# Patient Record
Sex: FEMALE | Race: WHITE | Employment: UNEMPLOYED | URBAN - METROPOLITAN AREA
[De-identification: names, ages, dates, MRNs, and addresses within clinical notes are randomized per-mention and may not be internally consistent; named-entity substitution may affect disease eponyms.]

---

## 2018-09-19 ENCOUNTER — OFFICE VISIT (OUTPATIENT)
Dept: ORTHOPEDIC SURGERY | Age: 58
End: 2018-09-19

## 2018-09-19 VITALS — BODY MASS INDEX: 31.58 KG/M2 | HEIGHT: 64 IN | WEIGHT: 185 LBS

## 2018-09-19 DIAGNOSIS — M76.61 ACHILLES TENDINITIS OF RIGHT LOWER EXTREMITY: ICD-10-CM

## 2018-09-19 DIAGNOSIS — M79.671 PAIN OF RIGHT HEEL: Primary | ICD-10-CM

## 2018-09-19 PROCEDURE — 99203 OFFICE O/P NEW LOW 30 MIN: CPT | Performed by: PHYSICIAN ASSISTANT

## 2018-09-19 PROCEDURE — L4361 PNEUMA/VAC WALK BOOT PRE OTS: HCPCS | Performed by: PHYSICIAN ASSISTANT

## 2018-09-19 RX ORDER — AMLODIPINE BESYLATE 10 MG/1
10 TABLET ORAL DAILY
COMMUNITY
Start: 2018-05-09

## 2018-09-19 NOTE — PROGRESS NOTES
T & A     Current Medications:     Current Outpatient Prescriptions:     amLODIPine (NORVASC) 10 MG tablet, Take 10 mg by mouth, Disp: , Rfl:     Etanercept (ENBREL SC), Inject  into the skin twice a week.  , Disp: , Rfl:     celecoxib (CELEBREX) 200 MG capsule, Take 200 mg by mouth daily. , Disp: , Rfl:     thyroid (ARMOUR) 240 MG tablet, Take 240 mg by mouth daily. , Disp: , Rfl:     Nutritional Supplements (DHEA PO), Take 10 mg by mouth three times a week., Disp: , Rfl:   Allergies:  Adhesive tape; Gluten; Plaquenil [hydroxychloroquine sulfate]; Vicodin [hydrocodone-acetaminophen]; Benzamide derivatives; and Codeine  Social History:    reports that she has never smoked. She has never used smokeless tobacco. She reports that she drinks alcohol. She reports that she does not use drugs. Family History:   Family History   Problem Relation Age of Onset    Heart Disease Father         enlarged heart    Cancer Brother        REVIEW OF SYSTEMS:   For new problems, a full review of systems will be found scanned in the patient's chart. CONSTITUTIONAL: Denies unexplained weight loss, fevers, chills   NEUROLOGICAL: Denies unsteady gait or progressive weakness  SKIN: Denies skin changes, delayed healing, rash, itching       PHYSICAL EXAM:    Vitals: Height 5' 4\" (1.626 m), weight 185 lb (83.9 kg), last menstrual period 09/01/2011. GENERAL EXAM:  · General Apparence: Patient is adequately groomed with no evidence of malnutrition. · Orientation: The patient is oriented to time, place and person. · Mood & Affect:The patient's mood and affect are appropriate       Right Achilles tendon PHYSICAL EXAMINATION:  · Inspection:  No visible deformities noted    · Palpation:  Tenderness to palpation at the distal Achilles insertion and retro-Achilles space      · Range of Motion: Range of motion limited by swelling used to chief complaint    · Strength: No gross strength deficits.   Strength limited only by She is going to follow up with Dr. Khari Mercer into 3 weeks. Eduardo Proctor, [facility]    This dictation was performed with a verbal recognition program [facility]) and it was checked for errors. It is possible that there are still dictated errors within this office note. If so, please bring any errors to my attention for an addendum. All efforts were made to ensure that this office note is accurate.

## 2018-09-20 ENCOUNTER — TELEPHONE (OUTPATIENT)
Dept: ORTHOPEDIC SURGERY | Age: 58
End: 2018-09-20

## 2018-10-12 ENCOUNTER — OFFICE VISIT (OUTPATIENT)
Dept: ORTHOPEDIC SURGERY | Age: 58
End: 2018-10-12
Payer: COMMERCIAL

## 2018-10-12 DIAGNOSIS — M76.61 TENDONITIS, ACHILLES, RIGHT: Primary | ICD-10-CM

## 2018-10-12 PROCEDURE — 99243 OFF/OP CNSLTJ NEW/EST LOW 30: CPT | Performed by: ORTHOPAEDIC SURGERY

## 2018-10-12 RX ORDER — PREDNISONE 10 MG/1
TABLET ORAL
Qty: 14 TABLET | Refills: 0 | Status: ON HOLD | OUTPATIENT
Start: 2018-10-12 | End: 2019-12-11 | Stop reason: ALTCHOICE

## 2018-10-13 NOTE — PROGRESS NOTES
Additional Examinations:         Left Lower Extremity: Examination of the left lower extremity does not show any tenderness, deformity or injury. Range of motion is unremarkable. There is no gross instability. There are no rashes, ulcerations or lesions. Strength and tone are normal.    Radiology:     X-rays obtained and reviewed in office:  Views 2  Location right calcaneus  Impression obtained previously shows evidence of some insertional Achilles spurring with Christa's deformity          Assessment : This patient has insertional Achilles tendinosis. Impression:  Encounter Diagnosis   Name Primary?  Tendonitis, Achilles, right Yes       Office Procedures:  No orders of the defined types were placed in this encounter. Treatment Plan:  I spent 30 minutes with this patient greater than 50% of the time face-to-face discussing treatment options. I wrote out her plan of care and copy to the media section of her chart. She is going to continue with her boot but use crutches as needed I put her on prednisone and I will see her back in 3 weeks at which point if she is not significantly improved I would recommend that we scan her and discuss surgical options.   Her hypersensitivity and fibromyalgia would be a problem with anything surgical

## 2018-11-02 ENCOUNTER — TELEPHONE (OUTPATIENT)
Dept: ORTHOPEDIC SURGERY | Age: 58
End: 2018-11-02

## 2018-11-02 ENCOUNTER — OFFICE VISIT (OUTPATIENT)
Dept: ORTHOPEDIC SURGERY | Age: 58
End: 2018-11-02
Payer: COMMERCIAL

## 2018-11-02 VITALS
HEIGHT: 64 IN | HEART RATE: 76 BPM | SYSTOLIC BLOOD PRESSURE: 135 MMHG | DIASTOLIC BLOOD PRESSURE: 87 MMHG | BODY MASS INDEX: 31.58 KG/M2 | WEIGHT: 184.97 LBS

## 2018-11-02 DIAGNOSIS — S86.001S ACHILLES TENDON INJURY, RIGHT, SEQUELA: Primary | ICD-10-CM

## 2018-11-02 DIAGNOSIS — S86.001D INJURY OF RIGHT ACHILLES TENDON, SUBSEQUENT ENCOUNTER: Primary | ICD-10-CM

## 2018-11-02 PROCEDURE — 99212 OFFICE O/P EST SF 10 MIN: CPT | Performed by: ORTHOPAEDIC SURGERY

## 2018-11-09 ENCOUNTER — TELEPHONE (OUTPATIENT)
Dept: ORTHOPEDIC SURGERY | Age: 58
End: 2018-11-09

## 2019-01-30 ENCOUNTER — OFFICE VISIT (OUTPATIENT)
Dept: ORTHOPEDIC SURGERY | Age: 59
End: 2019-01-30
Payer: COMMERCIAL

## 2019-01-30 VITALS — WEIGHT: 184.97 LBS | HEIGHT: 64 IN | BODY MASS INDEX: 31.58 KG/M2

## 2019-01-30 DIAGNOSIS — S86.001D INJURY OF RIGHT ACHILLES TENDON, SUBSEQUENT ENCOUNTER: Primary | ICD-10-CM

## 2019-01-30 PROCEDURE — 99212 OFFICE O/P EST SF 10 MIN: CPT | Performed by: ORTHOPAEDIC SURGERY

## 2019-12-10 ENCOUNTER — ANESTHESIA EVENT (OUTPATIENT)
Dept: OPERATING ROOM | Age: 59
DRG: 472 | End: 2019-12-10
Payer: COMMERCIAL

## 2019-12-11 ENCOUNTER — HOSPITAL ENCOUNTER (INPATIENT)
Age: 59
LOS: 4 days | Discharge: HOME | DRG: 472 | End: 2019-12-15
Attending: NEUROLOGICAL SURGERY | Admitting: NEUROLOGICAL SURGERY
Payer: COMMERCIAL

## 2019-12-11 ENCOUNTER — ANESTHESIA (OUTPATIENT)
Dept: OPERATING ROOM | Age: 59
DRG: 472 | End: 2019-12-11
Payer: COMMERCIAL

## 2019-12-11 ENCOUNTER — APPOINTMENT (OUTPATIENT)
Dept: GENERAL RADIOLOGY | Age: 59
DRG: 472 | End: 2019-12-11
Attending: NEUROLOGICAL SURGERY
Payer: COMMERCIAL

## 2019-12-11 VITALS — SYSTOLIC BLOOD PRESSURE: 81 MMHG | TEMPERATURE: 69.4 F | DIASTOLIC BLOOD PRESSURE: 51 MMHG | OXYGEN SATURATION: 91 %

## 2019-12-11 DIAGNOSIS — M47.22 CERVICAL SPONDYLOSIS WITH RADICULOPATHY: Primary | ICD-10-CM

## 2019-12-11 LAB
ABO/RH: NORMAL
ANTIBODY SCREEN: NORMAL
BASOPHILS ABSOLUTE: 0 K/UL (ref 0–0.2)
BASOPHILS RELATIVE PERCENT: 0.7 %
EOSINOPHILS ABSOLUTE: 0.1 K/UL (ref 0–0.6)
EOSINOPHILS RELATIVE PERCENT: 2 %
HCT VFR BLD CALC: 43.3 % (ref 36–48)
HEMOGLOBIN: 14.9 G/DL (ref 12–16)
LYMPHOCYTES ABSOLUTE: 1.1 K/UL (ref 1–5.1)
LYMPHOCYTES RELATIVE PERCENT: 15.9 %
MCH RBC QN AUTO: 32.2 PG (ref 26–34)
MCHC RBC AUTO-ENTMCNC: 34.5 G/DL (ref 31–36)
MCV RBC AUTO: 93.2 FL (ref 80–100)
MONOCYTES ABSOLUTE: 0.8 K/UL (ref 0–1.3)
MONOCYTES RELATIVE PERCENT: 11.6 %
NEUTROPHILS ABSOLUTE: 4.6 K/UL (ref 1.7–7.7)
NEUTROPHILS RELATIVE PERCENT: 69.8 %
PDW BLD-RTO: 13.2 % (ref 12.4–15.4)
PLATELET # BLD: 244 K/UL (ref 135–450)
PMV BLD AUTO: 10.1 FL (ref 5–10.5)
RBC # BLD: 4.65 M/UL (ref 4–5.2)
WBC # BLD: 6.6 K/UL (ref 4–11)

## 2019-12-11 PROCEDURE — 86900 BLOOD TYPING SEROLOGIC ABO: CPT

## 2019-12-11 PROCEDURE — 3209999900 FLUORO FOR SURGICAL PROCEDURES

## 2019-12-11 PROCEDURE — 6360000002 HC RX W HCPCS: Performed by: ANESTHESIOLOGY

## 2019-12-11 PROCEDURE — 86901 BLOOD TYPING SEROLOGIC RH(D): CPT

## 2019-12-11 PROCEDURE — 2580000003 HC RX 258: Performed by: ANESTHESIOLOGY

## 2019-12-11 PROCEDURE — 6360000002 HC RX W HCPCS: Performed by: NEUROLOGICAL SURGERY

## 2019-12-11 PROCEDURE — 6370000000 HC RX 637 (ALT 250 FOR IP): Performed by: ANESTHESIOLOGY

## 2019-12-11 PROCEDURE — 7100000001 HC PACU RECOVERY - ADDTL 15 MIN: Performed by: NEUROLOGICAL SURGERY

## 2019-12-11 PROCEDURE — 72040 X-RAY EXAM NECK SPINE 2-3 VW: CPT

## 2019-12-11 PROCEDURE — 2780000010 HC IMPLANT OTHER: Performed by: NEUROLOGICAL SURGERY

## 2019-12-11 PROCEDURE — 2709999900 HC NON-CHARGEABLE SUPPLY: Performed by: NEUROLOGICAL SURGERY

## 2019-12-11 PROCEDURE — 7100000000 HC PACU RECOVERY - FIRST 15 MIN: Performed by: NEUROLOGICAL SURGERY

## 2019-12-11 PROCEDURE — 0RB30ZZ EXCISION OF CERVICAL VERTEBRAL DISC, OPEN APPROACH: ICD-10-PCS | Performed by: NEUROLOGICAL SURGERY

## 2019-12-11 PROCEDURE — 2500000003 HC RX 250 WO HCPCS: Performed by: NURSE ANESTHETIST, CERTIFIED REGISTERED

## 2019-12-11 PROCEDURE — 6360000002 HC RX W HCPCS: Performed by: NURSE ANESTHETIST, CERTIFIED REGISTERED

## 2019-12-11 PROCEDURE — 6370000000 HC RX 637 (ALT 250 FOR IP): Performed by: NEUROLOGICAL SURGERY

## 2019-12-11 PROCEDURE — 86850 RBC ANTIBODY SCREEN: CPT

## 2019-12-11 PROCEDURE — 2500000003 HC RX 250 WO HCPCS: Performed by: ANESTHESIOLOGY

## 2019-12-11 PROCEDURE — 2580000003 HC RX 258: Performed by: NURSE ANESTHETIST, CERTIFIED REGISTERED

## 2019-12-11 PROCEDURE — C1713 ANCHOR/SCREW BN/BN,TIS/BN: HCPCS | Performed by: NEUROLOGICAL SURGERY

## 2019-12-11 PROCEDURE — 85025 COMPLETE CBC W/AUTO DIFF WBC: CPT

## 2019-12-11 PROCEDURE — 3600000004 HC SURGERY LEVEL 4 BASE: Performed by: NEUROLOGICAL SURGERY

## 2019-12-11 PROCEDURE — 0RG20A0 FUSION OF 2 OR MORE CERVICAL VERTEBRAL JOINTS WITH INTERBODY FUSION DEVICE, ANTERIOR APPROACH, ANTERIOR COLUMN, OPEN APPROACH: ICD-10-PCS | Performed by: NEUROLOGICAL SURGERY

## 2019-12-11 PROCEDURE — 2500000003 HC RX 250 WO HCPCS: Performed by: NEUROLOGICAL SURGERY

## 2019-12-11 PROCEDURE — 3600000014 HC SURGERY LEVEL 4 ADDTL 15MIN: Performed by: NEUROLOGICAL SURGERY

## 2019-12-11 PROCEDURE — G0378 HOSPITAL OBSERVATION PER HR: HCPCS

## 2019-12-11 PROCEDURE — 2580000003 HC RX 258: Performed by: NEUROLOGICAL SURGERY

## 2019-12-11 PROCEDURE — 51701 INSERT BLADDER CATHETER: CPT

## 2019-12-11 PROCEDURE — 3700000000 HC ANESTHESIA ATTENDED CARE: Performed by: NEUROLOGICAL SURGERY

## 2019-12-11 PROCEDURE — 2720000010 HC SURG SUPPLY STERILE: Performed by: NEUROLOGICAL SURGERY

## 2019-12-11 PROCEDURE — 51798 US URINE CAPACITY MEASURE: CPT

## 2019-12-11 PROCEDURE — 3700000001 HC ADD 15 MINUTES (ANESTHESIA): Performed by: NEUROLOGICAL SURGERY

## 2019-12-11 PROCEDURE — 1200000000 HC SEMI PRIVATE

## 2019-12-11 DEVICE — IMPLANTABLE DEVICE
Type: IMPLANTABLE DEVICE | Site: SPINE CERVICAL | Status: FUNCTIONAL
Brand: VISTA®-S

## 2019-12-11 DEVICE — IMPLANTABLE DEVICE
Type: IMPLANTABLE DEVICE | Site: SPINE CERVICAL | Status: FUNCTIONAL
Brand: TRINICA® TRINICA®

## 2019-12-11 DEVICE — PRIMAGEN 1CC: Type: IMPLANTABLE DEVICE | Site: SPINE CERVICAL | Status: FUNCTIONAL

## 2019-12-11 RX ORDER — DIPHENHYDRAMINE HYDROCHLORIDE 50 MG/ML
12.5 INJECTION INTRAMUSCULAR; INTRAVENOUS
Status: DISCONTINUED | OUTPATIENT
Start: 2019-12-11 | End: 2019-12-11 | Stop reason: HOSPADM

## 2019-12-11 RX ORDER — SODIUM CHLORIDE, SODIUM LACTATE, POTASSIUM CHLORIDE, CALCIUM CHLORIDE 600; 310; 30; 20 MG/100ML; MG/100ML; MG/100ML; MG/100ML
INJECTION, SOLUTION INTRAVENOUS CONTINUOUS PRN
Status: DISCONTINUED | OUTPATIENT
Start: 2019-12-11 | End: 2019-12-11 | Stop reason: SDUPTHER

## 2019-12-11 RX ORDER — HYDROMORPHONE HCL 110MG/55ML
PATIENT CONTROLLED ANALGESIA SYRINGE INTRAVENOUS PRN
Status: DISCONTINUED | OUTPATIENT
Start: 2019-12-11 | End: 2019-12-11 | Stop reason: SDUPTHER

## 2019-12-11 RX ORDER — SODIUM CHLORIDE, SODIUM LACTATE, POTASSIUM CHLORIDE, CALCIUM CHLORIDE 600; 310; 30; 20 MG/100ML; MG/100ML; MG/100ML; MG/100ML
INJECTION, SOLUTION INTRAVENOUS CONTINUOUS
Status: DISCONTINUED | OUTPATIENT
Start: 2019-12-11 | End: 2019-12-11

## 2019-12-11 RX ORDER — OXYCODONE HYDROCHLORIDE 5 MG/1
10 TABLET ORAL EVERY 4 HOURS PRN
Status: DISCONTINUED | OUTPATIENT
Start: 2019-12-11 | End: 2019-12-13

## 2019-12-11 RX ORDER — AMLODIPINE BESYLATE 10 MG/1
10 TABLET ORAL DAILY
Status: DISCONTINUED | OUTPATIENT
Start: 2019-12-11 | End: 2019-12-15 | Stop reason: HOSPADM

## 2019-12-11 RX ORDER — FOLIC ACID 1 MG/1
1 TABLET ORAL DAILY
COMMUNITY

## 2019-12-11 RX ORDER — OXYCODONE HYDROCHLORIDE 5 MG/1
10 TABLET ORAL ONCE
Status: COMPLETED | OUTPATIENT
Start: 2019-12-11 | End: 2019-12-11

## 2019-12-11 RX ORDER — SUCCINYLCHOLINE/SOD CL,ISO/PF 200MG/10ML
SYRINGE (ML) INTRAVENOUS PRN
Status: DISCONTINUED | OUTPATIENT
Start: 2019-12-11 | End: 2019-12-11 | Stop reason: SDUPTHER

## 2019-12-11 RX ORDER — OXYCODONE HYDROCHLORIDE 5 MG/1
5 TABLET ORAL EVERY 4 HOURS PRN
Status: DISCONTINUED | OUTPATIENT
Start: 2019-12-11 | End: 2019-12-13

## 2019-12-11 RX ORDER — APREPITANT 40 MG/1
40 CAPSULE ORAL ONCE
Status: COMPLETED | OUTPATIENT
Start: 2019-12-11 | End: 2019-12-11

## 2019-12-11 RX ORDER — SODIUM CHLORIDE 9 MG/ML
INJECTION, SOLUTION INTRAVENOUS CONTINUOUS
Status: DISCONTINUED | OUTPATIENT
Start: 2019-12-11 | End: 2019-12-11

## 2019-12-11 RX ORDER — FENTANYL CITRATE 50 UG/ML
50 INJECTION, SOLUTION INTRAMUSCULAR; INTRAVENOUS EVERY 5 MIN PRN
Status: DISCONTINUED | OUTPATIENT
Start: 2019-12-11 | End: 2019-12-11 | Stop reason: HOSPADM

## 2019-12-11 RX ORDER — SODIUM CHLORIDE 9 MG/ML
INJECTION, SOLUTION INTRAVENOUS CONTINUOUS
Status: ACTIVE | OUTPATIENT
Start: 2019-12-11 | End: 2019-12-12

## 2019-12-11 RX ORDER — SODIUM CHLORIDE 0.9 % (FLUSH) 0.9 %
10 SYRINGE (ML) INJECTION EVERY 12 HOURS SCHEDULED
Status: DISCONTINUED | OUTPATIENT
Start: 2019-12-11 | End: 2019-12-15 | Stop reason: HOSPADM

## 2019-12-11 RX ORDER — MIDAZOLAM HYDROCHLORIDE 1 MG/ML
INJECTION INTRAMUSCULAR; INTRAVENOUS PRN
Status: DISCONTINUED | OUTPATIENT
Start: 2019-12-11 | End: 2019-12-11 | Stop reason: SDUPTHER

## 2019-12-11 RX ORDER — ROCURONIUM BROMIDE 10 MG/ML
INJECTION, SOLUTION INTRAVENOUS PRN
Status: DISCONTINUED | OUTPATIENT
Start: 2019-12-11 | End: 2019-12-11 | Stop reason: SDUPTHER

## 2019-12-11 RX ORDER — SODIUM CHLORIDE 0.9 % (FLUSH) 0.9 %
10 SYRINGE (ML) INJECTION PRN
Status: DISCONTINUED | OUTPATIENT
Start: 2019-12-11 | End: 2019-12-11 | Stop reason: HOSPADM

## 2019-12-11 RX ORDER — METHOCARBAMOL 750 MG/1
750 TABLET, FILM COATED ORAL EVERY 6 HOURS PRN
Status: DISCONTINUED | OUTPATIENT
Start: 2019-12-12 | End: 2019-12-13

## 2019-12-11 RX ORDER — METHOCARBAMOL 750 MG/1
750 TABLET, FILM COATED ORAL 4 TIMES DAILY
Status: ON HOLD | COMMUNITY
End: 2020-08-29 | Stop reason: HOSPADM

## 2019-12-11 RX ORDER — HEPARIN SODIUM 5000 [USP'U]/ML
5000 INJECTION, SOLUTION INTRAVENOUS; SUBCUTANEOUS EVERY 8 HOURS
Status: DISCONTINUED | OUTPATIENT
Start: 2019-12-12 | End: 2019-12-15 | Stop reason: HOSPADM

## 2019-12-11 RX ORDER — FENTANYL CITRATE 50 UG/ML
25 INJECTION, SOLUTION INTRAMUSCULAR; INTRAVENOUS EVERY 5 MIN PRN
Status: DISCONTINUED | OUTPATIENT
Start: 2019-12-11 | End: 2019-12-11 | Stop reason: HOSPADM

## 2019-12-11 RX ORDER — LABETALOL 20 MG/4 ML (5 MG/ML) INTRAVENOUS SYRINGE
5 EVERY 10 MIN PRN
Status: DISCONTINUED | OUTPATIENT
Start: 2019-12-11 | End: 2019-12-11 | Stop reason: HOSPADM

## 2019-12-11 RX ORDER — ONDANSETRON 2 MG/ML
4 INJECTION INTRAMUSCULAR; INTRAVENOUS EVERY 6 HOURS PRN
Status: DISCONTINUED | OUTPATIENT
Start: 2019-12-11 | End: 2019-12-15 | Stop reason: HOSPADM

## 2019-12-11 RX ORDER — LIDOCAINE HYDROCHLORIDE ANHYDROUS AND DEXTROSE MONOHYDRATE .4; 5 G/100ML; G/100ML
INJECTION, SOLUTION INTRAVENOUS CONTINUOUS PRN
Status: DISCONTINUED | OUTPATIENT
Start: 2019-12-11 | End: 2019-12-11 | Stop reason: SDUPTHER

## 2019-12-11 RX ORDER — POLYETHYLENE GLYCOL 3350 17 G/17G
17 POWDER, FOR SOLUTION ORAL DAILY PRN
Status: DISCONTINUED | OUTPATIENT
Start: 2019-12-11 | End: 2019-12-15 | Stop reason: HOSPADM

## 2019-12-11 RX ORDER — ESMOLOL HYDROCHLORIDE 10 MG/ML
20 INJECTION INTRAVENOUS ONCE
Status: DISCONTINUED | OUTPATIENT
Start: 2019-12-11 | End: 2019-12-11 | Stop reason: HOSPADM

## 2019-12-11 RX ORDER — PROPOFOL 10 MG/ML
INJECTION, EMULSION INTRAVENOUS CONTINUOUS PRN
Status: DISCONTINUED | OUTPATIENT
Start: 2019-12-11 | End: 2019-12-11 | Stop reason: SDUPTHER

## 2019-12-11 RX ORDER — TRIAMCINOLONE ACETONIDE 0.25 MG/G
CREAM TOPICAL EVERY 4 HOURS PRN
COMMUNITY

## 2019-12-11 RX ORDER — SCOLOPAMINE TRANSDERMAL SYSTEM 1 MG/1
1 PATCH, EXTENDED RELEASE TRANSDERMAL
Status: DISCONTINUED | OUTPATIENT
Start: 2019-12-11 | End: 2019-12-11

## 2019-12-11 RX ORDER — HYDRALAZINE HYDROCHLORIDE 20 MG/ML
5 INJECTION INTRAMUSCULAR; INTRAVENOUS EVERY 10 MIN PRN
Status: DISCONTINUED | OUTPATIENT
Start: 2019-12-11 | End: 2019-12-11 | Stop reason: HOSPADM

## 2019-12-11 RX ORDER — SENNA AND DOCUSATE SODIUM 50; 8.6 MG/1; MG/1
1 TABLET, FILM COATED ORAL 2 TIMES DAILY
Status: DISCONTINUED | OUTPATIENT
Start: 2019-12-11 | End: 2019-12-15 | Stop reason: HOSPADM

## 2019-12-11 RX ORDER — LEVOTHYROXINE AND LIOTHYRONINE 19; 4.5 UG/1; UG/1
120 TABLET ORAL DAILY
Status: DISCONTINUED | OUTPATIENT
Start: 2019-12-12 | End: 2019-12-15 | Stop reason: HOSPADM

## 2019-12-11 RX ORDER — ONDANSETRON 2 MG/ML
4 INJECTION INTRAMUSCULAR; INTRAVENOUS
Status: COMPLETED | OUTPATIENT
Start: 2019-12-11 | End: 2019-12-11

## 2019-12-11 RX ORDER — ESMOLOL HYDROCHLORIDE 10 MG/ML
20 INJECTION INTRAVENOUS ONCE
Status: COMPLETED | OUTPATIENT
Start: 2019-12-11 | End: 2019-12-11

## 2019-12-11 RX ORDER — PROCHLORPERAZINE EDISYLATE 5 MG/ML
10 INJECTION INTRAMUSCULAR; INTRAVENOUS EVERY 6 HOURS PRN
Status: DISCONTINUED | OUTPATIENT
Start: 2019-12-11 | End: 2019-12-15 | Stop reason: HOSPADM

## 2019-12-11 RX ORDER — FENTANYL CITRATE 50 UG/ML
INJECTION, SOLUTION INTRAMUSCULAR; INTRAVENOUS PRN
Status: DISCONTINUED | OUTPATIENT
Start: 2019-12-11 | End: 2019-12-11 | Stop reason: SDUPTHER

## 2019-12-11 RX ORDER — HYDROCHLOROTHIAZIDE 12.5 MG/1
12.5 CAPSULE, GELATIN COATED ORAL DAILY
COMMUNITY
End: 2020-08-26

## 2019-12-11 RX ORDER — DIAZEPAM 5 MG/1
5 TABLET ORAL EVERY 6 HOURS PRN
Status: DISCONTINUED | OUTPATIENT
Start: 2019-12-11 | End: 2019-12-15 | Stop reason: HOSPADM

## 2019-12-11 RX ORDER — DEXAMETHASONE SODIUM PHOSPHATE 4 MG/ML
INJECTION, SOLUTION INTRA-ARTICULAR; INTRALESIONAL; INTRAMUSCULAR; INTRAVENOUS; SOFT TISSUE PRN
Status: DISCONTINUED | OUTPATIENT
Start: 2019-12-11 | End: 2019-12-11 | Stop reason: SDUPTHER

## 2019-12-11 RX ORDER — SODIUM CHLORIDE 0.9 % (FLUSH) 0.9 %
10 SYRINGE (ML) INJECTION PRN
Status: DISCONTINUED | OUTPATIENT
Start: 2019-12-11 | End: 2019-12-15 | Stop reason: HOSPADM

## 2019-12-11 RX ORDER — PROCHLORPERAZINE EDISYLATE 5 MG/ML
5 INJECTION INTRAMUSCULAR; INTRAVENOUS
Status: DISCONTINUED | OUTPATIENT
Start: 2019-12-11 | End: 2019-12-11 | Stop reason: HOSPADM

## 2019-12-11 RX ORDER — PROPOFOL 10 MG/ML
INJECTION, EMULSION INTRAVENOUS PRN
Status: DISCONTINUED | OUTPATIENT
Start: 2019-12-11 | End: 2019-12-11 | Stop reason: SDUPTHER

## 2019-12-11 RX ORDER — SODIUM CHLORIDE 0.9 % (FLUSH) 0.9 %
10 SYRINGE (ML) INJECTION EVERY 12 HOURS SCHEDULED
Status: DISCONTINUED | OUTPATIENT
Start: 2019-12-11 | End: 2019-12-11 | Stop reason: HOSPADM

## 2019-12-11 RX ORDER — ACETAMINOPHEN 325 MG/1
650 TABLET ORAL EVERY 4 HOURS PRN
Status: DISCONTINUED | OUTPATIENT
Start: 2019-12-11 | End: 2019-12-12

## 2019-12-11 RX ORDER — MEPERIDINE HYDROCHLORIDE 25 MG/ML
12.5 INJECTION INTRAMUSCULAR; INTRAVENOUS; SUBCUTANEOUS EVERY 5 MIN PRN
Status: DISCONTINUED | OUTPATIENT
Start: 2019-12-11 | End: 2019-12-11 | Stop reason: HOSPADM

## 2019-12-11 RX ORDER — LIDOCAINE HYDROCHLORIDE 20 MG/ML
INJECTION, SOLUTION INTRAVENOUS PRN
Status: DISCONTINUED | OUTPATIENT
Start: 2019-12-11 | End: 2019-12-11 | Stop reason: SDUPTHER

## 2019-12-11 RX ORDER — BISACODYL 10 MG
10 SUPPOSITORY, RECTAL RECTAL DAILY PRN
Status: DISCONTINUED | OUTPATIENT
Start: 2019-12-11 | End: 2019-12-15 | Stop reason: HOSPADM

## 2019-12-11 RX ADMIN — SUGAMMADEX 200 MG: 100 INJECTION, SOLUTION INTRAVENOUS at 08:40

## 2019-12-11 RX ADMIN — OXYCODONE 10 MG: 5 TABLET ORAL at 13:09

## 2019-12-11 RX ADMIN — ONDANSETRON 4 MG: 2 INJECTION INTRAMUSCULAR; INTRAVENOUS at 07:46

## 2019-12-11 RX ADMIN — SODIUM CHLORIDE, POTASSIUM CHLORIDE, SODIUM LACTATE AND CALCIUM CHLORIDE: 600; 310; 30; 20 INJECTION, SOLUTION INTRAVENOUS at 06:15

## 2019-12-11 RX ADMIN — Medication 140 MG: at 07:39

## 2019-12-11 RX ADMIN — ONDANSETRON 4 MG: 2 INJECTION INTRAMUSCULAR; INTRAVENOUS at 10:40

## 2019-12-11 RX ADMIN — SENNOSIDES AND DOCUSATE SODIUM 1 TABLET: 8.6; 5 TABLET ORAL at 19:51

## 2019-12-11 RX ADMIN — OXYCODONE 10 MG: 5 TABLET ORAL at 17:26

## 2019-12-11 RX ADMIN — SODIUM CHLORIDE, SODIUM LACTATE, POTASSIUM CHLORIDE, AND CALCIUM CHLORIDE: 600; 310; 30; 20 INJECTION, SOLUTION INTRAVENOUS at 07:30

## 2019-12-11 RX ADMIN — SODIUM CHLORIDE: 9 INJECTION, SOLUTION INTRAVENOUS at 11:34

## 2019-12-11 RX ADMIN — LIDOCAINE HYDROCHLORIDE ANHYDROUS AND DEXTROSE MONOHYDRATE 2 MG/MIN: .4; 5 INJECTION, SOLUTION INTRAVENOUS at 07:48

## 2019-12-11 RX ADMIN — HYDROMORPHONE HYDROCHLORIDE 0.5 MG: 1 INJECTION, SOLUTION INTRAMUSCULAR; INTRAVENOUS; SUBCUTANEOUS at 12:00

## 2019-12-11 RX ADMIN — AMLODIPINE BESYLATE 10 MG: 10 TABLET ORAL at 19:51

## 2019-12-11 RX ADMIN — HYDROMORPHONE HYDROCHLORIDE 0.5 MG: 1 INJECTION, SOLUTION INTRAMUSCULAR; INTRAVENOUS; SUBCUTANEOUS at 13:00

## 2019-12-11 RX ADMIN — SODIUM CHLORIDE, POTASSIUM CHLORIDE, SODIUM LACTATE AND CALCIUM CHLORIDE: 600; 310; 30; 20 INJECTION, SOLUTION INTRAVENOUS at 06:30

## 2019-12-11 RX ADMIN — SODIUM CHLORIDE, SODIUM LACTATE, POTASSIUM CHLORIDE, AND CALCIUM CHLORIDE: 600; 310; 30; 20 INJECTION, SOLUTION INTRAVENOUS at 09:28

## 2019-12-11 RX ADMIN — HYDROMORPHONE HYDROCHLORIDE 0.5 MG: 1 INJECTION, SOLUTION INTRAMUSCULAR; INTRAVENOUS; SUBCUTANEOUS at 15:40

## 2019-12-11 RX ADMIN — DEXAMETHASONE SODIUM PHOSPHATE 12 MG: 4 INJECTION, SOLUTION INTRAMUSCULAR; INTRAVENOUS at 07:46

## 2019-12-11 RX ADMIN — FENTANYL CITRATE 100 MCG: 50 INJECTION INTRAMUSCULAR; INTRAVENOUS at 07:31

## 2019-12-11 RX ADMIN — PROPOFOL 100 MCG/KG/MIN: 10 INJECTION, EMULSION INTRAVENOUS at 07:48

## 2019-12-11 RX ADMIN — CEFAZOLIN SODIUM 2 G: 10 INJECTION, POWDER, FOR SOLUTION INTRAVENOUS at 23:59

## 2019-12-11 RX ADMIN — APREPITANT 40 MG: 40 CAPSULE ORAL at 07:02

## 2019-12-11 RX ADMIN — REMIFENTANIL HYDROCHLORIDE 0.1 MCG/KG/MIN: 1 INJECTION, POWDER, LYOPHILIZED, FOR SOLUTION INTRAVENOUS at 07:46

## 2019-12-11 RX ADMIN — ESMOLOL HYDROCHLORIDE 20 MG: 10 INJECTION, SOLUTION INTRAVENOUS at 11:58

## 2019-12-11 RX ADMIN — METHOCARBAMOL 1000 MG: 100 INJECTION, SOLUTION INTRAMUSCULAR; INTRAVENOUS at 11:34

## 2019-12-11 RX ADMIN — MIDAZOLAM HYDROCHLORIDE 2 MG: 2 INJECTION, SOLUTION INTRAMUSCULAR; INTRAVENOUS at 07:34

## 2019-12-11 RX ADMIN — MIDAZOLAM HYDROCHLORIDE 2 MG: 2 INJECTION, SOLUTION INTRAMUSCULAR; INTRAVENOUS at 07:30

## 2019-12-11 RX ADMIN — OXYCODONE 10 MG: 5 TABLET ORAL at 21:47

## 2019-12-11 RX ADMIN — SUGAMMADEX 200 MG: 100 INJECTION, SOLUTION INTRAVENOUS at 09:10

## 2019-12-11 RX ADMIN — FENTANYL CITRATE 50 MCG: 50 INJECTION INTRAMUSCULAR; INTRAVENOUS at 11:49

## 2019-12-11 RX ADMIN — Medication 2 G: at 07:45

## 2019-12-11 RX ADMIN — PROPOFOL 200 MG: 10 INJECTION, EMULSION INTRAVENOUS at 07:39

## 2019-12-11 RX ADMIN — PHENYLEPHRINE HYDROCHLORIDE 100 MCG: 10 INJECTION, SOLUTION INTRAMUSCULAR; INTRAVENOUS; SUBCUTANEOUS at 08:57

## 2019-12-11 RX ADMIN — HYDROMORPHONE HYDROCHLORIDE 0.5 MG: 1 INJECTION, SOLUTION INTRAMUSCULAR; INTRAVENOUS; SUBCUTANEOUS at 13:14

## 2019-12-11 RX ADMIN — HYDROMORPHONE HYDROCHLORIDE 0.5 MG: 1 INJECTION, SOLUTION INTRAMUSCULAR; INTRAVENOUS; SUBCUTANEOUS at 23:59

## 2019-12-11 RX ADMIN — METHOCARBAMOL 1000 MG: 100 INJECTION, SOLUTION INTRAMUSCULAR; INTRAVENOUS at 18:44

## 2019-12-11 RX ADMIN — HYDROMORPHONE HYDROCHLORIDE 0.5 MG: 1 INJECTION, SOLUTION INTRAMUSCULAR; INTRAVENOUS; SUBCUTANEOUS at 11:50

## 2019-12-11 RX ADMIN — FENTANYL CITRATE 100 MCG: 50 INJECTION INTRAMUSCULAR; INTRAVENOUS at 07:39

## 2019-12-11 RX ADMIN — HYDROMORPHONE HYDROCHLORIDE 2 MG: 2 INJECTION, SOLUTION INTRAMUSCULAR; INTRAVENOUS; SUBCUTANEOUS at 08:07

## 2019-12-11 RX ADMIN — Medication 2 G: at 15:40

## 2019-12-11 RX ADMIN — DIAZEPAM 5 MG: 5 TABLET ORAL at 19:52

## 2019-12-11 RX ADMIN — FENTANYL CITRATE 50 MCG: 50 INJECTION INTRAMUSCULAR; INTRAVENOUS at 11:35

## 2019-12-11 RX ADMIN — ROCURONIUM BROMIDE 50 MG: 10 INJECTION, SOLUTION INTRAVENOUS at 08:19

## 2019-12-11 RX ADMIN — LIDOCAINE HYDROCHLORIDE 100 MG: 20 INJECTION, SOLUTION INTRAVENOUS at 07:34

## 2019-12-11 RX ADMIN — HYDROMORPHONE HYDROCHLORIDE 0.5 MG: 1 INJECTION, SOLUTION INTRAMUSCULAR; INTRAVENOUS; SUBCUTANEOUS at 18:30

## 2019-12-11 RX ADMIN — DIAZEPAM 5 MG: 5 TABLET ORAL at 12:44

## 2019-12-11 RX ADMIN — REMIFENTANIL HYDROCHLORIDE 0.25 MCG/KG/MIN: 1 INJECTION, POWDER, LYOPHILIZED, FOR SOLUTION INTRAVENOUS at 10:23

## 2019-12-11 ASSESSMENT — PULMONARY FUNCTION TESTS
PIF_VALUE: 22
PIF_VALUE: 21
PIF_VALUE: 20
PIF_VALUE: 22
PIF_VALUE: 21
PIF_VALUE: 1
PIF_VALUE: 22
PIF_VALUE: 20
PIF_VALUE: 21
PIF_VALUE: 21
PIF_VALUE: 22
PIF_VALUE: 22
PIF_VALUE: 21
PIF_VALUE: 22
PIF_VALUE: 21
PIF_VALUE: 22
PIF_VALUE: 21
PIF_VALUE: 22
PIF_VALUE: 21
PIF_VALUE: 19
PIF_VALUE: 8
PIF_VALUE: 22
PIF_VALUE: 21
PIF_VALUE: 22
PIF_VALUE: 21
PIF_VALUE: 21
PIF_VALUE: 23
PIF_VALUE: 23
PIF_VALUE: 2
PIF_VALUE: 22
PIF_VALUE: 25
PIF_VALUE: 22
PIF_VALUE: 22
PIF_VALUE: 7
PIF_VALUE: 22
PIF_VALUE: 22
PIF_VALUE: 18
PIF_VALUE: 2
PIF_VALUE: 22
PIF_VALUE: 20
PIF_VALUE: 20
PIF_VALUE: 22
PIF_VALUE: 18
PIF_VALUE: 22
PIF_VALUE: 21
PIF_VALUE: 22
PIF_VALUE: 1
PIF_VALUE: 22
PIF_VALUE: 4
PIF_VALUE: 2
PIF_VALUE: 21
PIF_VALUE: 22
PIF_VALUE: 21
PIF_VALUE: 18
PIF_VALUE: 24
PIF_VALUE: 22
PIF_VALUE: 21
PIF_VALUE: 22
PIF_VALUE: 21
PIF_VALUE: 21
PIF_VALUE: 22
PIF_VALUE: 20
PIF_VALUE: 1
PIF_VALUE: 21
PIF_VALUE: 22
PIF_VALUE: 21
PIF_VALUE: 23
PIF_VALUE: 21
PIF_VALUE: 22
PIF_VALUE: 19
PIF_VALUE: 20
PIF_VALUE: 21
PIF_VALUE: 29
PIF_VALUE: 15
PIF_VALUE: 22
PIF_VALUE: 23
PIF_VALUE: 22
PIF_VALUE: 3
PIF_VALUE: 22
PIF_VALUE: 2
PIF_VALUE: 22
PIF_VALUE: 21
PIF_VALUE: 20
PIF_VALUE: 2
PIF_VALUE: 21
PIF_VALUE: 22
PIF_VALUE: 21
PIF_VALUE: 22
PIF_VALUE: 1
PIF_VALUE: 22
PIF_VALUE: 23
PIF_VALUE: 22
PIF_VALUE: 22
PIF_VALUE: 21
PIF_VALUE: 22
PIF_VALUE: 19
PIF_VALUE: 23
PIF_VALUE: 22
PIF_VALUE: 21
PIF_VALUE: 22
PIF_VALUE: 0
PIF_VALUE: 22
PIF_VALUE: 21
PIF_VALUE: 21
PIF_VALUE: 22
PIF_VALUE: 18
PIF_VALUE: 17
PIF_VALUE: 21
PIF_VALUE: 2
PIF_VALUE: 22
PIF_VALUE: 23
PIF_VALUE: 22
PIF_VALUE: 21
PIF_VALUE: 22
PIF_VALUE: 20
PIF_VALUE: 22
PIF_VALUE: 21
PIF_VALUE: 22
PIF_VALUE: 21
PIF_VALUE: 21
PIF_VALUE: 0
PIF_VALUE: 20
PIF_VALUE: 22
PIF_VALUE: 21
PIF_VALUE: 22
PIF_VALUE: 22
PIF_VALUE: 21
PIF_VALUE: 22
PIF_VALUE: 2
PIF_VALUE: 17
PIF_VALUE: 21
PIF_VALUE: 21
PIF_VALUE: 22
PIF_VALUE: 21
PIF_VALUE: 20
PIF_VALUE: 21
PIF_VALUE: 22
PIF_VALUE: 21
PIF_VALUE: 0
PIF_VALUE: 20
PIF_VALUE: 20
PIF_VALUE: 21
PIF_VALUE: 22
PIF_VALUE: 22
PIF_VALUE: 21
PIF_VALUE: 2
PIF_VALUE: 1
PIF_VALUE: 21
PIF_VALUE: 22
PIF_VALUE: 22
PIF_VALUE: 21
PIF_VALUE: 22
PIF_VALUE: 3
PIF_VALUE: 21
PIF_VALUE: 23
PIF_VALUE: 21
PIF_VALUE: 21
PIF_VALUE: 23
PIF_VALUE: 21
PIF_VALUE: 21
PIF_VALUE: 22
PIF_VALUE: 21
PIF_VALUE: 17
PIF_VALUE: 22
PIF_VALUE: 2
PIF_VALUE: 22
PIF_VALUE: 22
PIF_VALUE: 0
PIF_VALUE: 21
PIF_VALUE: 22
PIF_VALUE: 20
PIF_VALUE: 21
PIF_VALUE: 22
PIF_VALUE: 22
PIF_VALUE: 21
PIF_VALUE: 17
PIF_VALUE: 21
PIF_VALUE: 1
PIF_VALUE: 21
PIF_VALUE: 21

## 2019-12-11 ASSESSMENT — PAIN DESCRIPTION - ONSET
ONSET: ON-GOING

## 2019-12-11 ASSESSMENT — PAIN DESCRIPTION - PAIN TYPE
TYPE: SURGICAL PAIN
TYPE: ACUTE PAIN
TYPE: SURGICAL PAIN

## 2019-12-11 ASSESSMENT — PAIN SCALES - GENERAL
PAINLEVEL_OUTOF10: 7
PAINLEVEL_OUTOF10: 6
PAINLEVEL_OUTOF10: 8
PAINLEVEL_OUTOF10: 10
PAINLEVEL_OUTOF10: 8
PAINLEVEL_OUTOF10: 7
PAINLEVEL_OUTOF10: 7
PAINLEVEL_OUTOF10: 0
PAINLEVEL_OUTOF10: 8
PAINLEVEL_OUTOF10: 8
PAINLEVEL_OUTOF10: 7
PAINLEVEL_OUTOF10: 8

## 2019-12-11 ASSESSMENT — PAIN DESCRIPTION - FREQUENCY
FREQUENCY: CONTINUOUS

## 2019-12-11 ASSESSMENT — PAIN - FUNCTIONAL ASSESSMENT
PAIN_FUNCTIONAL_ASSESSMENT: PREVENTS OR INTERFERES SOME ACTIVE ACTIVITIES AND ADLS
PAIN_FUNCTIONAL_ASSESSMENT: 0-10
PAIN_FUNCTIONAL_ASSESSMENT: PREVENTS OR INTERFERES SOME ACTIVE ACTIVITIES AND ADLS

## 2019-12-11 ASSESSMENT — PAIN DESCRIPTION - DESCRIPTORS
DESCRIPTORS: ACHING
DESCRIPTORS: ACHING;BURNING;SHARP
DESCRIPTORS: ACHING
DESCRIPTORS: ACHING;BURNING;SHARP
DESCRIPTORS: ACHING
DESCRIPTORS: ACHING;BURNING;SHARP
DESCRIPTORS: ACHING;NAGGING;NUMBNESS

## 2019-12-11 ASSESSMENT — PAIN DESCRIPTION - PROGRESSION
CLINICAL_PROGRESSION: NOT CHANGED

## 2019-12-11 ASSESSMENT — PAIN DESCRIPTION - LOCATION
LOCATION: NECK
LOCATION: NECK;SHOULDER
LOCATION: NECK

## 2019-12-11 ASSESSMENT — PAIN DESCRIPTION - ORIENTATION
ORIENTATION: POSTERIOR
ORIENTATION: ANTERIOR
ORIENTATION: POSTERIOR;RIGHT;LEFT
ORIENTATION: ANTERIOR
ORIENTATION: POSTERIOR

## 2019-12-11 ASSESSMENT — ENCOUNTER SYMPTOMS: SHORTNESS OF BREATH: 1

## 2019-12-12 LAB
ANION GAP SERPL CALCULATED.3IONS-SCNC: 11 MMOL/L (ref 3–16)
BUN BLDV-MCNC: 7 MG/DL (ref 7–20)
CALCIUM SERPL-MCNC: 9.2 MG/DL (ref 8.3–10.6)
CHLORIDE BLD-SCNC: 100 MMOL/L (ref 99–110)
CO2: 27 MMOL/L (ref 21–32)
CREAT SERPL-MCNC: 0.7 MG/DL (ref 0.6–1.2)
GFR AFRICAN AMERICAN: >60
GFR NON-AFRICAN AMERICAN: >60
GLUCOSE BLD-MCNC: 153 MG/DL (ref 70–99)
HCT VFR BLD CALC: 39.9 % (ref 36–48)
HEMOGLOBIN: 13.6 G/DL (ref 12–16)
MCH RBC QN AUTO: 31.8 PG (ref 26–34)
MCHC RBC AUTO-ENTMCNC: 34.1 G/DL (ref 31–36)
MCV RBC AUTO: 93.4 FL (ref 80–100)
PDW BLD-RTO: 13.2 % (ref 12.4–15.4)
PLATELET # BLD: 254 K/UL (ref 135–450)
PMV BLD AUTO: 10.1 FL (ref 5–10.5)
POTASSIUM SERPL-SCNC: 3.5 MMOL/L (ref 3.5–5.1)
RBC # BLD: 4.27 M/UL (ref 4–5.2)
SODIUM BLD-SCNC: 138 MMOL/L (ref 136–145)
WBC # BLD: 15.2 K/UL (ref 4–11)

## 2019-12-12 PROCEDURE — 97116 GAIT TRAINING THERAPY: CPT

## 2019-12-12 PROCEDURE — 36415 COLL VENOUS BLD VENIPUNCTURE: CPT

## 2019-12-12 PROCEDURE — 6360000002 HC RX W HCPCS: Performed by: NEUROLOGICAL SURGERY

## 2019-12-12 PROCEDURE — 97162 PT EVAL MOD COMPLEX 30 MIN: CPT

## 2019-12-12 PROCEDURE — 80048 BASIC METABOLIC PNL TOTAL CA: CPT

## 2019-12-12 PROCEDURE — 97166 OT EVAL MOD COMPLEX 45 MIN: CPT

## 2019-12-12 PROCEDURE — 2580000003 HC RX 258: Performed by: NEUROLOGICAL SURGERY

## 2019-12-12 PROCEDURE — 6370000000 HC RX 637 (ALT 250 FOR IP): Performed by: NEUROLOGICAL SURGERY

## 2019-12-12 PROCEDURE — 6370000000 HC RX 637 (ALT 250 FOR IP): Performed by: NURSE PRACTITIONER

## 2019-12-12 PROCEDURE — 1200000000 HC SEMI PRIVATE

## 2019-12-12 PROCEDURE — 51701 INSERT BLADDER CATHETER: CPT

## 2019-12-12 PROCEDURE — 51798 US URINE CAPACITY MEASURE: CPT

## 2019-12-12 PROCEDURE — 85027 COMPLETE CBC AUTOMATED: CPT

## 2019-12-12 PROCEDURE — 97535 SELF CARE MNGMENT TRAINING: CPT

## 2019-12-12 PROCEDURE — 51702 INSERT TEMP BLADDER CATH: CPT

## 2019-12-12 PROCEDURE — 97530 THERAPEUTIC ACTIVITIES: CPT

## 2019-12-12 RX ORDER — ACETAMINOPHEN 500 MG
1000 TABLET ORAL EVERY 6 HOURS
Status: DISCONTINUED | OUTPATIENT
Start: 2019-12-12 | End: 2019-12-15 | Stop reason: HOSPADM

## 2019-12-12 RX ADMIN — HYDROMORPHONE HYDROCHLORIDE 0.5 MG: 1 INJECTION, SOLUTION INTRAMUSCULAR; INTRAVENOUS; SUBCUTANEOUS at 11:46

## 2019-12-12 RX ADMIN — ACETAMINOPHEN 1000 MG: 500 TABLET ORAL at 18:52

## 2019-12-12 RX ADMIN — ACETAMINOPHEN 1000 MG: 500 TABLET ORAL at 14:12

## 2019-12-12 RX ADMIN — METHOCARBAMOL TABLETS 750 MG: 750 TABLET, COATED ORAL at 17:44

## 2019-12-12 RX ADMIN — OXYCODONE 10 MG: 5 TABLET ORAL at 14:12

## 2019-12-12 RX ADMIN — DIAZEPAM 5 MG: 5 TABLET ORAL at 10:03

## 2019-12-12 RX ADMIN — OXYCODONE 10 MG: 5 TABLET ORAL at 02:35

## 2019-12-12 RX ADMIN — LEVOTHYROXINE, LIOTHYRONINE 120 MG: 19; 4.5 TABLET ORAL at 11:37

## 2019-12-12 RX ADMIN — HYDROMORPHONE HYDROCHLORIDE 0.5 MG: 1 INJECTION, SOLUTION INTRAMUSCULAR; INTRAVENOUS; SUBCUTANEOUS at 07:47

## 2019-12-12 RX ADMIN — HYDROMORPHONE HYDROCHLORIDE 0.5 MG: 1 INJECTION, SOLUTION INTRAMUSCULAR; INTRAVENOUS; SUBCUTANEOUS at 21:51

## 2019-12-12 RX ADMIN — ACETAMINOPHEN 1000 MG: 500 TABLET ORAL at 07:48

## 2019-12-12 RX ADMIN — HEPARIN SODIUM 5000 UNITS: 5000 INJECTION INTRAVENOUS; SUBCUTANEOUS at 20:20

## 2019-12-12 RX ADMIN — HYDROMORPHONE HYDROCHLORIDE 0.5 MG: 1 INJECTION, SOLUTION INTRAMUSCULAR; INTRAVENOUS; SUBCUTANEOUS at 15:17

## 2019-12-12 RX ADMIN — SENNOSIDES AND DOCUSATE SODIUM 1 TABLET: 8.6; 5 TABLET ORAL at 20:21

## 2019-12-12 RX ADMIN — OXYCODONE 10 MG: 5 TABLET ORAL at 20:21

## 2019-12-12 RX ADMIN — HYDROMORPHONE HYDROCHLORIDE 0.5 MG: 1 INJECTION, SOLUTION INTRAMUSCULAR; INTRAVENOUS; SUBCUTANEOUS at 18:52

## 2019-12-12 RX ADMIN — SENNOSIDES AND DOCUSATE SODIUM 1 TABLET: 8.6; 5 TABLET ORAL at 10:04

## 2019-12-12 RX ADMIN — Medication 10 ML: at 11:38

## 2019-12-12 RX ADMIN — METHOCARBAMOL 1000 MG: 100 INJECTION, SOLUTION INTRAMUSCULAR; INTRAVENOUS at 02:35

## 2019-12-12 RX ADMIN — Medication 10 ML: at 20:30

## 2019-12-12 RX ADMIN — OXYCODONE 10 MG: 5 TABLET ORAL at 06:23

## 2019-12-12 RX ADMIN — DIAZEPAM 5 MG: 5 TABLET ORAL at 21:51

## 2019-12-12 ASSESSMENT — PAIN DESCRIPTION - PROGRESSION
CLINICAL_PROGRESSION: NOT CHANGED

## 2019-12-12 ASSESSMENT — PAIN DESCRIPTION - ONSET
ONSET: ON-GOING

## 2019-12-12 ASSESSMENT — PAIN SCALES - GENERAL
PAINLEVEL_OUTOF10: 8
PAINLEVEL_OUTOF10: 0
PAINLEVEL_OUTOF10: 7
PAINLEVEL_OUTOF10: 0
PAINLEVEL_OUTOF10: 7
PAINLEVEL_OUTOF10: 7
PAINLEVEL_OUTOF10: 6
PAINLEVEL_OUTOF10: 8
PAINLEVEL_OUTOF10: 8
PAINLEVEL_OUTOF10: 7
PAINLEVEL_OUTOF10: 0
PAINLEVEL_OUTOF10: 0
PAINLEVEL_OUTOF10: 7
PAINLEVEL_OUTOF10: 0
PAINLEVEL_OUTOF10: 7

## 2019-12-12 ASSESSMENT — PAIN DESCRIPTION - PAIN TYPE
TYPE: SURGICAL PAIN

## 2019-12-12 ASSESSMENT — PAIN DESCRIPTION - FREQUENCY
FREQUENCY: CONTINUOUS

## 2019-12-12 ASSESSMENT — PAIN DESCRIPTION - ORIENTATION
ORIENTATION: ANTERIOR
ORIENTATION: RIGHT;LEFT;ANTERIOR;POSTERIOR
ORIENTATION: ANTERIOR
ORIENTATION: ANTERIOR;POSTERIOR
ORIENTATION: ANTERIOR
ORIENTATION: ANTERIOR;POSTERIOR
ORIENTATION: ANTERIOR

## 2019-12-12 ASSESSMENT — PAIN DESCRIPTION - LOCATION
LOCATION: NECK
LOCATION: NECK
LOCATION: NECK;SHOULDER
LOCATION: NECK

## 2019-12-12 ASSESSMENT — PAIN DESCRIPTION - DESCRIPTORS
DESCRIPTORS: ACHING;BURNING;SHARP
DESCRIPTORS: ACHING;BURNING;SHARP
DESCRIPTORS: ACHING
DESCRIPTORS: ACHING;BURNING;SHARP
DESCRIPTORS: ACHING;BURNING;SHARP
DESCRIPTORS: ACHING
DESCRIPTORS: ACHING
DESCRIPTORS: ACHING;BURNING;SHARP
DESCRIPTORS: ACHING

## 2019-12-13 LAB
BILIRUBIN URINE: NEGATIVE
BLOOD, URINE: NEGATIVE
CLARITY: CLEAR
COLOR: YELLOW
GLUCOSE URINE: NEGATIVE MG/DL
KETONES, URINE: NEGATIVE MG/DL
LEUKOCYTE ESTERASE, URINE: NEGATIVE
MICROSCOPIC EXAMINATION: NORMAL
NITRITE, URINE: NEGATIVE
PH UA: 7 (ref 5–8)
PROTEIN UA: NEGATIVE MG/DL
SPECIFIC GRAVITY UA: 1.01 (ref 1–1.03)
URINE TYPE: NORMAL
UROBILINOGEN, URINE: 0.2 E.U./DL

## 2019-12-13 PROCEDURE — 1200000000 HC SEMI PRIVATE

## 2019-12-13 PROCEDURE — 81003 URINALYSIS AUTO W/O SCOPE: CPT

## 2019-12-13 PROCEDURE — 2580000003 HC RX 258: Performed by: NEUROLOGICAL SURGERY

## 2019-12-13 PROCEDURE — 6360000002 HC RX W HCPCS: Performed by: NEUROLOGICAL SURGERY

## 2019-12-13 PROCEDURE — 6370000000 HC RX 637 (ALT 250 FOR IP): Performed by: NURSE PRACTITIONER

## 2019-12-13 PROCEDURE — 51798 US URINE CAPACITY MEASURE: CPT

## 2019-12-13 PROCEDURE — 6370000000 HC RX 637 (ALT 250 FOR IP): Performed by: NEUROLOGICAL SURGERY

## 2019-12-13 RX ORDER — LIDOCAINE 4 G/G
1 PATCH TOPICAL DAILY
Status: DISCONTINUED | OUTPATIENT
Start: 2019-12-13 | End: 2019-12-15 | Stop reason: HOSPADM

## 2019-12-13 RX ORDER — HYDROMORPHONE HYDROCHLORIDE 2 MG/1
2 TABLET ORAL EVERY 4 HOURS PRN
Status: DISCONTINUED | OUTPATIENT
Start: 2019-12-13 | End: 2019-12-15 | Stop reason: HOSPADM

## 2019-12-13 RX ORDER — HYDROMORPHONE HYDROCHLORIDE 2 MG/1
4 TABLET ORAL EVERY 4 HOURS PRN
Status: DISCONTINUED | OUTPATIENT
Start: 2019-12-13 | End: 2019-12-15 | Stop reason: HOSPADM

## 2019-12-13 RX ORDER — METHOCARBAMOL 750 MG/1
750 TABLET, FILM COATED ORAL 4 TIMES DAILY
Status: DISCONTINUED | OUTPATIENT
Start: 2019-12-13 | End: 2019-12-15 | Stop reason: HOSPADM

## 2019-12-13 RX ADMIN — AMLODIPINE BESYLATE 10 MG: 10 TABLET ORAL at 09:15

## 2019-12-13 RX ADMIN — HYDROMORPHONE HYDROCHLORIDE 0.5 MG: 1 INJECTION, SOLUTION INTRAMUSCULAR; INTRAVENOUS; SUBCUTANEOUS at 08:40

## 2019-12-13 RX ADMIN — DIAZEPAM 5 MG: 5 TABLET ORAL at 17:03

## 2019-12-13 RX ADMIN — SENNOSIDES AND DOCUSATE SODIUM 1 TABLET: 8.6; 5 TABLET ORAL at 09:14

## 2019-12-13 RX ADMIN — HEPARIN SODIUM 5000 UNITS: 5000 INJECTION INTRAVENOUS; SUBCUTANEOUS at 21:17

## 2019-12-13 RX ADMIN — SENNOSIDES AND DOCUSATE SODIUM 1 TABLET: 8.6; 5 TABLET ORAL at 21:16

## 2019-12-13 RX ADMIN — Medication 10 ML: at 21:17

## 2019-12-13 RX ADMIN — OXYCODONE 10 MG: 5 TABLET ORAL at 14:42

## 2019-12-13 RX ADMIN — METHOCARBAMOL TABLETS 750 MG: 750 TABLET, COATED ORAL at 07:43

## 2019-12-13 RX ADMIN — OXYCODONE 10 MG: 5 TABLET ORAL at 01:51

## 2019-12-13 RX ADMIN — HYDROMORPHONE HYDROCHLORIDE 4 MG: 2 TABLET ORAL at 22:22

## 2019-12-13 RX ADMIN — METHOCARBAMOL TABLETS 750 MG: 750 TABLET, COATED ORAL at 21:16

## 2019-12-13 RX ADMIN — HEPARIN SODIUM 5000 UNITS: 5000 INJECTION INTRAVENOUS; SUBCUTANEOUS at 13:05

## 2019-12-13 RX ADMIN — METHOCARBAMOL TABLETS 750 MG: 750 TABLET, COATED ORAL at 13:05

## 2019-12-13 RX ADMIN — Medication 10 ML: at 11:08

## 2019-12-13 RX ADMIN — OXYCODONE 10 MG: 5 TABLET ORAL at 06:09

## 2019-12-13 RX ADMIN — OXYCODONE 10 MG: 5 TABLET ORAL at 10:30

## 2019-12-13 RX ADMIN — METHOCARBAMOL TABLETS 750 MG: 750 TABLET, COATED ORAL at 18:02

## 2019-12-13 RX ADMIN — ACETAMINOPHEN 1000 MG: 500 TABLET ORAL at 18:47

## 2019-12-13 RX ADMIN — ACETAMINOPHEN 1000 MG: 500 TABLET ORAL at 13:05

## 2019-12-13 RX ADMIN — DIAZEPAM 5 MG: 5 TABLET ORAL at 11:07

## 2019-12-13 RX ADMIN — HEPARIN SODIUM 5000 UNITS: 5000 INJECTION INTRAVENOUS; SUBCUTANEOUS at 06:09

## 2019-12-13 RX ADMIN — ACETAMINOPHEN 1000 MG: 500 TABLET ORAL at 01:51

## 2019-12-13 RX ADMIN — DIAZEPAM 5 MG: 5 TABLET ORAL at 22:22

## 2019-12-13 RX ADMIN — LEVOTHYROXINE, LIOTHYRONINE 120 MG: 19; 4.5 TABLET ORAL at 07:43

## 2019-12-13 RX ADMIN — HYDROMORPHONE HYDROCHLORIDE 4 MG: 2 TABLET ORAL at 18:02

## 2019-12-13 ASSESSMENT — PAIN DESCRIPTION - PROGRESSION
CLINICAL_PROGRESSION: NOT CHANGED

## 2019-12-13 ASSESSMENT — PAIN DESCRIPTION - LOCATION
LOCATION: NECK

## 2019-12-13 ASSESSMENT — PAIN DESCRIPTION - DESCRIPTORS
DESCRIPTORS: ACHING;SHARP
DESCRIPTORS: SORE
DESCRIPTORS: ACHING;BURNING;SHARP
DESCRIPTORS: ACHING
DESCRIPTORS: ACHING
DESCRIPTORS: ACHING;BURNING;SHARP
DESCRIPTORS: ACHING;SHARP
DESCRIPTORS: SORE
DESCRIPTORS: ACHING;SHARP
DESCRIPTORS: ACHING;SHARP

## 2019-12-13 ASSESSMENT — PAIN SCALES - GENERAL
PAINLEVEL_OUTOF10: 0
PAINLEVEL_OUTOF10: 4
PAINLEVEL_OUTOF10: 0
PAINLEVEL_OUTOF10: 6
PAINLEVEL_OUTOF10: 7
PAINLEVEL_OUTOF10: 6
PAINLEVEL_OUTOF10: 8
PAINLEVEL_OUTOF10: 7
PAINLEVEL_OUTOF10: 8
PAINLEVEL_OUTOF10: 6
PAINLEVEL_OUTOF10: 7

## 2019-12-13 ASSESSMENT — PAIN DESCRIPTION - PAIN TYPE
TYPE: SURGICAL PAIN
TYPE: ACUTE PAIN;SURGICAL PAIN
TYPE: ACUTE PAIN;SURGICAL PAIN
TYPE: SURGICAL PAIN

## 2019-12-13 ASSESSMENT — PAIN DESCRIPTION - ORIENTATION
ORIENTATION: ANTERIOR
ORIENTATION: ANTERIOR;POSTERIOR
ORIENTATION: ANTERIOR;POSTERIOR
ORIENTATION: ANTERIOR
ORIENTATION: ANTERIOR;POSTERIOR

## 2019-12-13 ASSESSMENT — PAIN DESCRIPTION - ONSET
ONSET: ON-GOING

## 2019-12-13 ASSESSMENT — PAIN - FUNCTIONAL ASSESSMENT

## 2019-12-13 ASSESSMENT — PAIN DESCRIPTION - FREQUENCY
FREQUENCY: CONTINUOUS

## 2019-12-14 PROCEDURE — 6370000000 HC RX 637 (ALT 250 FOR IP): Performed by: NURSE PRACTITIONER

## 2019-12-14 PROCEDURE — 6370000000 HC RX 637 (ALT 250 FOR IP): Performed by: PHYSICIAN ASSISTANT

## 2019-12-14 PROCEDURE — 6360000002 HC RX W HCPCS: Performed by: NEUROLOGICAL SURGERY

## 2019-12-14 PROCEDURE — 6370000000 HC RX 637 (ALT 250 FOR IP): Performed by: NEUROLOGICAL SURGERY

## 2019-12-14 PROCEDURE — 2580000003 HC RX 258: Performed by: NEUROLOGICAL SURGERY

## 2019-12-14 PROCEDURE — 51798 US URINE CAPACITY MEASURE: CPT

## 2019-12-14 PROCEDURE — 1200000000 HC SEMI PRIVATE

## 2019-12-14 RX ORDER — TAMSULOSIN HYDROCHLORIDE 0.4 MG/1
0.4 CAPSULE ORAL DAILY
Status: DISCONTINUED | OUTPATIENT
Start: 2019-12-14 | End: 2019-12-15 | Stop reason: HOSPADM

## 2019-12-14 RX ORDER — DIPHENHYDRAMINE HCL 25 MG
25 TABLET ORAL EVERY 6 HOURS PRN
Status: DISCONTINUED | OUTPATIENT
Start: 2019-12-14 | End: 2019-12-15 | Stop reason: HOSPADM

## 2019-12-14 RX ADMIN — HYDROMORPHONE HYDROCHLORIDE 4 MG: 2 TABLET ORAL at 17:20

## 2019-12-14 RX ADMIN — METHOCARBAMOL TABLETS 750 MG: 750 TABLET, COATED ORAL at 20:28

## 2019-12-14 RX ADMIN — Medication 10 ML: at 10:03

## 2019-12-14 RX ADMIN — ACETAMINOPHEN 1000 MG: 500 TABLET ORAL at 06:18

## 2019-12-14 RX ADMIN — PROCHLORPERAZINE EDISYLATE 10 MG: 5 INJECTION INTRAMUSCULAR; INTRAVENOUS at 10:02

## 2019-12-14 RX ADMIN — HEPARIN SODIUM 5000 UNITS: 5000 INJECTION INTRAVENOUS; SUBCUTANEOUS at 13:44

## 2019-12-14 RX ADMIN — ACETAMINOPHEN 1000 MG: 500 TABLET ORAL at 13:44

## 2019-12-14 RX ADMIN — TAMSULOSIN HYDROCHLORIDE 0.4 MG: 0.4 CAPSULE ORAL at 10:46

## 2019-12-14 RX ADMIN — ACETAMINOPHEN 1000 MG: 500 TABLET ORAL at 02:23

## 2019-12-14 RX ADMIN — SENNOSIDES AND DOCUSATE SODIUM 1 TABLET: 8.6; 5 TABLET ORAL at 20:28

## 2019-12-14 RX ADMIN — DIPHENHYDRAMINE HYDROCHLORIDE 25 MG: 25 TABLET ORAL at 10:08

## 2019-12-14 RX ADMIN — Medication 10 ML: at 20:29

## 2019-12-14 RX ADMIN — METHOCARBAMOL TABLETS 750 MG: 750 TABLET, COATED ORAL at 10:09

## 2019-12-14 RX ADMIN — HEPARIN SODIUM 5000 UNITS: 5000 INJECTION INTRAVENOUS; SUBCUTANEOUS at 20:29

## 2019-12-14 RX ADMIN — HYDROMORPHONE HYDROCHLORIDE 4 MG: 2 TABLET ORAL at 22:01

## 2019-12-14 RX ADMIN — LEVOTHYROXINE, LIOTHYRONINE 120 MG: 19; 4.5 TABLET ORAL at 06:36

## 2019-12-14 RX ADMIN — AMLODIPINE BESYLATE 10 MG: 10 TABLET ORAL at 10:08

## 2019-12-14 RX ADMIN — HEPARIN SODIUM 5000 UNITS: 5000 INJECTION INTRAVENOUS; SUBCUTANEOUS at 06:19

## 2019-12-14 RX ADMIN — DIAZEPAM 5 MG: 5 TABLET ORAL at 22:01

## 2019-12-14 RX ADMIN — ONDANSETRON 4 MG: 2 INJECTION INTRAMUSCULAR; INTRAVENOUS at 07:14

## 2019-12-14 RX ADMIN — METHOCARBAMOL TABLETS 750 MG: 750 TABLET, COATED ORAL at 13:44

## 2019-12-14 RX ADMIN — ACETAMINOPHEN 1000 MG: 500 TABLET ORAL at 20:28

## 2019-12-14 RX ADMIN — HYDROMORPHONE HYDROCHLORIDE 4 MG: 2 TABLET ORAL at 02:23

## 2019-12-14 RX ADMIN — METHOCARBAMOL TABLETS 750 MG: 750 TABLET, COATED ORAL at 17:17

## 2019-12-14 RX ADMIN — SENNOSIDES AND DOCUSATE SODIUM 1 TABLET: 8.6; 5 TABLET ORAL at 10:09

## 2019-12-14 RX ADMIN — HYDROMORPHONE HYDROCHLORIDE 4 MG: 2 TABLET ORAL at 06:19

## 2019-12-14 ASSESSMENT — PAIN DESCRIPTION - PAIN TYPE
TYPE: ACUTE PAIN;SURGICAL PAIN

## 2019-12-14 ASSESSMENT — PAIN SCALES - GENERAL
PAINLEVEL_OUTOF10: 7
PAINLEVEL_OUTOF10: 4

## 2019-12-14 ASSESSMENT — PAIN DESCRIPTION - LOCATION
LOCATION: NECK

## 2019-12-14 ASSESSMENT — PAIN DESCRIPTION - ONSET
ONSET: ON-GOING

## 2019-12-14 ASSESSMENT — PAIN DESCRIPTION - PROGRESSION
CLINICAL_PROGRESSION: NOT CHANGED

## 2019-12-14 ASSESSMENT — PAIN DESCRIPTION - DESCRIPTORS
DESCRIPTORS: SORE

## 2019-12-14 ASSESSMENT — PAIN DESCRIPTION - FREQUENCY
FREQUENCY: CONTINUOUS

## 2019-12-14 ASSESSMENT — PAIN - FUNCTIONAL ASSESSMENT
PAIN_FUNCTIONAL_ASSESSMENT: ACTIVITIES ARE NOT PREVENTED

## 2019-12-14 ASSESSMENT — PAIN DESCRIPTION - ORIENTATION
ORIENTATION: ANTERIOR;POSTERIOR

## 2019-12-15 VITALS
WEIGHT: 182 LBS | BODY MASS INDEX: 31.07 KG/M2 | HEIGHT: 64 IN | TEMPERATURE: 97.8 F | DIASTOLIC BLOOD PRESSURE: 77 MMHG | HEART RATE: 71 BPM | OXYGEN SATURATION: 94 % | RESPIRATION RATE: 16 BRPM | SYSTOLIC BLOOD PRESSURE: 120 MMHG

## 2019-12-15 PROCEDURE — 6370000000 HC RX 637 (ALT 250 FOR IP): Performed by: NURSE PRACTITIONER

## 2019-12-15 PROCEDURE — 6370000000 HC RX 637 (ALT 250 FOR IP): Performed by: NEUROLOGICAL SURGERY

## 2019-12-15 PROCEDURE — 6360000002 HC RX W HCPCS: Performed by: NEUROLOGICAL SURGERY

## 2019-12-15 PROCEDURE — 51798 US URINE CAPACITY MEASURE: CPT

## 2019-12-15 PROCEDURE — 2580000003 HC RX 258: Performed by: NEUROLOGICAL SURGERY

## 2019-12-15 PROCEDURE — 6370000000 HC RX 637 (ALT 250 FOR IP): Performed by: PHYSICIAN ASSISTANT

## 2019-12-15 RX ORDER — HYDROMORPHONE HYDROCHLORIDE 2 MG/1
2 TABLET ORAL
Qty: 40 TABLET | Refills: 0 | Status: SHIPPED | OUTPATIENT
Start: 2019-12-15 | End: 2019-12-29

## 2019-12-15 RX ORDER — TAMSULOSIN HYDROCHLORIDE 0.4 MG/1
0.4 CAPSULE ORAL DAILY
Qty: 30 CAPSULE | Refills: 3 | Status: SHIPPED | OUTPATIENT
Start: 2019-12-16 | End: 2020-08-26 | Stop reason: ALTCHOICE

## 2019-12-15 RX ORDER — SENNA AND DOCUSATE SODIUM 50; 8.6 MG/1; MG/1
1 TABLET, FILM COATED ORAL 2 TIMES DAILY
COMMUNITY
Start: 2019-12-15

## 2019-12-15 RX ORDER — DIAZEPAM 5 MG/1
5 TABLET ORAL EVERY 8 HOURS PRN
Qty: 40 TABLET | Refills: 0 | Status: SHIPPED | OUTPATIENT
Start: 2019-12-15 | End: 2019-12-25

## 2019-12-15 RX ADMIN — HYDROMORPHONE HYDROCHLORIDE 4 MG: 2 TABLET ORAL at 12:33

## 2019-12-15 RX ADMIN — HEPARIN SODIUM 5000 UNITS: 5000 INJECTION INTRAVENOUS; SUBCUTANEOUS at 06:15

## 2019-12-15 RX ADMIN — Medication 10 ML: at 08:34

## 2019-12-15 RX ADMIN — TAMSULOSIN HYDROCHLORIDE 0.4 MG: 0.4 CAPSULE ORAL at 08:33

## 2019-12-15 RX ADMIN — HYDROMORPHONE HYDROCHLORIDE 4 MG: 2 TABLET ORAL at 06:22

## 2019-12-15 RX ADMIN — METHOCARBAMOL TABLETS 750 MG: 750 TABLET, COATED ORAL at 08:33

## 2019-12-15 RX ADMIN — SENNOSIDES AND DOCUSATE SODIUM 1 TABLET: 8.6; 5 TABLET ORAL at 08:33

## 2019-12-15 RX ADMIN — HYDROMORPHONE HYDROCHLORIDE 4 MG: 2 TABLET ORAL at 02:32

## 2019-12-15 RX ADMIN — DIPHENHYDRAMINE HYDROCHLORIDE 25 MG: 25 TABLET ORAL at 02:32

## 2019-12-15 RX ADMIN — LEVOTHYROXINE, LIOTHYRONINE 120 MG: 19; 4.5 TABLET ORAL at 08:41

## 2019-12-15 RX ADMIN — ACETAMINOPHEN 1000 MG: 500 TABLET ORAL at 02:32

## 2019-12-15 RX ADMIN — HEPARIN SODIUM 5000 UNITS: 5000 INJECTION INTRAVENOUS; SUBCUTANEOUS at 12:33

## 2019-12-15 RX ADMIN — ACETAMINOPHEN 1000 MG: 500 TABLET ORAL at 07:27

## 2019-12-15 RX ADMIN — METHOCARBAMOL TABLETS 750 MG: 750 TABLET, COATED ORAL at 12:33

## 2019-12-15 RX ADMIN — AMLODIPINE BESYLATE 10 MG: 10 TABLET ORAL at 08:33

## 2019-12-15 ASSESSMENT — PAIN SCALES - GENERAL
PAINLEVEL_OUTOF10: 0
PAINLEVEL_OUTOF10: 7

## 2019-12-15 ASSESSMENT — PAIN - FUNCTIONAL ASSESSMENT
PAIN_FUNCTIONAL_ASSESSMENT: ACTIVITIES ARE NOT PREVENTED

## 2019-12-15 ASSESSMENT — PAIN DESCRIPTION - ONSET
ONSET: ON-GOING

## 2019-12-15 ASSESSMENT — PAIN DESCRIPTION - PROGRESSION
CLINICAL_PROGRESSION: NOT CHANGED

## 2019-12-15 ASSESSMENT — PAIN DESCRIPTION - FREQUENCY
FREQUENCY: CONTINUOUS

## 2019-12-15 ASSESSMENT — PAIN DESCRIPTION - DESCRIPTORS
DESCRIPTORS: ACHING;CONSTANT;SORE;THROBBING
DESCRIPTORS: SORE
DESCRIPTORS: ACHING;CONSTANT;SORE;THROBBING

## 2019-12-15 ASSESSMENT — PAIN DESCRIPTION - LOCATION
LOCATION: NECK

## 2019-12-15 ASSESSMENT — PAIN DESCRIPTION - ORIENTATION
ORIENTATION: ANTERIOR;POSTERIOR

## 2019-12-15 ASSESSMENT — PAIN DESCRIPTION - PAIN TYPE
TYPE: ACUTE PAIN;SURGICAL PAIN

## 2020-08-20 NOTE — PROGRESS NOTES
[facility] / [facility] ([facility]) [address] [facility], [address]    Acknowledgment of Informed Consent for Surgical or Medical Procedure and Sedation  I agree to allow doctor(s) [id number] and his/her associates or assistants, including residents and/or other qualified medical practitioner to perform the following medical treatment or procedure and to administer or direct the administration of sedation as necessary:  Procedure(s): LEFT TOTAL KNEE REVISION 2 COMPONENT  My doctor has explained the following regarding the proposed procedure:   the explanation of the procedure   the benefits of the procedure   the potential problems that might occur during recuperation   the risks and side effects of the procedure which could include but are not limited to severe blood loss, infection, stroke or death   the benefits, risks and side effect of alternative procedures including the consequences of declining this procedure or any alternative procedures   the likelihood of achieving satisfactory results. I acknowledge no guarantee or assurance has been made to me regarding the results. I understand that during the course of this treatment/procedure, unforeseen conditions can occur which require an additional or different procedure. I agree to allow my physician or assistants to perform such extension of the original procedure as they may find necessary. I understand that sedation will often result in temporary impairment of memory and fine motor skills and that sedation can occasionally progress to a state of deep sedation or general anesthesia. I understand the risks of anesthesia for surgery include, but are not limited to, sore throat, hoarseness, injury to face, mouth, or teeth; nausea; headache; injury to blood vessels or nerves; death, brain damage, or paralysis.     I understand that if I have a Limitation of Treatment order in effect during my hospitalization, the order may or

## 2020-08-24 ENCOUNTER — NURSE ONLY (OUTPATIENT)
Dept: PRIMARY CARE CLINIC | Age: 60
End: 2020-08-24
Payer: COMMERCIAL

## 2020-08-24 PROCEDURE — 99211 OFF/OP EST MAY X REQ PHY/QHP: CPT | Performed by: NURSE PRACTITIONER

## 2020-08-25 LAB — SARS-COV-2, NAA: NOT DETECTED

## 2020-08-26 RX ORDER — ETANERCEPT 25 MG/.5ML
25 SOLUTION SUBCUTANEOUS
Status: ON HOLD | COMMUNITY
Start: 2020-08-03 | End: 2022-01-10 | Stop reason: HOSPADM

## 2020-08-26 NOTE — PROGRESS NOTES
Snoring? Do you snore loudly (loud enough to be heard through closed doors, or your bed partner elbows you for snoring at night)? No    Tired? Do you often feel tired, fatigued, or sleepy during the daytime (such as falling asleep during driving)? No    Observed? Has anyone observed you stop breathing or choking/gasping during your sleep? No    Pressure? Do you have or are being treated for high blood pressure? Yes    Neck Size? (measured around Robers apple)  For male, is your shirt collar 17 inches or larger? For female, is your shirt collar 16 inches or larger? No    Age older than 48years old? No    Gender = Male  No    Body Mass Index more than 35 kg/m2?   No    Risk of YUDITH Scoring criteria:    [x] Low risk:  Yes to 0 - 2 questions    [] Intermediate risk:  Yes to 3 - 4 questions    [] High risk:  Yes to 5 - 8 questions

## 2020-08-26 NOTE — PROGRESS NOTES
registered at your bedside once brought back to your room. 5. DO NOT EAT ANYTHING eight hours prior to surgery. May have 8 ounces of water 4 hours prior to surgery. 6. MEDICATIONS    Take the following medications with a SMALL sip of water: [place] Thyroid   Use your usual dose of inhalers the morning of surgery. BRING your rescue inhaler with you to hospital.    Anesthesia does NOT want you to take insulin the morning of surgery. They will control your blood sugar while you are at the hospital. Please contact your ordering physician for instructions regarding your insulin the night before your procedure. If you have an insulin pump, please keep it set on basal rate. 7. Do not swallow water when brushing teeth. No gum, candy, mints or ice chips. Refrain from smoking or at least decrease the amount. 8. Dress in loose, comfortable clothing appropriate for redressing after your procedure. Do not wear jewelry (including body piercings), make-up (especially NO eye make-up), fingernail polish (NO toenail polish if foot/leg surgery), lotion, powders or metal hairclips. 9. Dentures, glasses, or contacts will need to be removed before your procedure. Bring cases for your glasses, contacts, dentures, or hearing aids to protect them while you are in surgery. 10. If you use a CPAP, please bring it with you on the day of your procedure. 11. We recommend that valuable personal  belongings such as cash, cell phones, e-tablets or jewelry, be left at home during your stay. The hospital will not be responsible for valuables that are not secured in the hospital safe. However, if your insurance requires a co-pay, you may want to bring a method of payment, i.e. Check or credit card, if you wish to pay your co-pay the day of surgery. 12. If you are to stay overnight, you may bring a bag with personal items.  Please have any large items you may need brought in by your family after your arrival to your hospital room. 15. If you have a Living Will or Durable Power of , please bring a copy on the day of your procedure. 15. With your permission, one family member may accompany you while you are being prepared for surgery. Once you are ready, additional family members may join you. HOW WE KEEP YOU SAFE and WORK TO PREVENT SURGICAL SITE INFECTIONS:  1. Health care workers should always check your ID bracelet to verify your name and birth date. You will be asked many times to state your name, date of birth, and allergies. 2. Health care workers should always clean their hands with soap or alcohol gel before providing care to you. It is okay to ask anyone if they cleaned their hands before they touch you. 3. You will be actively involved in verifying the type of procedure you are having and ensuring the correct surgical site. This will be confirmed multiple times prior to your procedure. Do NOT duke your surgery site UNLESS instructed to by your surgeon. 4. Do not shave or wax for 72 hours prior to procedure near your operative site. Shaving with a razor can irritate your skin and make it easier to develop an infection. On the day of your procedure, any hair that needs to be removed near the surgical site will be clipped by a healthcare worker using a special clippers designed to avoid skin irritation. 5. When you are in the operating room, your surgical site will be cleansed with a special soap, and in most cases, you will be given an antibiotic before the surgery begins. What to expect AFTER YOUR PROCEDURE:  1. Immediately following your procedure, your will be taken to the PACU for the first phase of your recovery. Your nurse will help you recover from any potential side effects of anesthesia, such as extreme drowsiness, changes in your vital signs or breathing patterns. Nausea, headache, muscle aches, or sore throat may also occur after anesthesia.   Your nurse will help you manage these potential side effects. 2. For comfort and safety, arrange to have someone at home with you for the first 24 hours after discharge. 3. You and your family will be given written instructions about your diet, activity, dressing care, medications, and return visits. 4. Once at home, should issues with nausea, pain, or bleeding occur, or should you notice any signs of infection, you should call your surgeon. 5. Always clean your hands before and after caring for your wound. Do not let your family touch your surgery site without cleaning their hands. 6. Narcotic pain medications can cause significant constipation. You may want to add a stool softener to your postoperative medication schedule or speak to your surgeon on how best to manage this SIDE EFFECT. SPECIAL INSTRUCTIONS     Thank you for allowing us to care for you. We strive to exceed your expectations in the delivery of care and service provided to you and your family. If you need to contact us for any reason, please call us at [phone]    Instructions reviewed with patient during preadmission testing phone interview. Tri Francois. 8/26/2020 .11:05 AM      ADDITIONAL EDUCATIONAL INFORMATION REVIEWED PER PHONE WITH YOU AND/OR YOUR FAMILY:  Yes Antibacterial Soap  Yes Incentive Spirometer Education previous instruction

## 2020-08-27 ENCOUNTER — ANESTHESIA EVENT (OUTPATIENT)
Dept: OPERATING ROOM | Age: 60
End: 2020-08-27
Payer: COMMERCIAL

## 2020-08-28 ENCOUNTER — APPOINTMENT (OUTPATIENT)
Dept: GENERAL RADIOLOGY | Age: 60
End: 2020-08-28
Attending: ORTHOPAEDIC SURGERY
Payer: COMMERCIAL

## 2020-08-28 ENCOUNTER — HOSPITAL ENCOUNTER (OUTPATIENT)
Age: 60
Setting detail: OBSERVATION
Discharge: HOME | End: 2020-08-30
Attending: ORTHOPAEDIC SURGERY | Admitting: ORTHOPAEDIC SURGERY
Payer: COMMERCIAL

## 2020-08-28 ENCOUNTER — ANESTHESIA (OUTPATIENT)
Dept: OPERATING ROOM | Age: 60
End: 2020-08-28
Payer: COMMERCIAL

## 2020-08-28 VITALS — DIASTOLIC BLOOD PRESSURE: 58 MMHG | SYSTOLIC BLOOD PRESSURE: 118 MMHG | OXYGEN SATURATION: 96 %

## 2020-08-28 PROBLEM — T84.023A INSTABILITY OF INTERNAL LEFT KNEE PROSTHESIS (HCC): Status: ACTIVE | Noted: 2020-08-28

## 2020-08-28 LAB
ABO/RH: NORMAL
ANTIBODY SCREEN: NORMAL
APPEARANCE FLUID: NORMAL
CELL COUNT FLUID TYPE: NORMAL
CLOT EVALUATION: NORMAL
COLOR FLUID: NORMAL
EOSINOPHIL FLUID: 2 %
LYMPHOCYTES, BODY FLUID: 34 %
MONOCYTE, FLUID: 8 %
NEUTROPHIL, FLUID: 56 %
NUCLEATED CELLS FLUID: 500 /CUMM
NUMBER OF CELLS COUNTED FLUID: 100
RBC FLUID: 6700 /CUMM

## 2020-08-28 PROCEDURE — 6360000002 HC RX W HCPCS: Performed by: ORTHOPAEDIC SURGERY

## 2020-08-28 PROCEDURE — 2709999900 HC NON-CHARGEABLE SUPPLY: Performed by: ORTHOPAEDIC SURGERY

## 2020-08-28 PROCEDURE — 6370000000 HC RX 637 (ALT 250 FOR IP): Performed by: ORTHOPAEDIC SURGERY

## 2020-08-28 PROCEDURE — 6360000002 HC RX W HCPCS: Performed by: NURSE ANESTHETIST, CERTIFIED REGISTERED

## 2020-08-28 PROCEDURE — 73560 X-RAY EXAM OF KNEE 1 OR 2: CPT

## 2020-08-28 PROCEDURE — 94150 VITAL CAPACITY TEST: CPT

## 2020-08-28 PROCEDURE — 89051 BODY FLUID CELL COUNT: CPT

## 2020-08-28 PROCEDURE — 3700000000 HC ANESTHESIA ATTENDED CARE: Performed by: ORTHOPAEDIC SURGERY

## 2020-08-28 PROCEDURE — 87205 SMEAR GRAM STAIN: CPT

## 2020-08-28 PROCEDURE — 6360000002 HC RX W HCPCS: Performed by: INTERNAL MEDICINE

## 2020-08-28 PROCEDURE — 2580000003 HC RX 258: Performed by: ORTHOPAEDIC SURGERY

## 2020-08-28 PROCEDURE — 2500000003 HC RX 250 WO HCPCS: Performed by: NURSE ANESTHETIST, CERTIFIED REGISTERED

## 2020-08-28 PROCEDURE — 7100000001 HC PACU RECOVERY - ADDTL 15 MIN: Performed by: ORTHOPAEDIC SURGERY

## 2020-08-28 PROCEDURE — G0378 HOSPITAL OBSERVATION PER HR: HCPCS

## 2020-08-28 PROCEDURE — 6360000002 HC RX W HCPCS: Performed by: ANESTHESIOLOGY

## 2020-08-28 PROCEDURE — 2580000003 HC RX 258: Performed by: NURSE ANESTHETIST, CERTIFIED REGISTERED

## 2020-08-28 PROCEDURE — 3600000015 HC SURGERY LEVEL 5 ADDTL 15MIN: Performed by: ORTHOPAEDIC SURGERY

## 2020-08-28 PROCEDURE — 87070 CULTURE OTHR SPECIMN AEROBIC: CPT

## 2020-08-28 PROCEDURE — 86900 BLOOD TYPING SEROLOGIC ABO: CPT

## 2020-08-28 PROCEDURE — 62327 NJX INTERLAMINAR LMBR/SAC: CPT | Performed by: ANESTHESIOLOGY

## 2020-08-28 PROCEDURE — 1200000000 HC SEMI PRIVATE

## 2020-08-28 PROCEDURE — 2580000003 HC RX 258: Performed by: ANESTHESIOLOGY

## 2020-08-28 PROCEDURE — 6370000000 HC RX 637 (ALT 250 FOR IP): Performed by: ANESTHESIOLOGY

## 2020-08-28 PROCEDURE — 3600000005 HC SURGERY LEVEL 5 BASE: Performed by: ORTHOPAEDIC SURGERY

## 2020-08-28 PROCEDURE — 3700000001 HC ADD 15 MINUTES (ANESTHESIA): Performed by: ORTHOPAEDIC SURGERY

## 2020-08-28 PROCEDURE — C1713 ANCHOR/SCREW BN/BN,TIS/BN: HCPCS | Performed by: ORTHOPAEDIC SURGERY

## 2020-08-28 PROCEDURE — 7100000000 HC PACU RECOVERY - FIRST 15 MIN: Performed by: ORTHOPAEDIC SURGERY

## 2020-08-28 PROCEDURE — C1776 JOINT DEVICE (IMPLANTABLE): HCPCS | Performed by: ORTHOPAEDIC SURGERY

## 2020-08-28 PROCEDURE — 86850 RBC ANTIBODY SCREEN: CPT

## 2020-08-28 PROCEDURE — 2720000010 HC SURG SUPPLY STERILE: Performed by: ORTHOPAEDIC SURGERY

## 2020-08-28 PROCEDURE — 86901 BLOOD TYPING SEROLOGIC RH(D): CPT

## 2020-08-28 PROCEDURE — 94761 N-INVAS EAR/PLS OXIMETRY MLT: CPT

## 2020-08-28 DEVICE — IMPLANTABLE DEVICE: Type: IMPLANTABLE DEVICE | Site: KNEE | Status: FUNCTIONAL

## 2020-08-28 DEVICE — INSERT TIB SZ 2.5 THK12.5MM GVF ROT PLATFRM TC3 SIG: Type: IMPLANTABLE DEVICE | Site: KNEE | Status: FUNCTIONAL

## 2020-08-28 DEVICE — ADAPTER FEM 5DEG KNEE PFC SIG: Type: IMPLANTABLE DEVICE | Site: KNEE | Status: FUNCTIONAL

## 2020-08-28 DEVICE — STEM FEM L115MM DIA12MM UNIV KNEE FLUT PRESSFIT FOR ROT HNG: Type: IMPLANTABLE DEVICE | Site: KNEE | Status: FUNCTIONAL

## 2020-08-28 DEVICE — TRAY TIB SZ 2 AP42.6MM ML64.6MM THK4.8MM KNEE CO CHROM ALLY: Type: IMPLANTABLE DEVICE | Site: KNEE | Status: FUNCTIONAL

## 2020-08-28 DEVICE — CEMENT BNE 20ML 41GM FULL DOSE PMMA W/ TOBRA M VISC RADPQ: Type: IMPLANTABLE DEVICE | Site: KNEE | Status: FUNCTIONAL

## 2020-08-28 DEVICE — SLEEVE TIB H40MM AP27MM ML37MM MTPHSEAL TI PORCOAT POR REV: Type: IMPLANTABLE DEVICE | Site: KNEE | Status: FUNCTIONAL

## 2020-08-28 DEVICE — ADAPTER FEM +2/-2MM OFFSET BOLT PFC SIG: Type: IMPLANTABLE DEVICE | Site: KNEE | Status: FUNCTIONAL

## 2020-08-28 DEVICE — STEM FEM L75MM DIA10MM UNIV KNEE PRESSFIT FLUT FOR ROT HNG: Type: IMPLANTABLE DEVICE | Site: KNEE | Status: FUNCTIONAL

## 2020-08-28 RX ORDER — PROPOFOL 10 MG/ML
INJECTION, EMULSION INTRAVENOUS PRN
Status: DISCONTINUED | OUTPATIENT
Start: 2020-08-28 | End: 2020-08-28 | Stop reason: SDUPTHER

## 2020-08-28 RX ORDER — ACETAMINOPHEN 500 MG
1000 TABLET ORAL EVERY 8 HOURS SCHEDULED
Status: DISCONTINUED | OUTPATIENT
Start: 2020-08-28 | End: 2020-08-30 | Stop reason: HOSPADM

## 2020-08-28 RX ORDER — FENTANYL CITRATE 50 UG/ML
INJECTION, SOLUTION INTRAMUSCULAR; INTRAVENOUS PRN
Status: DISCONTINUED | OUTPATIENT
Start: 2020-08-28 | End: 2020-08-28 | Stop reason: SDUPTHER

## 2020-08-28 RX ORDER — MORPHINE SULFATE 4 MG/ML
1 INJECTION, SOLUTION INTRAMUSCULAR; INTRAVENOUS EVERY 5 MIN PRN
Status: DISCONTINUED | OUTPATIENT
Start: 2020-08-28 | End: 2020-08-28 | Stop reason: HOSPADM

## 2020-08-28 RX ORDER — LIDOCAINE HYDROCHLORIDE 20 MG/ML
INJECTION, SOLUTION INFILTRATION; PERINEURAL PRN
Status: DISCONTINUED | OUTPATIENT
Start: 2020-08-28 | End: 2020-08-28 | Stop reason: SDUPTHER

## 2020-08-28 RX ORDER — ESMOLOL HYDROCHLORIDE 10 MG/ML
INJECTION INTRAVENOUS PRN
Status: DISCONTINUED | OUTPATIENT
Start: 2020-08-28 | End: 2020-08-28 | Stop reason: SDUPTHER

## 2020-08-28 RX ORDER — VANCOMYCIN HYDROCHLORIDE 500 MG/10ML
INJECTION, POWDER, LYOPHILIZED, FOR SOLUTION INTRAVENOUS PRN
Status: DISCONTINUED | OUTPATIENT
Start: 2020-08-28 | End: 2020-08-28 | Stop reason: ALTCHOICE

## 2020-08-28 RX ORDER — ONDANSETRON 2 MG/ML
4 INJECTION INTRAMUSCULAR; INTRAVENOUS EVERY 6 HOURS PRN
Status: DISCONTINUED | OUTPATIENT
Start: 2020-08-28 | End: 2020-08-30 | Stop reason: HOSPADM

## 2020-08-28 RX ORDER — DIPHENHYDRAMINE HYDROCHLORIDE 50 MG/ML
12.5 INJECTION INTRAMUSCULAR; INTRAVENOUS
Status: DISCONTINUED | OUTPATIENT
Start: 2020-08-28 | End: 2020-08-28 | Stop reason: HOSPADM

## 2020-08-28 RX ORDER — FOLIC ACID 1 MG/1
1 TABLET ORAL DAILY
Status: DISCONTINUED | OUTPATIENT
Start: 2020-08-28 | End: 2020-08-30 | Stop reason: HOSPADM

## 2020-08-28 RX ORDER — AMLODIPINE BESYLATE 10 MG/1
10 TABLET ORAL DAILY
Status: DISCONTINUED | OUTPATIENT
Start: 2020-08-28 | End: 2020-08-30 | Stop reason: HOSPADM

## 2020-08-28 RX ORDER — LABETALOL 20 MG/4 ML (5 MG/ML) INTRAVENOUS SYRINGE
5 EVERY 10 MIN PRN
Status: DISCONTINUED | OUTPATIENT
Start: 2020-08-28 | End: 2020-08-28 | Stop reason: HOSPADM

## 2020-08-28 RX ORDER — PROPOFOL 10 MG/ML
INJECTION, EMULSION INTRAVENOUS CONTINUOUS PRN
Status: DISCONTINUED | OUTPATIENT
Start: 2020-08-28 | End: 2020-08-28 | Stop reason: SDUPTHER

## 2020-08-28 RX ORDER — HYDRALAZINE HYDROCHLORIDE 20 MG/ML
5 INJECTION INTRAMUSCULAR; INTRAVENOUS EVERY 10 MIN PRN
Status: DISCONTINUED | OUTPATIENT
Start: 2020-08-28 | End: 2020-08-28 | Stop reason: HOSPADM

## 2020-08-28 RX ORDER — MAGNESIUM HYDROXIDE 1200 MG/15ML
LIQUID ORAL CONTINUOUS PRN
Status: COMPLETED | OUTPATIENT
Start: 2020-08-28 | End: 2020-08-28

## 2020-08-28 RX ORDER — HYDROMORPHONE HYDROCHLORIDE 2 MG/1
4 TABLET ORAL EVERY 4 HOURS PRN
Status: DISCONTINUED | OUTPATIENT
Start: 2020-08-28 | End: 2020-08-29

## 2020-08-28 RX ORDER — HYDRALAZINE HYDROCHLORIDE 20 MG/ML
INJECTION INTRAMUSCULAR; INTRAVENOUS PRN
Status: DISCONTINUED | OUTPATIENT
Start: 2020-08-28 | End: 2020-08-28 | Stop reason: SDUPTHER

## 2020-08-28 RX ORDER — SODIUM CHLORIDE 9 MG/ML
INJECTION, SOLUTION INTRAVENOUS CONTINUOUS
Status: DISCONTINUED | OUTPATIENT
Start: 2020-08-28 | End: 2020-08-30 | Stop reason: HOSPADM

## 2020-08-28 RX ORDER — MIDAZOLAM HYDROCHLORIDE 1 MG/ML
INJECTION INTRAMUSCULAR; INTRAVENOUS PRN
Status: DISCONTINUED | OUTPATIENT
Start: 2020-08-28 | End: 2020-08-28 | Stop reason: SDUPTHER

## 2020-08-28 RX ORDER — SODIUM CHLORIDE, SODIUM LACTATE, POTASSIUM CHLORIDE, CALCIUM CHLORIDE 600; 310; 30; 20 MG/100ML; MG/100ML; MG/100ML; MG/100ML
INJECTION, SOLUTION INTRAVENOUS CONTINUOUS PRN
Status: DISCONTINUED | OUTPATIENT
Start: 2020-08-28 | End: 2020-08-28 | Stop reason: SDUPTHER

## 2020-08-28 RX ORDER — PROMETHAZINE HYDROCHLORIDE 25 MG/ML
6.25 INJECTION, SOLUTION INTRAMUSCULAR; INTRAVENOUS
Status: COMPLETED | OUTPATIENT
Start: 2020-08-28 | End: 2020-08-28

## 2020-08-28 RX ORDER — HYDROMORPHONE HYDROCHLORIDE 2 MG/1
2 TABLET ORAL EVERY 6 HOURS PRN
Qty: 42 TABLET | Refills: 0 | Status: SHIPPED | OUTPATIENT
Start: 2020-08-28 | End: 2020-09-04

## 2020-08-28 RX ORDER — SENNA AND DOCUSATE SODIUM 50; 8.6 MG/1; MG/1
1 TABLET, FILM COATED ORAL 2 TIMES DAILY
Status: DISCONTINUED | OUTPATIENT
Start: 2020-08-28 | End: 2020-08-30 | Stop reason: HOSPADM

## 2020-08-28 RX ORDER — OXYCODONE HYDROCHLORIDE 5 MG/1
10 TABLET ORAL PRN
Status: COMPLETED | OUTPATIENT
Start: 2020-08-28 | End: 2020-08-28

## 2020-08-28 RX ORDER — METHOCARBAMOL 500 MG/1
500 TABLET, FILM COATED ORAL 4 TIMES DAILY
Status: DISCONTINUED | OUTPATIENT
Start: 2020-08-28 | End: 2020-08-30 | Stop reason: HOSPADM

## 2020-08-28 RX ORDER — PROMETHAZINE HYDROCHLORIDE 25 MG/ML
6.25 INJECTION, SOLUTION INTRAMUSCULAR; INTRAVENOUS EVERY 6 HOURS PRN
Status: DISCONTINUED | OUTPATIENT
Start: 2020-08-28 | End: 2020-08-30 | Stop reason: HOSPADM

## 2020-08-28 RX ORDER — FENTANYL CITRATE 50 UG/ML
INJECTION, SOLUTION INTRAMUSCULAR; INTRAVENOUS
Status: COMPLETED
Start: 2020-08-28 | End: 2020-08-28

## 2020-08-28 RX ORDER — SODIUM CHLORIDE 0.9 % (FLUSH) 0.9 %
10 SYRINGE (ML) INJECTION PRN
Status: DISCONTINUED | OUTPATIENT
Start: 2020-08-28 | End: 2020-08-30 | Stop reason: HOSPADM

## 2020-08-28 RX ORDER — METOCLOPRAMIDE HYDROCHLORIDE 5 MG/ML
10 INJECTION INTRAMUSCULAR; INTRAVENOUS
Status: DISCONTINUED | OUTPATIENT
Start: 2020-08-28 | End: 2020-08-28 | Stop reason: HOSPADM

## 2020-08-28 RX ORDER — OXYCODONE HYDROCHLORIDE 5 MG/1
5 TABLET ORAL PRN
Status: COMPLETED | OUTPATIENT
Start: 2020-08-28 | End: 2020-08-28

## 2020-08-28 RX ORDER — HYDROMORPHONE HYDROCHLORIDE 2 MG/1
2 TABLET ORAL EVERY 4 HOURS PRN
Status: DISCONTINUED | OUTPATIENT
Start: 2020-08-28 | End: 2020-08-29

## 2020-08-28 RX ORDER — MIDAZOLAM HYDROCHLORIDE 1 MG/ML
INJECTION INTRAMUSCULAR; INTRAVENOUS
Status: COMPLETED
Start: 2020-08-28 | End: 2020-08-28

## 2020-08-28 RX ORDER — MEPERIDINE HYDROCHLORIDE 25 MG/ML
12.5 INJECTION INTRAMUSCULAR; INTRAVENOUS; SUBCUTANEOUS EVERY 5 MIN PRN
Status: DISCONTINUED | OUTPATIENT
Start: 2020-08-28 | End: 2020-08-28 | Stop reason: HOSPADM

## 2020-08-28 RX ORDER — LEVOTHYROXINE AND LIOTHYRONINE 19; 4.5 UG/1; UG/1
120 TABLET ORAL DAILY
Status: DISCONTINUED | OUTPATIENT
Start: 2020-08-29 | End: 2020-08-30 | Stop reason: HOSPADM

## 2020-08-28 RX ORDER — SODIUM CHLORIDE, SODIUM LACTATE, POTASSIUM CHLORIDE, CALCIUM CHLORIDE 600; 310; 30; 20 MG/100ML; MG/100ML; MG/100ML; MG/100ML
INJECTION, SOLUTION INTRAVENOUS CONTINUOUS
Status: DISCONTINUED | OUTPATIENT
Start: 2020-08-28 | End: 2020-08-28

## 2020-08-28 RX ORDER — SODIUM CHLORIDE 0.9 % (FLUSH) 0.9 %
10 SYRINGE (ML) INJECTION EVERY 12 HOURS SCHEDULED
Status: DISCONTINUED | OUTPATIENT
Start: 2020-08-28 | End: 2020-08-30 | Stop reason: HOSPADM

## 2020-08-28 RX ADMIN — FENTANYL CITRATE 25 MCG: 50 INJECTION INTRAMUSCULAR; INTRAVENOUS at 12:48

## 2020-08-28 RX ADMIN — PROMETHAZINE HYDROCHLORIDE 6.25 MG: 25 INJECTION INTRAMUSCULAR; INTRAVENOUS at 20:43

## 2020-08-28 RX ADMIN — LIDOCAINE HYDROCHLORIDE 3 ML: 20 INJECTION, SOLUTION INFILTRATION; PERINEURAL at 12:56

## 2020-08-28 RX ADMIN — PROPOFOL 50 MG: 10 INJECTION, EMULSION INTRAVENOUS at 12:01

## 2020-08-28 RX ADMIN — ESMOLOL HYDROCHLORIDE 30 MG: 10 INJECTION, SOLUTION INTRAVENOUS at 12:54

## 2020-08-28 RX ADMIN — LIDOCAINE HYDROCHLORIDE 2 ML: 20 INJECTION, SOLUTION INFILTRATION; PERINEURAL at 12:30

## 2020-08-28 RX ADMIN — FENTANYL CITRATE 25 MCG: 50 INJECTION INTRAMUSCULAR; INTRAVENOUS at 13:03

## 2020-08-28 RX ADMIN — ESMOLOL HYDROCHLORIDE 30 MG: 10 INJECTION, SOLUTION INTRAVENOUS at 14:00

## 2020-08-28 RX ADMIN — SODIUM CHLORIDE, SODIUM LACTATE, POTASSIUM CHLORIDE, AND CALCIUM CHLORIDE: 600; 310; 30; 20 INJECTION, SOLUTION INTRAVENOUS at 12:35

## 2020-08-28 RX ADMIN — FENTANYL CITRATE 25 MCG: 50 INJECTION INTRAMUSCULAR; INTRAVENOUS at 13:01

## 2020-08-28 RX ADMIN — ESMOLOL HYDROCHLORIDE 20 MG: 10 INJECTION, SOLUTION INTRAVENOUS at 13:25

## 2020-08-28 RX ADMIN — FENTANYL CITRATE 50 MCG: 50 INJECTION INTRAMUSCULAR; INTRAVENOUS at 13:48

## 2020-08-28 RX ADMIN — HYDROMORPHONE HYDROCHLORIDE 0.5 MG: 1 INJECTION, SOLUTION INTRAMUSCULAR; INTRAVENOUS; SUBCUTANEOUS at 19:14

## 2020-08-28 RX ADMIN — HYDROMORPHONE HYDROCHLORIDE 0.5 MG: 1 INJECTION, SOLUTION INTRAMUSCULAR; INTRAVENOUS; SUBCUTANEOUS at 15:41

## 2020-08-28 RX ADMIN — LIDOCAINE HYDROCHLORIDE 5 ML: 20 INJECTION, SOLUTION INFILTRATION; PERINEURAL at 11:44

## 2020-08-28 RX ADMIN — HYDROMORPHONE HYDROCHLORIDE 0.5 MG: 1 INJECTION, SOLUTION INTRAMUSCULAR; INTRAVENOUS; SUBCUTANEOUS at 23:33

## 2020-08-28 RX ADMIN — METHOCARBAMOL 500 MG: 500 TABLET ORAL at 20:45

## 2020-08-28 RX ADMIN — HYDROMORPHONE HYDROCHLORIDE 0.5 MG: 1 INJECTION, SOLUTION INTRAMUSCULAR; INTRAVENOUS; SUBCUTANEOUS at 15:00

## 2020-08-28 RX ADMIN — MIDAZOLAM HYDROCHLORIDE 2 MG: 2 INJECTION, SOLUTION INTRAMUSCULAR; INTRAVENOUS at 09:40

## 2020-08-28 RX ADMIN — PROGESTERONE 600 MG: 100 CAPSULE ORAL at 20:45

## 2020-08-28 RX ADMIN — ESMOLOL HYDROCHLORIDE 30 MG: 10 INJECTION, SOLUTION INTRAVENOUS at 14:27

## 2020-08-28 RX ADMIN — VANCOMYCIN HYDROCHLORIDE 1250 MG: 10 INJECTION, POWDER, LYOPHILIZED, FOR SOLUTION INTRAVENOUS at 09:33

## 2020-08-28 RX ADMIN — ACETAMINOPHEN 1000 MG: 500 TABLET ORAL at 20:44

## 2020-08-28 RX ADMIN — PROPOFOL 40 MG: 10 INJECTION, EMULSION INTRAVENOUS at 13:25

## 2020-08-28 RX ADMIN — HYDROMORPHONE HYDROCHLORIDE 4 MG: 2 TABLET ORAL at 22:12

## 2020-08-28 RX ADMIN — LIDOCAINE HYDROCHLORIDE 5 ML: 20 INJECTION, SOLUTION INFILTRATION; PERINEURAL at 12:01

## 2020-08-28 RX ADMIN — FOLIC ACID 1 MG: 1 TABLET ORAL at 18:01

## 2020-08-28 RX ADMIN — LIDOCAINE HYDROCHLORIDE 5 ML: 20 INJECTION, SOLUTION INFILTRATION; PERINEURAL at 12:16

## 2020-08-28 RX ADMIN — HYDROMORPHONE HYDROCHLORIDE 2 MG: 2 TABLET ORAL at 18:01

## 2020-08-28 RX ADMIN — FENTANYL CITRATE 50 MCG: 50 INJECTION INTRAMUSCULAR; INTRAVENOUS at 12:20

## 2020-08-28 RX ADMIN — HYDROMORPHONE HYDROCHLORIDE 0.5 MG: 1 INJECTION, SOLUTION INTRAMUSCULAR; INTRAVENOUS; SUBCUTANEOUS at 16:04

## 2020-08-28 RX ADMIN — PROPOFOL 100 MCG/KG/MIN: 10 INJECTION, EMULSION INTRAVENOUS at 12:01

## 2020-08-28 RX ADMIN — HYDROMORPHONE HYDROCHLORIDE 0.5 MG: 1 INJECTION, SOLUTION INTRAMUSCULAR; INTRAVENOUS; SUBCUTANEOUS at 15:11

## 2020-08-28 RX ADMIN — HYDRALAZINE HYDROCHLORIDE 10 MG: 20 INJECTION INTRAMUSCULAR; INTRAVENOUS at 12:57

## 2020-08-28 RX ADMIN — SODIUM CHLORIDE: 9 INJECTION, SOLUTION INTRAVENOUS at 18:02

## 2020-08-28 RX ADMIN — FENTANYL CITRATE 25 MCG: 50 INJECTION INTRAMUSCULAR; INTRAVENOUS at 12:55

## 2020-08-28 RX ADMIN — LIDOCAINE HYDROCHLORIDE 2 ML: 20 INJECTION, SOLUTION INFILTRATION; PERINEURAL at 13:25

## 2020-08-28 RX ADMIN — LIDOCAINE HYDROCHLORIDE 2 ML: 20 INJECTION, SOLUTION INFILTRATION; PERINEURAL at 12:52

## 2020-08-28 RX ADMIN — OXYCODONE HYDROCHLORIDE 10 MG: 5 TABLET ORAL at 16:03

## 2020-08-28 RX ADMIN — PROMETHAZINE HYDROCHLORIDE 6.25 MG: 25 INJECTION INTRAMUSCULAR; INTRAVENOUS at 15:08

## 2020-08-28 RX ADMIN — CEFAZOLIN 2 G: 10 INJECTION, POWDER, FOR SOLUTION INTRAVENOUS at 18:40

## 2020-08-28 RX ADMIN — SODIUM CHLORIDE, SODIUM LACTATE, POTASSIUM CHLORIDE, AND CALCIUM CHLORIDE: 600; 310; 30; 20 INJECTION, SOLUTION INTRAVENOUS at 11:44

## 2020-08-28 RX ADMIN — MEPERIDINE HYDROCHLORIDE 12.5 MG: 25 INJECTION, SOLUTION INTRAMUSCULAR; INTRAVENOUS; SUBCUTANEOUS at 14:55

## 2020-08-28 RX ADMIN — ONDANSETRON 4 MG: 2 INJECTION INTRAMUSCULAR; INTRAVENOUS at 19:11

## 2020-08-28 RX ADMIN — DOCUSATE SODIUM 50 MG AND SENNOSIDES 8.6 MG 1 TABLET: 8.6; 5 TABLET, FILM COATED ORAL at 20:45

## 2020-08-28 RX ADMIN — ESMOLOL HYDROCHLORIDE 10 MG: 10 INJECTION, SOLUTION INTRAVENOUS at 13:03

## 2020-08-28 RX ADMIN — FENTANYL CITRATE 100 MCG: 50 INJECTION INTRAMUSCULAR; INTRAVENOUS at 09:40

## 2020-08-28 RX ADMIN — SODIUM CHLORIDE, POTASSIUM CHLORIDE, SODIUM LACTATE AND CALCIUM CHLORIDE: 600; 310; 30; 20 INJECTION, SOLUTION INTRAVENOUS at 08:21

## 2020-08-28 RX ADMIN — AMLODIPINE BESYLATE 10 MG: 10 TABLET ORAL at 20:51

## 2020-08-28 RX ADMIN — PROPOFOL 50 MG: 10 INJECTION, EMULSION INTRAVENOUS at 12:05

## 2020-08-28 ASSESSMENT — PULMONARY FUNCTION TESTS
PIF_VALUE: 6
PIF_VALUE: 2
PIF_VALUE: 5
PIF_VALUE: 6
PIF_VALUE: 2
PIF_VALUE: 9
PIF_VALUE: 10
PIF_VALUE: 2
PIF_VALUE: 5
PIF_VALUE: 1
PIF_VALUE: 4
PIF_VALUE: 2
PIF_VALUE: 1
PIF_VALUE: 2
PIF_VALUE: 0
PIF_VALUE: 3
PIF_VALUE: 0
PIF_VALUE: 4
PIF_VALUE: 6
PIF_VALUE: 4
PIF_VALUE: 5
PIF_VALUE: 2
PIF_VALUE: 3
PIF_VALUE: 0
PIF_VALUE: 3
PIF_VALUE: 2
PIF_VALUE: 5
PIF_VALUE: 3
PIF_VALUE: 0
PIF_VALUE: 0
PIF_VALUE: 35
PIF_VALUE: 6
PIF_VALUE: 6
PIF_VALUE: 1
PIF_VALUE: 7
PIF_VALUE: 6
PIF_VALUE: 11
PIF_VALUE: 1
PIF_VALUE: 7
PIF_VALUE: 3
PIF_VALUE: 4
PIF_VALUE: 21
PIF_VALUE: 7
PIF_VALUE: 2
PIF_VALUE: 0
PIF_VALUE: 7
PIF_VALUE: 1
PIF_VALUE: 8
PIF_VALUE: 9
PIF_VALUE: 6
PIF_VALUE: 2
PIF_VALUE: 0
PIF_VALUE: 0
PIF_VALUE: 1
PIF_VALUE: 2
PIF_VALUE: 5
PIF_VALUE: 1
PIF_VALUE: 3
PIF_VALUE: 8
PIF_VALUE: 10
PIF_VALUE: 0
PIF_VALUE: 6
PIF_VALUE: 8
PIF_VALUE: 2
PIF_VALUE: 2
PIF_VALUE: 6
PIF_VALUE: 7
PIF_VALUE: 4
PIF_VALUE: 3
PIF_VALUE: 4
PIF_VALUE: 2
PIF_VALUE: 5
PIF_VALUE: 0
PIF_VALUE: 2
PIF_VALUE: 6
PIF_VALUE: 6
PIF_VALUE: 0
PIF_VALUE: 2
PIF_VALUE: 6
PIF_VALUE: 32
PIF_VALUE: 1
PIF_VALUE: 2
PIF_VALUE: 2
PIF_VALUE: 11
PIF_VALUE: 2
PIF_VALUE: 2
PIF_VALUE: 1
PIF_VALUE: 5
PIF_VALUE: 2
PIF_VALUE: 9
PIF_VALUE: 21
PIF_VALUE: 2
PIF_VALUE: 2
PIF_VALUE: 3
PIF_VALUE: 0
PIF_VALUE: 9
PIF_VALUE: 6
PIF_VALUE: 5
PIF_VALUE: 1
PIF_VALUE: 1
PIF_VALUE: 6
PIF_VALUE: 2
PIF_VALUE: 8
PIF_VALUE: 10
PIF_VALUE: 2
PIF_VALUE: 1
PIF_VALUE: 6
PIF_VALUE: 0
PIF_VALUE: 7
PIF_VALUE: 0
PIF_VALUE: 2
PIF_VALUE: 2
PIF_VALUE: 1
PIF_VALUE: 2
PIF_VALUE: 6
PIF_VALUE: 3
PIF_VALUE: 4
PIF_VALUE: 2
PIF_VALUE: 0
PIF_VALUE: 6
PIF_VALUE: 6
PIF_VALUE: 3
PIF_VALUE: 1
PIF_VALUE: 1
PIF_VALUE: 9
PIF_VALUE: 6
PIF_VALUE: 0
PIF_VALUE: 2
PIF_VALUE: 2
PIF_VALUE: 0
PIF_VALUE: 10
PIF_VALUE: 2
PIF_VALUE: 6
PIF_VALUE: 26
PIF_VALUE: 2
PIF_VALUE: 8
PIF_VALUE: 0
PIF_VALUE: 1
PIF_VALUE: 2
PIF_VALUE: 2
PIF_VALUE: 0
PIF_VALUE: 1
PIF_VALUE: 31
PIF_VALUE: 2
PIF_VALUE: 6
PIF_VALUE: 30
PIF_VALUE: 9
PIF_VALUE: 2
PIF_VALUE: 7
PIF_VALUE: 10
PIF_VALUE: 4
PIF_VALUE: 9
PIF_VALUE: 1
PIF_VALUE: 2
PIF_VALUE: 6
PIF_VALUE: 2
PIF_VALUE: 2
PIF_VALUE: 1
PIF_VALUE: 8
PIF_VALUE: 6
PIF_VALUE: 0
PIF_VALUE: 13
PIF_VALUE: 4
PIF_VALUE: 3
PIF_VALUE: 9
PIF_VALUE: 32
PIF_VALUE: 3
PIF_VALUE: 6
PIF_VALUE: 0
PIF_VALUE: 11
PIF_VALUE: 2
PIF_VALUE: 8
PIF_VALUE: 1
PIF_VALUE: 0
PIF_VALUE: 6
PIF_VALUE: 5
PIF_VALUE: 6
PIF_VALUE: 1
PIF_VALUE: 2
PIF_VALUE: 21
PIF_VALUE: 5

## 2020-08-28 ASSESSMENT — PAIN DESCRIPTION - DESCRIPTORS
DESCRIPTORS: ACHING

## 2020-08-28 ASSESSMENT — PAIN DESCRIPTION - PROGRESSION
CLINICAL_PROGRESSION: NOT CHANGED

## 2020-08-28 ASSESSMENT — PAIN DESCRIPTION - LOCATION
LOCATION: LEG
LOCATION: KNEE

## 2020-08-28 ASSESSMENT — PAIN SCALES - GENERAL
PAINLEVEL_OUTOF10: 7
PAINLEVEL_OUTOF10: 7
PAINLEVEL_OUTOF10: 0
PAINLEVEL_OUTOF10: 8
PAINLEVEL_OUTOF10: 8
PAINLEVEL_OUTOF10: 4
PAINLEVEL_OUTOF10: 0
PAINLEVEL_OUTOF10: 8
PAINLEVEL_OUTOF10: 9
PAINLEVEL_OUTOF10: 6
PAINLEVEL_OUTOF10: 8

## 2020-08-28 ASSESSMENT — PAIN DESCRIPTION - PAIN TYPE
TYPE: SURGICAL PAIN
TYPE: ACUTE PAIN;CHRONIC PAIN
TYPE: SURGICAL PAIN

## 2020-08-28 ASSESSMENT — PAIN DESCRIPTION - ORIENTATION
ORIENTATION: LEFT

## 2020-08-28 ASSESSMENT — PAIN DESCRIPTION - ONSET
ONSET: ON-GOING

## 2020-08-28 ASSESSMENT — PAIN DESCRIPTION - FREQUENCY
FREQUENCY: CONTINUOUS

## 2020-08-28 ASSESSMENT — PAIN - FUNCTIONAL ASSESSMENT: PAIN_FUNCTIONAL_ASSESSMENT: 0-10

## 2020-08-28 NOTE — ANESTHESIA POSTPROCEDURE EVALUATION
Department of Anesthesiology  Postprocedure Note    Patient: Jose Evans  MRN: [id number]  YOB: 1960  Date of evaluation: 8/28/2020  Time:  3:34 PM     Procedure Summary     Date:  08/28/20 Room / Location:  [address]    Anesthesia Start:  3787 Anesthesia Stop:  5241    Procedure:  LEFT TOTAL KNEE REVISION 2 COMPONENT (Left ) Diagnosis:       Instability of internal left knee prosthesis, initial encounter ([facility] [address]      (Instability of internal left knee prosthesis, initial encounter ([address] [N96.360S])    Surgeon:  Della Guillen MD Responsible Provider:  Carin Birmingham MD    Anesthesia Type:  general, epidural ASA Status:  3          Anesthesia Type: general, epidural    Ant Phase I: Ant Score: 10    Ant Phase II:      Last vitals: Reviewed and per EMR flowsheets.        Anesthesia Post Evaluation    Patient location during evaluation: PACU  Patient participation: complete - patient participated  Level of consciousness: awake and alert  Pain score: 0  Airway patency: patent  Nausea & Vomiting: no nausea and no vomiting  Complications: no  Cardiovascular status: hemodynamically stable  Respiratory status: acceptable  Hydration status: euvolemic

## 2020-08-28 NOTE — PLAN OF CARE
Problem: Pain:  Goal: Pain level will decrease  Description: Pain level will decrease  Outcome: Ongoing  Note: Mrs. Cesar Lim is currently rating her pain between a 4 and 6. I will continue to assess frequently. Ice is applied; I'm also encouraging her to breathe.

## 2020-08-28 NOTE — PROGRESS NOTES
Allergies reviewed with pt. Pt with adhesive tape allergy. Asked if tegaderm on skin is ok?   Pt stated that it was ok to use, as well as paper tape/

## 2020-08-28 NOTE — CONSULTS
Hospital Medicine  Consult History & Physical        Chief Complaint: Left knee pain    Date of Service: Pt seen/examined in consultation on 8/28/2020    History Of Present Illness:      42-year-old female with past medical history of hypothyroidism, hypertension presented to the hospital for left knee surgery. Patient states that she had left knee osteoarthritis for sure which she initially got arthroplasty last year but since then she has been having significant pain and difficulty walking. She was recommended to undergo at the surgery for which she presented today. She was seen postoperatively. She said that the pain was well controlled and denied any other symptoms at this point. Past Medical History:        Diagnosis Date    Arthritis     rheumatoid arthritis    Fibromyalgia     Migraine     MVP (mitral valve prolapse)     Nausea & vomiting     PONV (postoperative nausea and vomiting)     Reflux Doesn't have anymore r/t weight loss    Thyroid disease     goiter treated with radioactive med    Wears glasses        Past Surgical History:        Procedure Laterality Date    ANKLE ARTHROSCOPY Right 3/12    lateral ligament repair, removal spurs    ANKLE SURGERY      right    BACK SURGERY      cyst removed off lower spine    CERVICAL FUSION N/A 12/11/2019    C4-5, C5-6, C6-7 ANTERIOR CERVICAL DISCECTOMY AND FUSION performed by Stew Herrera MD at [address] [place] [address]      left    JOINT REPLACEMENT Left 2019    KNEE ARTHROSCOPY Left     SHOULDER SURGERY      bilateral    TONSILLECTOMY      T & A    WRIST SURGERY Right        Medications Prior to Admission:    Prior to Admission medications    Medication Sig Start Date End Date Taking? Authorizing Provider   HYDROmorphone (DILAUDID) 2 MG tablet Take 1 tablet by mouth every 6 hours as needed for Pain for up to 7 days.  8/28/20 9/4/20 Yes Dudley Pastrana MD   apixaban (ELIQUIS) 2.5 MG TABS tablet Take 1 tablet by mouth 2 times daily 8/28/20  Yes Linnie Boxer, MD   sennosides-docusate sodium (SENOKOT-S) 8.6-50 MG tablet Take 1 tablet by mouth 2 times daily 12/15/19  Yes Kirit Vazquez MD   progesterone (PROMETRIUM) 200 MG capsule Take 600 mg by mouth nightly   Yes Historical Provider, MD   amLODIPine (NORVASC) 10 MG tablet Take 10 mg by mouth daily  5/9/18  Yes Historical Provider, MD   thyroid (ARMOUR) 240 MG tablet Take 120 mg by mouth daily    Yes Historical Provider, MD   ENBREL 25 MG/0.5ML SOSY  8/3/20   Historical Provider, MD   folic acid (FOLVITE) 1 MG tablet Take 1 mg by mouth daily    Historical Provider, MD   methocarbamol (ROBAXIN) 750 MG tablet Take 750 mg by mouth 4 times daily    Historical Provider, MD   metoprolol tartrate (LOPRESSOR) 25 MG tablet Take 25 mg by mouth as needed (heart racing)    Historical Provider, MD   triamcinolone (KENALOG) 0.025 % cream Apply topically every 4 hours as needed Apply Topically itchy skin    Historical Provider, MD       Allergies:  Latex; Adhesive tape; Gluten; Plaquenil [hydroxychloroquine sulfate]; Vicodin [hydrocodone-acetaminophen]; Benzamide derivatives; and Codeine    Social History:      TOBACCO:   reports that she has never smoked. She has never used smokeless tobacco.  ETOH:   reports previous alcohol use. Family History:     Reviewed in detail and positive as follows:        Problem Relation Age of Onset    Diabetes Mother     Heart Disease Father         enlarged heart    Cancer Father     Cancer Brother     Other Sister        REVIEW OF SYSTEMS:   Pertinent positives as noted in the HPI. All other systems reviewed and negative. PHYSICAL EXAM PERFORMED:  /68   Pulse 95   Temp 98 °F (36.7 °C) (Oral)   Resp 16   Ht 5' 4\" (1.626 m)   Wt 174 lb (78.9 kg)   LMP 09/01/2011   SpO2 95%   BMI 29.87 kg/m²   General appearance: No apparent distress, appears stated age and cooperative.   HEENT: Normal cephalic, atraumatic without obvious deformity. Pupils equal, round, and reactive to light. Extra ocular muscles intact. Conjunctivae/corneas clear. Neck: Supple, with full range of motion. No jugular venous distention. Trachea midline. Respiratory:  Normal respiratory effort. Clear to auscultation, bilaterally without Rales/Wheezes/Rhonchi. Cardiovascular: Regular rate and rhythm with normal S1/S2 without murmurs, rubs or gallops. Abdomen: Soft, non-tender, non-distended with normal bowel sounds. Musculoskeletal: Left knee/leg in Ace wrap  Skin: Skin color, texture, turgor normal.  No rashes or lesions. Neurologic:  Neurovascularly intact without any focal sensory/motor deficits. Cranial nerves: II-XII intact, grossly non-focal.  Psychiatric: Alert and oriented, thought content appropriate, normal insight  Capillary Refill: Brisk,< 3 seconds   Peripheral Pulses: +2 palpable, equal bilaterally     Labs:     No results for input(s): WBC, HGB, HCT, PLT in the last 72 hours. No results for input(s): NA, K, CL, CO2, BUN, CREATININE, CALCIUM, PHOS in the last 72 hours. Invalid input(s): MAGNES  No results for input(s): AST, ALT, BILIDIR, BILITOT, ALKPHOS in the last 72 hours. No results for input(s): INR in the last 72 hours. No results for input(s): Norvel Edi in the last 72 hours. Urinalysis:    Lab Results   Component Value Date    NITRU Negative 12/13/2019    BLOODU Negative 12/13/2019    SPECGRAV 1.010 12/13/2019    GLUCOSEU Negative 12/13/2019       Radiology: I have reviewed the radiology reports with the following interpretation:     XR KNEE LEFT (1-2 VIEWS)   Final Result      No immediate postoperative complications. XR KNEE LEFT (1-2 VIEWS)   Final Result   Impression:      Status post revision of the left knee arthroplasty with no immediate complication evident.               EKG:  I have reviewed the EKG with the following interpretation:    @ekgread@      ASSESSMENT:    Active Hospital Problems    Diagnosis Date Noted    Instability of internal left knee prosthesis ([address] Pati Muhammad 08/28/2020     Hypertension  -Continue amlodipine    Hypothyroidism  -Continue [place] Thyroid    Status post revision left total knee arthroplasty  -Continue postoperative management as per orthopedic surgery  -PT/OT  -Pain control      DVT Prophylaxis: Eliquis  Diet: DIET GENERAL;  Code Status: Full Code    PT/OT Eval Status: Active and ongoing    Dispo -as per primary    Thank you for the consultation, will follow up as needed    Electronically signed by Kay Lopez MD on 8/28/20 at 5:42 PM EDT

## 2020-08-28 NOTE — PROGRESS NOTES
PACU Transfer to [facility]    Vitals:    08/28/20 1636   BP: (!) 131/59   Pulse: 109   Resp: 12   Temp: 98.5 °F (36.9 °C)   SpO2: 93%   bp within 20% of admin       Intake/Output Summary (Last 24 hours) at 8/28/2020 1638  Last data filed at 8/28/2020 1638  Gross per 24 hour   Intake 2125 ml   Output 700 ml   Net 1425 ml       Pain assessment:  present - adequately treated  Pain Level: 6    Patient transferred to care of LARRY RN.    8/28/2020 4:38 PM

## 2020-08-28 NOTE — PROGRESS NOTES
4 Eyes Admission Assessment     I agree as the admission nurse that 2 RN's have performed a thorough Head to Toe Skin Assessment on the patient. ALL assessment sites listed below have been assessed on admission. Areas assessed by both nurses:   [x]   Head, Face, and Ears   [x]   Shoulders, Back, and Chest  [x]   Arms, Elbows, and Hands   [x]   Coccyx, Sacrum, and Ischium  [x]   Legs, Feet, and Heels        Does the Patient have Skin Breakdown?   No         Antonio Prevention initiated:  No   Wound Care Orders initiated:  No      [facility] nurse consulted for Pressure Injury (Stage 3,4, Unstageable, DTI, NWPT, and Complex wounds) or Antonio score 18 or lower:  No      Nurse 1 eSignature: Electronically signed by Nora Grande RN on 8/28/20 at 6:50 PM EDT    **SHARE this note so that the co-signing nurse is able to place an eSignature**    Kristen White RN 8/28/20 at 6:51 PM EDT

## 2020-08-28 NOTE — PROGRESS NOTES
Mrs. Kerry Woodson admitted to [phone] with all personal belongings. VSS. Neuro WDL. Patient oriented to room. She was given her call light. Fall precautions in place (nonskid socks, bed alarm, bedside table within [facility], fall sign posted).

## 2020-08-28 NOTE — OP NOTE
their implants to address instability. We discussed the  standard risk of surgery including but not limited to pain, scar,  bleeding, infection, need for recurrent surgery, fracture, stiffness,  or even loss of life or limb. Despite these risks and other, they did  elect to proceed.     OPERATIVE DETAILS:  The patient was greeted in the preoperative  holding area. The operative knee was marked with a marker. They were  brought to the operating room where general anesthesia was obtained. They were placed in the supine position with a tourniquet on the thigh  and a bump under the operative hip and all bony prominences well padded. Pre-operative antibiotics were administered prior to surgical incision and tourniquet  inflation. The operative extremity was prepped and draped in a normal  standard sterile surgical fashion followed by final time-out verifying  correct patient, operative site, and operative plan. The operative lower  extremity was exsanguinated with an Ace bandage and tourniquet was  inflated.     I utilized the previous anterior-based incision to expose the extensor mechanism. Full-thickness layers were elevated medially and laterally. I  aspirated synovial fluid, which was sent for stat cell  count and differential which later returned negative for signs of  infection. I then performed a medial parapatellar arthrotomy with a  medial release and performed an extensive synovectomy while sending samples for culture. I then obtained exposure of the  femur and removed the femur with an offset osteotome and a reciprocating saw. I then used an offset osteotome and reciprocating saw to elevate the tibial  Component and it was removed with as little bone loss as possible. After I had removed all the femoral and  tibial cement, I then reamed to the above size stems for the femur and  the tibia. I then broached for a sleeve in the tibia followed by tibia  trial in appropriate external rotation.   I then prepared the femur with  augments. I assembled the trial and placed it onto the femur. I placed the knee into extension and took through wide range of  motion. I distalized the femur well and restored the joint  line and took an intraoperative x-ray, which verified a correct  position of my implant. I was pleased with this. I opened these  implants on the back table. I removed the trials and copiously  irrigated the wound with multiple Betadine solution soaks. I then  dried all the bony and mixed cement on the back table and cemented the  tibia and femur into place removing all extra cement. This knee was  placed into full extension for cement curing. I trialed multiple  polyethylene liners and selected the 12.5 mm, placed it in the knee and once  again took the knee through a wide range of motion. The patella  tracked centrally. I did perform a lateral release with a small  resection of the lateral patellar facet. I was pleased with my  construct. I once again irrigated the wound and then closed the knee  in mid flexion with interrupted 0 Vicryl and arthrotomy oversewn with  #2 Stratafix and tourniquet was deflated. There was no profuse  bleeding. I injected the ortho cocktail mixture into the deep and  subcutaneous tissues followed by 0 Vicryl in the subcutaneous tissue,  2-0 Vicryl in a buried interrupted fashion, the subdermal tissue and  A running 4-0 monocryl, dermabond and prineo. A sterile silver-impregnated  dressing was applied followed by an Ace bandage. The patient was then  extubated and transported to the postoperative anesthesia care unit in  stable condition. All sponge and needle counts were correct x2. The  patient tolerated the procedure well without complication.

## 2020-08-28 NOTE — ANESTHESIA PROCEDURE NOTES
Epidural Block    Patient location during procedure: pre-op  Start time: 8/28/2020 9:40 AM  End time: 8/28/2020 9:53 AM  Staffing  Anesthesiologist: Dylan Costello MD  Performed: anesthesiologist   Preanesthetic Checklist  Completed: patient identified, site marked, surgical consent, pre-op evaluation, timeout performed, IV checked, risks and benefits discussed, monitors and equipment checked, anesthesia consent given, oxygen available and patient being monitored  Epidural  Patient position: sitting  Prep: ChloraPrep  Patient monitoring: cardiac monitor, continuous pulse ox and frequent blood pressure checks  Approach: midline  Location: lumbar (1-5)  Injection technique: PARRISH air  Provider prep: mask and sterile gloves  Needle  Needle type: Tuohy   Needle gauge: 17 G  Needle length: 3.5 in  Needle insertion depth: 5 cm  Catheter type: side hole  Catheter size: 19 G  Catheter at skin depth: 15 cm  Test dose: negative  Kit: Perifix FX Continuous Epidural Anesthesia Tray  Lot number: [id number]  Expiration date: 6/1/2021  Assessment  Hemodynamics: stable  Attempts: 1  Additional Notes  Epidural Note    Seated position. Landmarks identified. Sterile prep and drape. Lidocaine 1% skin wheal at L3-4.  17G Tuohy passed with loss of resistance at 5cm. Catheter passed easily to 15cm. Tuohy removed. Catheter secured. Lidocaine 1.5% with Epinephrine 1:200,000 administered to 5cc total at conclusion of procedure. No apparent complications at the time of the procedure. Jasiel Banks.  Magi Sommer MD  August 28, 2020 9:56 AM

## 2020-08-28 NOTE — CONSULTS
Clinical Pharmacy Progress Note    Admit date: 8/28/2020     Subjective/Objective:  Pt is 64 yo female with PMHx significant for RA (on Enbrel on hold til 1 week postop) HTN, hypothyroidism, osteoarthritis. Admitted today for left total knee revision by Dr Vale Dumont. POD #0    Pharmacy has been consulted to make pain medication recommendations by Dr. Vale Dumont    Pain scores have been 4-8  Patient reports-  * patient will be interviewed tomorrow  Patient's stated pain goal: TBD    Current pain regimen:   Medication  Home med? Amount used last 24 hrs    Acetaminophen 100 mg TID no    Methocarbamol 500 mg PO 4x daily yes    Hydromorphone 0.25- 0.5 mg IV q3h PRN no    Hydromorphone 2- 4 mg PO q4h PRN pain no    Oxycodone 5-10 mg PO PRN (PACU only 8/28)  no                     Morphine Equivalent Daily Dose: Will assess tomorrow    Bowel Regimen:  Senokot/ DSS 1 tab BID    Assessment/Plan:  1)  Pain regimen recommendations:  · Will make recommendations tomorrow after assessing patient's response to current regimen    Will notify Dr Vale Dumont when recommendations made tomorrow. Will continue to monitor and assist with adjustments to regimen as needed. Please call with questions:  [phone] ([address])    Thank you for the consult  Vito BRYANT  8/28/2020    7:31 PM

## 2020-08-28 NOTE — ANESTHESIA PRE PROCEDURE
Department of Anesthesiology  Preprocedure Note       Name:  Romaine Carrel   Age:  63 y.o.  :  1960                                          MRN:  [id number]         Date:  2020      Surgeon: Neftali Mckeon):  Du Johansen MD    Procedure: Procedure(s):  LEFT TOTAL KNEE REVISION 2 COMPONENT    Medications prior to admission:   Prior to Admission medications    Medication Sig Start Date End Date Taking? Authorizing Provider   sennosides-docusate sodium (SENOKOT-S) 8.6-50 MG tablet Take 1 tablet by mouth 2 times daily 12/15/19  Yes Kirit Vazquez MD   progesterone (PROMETRIUM) 200 MG capsule Take 600 mg by mouth nightly   Yes Historical Provider, MD   amLODIPine (NORVASC) 10 MG tablet Take 10 mg by mouth daily  18  Yes Historical Provider, MD   thyroid (ARMOUR) 240 MG tablet Take 120 mg by mouth daily    Yes Historical Provider, MD   ENBREL 25 MG/0.5ML SOSY  8/3/20   Historical Provider, MD   folic acid (FOLVITE) 1 MG tablet Take 1 mg by mouth daily    Historical Provider, MD   methocarbamol (ROBAXIN) 750 MG tablet Take 750 mg by mouth 4 times daily    Historical Provider, MD   metoprolol tartrate (LOPRESSOR) 25 MG tablet Take 25 mg by mouth as needed (heart racing)    Historical Provider, MD   triamcinolone (KENALOG) 0.025 % cream Apply topically every 4 hours as needed Apply Topically itchy skin    Historical Provider, MD       Current medications:    No current facility-administered medications for this encounter.       Current Outpatient Medications   Medication Sig Dispense Refill    sennosides-docusate sodium (SENOKOT-S) 8.6-50 MG tablet Take 1 tablet by mouth 2 times daily      progesterone (PROMETRIUM) 200 MG capsule Take 600 mg by mouth nightly      amLODIPine (NORVASC) 10 MG tablet Take 10 mg by mouth daily       thyroid (ARMOUR) 240 MG tablet Take 120 mg by mouth daily       ENBREL 25 WZ/2.1FB SOSY       folic acid (FOLVITE) 1 MG tablet Take 1 mg by mouth daily      Never Used   Substance Use Topics    Alcohol use: Yes     Comment: monthly                                Counseling given: Not Answered      Vital Signs (Current):   Vitals:    08/26/20 1104   Weight: 171 lb (77.6 kg)   Height: 5' 4\" (1.626 m)                                              BP Readings from Last 3 Encounters:   12/15/19 120/77   12/11/19 (!) 81/51   11/02/18 135/87       NPO Status:                                                                                 BMI:   Wt Readings from Last 3 Encounters:   12/11/19 182 lb (82.6 kg)   01/30/19 184 lb 15.5 oz (83.9 kg)   11/02/18 184 lb 15.5 oz (83.9 kg)     Body mass index is 29.35 kg/m². CBC:   Lab Results   Component Value Date    WBC 15.2 12/12/2019    RBC 4.27 12/12/2019    HGB 13.6 12/12/2019    HCT 39.9 12/12/2019    MCV 93.4 12/12/2019    RDW 13.2 12/12/2019     12/12/2019       CMP:   Lab Results   Component Value Date     12/12/2019    K 3.5 12/12/2019     12/12/2019    CO2 27 12/12/2019    BUN 7 12/12/2019    CREATININE 0.7 12/12/2019    GFRAA >60 12/12/2019    GFRAA >60 03/02/2012    LABGLOM >60 12/12/2019    GLUCOSE 153 12/12/2019    CALCIUM 9.2 12/12/2019       POC Tests: No results for input(s): POCGLU, POCNA, POCK, POCCL, POCBUN, POCHEMO, POCHCT in the last 72 hours. Coags: No results found for: PROTIME, INR, APTT    HCG (If Applicable): No results found for: PREGTESTUR, PREGSERUM, HCG, HCGQUANT     ABGs: No results found for: PHART, PO2ART, PVN2VII, TCM5UHB, BEART, H2TOFTSL     Type & Screen (If Applicable):  No results found for: LABABO, LABRH    Drug/Infectious Status (If Applicable):  No results found for: HIV, HEPCAB    COVID-19 Screening (If Applicable):   Lab Results   Component Value Date    COVID19 NOT DETECTED 08/24/2020         Anesthesia Evaluation  Patient summary reviewed and Nursing notes reviewed   history of anesthetic complications: PONV.   Airway: Mallampati: II  TM distance: >3 FB   Neck ROM: full  Mouth opening: > = 3 FB Dental:          Pulmonary:                              Cardiovascular:    (+) valvular problems/murmurs: MVP,                   Neuro/Psych:   (+) headaches: migraine headaches,             GI/Hepatic/Renal:   (+) GERD:,           Endo/Other:    (+) hypothyroidism: arthritis: rheumatoid. , .                 Abdominal:           Vascular:                                        Anesthesia Plan      general and epidural     ASA 1    (34-year-old female presents for revision of left total knee arthroplasty. Plan epidural anesthesia with I.V. sedation as needed. ASA standard monitors. Patient was given the opportunity to ask question and is in agreement with the anesthetic plan.)  Induction: intravenous. Anesthetic plan and risks discussed with patient. Plan discussed with CRNA.     Attending anesthesiologist reviewed and agrees with Pre Eval content              Shea Sandoval MD   8/27/2020

## 2020-08-29 LAB
HCT VFR BLD CALC: 35.8 % (ref 36–48)
HEMOGLOBIN: 12.7 G/DL (ref 12–16)

## 2020-08-29 PROCEDURE — 6360000002 HC RX W HCPCS: Performed by: INTERNAL MEDICINE

## 2020-08-29 PROCEDURE — 6360000002 HC RX W HCPCS: Performed by: ORTHOPAEDIC SURGERY

## 2020-08-29 PROCEDURE — 96376 TX/PRO/DX INJ SAME DRUG ADON: CPT

## 2020-08-29 PROCEDURE — G0378 HOSPITAL OBSERVATION PER HR: HCPCS

## 2020-08-29 PROCEDURE — 2580000003 HC RX 258: Performed by: ORTHOPAEDIC SURGERY

## 2020-08-29 PROCEDURE — 6370000000 HC RX 637 (ALT 250 FOR IP): Performed by: ORTHOPAEDIC SURGERY

## 2020-08-29 PROCEDURE — 97162 PT EVAL MOD COMPLEX 30 MIN: CPT

## 2020-08-29 PROCEDURE — 97535 SELF CARE MNGMENT TRAINING: CPT

## 2020-08-29 PROCEDURE — 36415 COLL VENOUS BLD VENIPUNCTURE: CPT

## 2020-08-29 PROCEDURE — 96365 THER/PROPH/DIAG IV INF INIT: CPT

## 2020-08-29 PROCEDURE — 85018 HEMOGLOBIN: CPT

## 2020-08-29 PROCEDURE — 97110 THERAPEUTIC EXERCISES: CPT

## 2020-08-29 PROCEDURE — 85014 HEMATOCRIT: CPT

## 2020-08-29 PROCEDURE — 96375 TX/PRO/DX INJ NEW DRUG ADDON: CPT

## 2020-08-29 PROCEDURE — 97166 OT EVAL MOD COMPLEX 45 MIN: CPT

## 2020-08-29 PROCEDURE — 97116 GAIT TRAINING THERAPY: CPT

## 2020-08-29 RX ORDER — HYDROMORPHONE HYDROCHLORIDE 2 MG/1
2 TABLET ORAL EVERY 4 HOURS PRN
Status: DISCONTINUED | OUTPATIENT
Start: 2020-08-29 | End: 2020-08-30 | Stop reason: HOSPADM

## 2020-08-29 RX ORDER — PROMETHAZINE HYDROCHLORIDE 12.5 MG/1
12.5 TABLET ORAL EVERY 6 HOURS PRN
Qty: 30 TABLET | Refills: 0 | Status: SHIPPED | OUTPATIENT
Start: 2020-08-29 | End: 2020-09-05

## 2020-08-29 RX ORDER — METHOCARBAMOL 500 MG/1
500 TABLET, FILM COATED ORAL 4 TIMES DAILY
Qty: 60 TABLET | Refills: 1 | Status: ON HOLD | OUTPATIENT
Start: 2020-08-29 | End: 2022-01-07 | Stop reason: ALTCHOICE

## 2020-08-29 RX ADMIN — FOLIC ACID 1 MG: 1 TABLET ORAL at 07:45

## 2020-08-29 RX ADMIN — HYDROMORPHONE HYDROCHLORIDE 0.5 MG: 1 INJECTION, SOLUTION INTRAMUSCULAR; INTRAVENOUS; SUBCUTANEOUS at 07:42

## 2020-08-29 RX ADMIN — APIXABAN 2.5 MG: 2.5 TABLET, FILM COATED ORAL at 07:45

## 2020-08-29 RX ADMIN — METHOCARBAMOL 500 MG: 500 TABLET ORAL at 07:45

## 2020-08-29 RX ADMIN — HYDROMORPHONE HYDROCHLORIDE 2 MG: 2 TABLET ORAL at 22:38

## 2020-08-29 RX ADMIN — METHOCARBAMOL 500 MG: 500 TABLET ORAL at 21:14

## 2020-08-29 RX ADMIN — DOCUSATE SODIUM 50 MG AND SENNOSIDES 8.6 MG 1 TABLET: 8.6; 5 TABLET, FILM COATED ORAL at 22:38

## 2020-08-29 RX ADMIN — ACETAMINOPHEN 1000 MG: 500 TABLET ORAL at 21:14

## 2020-08-29 RX ADMIN — ACETAMINOPHEN 1000 MG: 500 TABLET ORAL at 06:29

## 2020-08-29 RX ADMIN — Medication 10 ML: at 21:14

## 2020-08-29 RX ADMIN — HYDROMORPHONE HYDROCHLORIDE 4 MG: 2 TABLET ORAL at 10:36

## 2020-08-29 RX ADMIN — HYDROMORPHONE HYDROCHLORIDE 0.5 MG: 1 INJECTION, SOLUTION INTRAMUSCULAR; INTRAVENOUS; SUBCUTANEOUS at 11:37

## 2020-08-29 RX ADMIN — PROGESTERONE 600 MG: 100 CAPSULE ORAL at 21:14

## 2020-08-29 RX ADMIN — AMLODIPINE BESYLATE 10 MG: 10 TABLET ORAL at 21:14

## 2020-08-29 RX ADMIN — HYDROMORPHONE HYDROCHLORIDE 4 MG: 2 TABLET ORAL at 02:08

## 2020-08-29 RX ADMIN — DOCUSATE SODIUM 50 MG AND SENNOSIDES 8.6 MG 1 TABLET: 8.6; 5 TABLET, FILM COATED ORAL at 07:45

## 2020-08-29 RX ADMIN — PROMETHAZINE HYDROCHLORIDE 6.25 MG: 25 INJECTION INTRAMUSCULAR; INTRAVENOUS at 08:51

## 2020-08-29 RX ADMIN — HYDROMORPHONE HYDROCHLORIDE 4 MG: 2 TABLET ORAL at 06:29

## 2020-08-29 RX ADMIN — HYDROMORPHONE HYDROCHLORIDE 0.5 MG: 1 INJECTION, SOLUTION INTRAMUSCULAR; INTRAVENOUS; SUBCUTANEOUS at 04:38

## 2020-08-29 RX ADMIN — APIXABAN 2.5 MG: 2.5 TABLET, FILM COATED ORAL at 21:14

## 2020-08-29 RX ADMIN — METHOCARBAMOL 500 MG: 500 TABLET ORAL at 16:30

## 2020-08-29 RX ADMIN — ACETAMINOPHEN 1000 MG: 500 TABLET ORAL at 12:48

## 2020-08-29 RX ADMIN — HYDROMORPHONE HYDROCHLORIDE 4 MG: 2 TABLET ORAL at 14:30

## 2020-08-29 RX ADMIN — HYDROMORPHONE HYDROCHLORIDE 0.5 MG: 1 INJECTION, SOLUTION INTRAMUSCULAR; INTRAVENOUS; SUBCUTANEOUS at 15:34

## 2020-08-29 RX ADMIN — LEVOTHYROXINE, LIOTHYRONINE 120 MG: 19; 4.5 TABLET ORAL at 06:29

## 2020-08-29 RX ADMIN — MAGNESIUM HYDROXIDE 30 ML: 400 SUSPENSION ORAL at 07:47

## 2020-08-29 RX ADMIN — METHOCARBAMOL 500 MG: 500 TABLET ORAL at 12:48

## 2020-08-29 RX ADMIN — HYDROMORPHONE HYDROCHLORIDE 2 MG: 2 TABLET ORAL at 18:38

## 2020-08-29 RX ADMIN — Medication 10 ML: at 07:45

## 2020-08-29 RX ADMIN — CEFAZOLIN 2 G: 10 INJECTION, POWDER, FOR SOLUTION INTRAVENOUS at 02:08

## 2020-08-29 ASSESSMENT — PAIN DESCRIPTION - PROGRESSION
CLINICAL_PROGRESSION: NOT CHANGED

## 2020-08-29 ASSESSMENT — PAIN DESCRIPTION - LOCATION
LOCATION: KNEE

## 2020-08-29 ASSESSMENT — PAIN SCALES - GENERAL
PAINLEVEL_OUTOF10: 8
PAINLEVEL_OUTOF10: 0
PAINLEVEL_OUTOF10: 8
PAINLEVEL_OUTOF10: 7
PAINLEVEL_OUTOF10: 8
PAINLEVEL_OUTOF10: 0
PAINLEVEL_OUTOF10: 6
PAINLEVEL_OUTOF10: 7
PAINLEVEL_OUTOF10: 8
PAINLEVEL_OUTOF10: 6
PAINLEVEL_OUTOF10: 6
PAINLEVEL_OUTOF10: 8
PAINLEVEL_OUTOF10: 6
PAINLEVEL_OUTOF10: 0
PAINLEVEL_OUTOF10: 7
PAINLEVEL_OUTOF10: 8
PAINLEVEL_OUTOF10: 6
PAINLEVEL_OUTOF10: 8
PAINLEVEL_OUTOF10: 6

## 2020-08-29 ASSESSMENT — PAIN DESCRIPTION - ONSET
ONSET: ON-GOING

## 2020-08-29 ASSESSMENT — PAIN DESCRIPTION - ORIENTATION
ORIENTATION: LEFT

## 2020-08-29 ASSESSMENT — PAIN - FUNCTIONAL ASSESSMENT
PAIN_FUNCTIONAL_ASSESSMENT: ACTIVITIES ARE NOT PREVENTED
PAIN_FUNCTIONAL_ASSESSMENT: ACTIVITIES ARE NOT PREVENTED

## 2020-08-29 ASSESSMENT — PAIN DESCRIPTION - PAIN TYPE
TYPE: SURGICAL PAIN

## 2020-08-29 ASSESSMENT — PAIN DESCRIPTION - FREQUENCY
FREQUENCY: CONTINUOUS

## 2020-08-29 ASSESSMENT — PAIN DESCRIPTION - DESCRIPTORS
DESCRIPTORS: ACHING

## 2020-08-29 NOTE — PROGRESS NOTES
Pt. Up walking with assist X1 with rolling walker. No hands on assist. Pt. Able to properly transfer and use correct bed mobility measures following surgery. Ace wrap removed this morning. Silver dressing in place with no drainage. Bilateral israel socks applied. Ice applied. Pt. Requesting prn pain meds as soon as it is due. Pt. Still needing IV pain meds. MD made aware. No changes were pain to discharge pain scripts. Pt. Was upset that discharge script was a lot less than meds given here. Discharge order in place.  at bedside. Will continue to monitor. Call-light within reach.

## 2020-08-29 NOTE — PROGRESS NOTES
Occupational Therapy   Occupational Therapy Initial Assessment  Date: 2020   Patient Name: Melo Hdez  MRN: [id number]     : 1960    Date of Service: 2020       Melo Hdez scored a 21/24 on the AM-PAC ADL Inpatient form. Current research shows that an AM-PAC score of 18 or greater is typically associated with a discharge to the patient's home setting. Based on the patient's AM-PAC score, and their current ADL deficits, it is recommended that the patient have 2-3 sessions per week of Occupational Therapy at d/c to increase the patient's independence. At this time, this patient demonstrates the endurance and safety to discharge home with assist PRN and a follow up treatment frequency of 2-3x/wk. Please see assessment section for further patient specific details. If patient discharges prior to next session this note will serve as a discharge summary. Please see below for the latest assessment towards goals. Discharge Recommendations:  Home with assist PRN  OT Equipment Recommendations  Equipment Needed: Yes  Mobility Devices: ADL Assistive Devices  ADL Assistive Devices: Long-handled Shoe Horn;Long-handled Sponge;Sock-Aid Hard    Assessment   Performance deficits / Impairments: Decreased functional mobility ; Decreased ADL status; Decreased high-level IADLs;Decreased endurance;Decreased strength  Assessment: Pt is a 64 y/o female admitted to [facility]  post left total knee, pt reporting this is her second knee replacement. Pt endores independence with ADL/IADL routine and functional mobility PTA. At time of evaluation pt completed toileting and grooming tasks with supervision, dependent for donning socks d/t pain in LLE- would benefit from AE.  Pt presents with limited strength and endurance of LLE, would benefit from continued skilled services during admission to promote increased safety and independence with ADL routine at d./c  Treatment Diagnosis: decreased ADL participation d/t L total knee  Prognosis: Good  Decision Making: Medium Complexity  OT Education: OT Role;Plan of Care  REQUIRES OT FOLLOW UP: Yes  Activity Tolerance  Activity Tolerance: Patient Tolerated treatment well  Safety Devices  Safety Devices in place: Yes  Type of devices: All fall risk precautions in place; Left in chair;Chair alarm in place;Nurse notified           Patient Diagnosis(es): The primary encounter diagnosis was Instability of internal left knee prosthesis, sequela. A diagnosis of Instability of internal left knee prosthesis, initial encounter [facility]) was also pertinent to this visit. has a past medical history of Arthritis, Fibromyalgia, Migraine, MVP (mitral valve prolapse), Nausea & vomiting, PONV (postoperative nausea and vomiting), Reflux, Thyroid disease, and Wears glasses. has a past surgical history that includes shoulder surgery; Ankle surgery; Cholecystectomy; Tonsillectomy; back surgery; Elbow surgery; Ankle arthroscopy (Right, 3/12); Knee arthroscopy (Left); Wrist surgery (Right); cervical fusion (N/A, 12/11/2019); and joint replacement (Left, 2019). Treatment Diagnosis: decreased ADL participation d/t L total knee      Restrictions  Restrictions/Precautions  Restrictions/Precautions: Weight Bearing, General Precautions  Required Braces or Orthoses?: No  Lower Extremity Weight Bearing Restrictions  Left Lower Extremity Weight Bearing: Weight Bearing As Tolerated  Position Activity Restriction  Other position/activity restrictions: ambulate in room progressing to hallway    Subjective   General  Chart Reviewed: Yes  Patient assessed for rehabilitation services?: Yes  Additional Pertinent Hx: Patient with c/o left knee pain, swelling and instability in the setting of previous TKA.  Past hx includes RA, fibromyalgia, MVP  Family / Caregiver Present: No  Referring Practitioner: Fifi Little MD  Diagnosis: L total knee  Patient Currently in Pain: Yes  Pain Assessment  Pain Assessment: 0-10  Pain Level: 8  Patient's Stated Pain Goal: No pain  Pain Type: Surgical pain  Pain Location: Knee  Pain Orientation: Left  Functional Pain Assessment: Activities are not prevented  Vital Signs  Patient Currently in Pain: Yes  Social/Functional History  Social/Functional History  Lives With: Spouse(Working full-time; available for weekend)  Type of Home: House  Home Layout: Two level, Able to Live on Main level with bedroom/bathroom, Performs ADL's on one level  Home Access: Stairs to enter without rails  Entrance Stairs - Number of Steps: 1  Bathroom Shower/Tub: Walk-in shower  Bathroom Toilet: Handicap height  Bathroom Equipment: Shower chair, Grab bars in shower, Hand-held shower  Bathroom Accessibility: Accessible  Home Equipment: Rolling walker, Wheelchair-manual, [employer] Help From: Other (comment)(none needed PTA)  ADL Assistance: Independent  Homemaking Assistance: Independent  Homemaking Responsibilities: Yes  Ambulation Assistance: Independent  Transfer Assistance: Independent  Active : Yes  Occupation: Retired  Leisure & Hobbies: cross stitching  Additional Comments: no falls in last 5 months       Objective   Vision: Impaired  Vision Exceptions: Wears glasses at all times  Hearing: Within functional limits    Orientation  Overall Orientation Status: Within Functional Limits     Balance  Sitting Balance: Independent  Standing Balance: Supervision  Standing Balance  Time: ~10 minutes total  Activity: standing ADL tasks, functional mobility, transfers  Functional Mobility  Functional - Mobility Device: Rolling Walker  Activity: To/from bathroom  Assist Level: Supervision  Toilet Transfers  Toilet - Technique: Ambulating  Equipment Used: Standard toilet(+ grab bar)  Toilet Transfer: Supervision  ADL  Grooming: Supervision(Supervision in stance at sink for oral care)  LE Dressing: Dependent/Total(donning/doffing socks)  Toileting: Supervision(Completed pericare seated on toilet via weight shift)  Donell ABDUL Tone: Normotonic  Tone LUE  LUE Tone: Normotonic  Coordination  Movements Are Fluid And Coordinated: Yes     Bed mobility  Supine to Sit: Supervision  Sit to Supine: Supervision  Transfers  Sit to stand: Supervision  Stand to sit: Supervision  Vision - Basic Assessment  Prior Vision: Wears glasses all the time  Visual History: No significant visual history  Cognition  Overall Cognitive Status: WFL  Perception  Overall Perceptual Status: WFL     Sensation  Overall Sensation Status: WFL(denies N/T)        LUE AROM (degrees)  LUE AROM : WFL  Left Hand AROM (degrees)  Left Hand AROM: WFL  RUE AROM (degrees)  RUE AROM : WFL  Right Hand AROM (degrees)  Right Hand AROM: WFL  LUE Strength  Gross LUE Strength: WFL  L Hand General: 4/5  RUE Strength  Gross RUE Strength: WFL  R Hand General: 4/5                   Plan   Plan  Times per week: 5-7x/wk  Current Treatment Recommendations: Strengthening, Safety Education & Training, Self-Care / ADL, Home Management Training, Functional Mobility Training    AM-PAC Score        AM-PAC Inpatient Daily Activity Raw Score: 21 (08/29/20 0929)  AM-PAC Inpatient ADL T-Scale Score : 44.27 (08/29/20 0929)  ADL Inpatient CMS 0-100% Score: 32.79 (08/29/20 0929)  ADL Inpatient CMS G-Code Modifier : CJ (08/29/20 0299)    Goals  Short term goals  Time Frame for Short term goals: by discharge  Short term goal 1: Demo LB dressing with AE PRN and superivision  Short term goal 2: Demo toileting, to include transfer, MOD I  Short term goal 3: Demo independence with UE HEP to promote increased strength for transfers and ADLs  Patient Goals   Patient goals : return home       Therapy Time   Individual Concurrent Group Co-treatment   Time In 0850         Time Out 0913         Minutes 23         Timed Code Treatment Minutes: 40 Sandra Carvalho OT

## 2020-08-29 NOTE — PROGRESS NOTES
Hospitalist Progress Note      PCP: Joceline Pinto MD    Date of Admission: 8/28/2020    Chief Complaint: Left knee pain    Hospital Course: 21-year-old female who was admitted for left knee surgery. Sound was consulted for postoperative medical management    Subjective: No acute events reported overnight. States that the pain is overall well controlled. Denies any new concerns this morning. Medications:  Reviewed    Infusion Medications    sodium chloride 125 mL/hr at 08/28/20 1802     Scheduled Medications    amLODIPine  10 mg Oral Daily    folic acid  1 mg Oral Daily    thyroid  120 mg Oral Daily    progesterone  600 mg Oral Nightly    sodium chloride flush  10 mL Intravenous 2 times per day    acetaminophen  1,000 mg Oral 3 times per day    sennosides-docusate sodium  1 tablet Oral BID    apixaban  2.5 mg Oral BID    methocarbamol  500 mg Oral 4x Daily     PRN Meds: metoprolol tartrate, sodium chloride flush, magnesium hydroxide, HYDROmorphone **OR** HYDROmorphone, ondansetron, HYDROmorphone **OR** HYDROmorphone, promethazine      Intake/Output Summary (Last 24 hours) at 8/29/2020 1315  Last data filed at 8/29/2020 1253  Gross per 24 hour   Intake 3025 ml   Output 1550 ml   Net 1475 ml       Physical Exam Performed:    /61   Pulse 89   Temp 98.2 °F (36.8 °C) (Oral)   Resp 17   Ht 5' 4\" (1.626 m)   Wt 174 lb (78.9 kg)   LMP 09/01/2011   SpO2 96%   BMI 29.87 kg/m²     General appearance: No apparent distress, appears stated age and cooperative. HEENT: Pupils equal, round, and reactive to light. Conjunctivae/corneas clear. Neck: Supple, with full range of motion. No jugular venous distention. Trachea midline. Respiratory:  Normal respiratory effort. Clear to auscultation, bilaterally without Rales/Wheezes/Rhonchi. Cardiovascular: Regular rate and rhythm with normal S1/S2 without murmurs, rubs or gallops.   Abdomen: Soft, non-tender, non-distended with normal bowel sounds. Musculoskeletal: No clubbing, cyanosis or edema bilaterally. Full range of motion without deformity. Surgical dressing noted over left knee, looks clean dry and intact  Skin: Skin color, texture, turgor normal.  No rashes or lesions. Neurologic:  Neurovascularly intact without any focal sensory/motor deficits. Cranial nerves: II-XII intact, grossly non-focal.  Psychiatric: Alert and oriented, thought content appropriate, normal insight  Capillary Refill: Brisk,< 3 seconds   Peripheral Pulses: +2 palpable, equal bilaterally       Labs:   Recent Labs     08/29/20  0528   HGB 12.7   HCT 35.8*     No results for input(s): NA, K, CL, CO2, BUN, CREATININE, CALCIUM, PHOS in the last 72 hours. Invalid input(s): MAGNES  No results for input(s): AST, ALT, BILIDIR, BILITOT, ALKPHOS in the last 72 hours. No results for input(s): INR in the last 72 hours. No results for input(s): Jyoti Caul in the last 72 hours. Urinalysis:      Lab Results   Component Value Date    NITRU Negative 12/13/2019    BLOODU Negative 12/13/2019    SPECGRAV 1.010 12/13/2019    GLUCOSEU Negative 12/13/2019       Radiology:  XR KNEE LEFT (1-2 VIEWS)   Final Result      No immediate postoperative complications. XR KNEE LEFT (1-2 VIEWS)   Final Result   Impression:      Status post revision of the left knee arthroplasty with no immediate complication evident.                Assessment/Plan:    Active Hospital Problems    Diagnosis    Instability of internal left knee prosthesis (HCC) [T84.023A]     Hypertension  -Continue amlodipine     Hypothyroidism  -Continue [place] Thyroid     Status post revision left total knee arthroplasty  -Continue postoperative management as per orthopedic surgery  -PT/OT  -Pain control        DVT Prophylaxis: Eliquis  Diet: DIET GENERAL;  Code Status: Full Code     PT/OT Eval Status: Active and ongoing     Dispo -as per primary    Kelsea Ball MD

## 2020-08-29 NOTE — PROGRESS NOTES
Clinical Pharmacy Progress Note    Admit date: 8/28/2020     Subjective/Objective:  Pt is 64 yo female with PMHx significant for RA (on Enbrel on hold til 1 week postop) HTN, hypothyroidism, osteoarthritis. Admitted today for left total knee revision by Dr Raghu Mandel. POD #1    Pharmacy has been consulted to make pain medication recommendations by Dr. Raghu Mandel    Update 8/29: Planning for possible discharge today. Per review of chart, pt comfortable. Pain 8/10 this AM.    Current pain regimen:   Medication  Home med? Amount used last 24 hrs    Acetaminophen 1000 mg TID no 3 doses (3g)   Methocarbamol 500 mg PO 4x daily Yes 2 doses    Hydromorphone 0.25- 0.5 mg IV q3h PRN no 2 mg IV total (4 doses x 0.5 mg)   Hydromorphone 2- 4 mg PO q4h PRN pain no 16 mg PO total (4 doses x 4 mg)   (PACU only 8/28)  no 10 mg     Morphine Equivalent Daily Dose:   8/28:  104 mg    Bowel Regimen:  Senokot-S 1 tab BID  Milk of magnesia 10 mL daily PRN     Assessment/Plan:  1)  Pain regimen recommendations:  · Pain controlled, pt tolerating PO. Planning for possible discharge later today. · Could consider starting to wean off IV hydromorphone if patient is not discharged today. Could consider decreasing interval (currently ordered q3h PRN) to help transition to PO. Will continue to monitor and assist with adjustments to regimen as needed.     Please call with questions:  Judah CrouchD, BCPS  Wireless: [id number]  or [phone]  8/29/2020 12:49 PM

## 2020-08-29 NOTE — PROGRESS NOTES
Department of Orthopedic Surgery     Progress Note    Subjective:   Admit Day:8/28/2020    Patient is comfortable appearing. Denies chest pain, shortness of air or calf pain. Tolerating PO. Objective[de-identified]    height is 5' 4\" (1.626 m) and weight is 174 lb (78.9 kg). Her oral temperature is 98 °F (36.7 °C). Her blood pressure is 114/68 and her pulse is 86. Her respiration is 16 and oxygen saturation is 98%. 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  *      s/p revision TKA      Plan:   Cont w pathway  Dc planning home today after PT  Outpatient PT next week  Fu w Dr Ramila Rod 3 weeks

## 2020-08-29 NOTE — PROGRESS NOTES
Yes  Activity Tolerance  Activity Tolerance: Patient Tolerated treatment well       Patient Diagnosis(es): The primary encounter diagnosis was Instability of internal left knee prosthesis, sequela. A diagnosis of Instability of internal left knee prosthesis, initial encounter [facility]) was also pertinent to this visit. has a past medical history of Arthritis, Fibromyalgia, Migraine, MVP (mitral valve prolapse), Nausea & vomiting, PONV (postoperative nausea and vomiting), Reflux, Thyroid disease, and Wears glasses. has a past surgical history that includes shoulder surgery; Ankle surgery; Cholecystectomy; Tonsillectomy; back surgery; Elbow surgery; Ankle arthroscopy (Right, 3/12); Knee arthroscopy (Left); Wrist surgery (Right); cervical fusion (N/A, 12/11/2019); and joint replacement (Left, 2019). Restrictions  Restrictions/Precautions  Restrictions/Precautions: Weight Bearing, General Precautions  Required Braces or Orthoses?: No  Lower Extremity Weight Bearing Restrictions  Left Lower Extremity Weight Bearing: Weight Bearing As Tolerated  Position Activity Restriction  Other position/activity restrictions: ambulate in room progressing to hallway          Subjective  General  Chart Reviewed: Yes  Patient assessed for rehabilitation services?: Yes  Additional Pertinent Hx: 64 y/o F presents s/p L total knee revision.   Response To Previous Treatment: Not applicable  Family / Caregiver Present: No  Referring Practitioner: Sae Harrington MD  Referral Date : 08/28/20  Diagnosis: L TKA  Follows Commands: Within Functional Limits  General Comment  Comments: Pt resting in bed upon PT arrival  Subjective  Subjective: Pt agreeable to PT evaluation  Pain Screening  Patient Currently in Pain: Yes  Vital Signs  Patient Currently in Pain: Yes(+L knee pain)       Orientation  Orientation  Overall Orientation Status: Within Normal Limits  Social/Functional History  Social/Functional History  Lives With: Spouse(Working full-time; available for weekend)  Type of Home: House  Home Layout: Two level, Able to Live on Main level with bedroom/bathroom, Performs ADL's on one level  Home Access: Stairs to enter without rails  Entrance Stairs - Number of Steps: 1  Bathroom Shower/Tub: Walk-in shower  Bathroom Toilet: Handicap height  Bathroom Equipment: Shower chair, Grab bars in shower, Hand-held shower  Bathroom Accessibility: Accessible  Home Equipment: Rolling walker, [employer]icient, [address] Help From: Other (comment)(none needed PTA)  ADL Assistance: Independent  Homemaking Assistance: Independent  Homemaking Responsibilities: Yes  Ambulation Assistance: Independent  Transfer Assistance: Independent  Active : Yes  Occupation: Retired  Leisure & Hobbies: cross stitching  Additional Comments: no falls in last 5 months  Cognition   Cognition  Overall Cognitive Status: WFL    Objective     Observation/Palpation  Posture: Fair    AROM RLE (degrees)  RLE AROM: WFL  AROM LLE (degrees)  LLE General AROM: Grossly WFL except the knee joint--pt lacking ~10* from neutral position & achieving ~90* active knee flexion when sitting in bedside chair. Encouragement & education provided on the importance of ROM activities. Strength RLE  Strength RLE: WFL  Strength LLE  Strength LLE: WFL  Tone RLE  RLE Tone: Normotonic  Tone LLE  LLE Tone: Normotonic  Motor Control  Gross Motor?: WNL  Coordination  Rapid Alternating Movements: Normal     Bed mobility  Supine to Sit: Supervision(with HOB elevated.  Pt actively using the BUE to advance the LLE.)  Transfers  Sit to Stand: Supervision(up to RW.)  Stand to sit: Supervision  Ambulation  Ambulation?: Yes  WB Status: n/a  More Ambulation?: No  Ambulation 1  Surface: level tile  Device: Rolling Walker  Assistance: Supervision;Contact guard assistance  Quality of Gait: NBOS with downward gaze, dec'd B step length/stride length, dec'd LLE knee flexion during swing phase with dec'd LLE heel strike at initial contact phase. No overt LOB. Distance: 15' x 1 repetition & 25' x 1 repetition  Comments: VCs for forward gaze & gradual increase in L knee flexion during swing phase. Stairs/Curb  Stairs?: No     Balance  Posture: Fair  Sitting - Static: (I)  Sitting - Dynamic: (I)  Standing - Static: (S with RW)  Standing - Dynamic: (S<>CGA with RW)     ADDENDUM:   PT returned in PM for additional therapy session. Pt resting in bed upon PT arrival & endorsing 9/10 pain of the LLE (scheduled for pain medicine at 1430). Pt participated in semi supine LE therex/ROM. Pt completed the following exercises: 2 sets x 10 repetitions B ankle DF/PF, 2 sets x 10 repetitions B QS with a 5 second hold, 2 sets x 10 repetitions B GS with a 5 second hold, 2 sets x 10 repetitions AA LLE hip abd/add, 2 sets x 10 repetitions AA LLE heel slides with a 5 second hold at available end range. VCs, TCs for improved therex technique & +time 2/2 inc'd pain. Upon conclusion of therapy session pt resting in bed with bed alarm on, call light in reach, visitor at bedside & NAD. Plan   Plan  Times per week: 7  Times per day: Twice a day  Current Treatment Recommendations: Strengthening, Transfer Training, Endurance Training, Patient/Caregiver Education & Training, Pain Management, Equipment Evaluation, Education, & procurement, ROM, Gait Training, Functional Mobility Training, Stair training, Safety Education & Training  Safety Devices  Type of devices:  All fall risk precautions in place, Call light within reach, Chair alarm in place, Left in chair  Restraints  Initially in place: No                        AM-PAC Score  AM-PAC Inpatient Mobility Raw Score : 18 (08/29/20 0956)  AM-PAC Inpatient T-Scale Score : 43.63 (08/29/20 0956)  Mobility Inpatient CMS 0-100% Score: 46.58 (08/29/20 0956)  Mobility Inpatient CMS G-Code Modifier : CK (08/29/20 0956)          Goals  Short term goals  Time Frame for Short term goals: DC  Short term goal 1: pt will complete functional txfs independently. Short term goal 2: Pt will ambulate greater than 150' via Mod I with LRAD  Short term goal 3: Pt will tolerate stair assessment.   Short term goal 4: Pt will achieve 0-100* active L knee ROM  Patient Goals   Patient goals : to return home       Therapy Time   Individual Concurrent Group Co-treatment   Time In 0850         Time Out 0913         Minutes 23         Timed Code Treatment Minutes: 23 Minutes      Second Session Therapy Time:   Individual Concurrent Group Co-treatment   Time In 1342         Time Out 1406         Minutes 24           Timed Code Treatment Minutes:  24    Total Treatment Minutes:  14[address] Francisco Deluna, PT

## 2020-08-30 VITALS
HEART RATE: 92 BPM | OXYGEN SATURATION: 93 % | WEIGHT: 174 LBS | DIASTOLIC BLOOD PRESSURE: 78 MMHG | RESPIRATION RATE: 16 BRPM | HEIGHT: 64 IN | SYSTOLIC BLOOD PRESSURE: 130 MMHG | BODY MASS INDEX: 29.71 KG/M2 | TEMPERATURE: 98.1 F

## 2020-08-30 PROCEDURE — 2580000003 HC RX 258: Performed by: ORTHOPAEDIC SURGERY

## 2020-08-30 PROCEDURE — 6360000002 HC RX W HCPCS: Performed by: ORTHOPAEDIC SURGERY

## 2020-08-30 PROCEDURE — 97110 THERAPEUTIC EXERCISES: CPT

## 2020-08-30 PROCEDURE — 97530 THERAPEUTIC ACTIVITIES: CPT

## 2020-08-30 PROCEDURE — 97116 GAIT TRAINING THERAPY: CPT

## 2020-08-30 PROCEDURE — G0378 HOSPITAL OBSERVATION PER HR: HCPCS

## 2020-08-30 PROCEDURE — 6370000000 HC RX 637 (ALT 250 FOR IP): Performed by: INTERNAL MEDICINE

## 2020-08-30 PROCEDURE — 96375 TX/PRO/DX INJ NEW DRUG ADDON: CPT

## 2020-08-30 PROCEDURE — 6370000000 HC RX 637 (ALT 250 FOR IP): Performed by: ORTHOPAEDIC SURGERY

## 2020-08-30 RX ORDER — HYDROMORPHONE HYDROCHLORIDE 2 MG/1
2 TABLET ORAL ONCE
Status: COMPLETED | OUTPATIENT
Start: 2020-08-30 | End: 2020-08-30

## 2020-08-30 RX ADMIN — HYDROMORPHONE HYDROCHLORIDE 2 MG: 2 TABLET ORAL at 02:52

## 2020-08-30 RX ADMIN — HYDROMORPHONE HYDROCHLORIDE 2 MG: 2 TABLET ORAL at 06:53

## 2020-08-30 RX ADMIN — METHOCARBAMOL 500 MG: 500 TABLET ORAL at 12:59

## 2020-08-30 RX ADMIN — FOLIC ACID 1 MG: 1 TABLET ORAL at 08:35

## 2020-08-30 RX ADMIN — APIXABAN 2.5 MG: 2.5 TABLET, FILM COATED ORAL at 08:35

## 2020-08-30 RX ADMIN — DOCUSATE SODIUM 50 MG AND SENNOSIDES 8.6 MG 1 TABLET: 8.6; 5 TABLET, FILM COATED ORAL at 09:38

## 2020-08-30 RX ADMIN — ACETAMINOPHEN 1000 MG: 500 TABLET ORAL at 06:48

## 2020-08-30 RX ADMIN — LEVOTHYROXINE, LIOTHYRONINE 120 MG: 19; 4.5 TABLET ORAL at 07:41

## 2020-08-30 RX ADMIN — ONDANSETRON 4 MG: 2 INJECTION INTRAMUSCULAR; INTRAVENOUS at 06:48

## 2020-08-30 RX ADMIN — HYDROMORPHONE HYDROCHLORIDE 2 MG: 2 TABLET ORAL at 01:31

## 2020-08-30 RX ADMIN — METHOCARBAMOL 500 MG: 500 TABLET ORAL at 08:35

## 2020-08-30 RX ADMIN — HYDROMORPHONE HYDROCHLORIDE 2 MG: 2 TABLET ORAL at 10:48

## 2020-08-30 RX ADMIN — Medication 10 ML: at 08:27

## 2020-08-30 ASSESSMENT — PAIN DESCRIPTION - DESCRIPTORS
DESCRIPTORS: ACHING;SHARP
DESCRIPTORS: ACHING

## 2020-08-30 ASSESSMENT — PAIN DESCRIPTION - LOCATION
LOCATION: KNEE

## 2020-08-30 ASSESSMENT — PAIN SCALES - GENERAL
PAINLEVEL_OUTOF10: 10
PAINLEVEL_OUTOF10: 8
PAINLEVEL_OUTOF10: 8
PAINLEVEL_OUTOF10: 6
PAINLEVEL_OUTOF10: 10

## 2020-08-30 ASSESSMENT — PAIN DESCRIPTION - PROGRESSION
CLINICAL_PROGRESSION: NOT CHANGED

## 2020-08-30 ASSESSMENT — PAIN - FUNCTIONAL ASSESSMENT
PAIN_FUNCTIONAL_ASSESSMENT: ACTIVITIES ARE NOT PREVENTED

## 2020-08-30 ASSESSMENT — PAIN DESCRIPTION - ORIENTATION
ORIENTATION: LEFT

## 2020-08-30 ASSESSMENT — PAIN DESCRIPTION - PAIN TYPE
TYPE: SURGICAL PAIN

## 2020-08-30 ASSESSMENT — PAIN DESCRIPTION - FREQUENCY
FREQUENCY: CONTINUOUS

## 2020-08-30 ASSESSMENT — PAIN DESCRIPTION - ONSET
ONSET: ON-GOING

## 2020-08-30 NOTE — PROGRESS NOTES
Orthopedics    Asked to examine patient's knee  Patient expresses concern about some redness, asking about infection. On exam, knee appears completely benign. There is no significant erythema whatsoever, and some mild to moderate swelling as expected postoperatively. Dressing is completely dry. Minimal tenderness to palpation. Reassurance provided. Continue on pathway.

## 2020-08-30 NOTE — PLAN OF CARE
Problem: Falls - Risk of:  Goal: Will remain free from falls  Description: Will remain free from falls  8/30/2020 0951 by Ilsa Sal RN  Outcome: Ongoing  8/29/2020 2234 by Crystal Person RN  Outcome: Ongoing  Note: Patient has remained free from falls. Fall precautions in place, call light within reach, bed alarm on, bed in lowest position, and non skid socks on. Problem: Falls - Risk of:  Goal: Absence of physical injury  Description: Absence of physical injury  8/29/2020 2234 by Crystal Person RN  Outcome: Ongoing  Note: Patient has remained free from physical injury. Fall precautions are in place and patients needs are being met. Problem: Pain:  Goal: Pain level will decrease  Description: Pain level will decrease  8/30/2020 0951 by Ilsa Sal RN  Outcome: Ongoing  8/29/2020 2234 by Crystal Person RN  Outcome: Ongoing  Note: Patients pain level is being monitored. Pain scale is used to assess pain and interventions are implemented as needed.

## 2020-08-30 NOTE — PROGRESS NOTES
Physical Therapy  Daily Treatment Note    Discharge Recommendations: Norma Javier scored a 18/24 on the AM-PAC short mobility form. Current research shows that an AM-PAC score of 18 or greater is typically associated with a discharge to the patient's home setting. Based on the patient's AM-PAC score and their current functional mobility deficits, it is recommended that the patient have 2-3 sessions per week of Physical Therapy at d/c to increase the patient's independence. At this time, this patient demonstrates the endurance and safety to discharge home with OP PT and a follow up treatment frequency of 2-3x/wk. Please see assessment section for further patient specific details. Assessment:  Pt moving well despite L knee discomfort/swelling. Steady gait with walker. Did well on curb step. Pt will needs assist for some exercises initially at home. Plan is for home with assist of family PRN and outpatient PT. No new DME needs. Equipment Needs: No new needs (pt has a wheeled walker)    Chart Reviewed: Yes   Restrictions/Precautions: Weight Bearing, General Precautions Other position/activity restrictions: ambulate in room progressing to hallway   Additional Pertinent Hx: 62 y/o F presents s/p L total knee revision. WB Status: n/a    Diagnosis: L TKA   Treatment Diagnosis: Impaired ROM    Subjective: Pt in bed initially.  arrived during session. Pt reports pain was very bad overnight, but some better this morning. C/o swelling L knee. Ready to work with PT. Anticipates going home today.  will be at home to assist as needed. Pt has one step into house. Stays on main level. \"I'll probably sleep on the couch for awhile. \"    Pain: 8/10 L knee. RN aware and addressed with pain meds. Ice packs to knee after session.      Objective:    Exercises  10 reps B ankle pumps, quad sets, glut sets, L hip abd/add (mod assist), SLR (max assist), heel slides, SAQ (mod assist) in supine    ROM   18 - 73 degrees L knee    Bed mobility  Supine to sit: SBA, HOB flat without rail  Scooting: Supervision to EOB    Transfers  Sit to stand: SBA from bed; SBA from chair; SBA from commode with grab bar  Stand to sit: SBA onto chair; SBA onto commode with grab bar; SBA into bed. Cues initially for L LE placement prior to sitting     Ambulation  Assistance Level: SBA  Assistive device: Wheeled walker  Distance: ~100 ft x 2 in sullivan (seated rest between walks); 4 ft, 10 ft in room (sink/toilet)  Quality of gait: Step-to pattern initially, progressing to step-through pattern; decreased L knee flexion in swing phase; antalgic; decreased pace; decreased step length; steady with walker    Stairs  Up/down curb step with walker CGA. Cues only initially for sequencing. Steady. Balance  Sat EOB with Supervision  Sat on commode with Supervision  Static stance with walker SBA  Ambulation with wheeled walker SBA    Patient Education  Curb step negotiation with walker. Demonstrated safely and with good understanding. Continuing HEP at home, using [facility] book as a reference. Expressed understanding. Calling for assist with needs. Expressed understanding. Safety Devices  Pt left with needs in reach. In bed with bed alarm on.  present. RN updated. AM-PAC score  AM-PAC Inpatient Mobility Raw Score : 18  AM-PAC Inpatient T-Scale Score : 43.63  Mobility Inpatient CMS 0-100% Score: 46.58  Mobility Inpatient CMS G-Code Modifier : CK    Goals: (as determined and assessed by primary PT)  Time Frame for Short term goals: DC  Short term goal 1: pt will complete functional txfs independently. Short term goal 2: Pt will ambulate greater than 150' via Mod I with LRAD   Short term goal 3: Pt will tolerate stair assessment.   Short term goal 4: Pt will achieve 0-100* active L knee ROM     Plan:  Times per week: 7; Times per day: Twice a day  Current Treatment Recommendations: Strengthening, Transfer Training, Endurance Training, Patient/Caregiver Education & Training, Pain Management, Equipment Evaluation, Education, & procurement, ROM, Gait Training, Functional Mobility Training, Stair training, Safety Education & Training    Therapy Time    Individual  Concurrent  Group  Co-treatment    Time In  1028            Time Out  1112            Minutes  44              Timed Code Treatment Minutes: 44  Total Treatment Minutes: 40    Anticipate D/C home with  today. Pt to continue with outpatient PT. Will continue per plan of care if not D/Chris. If patient is discharged prior to next treatment, this note will serve as the discharge summary.     Tami [place], [place] #[id number]

## 2020-08-30 NOTE — PROGRESS NOTES
Patient alert and oriented x4. Up x1 sba with walker to bathroom. Dressing remains in place and is CDI. Ice applied to surgical site. Patient is c/o pain, pain meds given the moment they are due. Fall precautions in place, call light within reach, bed alarm on, bed in lowest position, and non skid socks on. VSS. Denies needs at this time. Will continue to monitor.

## 2020-08-30 NOTE — PROGRESS NOTES
Hospitalist Progress Note      PCP: Deven Vega MD    Date of Admission: 8/28/2020    Chief Complaint: Left knee pain    Hospital Course: 58-year-old female who was admitted for left knee surgery. Sound was consulted for postoperative medical management    Subjective: No acute events reported overnight. Overall feels well and pain is well controlled as well. Medications:  Reviewed    Infusion Medications    sodium chloride 125 mL/hr at 08/28/20 1802     Scheduled Medications    amLODIPine  10 mg Oral Daily    folic acid  1 mg Oral Daily    thyroid  120 mg Oral Daily    progesterone  600 mg Oral Nightly    sodium chloride flush  10 mL Intravenous 2 times per day    acetaminophen  1,000 mg Oral 3 times per day    sennosides-docusate sodium  1 tablet Oral BID    apixaban  2.5 mg Oral BID    methocarbamol  500 mg Oral 4x Daily     PRN Meds: HYDROmorphone **OR** [DISCONTINUED] HYDROmorphone, metoprolol tartrate, sodium chloride flush, magnesium hydroxide, ondansetron, promethazine      Intake/Output Summary (Last 24 hours) at 8/30/2020 0946  Last data filed at 8/30/2020 0257  Gross per 24 hour   Intake 2440 ml   Output 300 ml   Net 2140 ml       Physical Exam Performed:    /68   Pulse 98   Temp 98.5 °F (36.9 °C) (Oral)   Resp 18   Ht 5' 4\" (1.626 m)   Wt 174 lb (78.9 kg)   LMP 09/01/2011   SpO2 94%   BMI 29.87 kg/m²     General appearance: No apparent distress, appears stated age and cooperative. HEENT: Pupils equal, round, and reactive to light. Conjunctivae/corneas clear. Neck: Supple, with full range of motion. No jugular venous distention. Trachea midline. Respiratory:  Normal respiratory effort. Clear to auscultation, bilaterally without Rales/Wheezes/Rhonchi. Cardiovascular: Regular rate and rhythm with normal S1/S2 without murmurs, rubs or gallops. Abdomen: Soft, non-tender, non-distended with normal bowel sounds.   Musculoskeletal: No clubbing, cyanosis or edema bilaterally. Full range of motion without deformity. Surgical dressing noted over left knee, looks clean dry and intact  Skin: Skin color, texture, turgor normal.  No rashes or lesions. Neurologic:  Neurovascularly intact without any focal sensory/motor deficits. Cranial nerves: II-XII intact, grossly non-focal.  Psychiatric: Alert and oriented, thought content appropriate, normal insight  Capillary Refill: Brisk,< 3 seconds   Peripheral Pulses: +2 palpable, equal bilaterally       Labs:   Recent Labs     08/29/20  0528   HGB 12.7   HCT 35.8*     No results for input(s): NA, K, CL, CO2, BUN, CREATININE, CALCIUM, PHOS in the last 72 hours. Invalid input(s): MAGNES  No results for input(s): AST, ALT, BILIDIR, BILITOT, ALKPHOS in the last 72 hours. No results for input(s): INR in the last 72 hours. No results for input(s): Spencer Lust in the last 72 hours. Urinalysis:      Lab Results   Component Value Date    NITRU Negative 12/13/2019    BLOODU Negative 12/13/2019    SPECGRAV 1.010 12/13/2019    GLUCOSEU Negative 12/13/2019       Radiology:  XR KNEE LEFT (1-2 VIEWS)   Final Result      No immediate postoperative complications. XR KNEE LEFT (1-2 VIEWS)   Final Result   Impression:      Status post revision of the left knee arthroplasty with no immediate complication evident.                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

## 2020-09-15 NOTE — DISCHARGE SUMMARY
[address] [facility] SUMMARY    Pre Operative Diagnosis  Instability of internal left knee prosthesis, initial [facility] [T84.023A]    Post Operative Diagnosis   Instability of internal left knee prosthesis, initial encounter ([facility] [T84.023A]    Discharge Diagnosis  Instability of internal left knee prosthesis, initial encounter ([facility] [T84.023A]    Procedure Preformed  Procedure(s):  LEFT TOTAL KNEE REVISION 2 COMPONENT    Surgeon  Teri Lefort MD     Medical course: as per medical records       The patient was taken to the operating room  where the aforementioned procedure was preformed. The patient was taken to the post operative anesthesia recovery unit in stable condition. The patient was then transferred to the orthopaedic floor for post operative pain management and convalesce. ( x )The patient was placed on anticoagulation therapy for DVT prophylaxis       The patient was discharged in stable condition. Please see medical reconciliation for discharge medications. The discharge instructions were explained to the patient and the family. The patient will follow up in the office in 3 weeks for repeat examination and xray .

## 2020-09-21 LAB
ANAEROBIC CULTURE: NORMAL
GRAM STAIN RESULT: NORMAL
WOUND/ABSCESS: NORMAL

## 2020-10-26 ENCOUNTER — HOSPITAL ENCOUNTER (OUTPATIENT)
Dept: MRI IMAGING | Age: 60
Discharge: HOME | End: 2020-10-26
Payer: COMMERCIAL

## 2020-10-26 LAB
GFR AFRICAN AMERICAN: >60
GFR NON-AFRICAN AMERICAN: >60
PERFORMED ON: NORMAL
POC CREATININE: 0.8 MG/DL (ref 0.6–1.2)
POC SAMPLE TYPE: NORMAL

## 2020-10-26 PROCEDURE — A9576 INJ PROHANCE MULTIPACK: HCPCS | Performed by: NURSE PRACTITIONER

## 2020-10-26 PROCEDURE — 6360000004 HC RX CONTRAST MEDICATION: Performed by: NURSE PRACTITIONER

## 2020-10-26 PROCEDURE — 72158 MRI LUMBAR SPINE W/O & W/DYE: CPT

## 2020-10-26 PROCEDURE — 82565 ASSAY OF CREATININE: CPT

## 2020-10-26 RX ADMIN — GADOTERIDOL 17 ML: 279.3 INJECTION, SOLUTION INTRAVENOUS at 20:17

## 2021-01-08 NOTE — PROGRESS NOTES
[facility] / [facility] ([facility]) [address] [facility], [address]    Acknowledgment of Informed Consent for Surgical or Medical Procedure and Sedation  I agree to allow doctor(s) [id number] and his/her associates or assistants, including residents and/or other qualified medical practitioner to perform the following medical treatment or procedure and to administer or direct the administration of sedation as necessary:  Procedure(s): LEFT KNEE [address]  My doctor has explained the following regarding the proposed procedure:   the explanation of the procedure   the benefits of the procedure   the potential problems that might occur during recuperation   the risks and side effects of the procedure which could include but are not limited to severe blood loss, infection, stroke or death   the benefits, risks and side effect of alternative procedures including the consequences of declining this procedure or any alternative procedures   the likelihood of achieving satisfactory results. I acknowledge no guarantee or assurance has been made to me regarding the results. I understand that during the course of this treatment/procedure, unforeseen conditions can occur which require an additional or different procedure. I agree to allow my physician or assistants to perform such extension of the original procedure as they may find necessary. I understand that sedation will often result in temporary impairment of memory and fine motor skills and that sedation can occasionally progress to a state of deep sedation or general anesthesia. I understand the risks of anesthesia for surgery include, but are not limited to, sore throat, hoarseness, injury to face, mouth, or teeth; nausea; headache; injury to blood vessels or nerves; death, brain damage, or paralysis.     I understand that if I have a Limitation of Treatment order in effect during my hospitalization, the order may or may not be in effect during this procedure. I give my doctor permission to give me blood or blood products. I understand that there are risks with receiving blood such as hepatitis, AIDS, fever, or allergic reaction. I acknowledge that the risks, benefits, and alternatives of this treatment have been explained to me and that no express or implied warranty has been given by the hospital, any blood bank, or any person or entity as to the blood or blood components transfused. At the discretion of my doctor, I agree to allow observers, equipment/product representatives and allow photographing, and/or televising of the procedure, provided my name or identity is maintained confidentially. I agree the hospital may dispose of or use for scientific or educational purposes any tissue, fluid, or body parts which may be removed.     ________________________________Date________Time______ am/pm  ([place] [facility])  Patient or Signature of Closest Relative or Legal Guardian    ________________________________Date________Time______am/pm      Page 1 of  1  Witness

## 2021-01-18 NOTE — PROGRESS NOTES
[address]2 [facility] patients having surgery or anesthesia are required to be Covid tested. You will need to quarantined from the time you are tested until your surgery. PRIOR TO PROCEDURE DATE:  1. Please follow any guidelines/instructions prior to your procedure as advised by your surgeon. 2. Arrange for someone to drive you home and be with you for the first 24 hours after discharge for your safety after your procedure for which you received sedation. Ensure it is someone we can share information with regarding your discharge. 3. You must contact your surgeon for instructions IF:   You are taking any blood thinners, aspirin, anti-inflammatory or vitamin E.   There is a change in your physical condition such as a cold, fever, rash, cuts, sores or any other infection, especially near your surgical site. 4. Do not drink alcohol the day before or day of your procedure. 5. A Pre-op History and Physical for surgery MUST be completed by your Physician or Urgent Care within 30 days of your procedure date. Please bring a copy with you on the day of your procedure and along with any other testing performed. THE DAY OF YOUR PROCEDURE:  1. Follow instructions for ARRIVAL TIME as DIRECTED BY YOUR SURGEON. I    2. Enter the MAIN entrance from [address] and follow the signs to the free [place] or [place] parking (offered free of charge 6am-5pm). 3. Enter the Main Entrance of the hospital (do not enter from the lower level of the parking garage). Upon entrance, check in with the  at the main desk on your left. If no one is available at the desk, proceed into the [facility] Waiting Room and go through the door directly into the [facility]. There is a Check-in desk ACROSS from Room 5 (marked with a sign hanging from the ceiling). The phone number for the surgery center is [phone].     4. Please call [phone] option #2 option #2 if you have not been preregistered yet. On the day of your procedure bring your insurance card and photo ID. You will be registered at your bedside once brought back to your room. 5. DO NOT EAT ANYTHING eight hours prior to surgery. May have 8 ounces of water 4 hours prior to surgery. 6. MEDICATIONS    Take the following medications with a SMALL sip of water: amlodipine, thyroid   Use your usual dose of inhalers the morning of surgery. BRING your rescue inhaler with you to hospital.    Anesthesia does NOT want you to take insulin the morning of surgery. They will control your blood sugar while you are at the hospital. Please contact your ordering physician for instructions regarding your insulin the night before your procedure. If you have an insulin pump, please keep it set on basal rate. 7. Do not swallow water when brushing teeth. No gum, candy, mints or ice chips. Refrain from smoking or at least decrease the amount. 8. Dress in loose, comfortable clothing appropriate for redressing after your procedure. Do not wear jewelry (including body piercings), make-up (especially NO eye make-up), fingernail polish (NO toenail polish if foot/leg surgery), lotion, powders or metal hairclips. 9. Dentures, glasses, or contacts will need to be removed before your procedure. Bring cases for your glasses, contacts, dentures, or hearing aids to protect them while you are in surgery. 10. If you use a CPAP, please bring it with you on the day of your procedure. 11. We recommend that valuable personal  belongings such as cash, cell phones, e-tablets or jewelry, be left at home during your stay. The hospital will not be responsible for valuables that are not secured in the hospital safe. However, if your insurance requires a co-pay, you may want to bring a method of payment, i.e. Check or credit card, if you wish to pay your co-pay the day of surgery. 12. If you are to stay overnight, you may bring a bag with personal items. Please have any large items you may need brought in by your family after your arrival to your hospital room. 15. If you have a Living Will or Durable Power of , please bring a copy on the day of your procedure. 15. With your permission, one family member may accompany you while you are being prepared for surgery. Once you are ready, additional family members may join you. HOW WE KEEP YOU SAFE and WORK TO PREVENT SURGICAL SITE INFECTIONS:  1. Health care workers should always check your ID bracelet to verify your name and birth date. You will be asked many times to state your name, date of birth, and allergies. 2. Health care workers should always clean their hands with soap or alcohol gel before providing care to you. It is okay to ask anyone if they cleaned their hands before they touch you. 3. You will be actively involved in verifying the type of procedure you are having and ensuring the correct surgical site. This will be confirmed multiple times prior to your procedure. Do NOT duke your surgery site UNLESS instructed to by your surgeon. 4. Do not shave or wax for 72 hours prior to procedure near your operative site. Shaving with a razor can irritate your skin and make it easier to develop an infection. On the day of your procedure, any hair that needs to be removed near the surgical site will be clipped by a healthcare worker using a special clippers designed to avoid skin irritation. 5. When you are in the operating room, your surgical site will be cleansed with a special soap, and in most cases, you will be given an antibiotic before the surgery begins. What to expect AFTER YOUR PROCEDURE:  1. Immediately following your procedure, your will be taken to the PACU for the first phase of your recovery.   Your nurse will help you recover from any potential side effects of anesthesia, such as extreme

## 2021-01-18 NOTE — PROGRESS NOTES
Message left at Dr Tami Preston office. Last EKG done 8/20. Patient does have current cardiac clearance but no EKG since 8/20. Does doctor want new new EKG prior to surgery.

## 2021-01-21 ENCOUNTER — OFFICE VISIT (OUTPATIENT)
Dept: PRIMARY CARE CLINIC | Age: 61
End: 2021-01-21
Payer: COMMERCIAL

## 2021-01-21 DIAGNOSIS — Z01.818 PREOP EXAMINATION: Primary | ICD-10-CM

## 2021-01-21 LAB — SARS-COV-2: NOT DETECTED

## 2021-01-21 PROCEDURE — 99211 OFF/OP EST MAY X REQ PHY/QHP: CPT | Performed by: NURSE PRACTITIONER

## 2021-01-23 LAB — CULTURE NOSE: NORMAL

## 2021-01-25 ENCOUNTER — ANESTHESIA EVENT (OUTPATIENT)
Dept: OPERATING ROOM | Age: 61
End: 2021-01-25
Payer: COMMERCIAL

## 2021-01-25 NOTE — PROGRESS NOTES
Nasal culture with MSSA, but no MRSA. Result routed to Dr. Fanta Goins in [facility]. [facility] for [place], at surgeon's,  to notify.

## 2021-01-26 ENCOUNTER — HOSPITAL ENCOUNTER (OUTPATIENT)
Age: 61
Setting detail: OBSERVATION
Discharge: HOME | End: 2021-01-26
Attending: ORTHOPAEDIC SURGERY | Admitting: ORTHOPAEDIC SURGERY
Payer: COMMERCIAL

## 2021-01-26 ENCOUNTER — ANESTHESIA (OUTPATIENT)
Dept: OPERATING ROOM | Age: 61
End: 2021-01-26
Payer: COMMERCIAL

## 2021-01-26 ENCOUNTER — APPOINTMENT (OUTPATIENT)
Dept: GENERAL RADIOLOGY | Age: 61
End: 2021-01-26
Attending: ORTHOPAEDIC SURGERY
Payer: COMMERCIAL

## 2021-01-26 VITALS — OXYGEN SATURATION: 98 % | SYSTOLIC BLOOD PRESSURE: 136 MMHG | DIASTOLIC BLOOD PRESSURE: 58 MMHG

## 2021-01-26 VITALS
WEIGHT: 189 LBS | OXYGEN SATURATION: 92 % | HEART RATE: 112 BPM | TEMPERATURE: 98 F | DIASTOLIC BLOOD PRESSURE: 77 MMHG | RESPIRATION RATE: 14 BRPM | BODY MASS INDEX: 32.27 KG/M2 | HEIGHT: 64 IN | SYSTOLIC BLOOD PRESSURE: 127 MMHG

## 2021-01-26 DIAGNOSIS — T84.093A OTHER MECHANICAL COMPLICATION OF INTERNAL LEFT KNEE PROSTHESIS, INITIAL ENCOUNTER (HCC): ICD-10-CM

## 2021-01-26 DIAGNOSIS — T84.9XXS COMPLICATION OF INTERNAL LEFT KNEE PROSTHESIS, UNSPECIFIED COMPLICATION, SEQUELA: Primary | ICD-10-CM

## 2021-01-26 DIAGNOSIS — Z96.652 COMPLICATION OF INTERNAL LEFT KNEE PROSTHESIS, UNSPECIFIED COMPLICATION, SEQUELA: Primary | ICD-10-CM

## 2021-01-26 PROBLEM — T84.9XXA COMPLICATION OF INTERNAL LEFT KNEE PROSTHESIS (HCC): Status: ACTIVE | Noted: 2021-01-26

## 2021-01-26 LAB
ABO/RH: NORMAL
ANTIBODY SCREEN: NORMAL
APPEARANCE FLUID: NORMAL
CELL COUNT FLUID TYPE: NORMAL
CLOT EVALUATION: NORMAL
COLOR FLUID: NORMAL
EKG ATRIAL RATE: 76 BPM
EKG DIAGNOSIS: NORMAL
EKG P AXIS: 73 DEGREES
EKG P-R INTERVAL: 174 MS
EKG Q-T INTERVAL: 382 MS
EKG QRS DURATION: 98 MS
EKG QTC CALCULATION (BAZETT): 429 MS
EKG R AXIS: 6 DEGREES
EKG T AXIS: 34 DEGREES
EKG VENTRICULAR RATE: 76 BPM
EOSINOPHIL FLUID: 1 %
GLUCOSE BLD-MCNC: 122 MG/DL (ref 70–99)
LYMPHOCYTES, BODY FLUID: 10 %
MACROPHAGE FLUID: 4 %
NEUTROPHIL, FLUID: 25 %
NUCLEATED CELLS FLUID: 261 /CUMM
PERFORMED ON: ABNORMAL
RBC FLUID: NORMAL /CUMM

## 2021-01-26 PROCEDURE — 2720000010 HC SURG SUPPLY STERILE: Performed by: ORTHOPAEDIC SURGERY

## 2021-01-26 PROCEDURE — 86850 RBC ANTIBODY SCREEN: CPT

## 2021-01-26 PROCEDURE — 6360000002 HC RX W HCPCS: Performed by: ORTHOPAEDIC SURGERY

## 2021-01-26 PROCEDURE — 7100000001 HC PACU RECOVERY - ADDTL 15 MIN: Performed by: ORTHOPAEDIC SURGERY

## 2021-01-26 PROCEDURE — 62327 NJX INTERLAMINAR LMBR/SAC: CPT | Performed by: ANESTHESIOLOGY

## 2021-01-26 PROCEDURE — 93005 ELECTROCARDIOGRAM TRACING: CPT | Performed by: ORTHOPAEDIC SURGERY

## 2021-01-26 PROCEDURE — 6370000000 HC RX 637 (ALT 250 FOR IP): Performed by: ORTHOPAEDIC SURGERY

## 2021-01-26 PROCEDURE — 93010 ELECTROCARDIOGRAM REPORT: CPT | Performed by: INTERNAL MEDICINE

## 2021-01-26 PROCEDURE — 97166 OT EVAL MOD COMPLEX 45 MIN: CPT

## 2021-01-26 PROCEDURE — 86901 BLOOD TYPING SEROLOGIC RH(D): CPT

## 2021-01-26 PROCEDURE — 7100000000 HC PACU RECOVERY - FIRST 15 MIN: Performed by: ORTHOPAEDIC SURGERY

## 2021-01-26 PROCEDURE — 6370000000 HC RX 637 (ALT 250 FOR IP): Performed by: ANESTHESIOLOGY

## 2021-01-26 PROCEDURE — 89050 BODY FLUID CELL COUNT: CPT

## 2021-01-26 PROCEDURE — 2580000003 HC RX 258: Performed by: NURSE ANESTHETIST, CERTIFIED REGISTERED

## 2021-01-26 PROCEDURE — 2500000003 HC RX 250 WO HCPCS: Performed by: NURSE ANESTHETIST, CERTIFIED REGISTERED

## 2021-01-26 PROCEDURE — 6360000002 HC RX W HCPCS: Performed by: NURSE ANESTHETIST, CERTIFIED REGISTERED

## 2021-01-26 PROCEDURE — 2580000003 HC RX 258: Performed by: ORTHOPAEDIC SURGERY

## 2021-01-26 PROCEDURE — 97530 THERAPEUTIC ACTIVITIES: CPT

## 2021-01-26 PROCEDURE — 87205 SMEAR GRAM STAIN: CPT

## 2021-01-26 PROCEDURE — C1713 ANCHOR/SCREW BN/BN,TIS/BN: HCPCS | Performed by: ORTHOPAEDIC SURGERY

## 2021-01-26 PROCEDURE — 87070 CULTURE OTHR SPECIMN AEROBIC: CPT

## 2021-01-26 PROCEDURE — 97161 PT EVAL LOW COMPLEX 20 MIN: CPT

## 2021-01-26 PROCEDURE — 3700000000 HC ANESTHESIA ATTENDED CARE: Performed by: ORTHOPAEDIC SURGERY

## 2021-01-26 PROCEDURE — C1776 JOINT DEVICE (IMPLANTABLE): HCPCS | Performed by: ORTHOPAEDIC SURGERY

## 2021-01-26 PROCEDURE — 86900 BLOOD TYPING SEROLOGIC ABO: CPT

## 2021-01-26 PROCEDURE — 3600000014 HC SURGERY LEVEL 4 ADDTL 15MIN: Performed by: ORTHOPAEDIC SURGERY

## 2021-01-26 PROCEDURE — 73560 X-RAY EXAM OF KNEE 1 OR 2: CPT

## 2021-01-26 PROCEDURE — 2709999900 HC NON-CHARGEABLE SUPPLY: Performed by: ORTHOPAEDIC SURGERY

## 2021-01-26 PROCEDURE — 3700000001 HC ADD 15 MINUTES (ANESTHESIA): Performed by: ORTHOPAEDIC SURGERY

## 2021-01-26 PROCEDURE — 97535 SELF CARE MNGMENT TRAINING: CPT

## 2021-01-26 PROCEDURE — 7100000011 HC PHASE II RECOVERY - ADDTL 15 MIN: Performed by: ORTHOPAEDIC SURGERY

## 2021-01-26 PROCEDURE — 6360000002 HC RX W HCPCS: Performed by: ANESTHESIOLOGY

## 2021-01-26 PROCEDURE — 2580000003 HC RX 258: Performed by: ANESTHESIOLOGY

## 2021-01-26 PROCEDURE — 97116 GAIT TRAINING THERAPY: CPT

## 2021-01-26 PROCEDURE — 3600000004 HC SURGERY LEVEL 4 BASE: Performed by: ORTHOPAEDIC SURGERY

## 2021-01-26 PROCEDURE — 87176 TISSUE HOMOGENIZATION CULTR: CPT

## 2021-01-26 PROCEDURE — 2500000003 HC RX 250 WO HCPCS: Performed by: ORTHOPAEDIC SURGERY

## 2021-01-26 PROCEDURE — 7100000010 HC PHASE II RECOVERY - FIRST 15 MIN: Performed by: ORTHOPAEDIC SURGERY

## 2021-01-26 DEVICE — CEMENT BNE 20ML 41GM FULL DOSE PMMA W/ TOBRA M VISC RADPQ: Type: IMPLANTABLE DEVICE | Site: KNEE | Status: FUNCTIONAL

## 2021-01-26 DEVICE — IMPLANTABLE DEVICE: Type: IMPLANTABLE DEVICE | Site: KNEE | Status: FUNCTIONAL

## 2021-01-26 DEVICE — COMPONENT PAT DIA38MM POLYETH DOME CEM MEDIALIZED ATTUNE: Type: IMPLANTABLE DEVICE | Site: KNEE | Status: FUNCTIONAL

## 2021-01-26 RX ORDER — LEVOTHYROXINE AND LIOTHYRONINE 19; 4.5 UG/1; UG/1
120 TABLET ORAL DAILY
Status: DISCONTINUED | OUTPATIENT
Start: 2021-01-26 | End: 2021-01-26 | Stop reason: HOSPADM

## 2021-01-26 RX ORDER — LABETALOL 20 MG/4 ML (5 MG/ML) INTRAVENOUS SYRINGE
5 EVERY 10 MIN PRN
Status: DISCONTINUED | OUTPATIENT
Start: 2021-01-26 | End: 2021-01-26 | Stop reason: HOSPADM

## 2021-01-26 RX ORDER — MEPERIDINE HYDROCHLORIDE 25 MG/ML
12.5 INJECTION INTRAMUSCULAR; INTRAVENOUS; SUBCUTANEOUS EVERY 5 MIN PRN
Status: DISCONTINUED | OUTPATIENT
Start: 2021-01-26 | End: 2021-01-26 | Stop reason: HOSPADM

## 2021-01-26 RX ORDER — SODIUM CHLORIDE, SODIUM LACTATE, POTASSIUM CHLORIDE, CALCIUM CHLORIDE 600; 310; 30; 20 MG/100ML; MG/100ML; MG/100ML; MG/100ML
INJECTION, SOLUTION INTRAVENOUS CONTINUOUS PRN
Status: DISCONTINUED | OUTPATIENT
Start: 2021-01-26 | End: 2021-01-26 | Stop reason: SDUPTHER

## 2021-01-26 RX ORDER — CEFAZOLIN SODIUM 2 G/50ML
2000 SOLUTION INTRAVENOUS EVERY 8 HOURS
Status: DISCONTINUED | OUTPATIENT
Start: 2021-01-26 | End: 2021-01-26 | Stop reason: HOSPADM

## 2021-01-26 RX ORDER — CELECOXIB 200 MG/1
400 CAPSULE ORAL ONCE
Status: COMPLETED | OUTPATIENT
Start: 2021-01-26 | End: 2021-01-26

## 2021-01-26 RX ORDER — AMLODIPINE BESYLATE 10 MG/1
10 TABLET ORAL DAILY
Status: DISCONTINUED | OUTPATIENT
Start: 2021-01-26 | End: 2021-01-26 | Stop reason: HOSPADM

## 2021-01-26 RX ORDER — BISACODYL 10 MG
10 SUPPOSITORY, RECTAL RECTAL DAILY PRN
Status: DISCONTINUED | OUTPATIENT
Start: 2021-01-26 | End: 2021-01-26 | Stop reason: HOSPADM

## 2021-01-26 RX ORDER — FLUOCINONIDE 0.5 MG/G
OINTMENT TOPICAL DAILY
Status: DISCONTINUED | OUTPATIENT
Start: 2021-01-26 | End: 2021-01-26 | Stop reason: HOSPADM

## 2021-01-26 RX ORDER — FENTANYL CITRATE 50 UG/ML
INJECTION, SOLUTION INTRAMUSCULAR; INTRAVENOUS
Status: COMPLETED
Start: 2021-01-26 | End: 2021-01-26

## 2021-01-26 RX ORDER — KETOROLAC TROMETHAMINE 30 MG/ML
30 INJECTION, SOLUTION INTRAMUSCULAR; INTRAVENOUS EVERY 6 HOURS
Status: DISCONTINUED | OUTPATIENT
Start: 2021-01-26 | End: 2021-01-26 | Stop reason: HOSPADM

## 2021-01-26 RX ORDER — DEXAMETHASONE SODIUM PHOSPHATE 10 MG/ML
10 INJECTION, SOLUTION INTRAMUSCULAR; INTRAVENOUS ONCE
Status: COMPLETED | OUTPATIENT
Start: 2021-01-26 | End: 2021-01-26

## 2021-01-26 RX ORDER — SODIUM CHLORIDE, SODIUM LACTATE, POTASSIUM CHLORIDE, CALCIUM CHLORIDE 600; 310; 30; 20 MG/100ML; MG/100ML; MG/100ML; MG/100ML
INJECTION, SOLUTION INTRAVENOUS CONTINUOUS
Status: DISCONTINUED | OUTPATIENT
Start: 2021-01-26 | End: 2021-01-26

## 2021-01-26 RX ORDER — SENNA AND DOCUSATE SODIUM 50; 8.6 MG/1; MG/1
1 TABLET, FILM COATED ORAL 2 TIMES DAILY
Status: DISCONTINUED | OUTPATIENT
Start: 2021-01-26 | End: 2021-01-26 | Stop reason: HOSPADM

## 2021-01-26 RX ORDER — OXYCODONE HCL 10 MG/1
10 TABLET, FILM COATED, EXTENDED RELEASE ORAL ONCE
Status: COMPLETED | OUTPATIENT
Start: 2021-01-26 | End: 2021-01-26

## 2021-01-26 RX ORDER — OXYCODONE HYDROCHLORIDE 5 MG/1
15 TABLET ORAL EVERY 4 HOURS PRN
Status: DISCONTINUED | OUTPATIENT
Start: 2021-01-26 | End: 2021-01-26 | Stop reason: HOSPADM

## 2021-01-26 RX ORDER — ACETAMINOPHEN 500 MG
1000 TABLET ORAL ONCE
Status: COMPLETED | OUTPATIENT
Start: 2021-01-26 | End: 2021-01-26

## 2021-01-26 RX ORDER — OXYCODONE HYDROCHLORIDE 5 MG/1
5 TABLET ORAL PRN
Status: COMPLETED | OUTPATIENT
Start: 2021-01-26 | End: 2021-01-26

## 2021-01-26 RX ORDER — ACETAMINOPHEN 500 MG
1000 TABLET ORAL EVERY 8 HOURS SCHEDULED
Status: DISCONTINUED | OUTPATIENT
Start: 2021-01-26 | End: 2021-01-26 | Stop reason: HOSPADM

## 2021-01-26 RX ORDER — MORPHINE SULFATE 4 MG/ML
1 INJECTION, SOLUTION INTRAMUSCULAR; INTRAVENOUS EVERY 5 MIN PRN
Status: DISCONTINUED | OUTPATIENT
Start: 2021-01-26 | End: 2021-01-26 | Stop reason: HOSPADM

## 2021-01-26 RX ORDER — FENTANYL CITRATE 50 UG/ML
INJECTION, SOLUTION INTRAMUSCULAR; INTRAVENOUS PRN
Status: DISCONTINUED | OUTPATIENT
Start: 2021-01-26 | End: 2021-01-26 | Stop reason: SDUPTHER

## 2021-01-26 RX ORDER — ASPIRIN 81 MG/1
81 TABLET ORAL DAILY
Status: DISCONTINUED | OUTPATIENT
Start: 2021-01-27 | End: 2021-01-26 | Stop reason: HOSPADM

## 2021-01-26 RX ORDER — TAMSULOSIN HYDROCHLORIDE 0.4 MG/1
0.4 CAPSULE ORAL DAILY
Status: DISCONTINUED | OUTPATIENT
Start: 2021-01-26 | End: 2021-01-26 | Stop reason: HOSPADM

## 2021-01-26 RX ORDER — MAGNESIUM HYDROXIDE 1200 MG/15ML
LIQUID ORAL CONTINUOUS PRN
Status: COMPLETED | OUTPATIENT
Start: 2021-01-26 | End: 2021-01-26

## 2021-01-26 RX ORDER — SODIUM CHLORIDE 0.9 % (FLUSH) 0.9 %
10 SYRINGE (ML) INJECTION PRN
Status: DISCONTINUED | OUTPATIENT
Start: 2021-01-26 | End: 2021-01-26 | Stop reason: HOSPADM

## 2021-01-26 RX ORDER — METHOCARBAMOL 500 MG/1
500 TABLET, FILM COATED ORAL 4 TIMES DAILY
Status: DISCONTINUED | OUTPATIENT
Start: 2021-01-26 | End: 2021-01-26 | Stop reason: HOSPADM

## 2021-01-26 RX ORDER — OXYCODONE HYDROCHLORIDE 5 MG/1
10 TABLET ORAL PRN
Status: COMPLETED | OUTPATIENT
Start: 2021-01-26 | End: 2021-01-26

## 2021-01-26 RX ORDER — SODIUM CHLORIDE 9 MG/ML
INJECTION, SOLUTION INTRAVENOUS CONTINUOUS
Status: DISCONTINUED | OUTPATIENT
Start: 2021-01-26 | End: 2021-01-26 | Stop reason: HOSPADM

## 2021-01-26 RX ORDER — CEFAZOLIN SODIUM 2 G/50ML
2 SOLUTION INTRAVENOUS ONCE
Status: COMPLETED | OUTPATIENT
Start: 2021-01-26 | End: 2021-01-26

## 2021-01-26 RX ORDER — SODIUM CHLORIDE 0.9 % (FLUSH) 0.9 %
10 SYRINGE (ML) INJECTION EVERY 12 HOURS SCHEDULED
Status: DISCONTINUED | OUTPATIENT
Start: 2021-01-26 | End: 2021-01-26 | Stop reason: HOSPADM

## 2021-01-26 RX ORDER — OXYCODONE HYDROCHLORIDE 5 MG/1
10 TABLET ORAL EVERY 4 HOURS PRN
Status: DISCONTINUED | OUTPATIENT
Start: 2021-01-26 | End: 2021-01-26 | Stop reason: HOSPADM

## 2021-01-26 RX ORDER — PROMETHAZINE HYDROCHLORIDE 25 MG/ML
6.25 INJECTION, SOLUTION INTRAMUSCULAR; INTRAVENOUS
Status: DISCONTINUED | OUTPATIENT
Start: 2021-01-26 | End: 2021-01-26 | Stop reason: HOSPADM

## 2021-01-26 RX ORDER — PROPOFOL 10 MG/ML
INJECTION, EMULSION INTRAVENOUS CONTINUOUS PRN
Status: DISCONTINUED | OUTPATIENT
Start: 2021-01-26 | End: 2021-01-26 | Stop reason: SDUPTHER

## 2021-01-26 RX ORDER — METOCLOPRAMIDE HYDROCHLORIDE 5 MG/ML
10 INJECTION INTRAMUSCULAR; INTRAVENOUS
Status: COMPLETED | OUTPATIENT
Start: 2021-01-26 | End: 2021-01-26

## 2021-01-26 RX ORDER — OXYCODONE HYDROCHLORIDE AND ACETAMINOPHEN 5; 325 MG/1; MG/1
1 TABLET ORAL EVERY 6 HOURS PRN
Qty: 28 TABLET | Refills: 0 | Status: SHIPPED | OUTPATIENT
Start: 2021-01-26 | End: 2021-02-02

## 2021-01-26 RX ORDER — ONDANSETRON 2 MG/ML
4 INJECTION INTRAMUSCULAR; INTRAVENOUS EVERY 6 HOURS PRN
Status: DISCONTINUED | OUTPATIENT
Start: 2021-01-26 | End: 2021-01-26 | Stop reason: HOSPADM

## 2021-01-26 RX ORDER — DIPHENHYDRAMINE HYDROCHLORIDE 50 MG/ML
12.5 INJECTION INTRAMUSCULAR; INTRAVENOUS
Status: DISCONTINUED | OUTPATIENT
Start: 2021-01-26 | End: 2021-01-26 | Stop reason: HOSPADM

## 2021-01-26 RX ORDER — LIDOCAINE HYDROCHLORIDE 20 MG/ML
INJECTION, SOLUTION INFILTRATION; PERINEURAL PRN
Status: DISCONTINUED | OUTPATIENT
Start: 2021-01-26 | End: 2021-01-26 | Stop reason: SDUPTHER

## 2021-01-26 RX ORDER — VANCOMYCIN HYDROCHLORIDE 500 MG/10ML
INJECTION, POWDER, LYOPHILIZED, FOR SOLUTION INTRAVENOUS PRN
Status: DISCONTINUED | OUTPATIENT
Start: 2021-01-26 | End: 2021-01-26 | Stop reason: ALTCHOICE

## 2021-01-26 RX ORDER — FOLIC ACID 1 MG/1
1 TABLET ORAL DAILY
Status: DISCONTINUED | OUTPATIENT
Start: 2021-01-26 | End: 2021-01-26 | Stop reason: HOSPADM

## 2021-01-26 RX ORDER — ONDANSETRON 2 MG/ML
INJECTION INTRAMUSCULAR; INTRAVENOUS PRN
Status: DISCONTINUED | OUTPATIENT
Start: 2021-01-26 | End: 2021-01-26 | Stop reason: SDUPTHER

## 2021-01-26 RX ORDER — HYDRALAZINE HYDROCHLORIDE 20 MG/ML
5 INJECTION INTRAMUSCULAR; INTRAVENOUS EVERY 10 MIN PRN
Status: DISCONTINUED | OUTPATIENT
Start: 2021-01-26 | End: 2021-01-26 | Stop reason: HOSPADM

## 2021-01-26 RX ADMIN — DEXAMETHASONE SODIUM PHOSPHATE 10 MG: 10 INJECTION, SOLUTION INTRAMUSCULAR; INTRAVENOUS at 08:31

## 2021-01-26 RX ADMIN — OXYCODONE HYDROCHLORIDE 10 MG: 10 TABLET, FILM COATED, EXTENDED RELEASE ORAL at 08:31

## 2021-01-26 RX ADMIN — ACETAMINOPHEN 1000 MG: 500 TABLET ORAL at 08:31

## 2021-01-26 RX ADMIN — VANCOMYCIN HYDROCHLORIDE 1250 MG: 10 INJECTION, POWDER, LYOPHILIZED, FOR SOLUTION INTRAVENOUS at 08:55

## 2021-01-26 RX ADMIN — SODIUM CHLORIDE, POTASSIUM CHLORIDE, SODIUM LACTATE AND CALCIUM CHLORIDE: 600; 310; 30; 20 INJECTION, SOLUTION INTRAVENOUS at 08:30

## 2021-01-26 RX ADMIN — PROPOFOL 100 MCG/KG/MIN: 10 INJECTION, EMULSION INTRAVENOUS at 10:04

## 2021-01-26 RX ADMIN — HYDROMORPHONE HYDROCHLORIDE 0.25 MG: 1 INJECTION, SOLUTION INTRAMUSCULAR; INTRAVENOUS; SUBCUTANEOUS at 13:14

## 2021-01-26 RX ADMIN — OXYCODONE 5 MG: 5 TABLET ORAL at 12:22

## 2021-01-26 RX ADMIN — LIDOCAINE HYDROCHLORIDE 5 ML: 20 INJECTION, SOLUTION INFILTRATION; PERINEURAL at 10:02

## 2021-01-26 RX ADMIN — LIDOCAINE HYDROCHLORIDE 10 ML: 20 INJECTION, SOLUTION INFILTRATION; PERINEURAL at 09:54

## 2021-01-26 RX ADMIN — CEFAZOLIN SODIUM 2 G: 2 SOLUTION INTRAVENOUS at 10:01

## 2021-01-26 RX ADMIN — CELECOXIB 400 MG: 200 CAPSULE ORAL at 08:31

## 2021-01-26 RX ADMIN — FENTANYL CITRATE 100 MCG: 50 INJECTION INTRAMUSCULAR; INTRAVENOUS at 09:54

## 2021-01-26 RX ADMIN — METOCLOPRAMIDE HYDROCHLORIDE 10 MG: 5 INJECTION INTRAMUSCULAR; INTRAVENOUS at 13:47

## 2021-01-26 RX ADMIN — FENTANYL CITRATE 100 MCG: 50 INJECTION INTRAMUSCULAR; INTRAVENOUS at 11:52

## 2021-01-26 RX ADMIN — TRANEXAMIC ACID 1000 MG: 1 INJECTION, SOLUTION INTRAVENOUS at 09:54

## 2021-01-26 RX ADMIN — HYDROMORPHONE HYDROCHLORIDE 0.25 MG: 1 INJECTION, SOLUTION INTRAMUSCULAR; INTRAVENOUS; SUBCUTANEOUS at 12:31

## 2021-01-26 RX ADMIN — SODIUM CHLORIDE, SODIUM LACTATE, POTASSIUM CHLORIDE, AND CALCIUM CHLORIDE: .6; .31; .03; .02 INJECTION, SOLUTION INTRAVENOUS at 09:54

## 2021-01-26 RX ADMIN — ONDANSETRON 4 MG: 2 INJECTION INTRAMUSCULAR; INTRAVENOUS at 10:03

## 2021-01-26 RX ADMIN — VANCOMYCIN HYDROCHLORIDE 1250 G: 10 INJECTION, POWDER, LYOPHILIZED, FOR SOLUTION INTRAVENOUS at 09:54

## 2021-01-26 RX ADMIN — PROPOFOL 50 MCG/KG/MIN: 10 INJECTION, EMULSION INTRAVENOUS at 09:56

## 2021-01-26 ASSESSMENT — PAIN DESCRIPTION - LOCATION
LOCATION: KNEE
LOCATION: KNEE

## 2021-01-26 ASSESSMENT — PAIN DESCRIPTION - ORIENTATION
ORIENTATION: LEFT
ORIENTATION: LEFT

## 2021-01-26 ASSESSMENT — PULMONARY FUNCTION TESTS
PIF_VALUE: 1
PIF_VALUE: 0
PIF_VALUE: 1
PIF_VALUE: 0
PIF_VALUE: 1

## 2021-01-26 ASSESSMENT — PAIN DESCRIPTION - PAIN TYPE
TYPE: SURGICAL PAIN

## 2021-01-26 ASSESSMENT — PAIN DESCRIPTION - ONSET
ONSET: ON-GOING
ONSET: PROGRESSIVE

## 2021-01-26 ASSESSMENT — PAIN SCALES - GENERAL
PAINLEVEL_OUTOF10: 2
PAINLEVEL_OUTOF10: 0
PAINLEVEL_OUTOF10: 5
PAINLEVEL_OUTOF10: 4
PAINLEVEL_OUTOF10: 5

## 2021-01-26 ASSESSMENT — PAIN DESCRIPTION - FREQUENCY: FREQUENCY: CONTINUOUS

## 2021-01-26 ASSESSMENT — PAIN DESCRIPTION - DESCRIPTORS
DESCRIPTORS: ACHING;BURNING
DESCRIPTORS: ACHING;BURNING

## 2021-01-26 NOTE — PROGRESS NOTES
PACU Transfer to \[facility]\""  Pt's Current Allergies: Latex, Adhesive tape, Gluten, Plaquenil [hydroxychloroquine sulfate], Vicodin [hydrocodone-acetaminophen], Benzamide derivatives, and Codeine    Pt meets criteria to transfer to next phase of care per TACHO SCORE and ASPAN standards    Recent Labs     01/26/21  0838   POCGLU 122*       Vitals:    01/26/21 1345   BP: 132/70   Pulse: 107   Resp: 15   Temp: 98 °F (36.7 °C)   SpO2: 90%         Intake/Output Summary (Last 24 hours) at 1/26/2021 1402  Last data filed at 1/26/2021 1401  Gross per 24 hour   Intake 1756 ml   Output 50 ml   Net 1706 ml         Pain assessment:  present - adequately treated  Pain Level: 4    Patient was assessed for unknown alterations to skin integrity. There were not unknown alterations observed. Patient transferred to care of Cheo Mckeon RN.   Family updated and directed to Cheo Mckeon    1/26/2021 2:02 PM

## 2021-01-26 NOTE — PROGRESS NOTES
Pt's , [facility], called and updated. RN will let them know when PT/OT is at bedside.

## 2021-01-26 NOTE — OP NOTE
PREOPERATIVE DIAGNOSIS:  Left total knee arthroplasty, patella clunk and grind after revision total knee arthrplasty     POSTOPERATIVE DIAGNOSIS:   Left total knee arthroplasty, patella clunk and grind after revision total knee arthrplasty and extension deficit     OPERATIONS PERFORMED:    1. Left knee revision total knee arthroplasty,  modular liner exchange with extensive anterior synovectomy. 2.  Left knee patella arthroplasty revision     ATTENDING SURGEON:  Barby Elam M.D. FIRST SURGICAL ASSISTANT:  Jose Day PA-C. The physician Assistant was necessary today to  perform the operation for manipulation of the extremity and  application of the implants. This help was otherwise not available in  the operating room today. ESTIMATED BLOOD LOSS: 50 mL. INTRAVENOUS FLUIDS:  1000 cc of crystalloid. TOURNIQUET TIME:  65 minutes at 320 mmHg. IMPLANTS:    1. Depuy TC3 Insert, size 2.5 x 10 mm thickness (12.5 mm removed)  2. Depuy Attune Medial dome patella, 38 mm     SPECIMENS:  Three samples were sent for routine culture and   synovial fluid and sent for cell count differential.     OPERATIVE INDICATIONS:  This patient is revision left total knee arthroplasty by myself last year. She progressed well with improved stability after revision for instability however developed patella clunk late. Work-up was negative for infection. I did not revise her patella at the time of her last revision. I treated the patient with corticosteroids and bracing. Their symptoms improved. I offered the patient poly liner exchange, synovectomy and patella revision. We did discuss the fact that if they  possibly had ongoing problems, there could be a total revision in the future. We discussed the  standard risk of surgery including but not limited to pain, scar,  bleeding, infection, need for recurrent surgery, fracture, stiffness,  or even loss of life or limb.   Despite these risks and other, they did  elect to proceed. OPERATIVE DETAILS:  The patient was greeted in the preoperative  holding area. The operative knee was marked with a marker. They were  brought to the operating room where general anesthesia was obtained. They were placed in the supine position with all bony prominences were  well padded. Tourniquet was applied to the thigh. The operative lower  extremity was prepped and draped in normal standard sterile surgical  fashion followed by final time-out verifying correct patient,  operative side, and operative plan. The patient did receive  preoperative Ancef prior to tourniquet inflation. The operative lower  extremity was exsanguinated with an Ace bandage and tourniquet was  inflated. I utilized her previous anterior incision to access this  extensor retinaculum and extensor mechanism. Full-thickness layers  were developed medially and laterally, and I then performed a medial  parapatellar arthrotomy after aspirating 5 mL of normal-appearing  synovial fluid which was sent for stat cell count differential,  returned negative for signs of infection. I immediately encountered a  significant redundant synovium. I performed a small medial release  and then performed a wide synovectomy involving the medial-lateral  gutters, suprapatellar pouch, and infrapatellar region. After  performing the synovectomy, I used the Aquamantys bipolar sealer on  the synovectomized tissues in order to obtain hemostasis. I then  removed the polyethelene liner, trialed multiple liners. They were stable with a 10mm. I selected  the  10 mm liner. I subluxed the knee forward, placed the actual liner,  and placed about the knee into full extension and through a wide range of  motion. I was pleased with this. I measure the patella at 25 mm thickness. I elected to revise. I recessed the patella to 14 mm thickness. I chose a medial dome patella and removed all lateral facet.      I irrigated the wound copiously  with Betadine solution soaked with pulsatile irrigation a total of 3  liters and then closed the arthrotomy with interrupted 0 Vicryl  oversewn with #2 Stratafix and then 2-0 Vicryl was placed in an  interrupted fashion in the subcutaneous tissue and subdermal tissue  followed by running 4-0 Monocryl, Dermabond Prineo, and a sterile  silver impregnated dressing. The tourniquet was deflated after  arthrotomy closure. The ortho cocktail mixture was injected into the  deep and subcutaneous tissues and the 500 mg of vancomycin was  injected into the intraarticular space. The patient's leg was wrapped  with an Ace bandage and she was extubated and transported to the  postoperative anesthesia care unit in stable condition. All sponge  and needle counts were correct x2. The patient tolerated the  procedure well without complication.

## 2021-01-26 NOTE — ANESTHESIA POSTPROCEDURE EVALUATION
Department of Anesthesiology  Postprocedure Note    Patient: Daphne Cagle  MRN: [id number]  YOB: 1960  Date of evaluation: 1/26/2021  Time:  2:47 PM     Procedure Summary     Date: 01/26/21 Room / Location: [address]  / [facility]    Anesthesia Start: 2213 Anesthesia Stop: 0739    Procedure: LEFT KNEE PATELLA REVISON WITH ANTERIOR SYNOVECTOMY (Left ) Diagnosis:       Other mechanical complication of internal left knee prosthesis, initial encounter ([facility] [address]      (Other mechanical complication of internal left knee prosthesis, initial encounter ([facility] [address] [P72.641O])    Surgeons: Susan Lopez MD Responsible Provider: Jeet Quinn MD    Anesthesia Type: epidural ASA Status: 3          Anesthesia Type: epidural    Ant Phase I: Ant Score: 10    Ant Phase II: Ant Score: 10    Last vitals: Reviewed and per EMR flowsheets.        Anesthesia Post Evaluation    Patient location during evaluation: PACU  Patient participation: complete - patient participated  Level of consciousness: awake and alert  Pain score: 0  Airway patency: patent  Nausea & Vomiting: no nausea and no vomiting  Complications: no  Cardiovascular status: hemodynamically stable  Respiratory status: acceptable  Hydration status: euvolemic

## 2021-01-26 NOTE — PROGRESS NOTES
Patient admitted to PACU #5 from OR per bed at 1151 s/p LEFT KNEE PATELLA REVISON WITH ANTERIOR SYNOVECTOMY (Left). Report received at bedside in PACU per CRNA. Patient was reported to be hemodynamically stable in OR with no complications. Patient received an epidural anesthesia with epidural catheter removed in OR prior to arrival to PACU with tip of catheter intact with site unremarkable. Patient connected to PACU monitoring equipment. IVF's infusing with site unremarkable. Patient arrived to PACU responsive but not fully wakeful from anesthesia but with respirations easy, even and regular with complaints of pain which she was given 100 mcq of fentanyl per CRNA, see anesthesia record. Left knee surgical dressing with ace wrap in place and no drainage noted. Ice packs applied. No further changes. Will continue to monitor.

## 2021-01-26 NOTE — ANESTHESIA PRE PROCEDURE
Department of Anesthesiology  Preprocedure Note       Name:  Reynaldo Lugo   Age:  63 y.o.  :  1960                                          MRN:  [id number]         Date:  2021      Surgeon: Raine George):  Madelon Holstein, MD    Procedure: Procedure(s):  LEFT KNEE PATELLA REVISON WITH ANTERIOR SYNOVECTOMY    Medications prior to admission:   Prior to Admission medications    Medication Sig Start Date End Date Taking? Authorizing Provider   oxyCODONE-acetaminophen (PERCOCET) 5-325 MG per tablet Take 1 tablet by mouth every 6 hours as needed for Pain for up to 7 days. Intended supply: 7 days. Take lowest dose possible to manage pain 21 Yes Madelon Holstein, MD   ENBREL 25 MG/0.5ML SOSY Inject 25 mg into the skin Twice a Week  8/3/20  Yes Historical Provider, MD   sennosides-docusate sodium (SENOKOT-S) 8.6-50 MG tablet Take 1 tablet by mouth 2 times daily 12/15/19  Yes Kirit Vazquez MD   progesterone (PROMETRIUM) 200 MG capsule Take 600 mg by mouth nightly   Yes Historical Provider, MD   amLODIPine (NORVASC) 10 MG tablet Take 10 mg by mouth daily  18  Yes Historical Provider, MD   thyroid (ARMOUR) 240 MG tablet Take 120 mg by mouth daily    Yes Historical Provider, MD   methocarbamol (ROBAXIN) 500 MG tablet Take 1 tablet by mouth 4 times daily 20   [facility] [place]   folic acid (FOLVITE) 1 MG tablet Take 1 mg by mouth daily    Historical Provider, MD   triamcinolone (KENALOG) 0.025 % cream Apply topically every 4 hours as needed Apply Topically itchy skin    Historical Provider, MD       Current medications:    Current Facility-Administered Medications   Medication Dose Route Frequency Provider Last Rate Last Admin    lactated ringers infusion   Intravenous Continuous Madelon Holstein, MD        ceFAZolin (ANCEF) 2000 mg in dextrose 3 % 50 mL IVPB (duplex)  2 g Intravenous Once Madelon Holstein, MD  vancomycin (VANCOCIN) 1,250 mg in dextrose 5 % 250 mL IVPB  15 mg/kg Intravenous Once Orlando Smith .7 mL/hr at 01/26/21 0855 1,250 mg at 01/26/21 0855    ortho mix (with morphine) injection   Injection On Call Orlando Smith MD        tranexamic acid (CYKLOKAPRON) 1,000 mg in sodium chloride 0.9 % 50 mL IVPB  1,000 mg Intravenous Once Orlando Smith MD        lactated ringers infusion   Intravenous Continuous Maynor Mario,  mL/hr at 01/26/21 0830 New Bag at 01/26/21 0830    HYDROmorphone (DILAUDID) injection 0.25 mg  0.25 mg Intravenous Q5 Min PRN Candelaria Ching MD        HYDROmorphone (DILAUDID) injection 0.5 mg  0.5 mg Intravenous Q5 Min PRN Candelaria Ching MD        morphine injection 1 mg  1 mg Intravenous Q5 Min PRN Candelaria Ching MD        HYDROmorphone (DILAUDID) injection 0.5 mg  0.5 mg Intravenous Q5 Min PRN Candelaria Ching MD        oxyCODONE (ROXICODONE) immediate release tablet 5 mg  5 mg Oral PRN Candelaria Ching MD        Or    oxyCODONE (ROXICODONE) immediate release tablet 10 mg  10 mg Oral PRN Candelaria Ching MD        diphenhydrAMINE (BENADRYL) injection 12.5 mg  12.5 mg Intravenous Once PRN Candelaria Cihng MD        metoclopramide (REGLAN) injection 10 mg  10 mg Intravenous Once PRN Candelaria Ching MD        promethazine (PHENERGAN) injection 6.25 mg  6.25 mg Intravenous Once PRN Candelaria Ching MD        labetalol (NORMODYNE;TRANDATE) injection syringe 5 mg  5 mg Intravenous Q10 Min PRN Candelaria Ching MD        hydrALAZINE (APRESOLINE) injection 5 mg  5 mg Intravenous Q10 Min PRN Candelaria Ching MD        meperidine (DEMEROL) injection 12.5 mg  12.5 mg Intravenous Q5 Min PRN Candelaria Ching MD           Allergies:     Allergies   Allergen Reactions    Latex Dermatitis    Adhesive Tape      blisters    Gluten     Plaquenil [Hydroxychloroquine Sulfate]      Rash      Vicodin [Hydrocodone-Acetaminophen] Says she gets a rash after taking it for 3 straight days      Benzamide Derivatives Rash     Benzoin   topical    Codeine Nausea And Vomiting       Problem List:    Patient Active Problem List   Diagnosis Code    Ankle instability M25.373    Achilles tendon injury S86.009A    Left ankle pain M25.572    Ankle pain M25.579    Sciatica M54.30    Peroneal tendon injury S86.309A    Edema of left foot R60.0    Sprain of foot S93.609A    Cervical spondylosis with radiculopathy M47.22    Instability of internal left knee prosthesis ([address] Y65.954R    Complication of internal left knee prosthesis ([address] T84. [id number], [id number]       Past Medical History:        Diagnosis Date    Arthritis     rheumatoid arthritis    Fibromyalgia     Hypertension     Migraine     MVP (mitral valve prolapse)     Nausea & vomiting     PONV (postoperative nausea and vomiting)     Reflux Doesn't have anymore r/t weight loss    Thyroid disease     goiter treated with radioactive med    Wears glasses        Past Surgical History:        Procedure Laterality Date    ANKLE ARTHROSCOPY Right 3/12    lateral ligament repair, removal spurs    ANKLE SURGERY      right    BACK SURGERY      cyst removed off lower spine    CERVICAL FUSION N/A 12/11/2019    C4-5, C5-6, C6-7 ANTERIOR CERVICAL DISCECTOMY AND FUSION performed by Quique Kohli MD at [address]      left    JOINT REPLACEMENT Left 2019    KNEE ARTHROSCOPY Left     REVISION TOTAL KNEE ARTHROPLASTY Left 8/28/2020    LEFT TOTAL KNEE REVISION 2 COMPONENT performed by Rodney Ryan MD at [address] [facility]      bilateral    TONSILLECTOMY      T & A    WRIST SURGERY Right        Social History:    Social History     Tobacco Use    Smoking status: Never Smoker    Smokeless tobacco: Never Used   Substance Use Topics    Alcohol use: Not Currently                                Counseling given: Not Answered Vital Signs (Current):   Vitals:    01/18/21 1117 01/26/21 0710   BP:  (!) 141/86   Pulse:  92   Resp:  16   Temp:  98 °F (36.7 °C)   TempSrc:  Oral   SpO2:  95%   Weight: 189 lb (85.7 kg) 189 lb (85.7 kg)   Height: 5' 4\" (1.626 m) 5' 4\" (1.626 m)                                              BP Readings from Last 3 Encounters:   01/26/21 (!) 141/86   08/30/20 130/78   08/28/20 (!) 118/58       NPO Status: Time of last liquid consumption: 2100                        Time of last solid consumption: 2100                        Date of last liquid consumption: 01/25/21                        Date of last solid food consumption: 01/25/21    BMI:   Wt Readings from Last 3 Encounters:   01/26/21 189 lb (85.7 kg)   08/28/20 174 lb (78.9 kg)   12/11/19 182 lb (82.6 kg)     Body mass index is 32.44 kg/m².     CBC:   Lab Results   Component Value Date    WBC 15.2 12/12/2019    RBC 4.27 12/12/2019    HGB 12.7 08/29/2020    HCT 35.8 08/29/2020    MCV 93.4 12/12/2019    RDW 13.2 12/12/2019     12/12/2019       CMP:   Lab Results   Component Value Date     12/12/2019    K 3.5 12/12/2019     12/12/2019    CO2 27 12/12/2019    BUN 7 12/12/2019    CREATININE 0.8 10/26/2020    CREATININE 0.7 12/12/2019    GFRAA >60 10/26/2020    GFRAA >60 03/02/2012    LABGLOM >60 10/26/2020    GLUCOSE 153 12/12/2019    CALCIUM 9.2 12/12/2019       POC Tests:   Recent Labs     01/26/21  0838   POCGLU 122*       Coags: No results found for: PROTIME, INR, APTT    HCG (If Applicable): No results found for: PREGTESTUR, PREGSERUM, HCG, HCGQUANT     ABGs: No results found for: PHART, PO2ART, QAW4OOS, RKO3HBI, BEART, C3EVKMWR     Type & Screen (If Applicable):  No results found for: LABABO, LABRH    Drug/Infectious Status (If Applicable):  No results found for: HIV, HEPCAB    COVID-19 Screening (If Applicable):   Lab Results   Component Value Date    COVID19 Not Detected 01/21/2021    COVID19 NOT DETECTED 08/24/2020 Anesthesia Evaluation  Patient summary reviewed and Nursing notes reviewed no history of anesthetic complications:   Airway: Mallampati: II  TM distance: >3 FB   Neck ROM: full  Mouth opening: > = 3 FB Dental:          Pulmonary:                              Cardiovascular:    (+) hypertension:,                   Neuro/Psych:   (+) headaches:,             GI/Hepatic/Renal: Neg GI/Hepatic/Renal ROS            Endo/Other:    (+) : arthritis: rheumatoid. , .                  ROS comment: Fibromyalgia Abdominal:           Vascular:                                        Anesthesia Plan      epidural     ASA 1    (74-year-old female presents for LEFT KNEE PATELLA REVISON WITH ANTERIOR SYNOVECTOMY. Plan epidural anesthesia with ASA standard monitors. Questions answered. Patient agreeable with anesthetic plan.  )  Induction: intravenous. Anesthetic plan and risks discussed with patient. Plan discussed with CRNA.     Attending anesthesiologist reviewed and agrees with Pre Eval content        Daniela Duarte MD   1/26/2021

## 2021-01-26 NOTE — ANESTHESIA PROCEDURE NOTES
Epidural Block    Patient location during procedure: pre-op  Start time: 1/26/2021 9:25 AM  End time: 1/26/2021 9:36 AM  Reason for block: post-op pain management  Staffing  Performed: anesthesiologist   Anesthesiologist: Jessica Singh MD  Preanesthetic Checklist  Completed: patient identified, IV checked, site marked, risks and benefits discussed, surgical consent, monitors and equipment checked, pre-op evaluation, timeout performed, anesthesia consent given, oxygen available and patient being monitored  Epidural  Patient position: sitting  Prep: ChloraPrep  Patient monitoring: cardiac monitor, continuous pulse ox and frequent blood pressure checks  Approach: midline  Location: lumbar (1-5)  Injection technique: PARRISH air  Provider prep: mask and sterile gloves  Needle  Needle type: Tuohy   Needle gauge: 17 G  Needle length: 3.5 in  Needle insertion depth: 7 cm  Catheter type: side hole  Catheter size: 19 G  Catheter at skin depth: 15 cm  Test dose: negative  Kit: Perifix FX Continuous Epidural Anesthesia Tray   Lot number: [id number]  Expiration date: 9/30/2021  Assessment  Hemodynamics: stable  Attempts: 1  Additional Notes  Epidural Note    Seated position. Landmarks identified. Sterile prep and drape. Lidocaine 1% skin wheal at L3-4.  17G Tuohy passed with loss of resistance at 7cm. Catheter passed easily to 15cm. Tuohy removed. Catheter secured. Lidocaine 1.5% with Epinephrine 1:200,000 administered to 5cc total at conclusion of procedure. No apparent complications at the time of the procedure. Laurel Rogel.  MD PAZ  January 26, 2021 9:44 AM

## 2021-01-26 NOTE — PROGRESS NOTES
Patient received epidural from Dr. Kesha Jean, patients blood pressure dropped to the 80's systaltically, anesthesia at the bedside. After epidural placed patients blood pressure was 110/50. OR team here to transport patient to the OR.

## 2021-01-26 NOTE — PROGRESS NOTES
Occupational Therapy   Occupational Therapy Initial Assessment/Tx/Discharge Note  Date: 2021   Patient Name: Gena Conley  MRN: [id number]     : 1960    Date of Service: 2021    Discharge Recommendations: Gena Conley scored a 21/24 on the AM-PAC ADL Inpatient form. At this time, no further OT is recommended upon discharge. Recommend patient returns to prior setting with prior services. OT Equipment Recommendations  Equipment Needed: No    Assessment   Assessment: Pt is doing well functionally POD #0. Demos safe mobility and ADLs. Will have 24 hr A at home. No further acute OT needs. Treatment Diagnosis: Decreased activity tolerance, impaired ADLs and mobility  Decision Making: Medium Complexity  No Skilled OT: Safe to return home; No OT goals identified  REQUIRES OT FOLLOW UP: No  Activity Tolerance  Activity Tolerance: Patient Tolerated treatment well  Activity Tolerance: Nausea and lightheadedness, RN aware  Safety Devices  Safety Devices in place: Yes  Type of devices: Call light within reach;Nurse notified; Left in bed           Treatment Diagnosis: Decreased activity tolerance, impaired ADLs and mobility      Restrictions  Position Activity Restriction  Other position/activity restrictions: up with assist, progressive mobility with assist ,full weight bearing as tolerated    Subjective   General  Chart Reviewed: Yes  Additional Pertinent Hx: 63 y.o. F s/p L TKA revision   Family / Caregiver Present: No  Referring Practitioner: Kriss Ritter  Subjective  Subjective: Pt in bed on entry. Pleasant and cooperative.   Pain Assessment  Pain Assessment: 0-10(yes, surgical pain, tolerable)    Social/Functional History  Social/Functional History  Lives With: Spouse  Type of Home: House  Home Layout: Two level, Able to Live on Main level with bedroom/bathroom  Home Access: Stairs to enter without rails  Entrance Stairs - Number of Steps: 3 platform NASIM  Bathroom Shower/Tub: Walk-in shower  Bathroom Toilet: Handicap height(2 half walls for leverage)  Bathroom Equipment: Built-in shower seat, Hand-held shower  Home Equipment: Rolling walker, Cane, Crutches, [employer]  ADL Assistance: Independent  Homemaking Assistance: Independent  Ambulation Assistance: Independent  Transfer Assistance: Independent  Active : Yes  Leisure & Hobbies: Walking 3 miles a day, Hand and border/cross stitching  Additional Comments: Pt reports no recent falls.  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       Objective        Orientation  Overall Orientation Status: Within Functional Limits     Balance  Sitting Balance: Independent  Standing Balance: Stand by assistance  Standing Balance  Time: 2 min + 4 min + 1 min  Activity: functional mobility in room, bathroom, unit with rolling walker  Comment: SBA for functional mobility  Toilet Transfers  Toilet - Technique: Ambulating  Equipment Used: Standard toilet  Toilet Transfer: Stand by assistance  ADL  Feeding: Independent  Grooming: Independent  LE Dressing: Stand by assistance  Toileting: Stand by assistance  Additional Comments: Verbalizes safe plan + available assist for all ADLs at home        Bed mobility  Supine to Sit: Supervision  Sit to Supine: Minimal assistance(to support LLE)  Transfers  Sit to stand: Stand by assistance  Stand to sit: Stand by assistance     Cognition  Overall Cognitive Status: WFL        Plan  - D/C OT services       AM-PAC Score  AM-[facility] Inpatient Daily Activity Raw Score: 21 (01/26/21 1353)  AM-PAC Inpatient ADL T-Scale Score : 44.27 (01/26/21 1353)  ADL Inpatient CMS 0-100% Score: 32.79 (01/26/21 [facility]3)  ADL Inpatient CMS G-Code Modifier : CJ (01/26/21 [facility])      Therapy Time   Individual Concurrent Group Co-treatment   Time In 1320         Time Out 1350         Minutes 30          Timed Code Tx Min: 15  Total Tx Time: 30       Kristina Greenfield, OT

## 2021-01-26 NOTE — PROGRESS NOTES
Ambulatory Surgery/Procedure Discharge Note    Vitals:    01/26/21 1413   BP: 127/77   Pulse: 112   Resp: 14   Temp:    SpO2: 92%     Patient arrived in Phase II recovery alert and oriented. VSS. Reports mild pain. Surgical incision covered ace bandage. Discharge instructions reviewed with patient and . Both verbalized understanding. No intake/output data recorded. Restroom use offered before discharge. Yes, patient voided in restroom. Pain assessment:  present - adequately treated  Pain Level: 1        Patient discharged to home/self care. Patient discharged via wheel chair home with spouse.       1/26/2021 3:01 PM

## 2021-01-26 NOTE — H&P
Hari Ramirez    [id number]    [facility] Same Day Surgery Update H & P  Department of General Surgery   Surgical Service   Pre-operative History and Physical  Last H & P within the last 30 days. 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 The symptoms have been recalcitrant to conservative treatment and the patient presents today for the above procedure. Covid 19:  Patient denies fever, chills, cough or known exposure to Covid-19.       Past Medical History:        Diagnosis Date    Arthritis     rheumatoid arthritis    Fibromyalgia     Hypertension     Migraine     MVP (mitral valve prolapse)     Nausea & vomiting     PONV (postoperative nausea and vomiting)     Reflux Doesn't have anymore r/t weight loss    Thyroid disease     goiter treated with radioactive med    Wears glasses      Past Surgical History:        Procedure Laterality Date    ANKLE ARTHROSCOPY Right 3/12    lateral ligament repair, removal spurs    ANKLE SURGERY      right    BACK SURGERY      cyst removed off lower spine    CERVICAL FUSION N/A 12/11/2019    C4-5, C5-6, C6-7 ANTERIOR CERVICAL DISCECTOMY AND FUSION performed by Sergio Baker MD at [facility]      left    JOINT REPLACEMENT Left 2019    KNEE ARTHROSCOPY Left     REVISION TOTAL KNEE ARTHROPLASTY Left 8/28/2020    LEFT TOTAL KNEE REVISION 2 COMPONENT performed by Yazmin óLpez MD at [address][note 3]      bilateral    TONSILLECTOMY      T & A    WRIST SURGERY Right      Past Social History:  Social History     Socioeconomic History    Marital status:      Spouse name: None    Number of children: None    Years of education: None    Highest education 1 mg by mouth daily    Historical Provider, MD         Allergies:  Latex, Adhesive tape, Gluten, Plaquenil [hydroxychloroquine sulfate], Vicodin [hydrocodone-acetaminophen], Benzamide derivatives, and Codeine    PHYSICAL EXAM:      BP (!) 141/86   Pulse 92   Temp 98 °F (36.7 °C) (Oral)   Resp 16   Ht 5' 4\" (1.626 m)   Wt 189 lb (85.7 kg)   LMP 09/01/2011   SpO2 95%   BMI 32.44 kg/m²      Airway:  Airway patent with no audible stridor    Heart:  Regular rate and rhythm, No murmur noted    Lungs:  No increased work of breathing, good air exchange, clear to auscultation bilaterally, no crackles or wheezing    Abdomen:  Soft, non-distended, non-tender, normal active bowel sounds, no masses palpated    ASSESSMENT AND PLAN    Patient is a 63 y.o. female with above specified procedure planned. 1.  Patient seen and focused exam done today- no new changes since last physical exam on 1/11/21    2. Access to ancillary services are available per request of the provider.     ITZ Kapoor - CNP     1/26/2021

## 2021-02-15 LAB
ANAEROBIC CULTURE: NORMAL
GRAM STAIN RESULT: NORMAL
WOUND/ABSCESS: NORMAL

## 2021-06-20 ENCOUNTER — HOSPITAL ENCOUNTER (OUTPATIENT)
Dept: HOSPITAL 8 - ED | Age: 61
Setting detail: OBSERVATION
Discharge: HOME | End: 2021-06-20
Attending: HOSPITALIST | Admitting: HOSPITALIST
Payer: COMMERCIAL

## 2021-06-20 VITALS — SYSTOLIC BLOOD PRESSURE: 123 MMHG | DIASTOLIC BLOOD PRESSURE: 82 MMHG

## 2021-06-20 VITALS — HEIGHT: 64 IN | WEIGHT: 207.23 LBS | BODY MASS INDEX: 35.38 KG/M2

## 2021-06-20 VITALS — DIASTOLIC BLOOD PRESSURE: 79 MMHG | SYSTOLIC BLOOD PRESSURE: 125 MMHG

## 2021-06-20 VITALS — SYSTOLIC BLOOD PRESSURE: 130 MMHG | DIASTOLIC BLOOD PRESSURE: 81 MMHG

## 2021-06-20 DIAGNOSIS — R73.9: ICD-10-CM

## 2021-06-20 DIAGNOSIS — Z86.73: ICD-10-CM

## 2021-06-20 DIAGNOSIS — Z96.651: ICD-10-CM

## 2021-06-20 DIAGNOSIS — R20.2: ICD-10-CM

## 2021-06-20 DIAGNOSIS — I10: ICD-10-CM

## 2021-06-20 DIAGNOSIS — M54.9: ICD-10-CM

## 2021-06-20 DIAGNOSIS — E66.9: ICD-10-CM

## 2021-06-20 DIAGNOSIS — M06.9: ICD-10-CM

## 2021-06-20 DIAGNOSIS — Z79.899: ICD-10-CM

## 2021-06-20 DIAGNOSIS — E83.42: ICD-10-CM

## 2021-06-20 DIAGNOSIS — Z96.619: ICD-10-CM

## 2021-06-20 DIAGNOSIS — R94.31: ICD-10-CM

## 2021-06-20 DIAGNOSIS — G89.29: ICD-10-CM

## 2021-06-20 DIAGNOSIS — R11.2: ICD-10-CM

## 2021-06-20 DIAGNOSIS — E87.6: ICD-10-CM

## 2021-06-20 DIAGNOSIS — H81.10: Primary | ICD-10-CM

## 2021-06-20 DIAGNOSIS — D84.9: ICD-10-CM

## 2021-06-20 LAB
ANION GAP SERPL CALC-SCNC: 5 MMOL/L (ref 5–15)
APTT BLD: < 23 SECONDS (ref 25–31)
BASOPHILS # BLD AUTO: 0.1 X10^3/UL (ref 0–0.1)
BASOPHILS NFR BLD AUTO: 1 % (ref 0–1)
CALCIUM SERPL-MCNC: 8.4 MG/DL (ref 8.5–10.1)
CHLORIDE SERPL-SCNC: 108 MMOL/L (ref 98–107)
CREAT SERPL-MCNC: 0.63 MG/DL (ref 0.55–1.02)
EOSINOPHIL # BLD AUTO: 0.2 X10^3/UL (ref 0–0.4)
EOSINOPHIL NFR BLD AUTO: 2 % (ref 1–7)
ERYTHROCYTE [DISTWIDTH] IN BLOOD BY AUTOMATED COUNT: 13 % (ref 9.6–15.2)
EST. AVERAGE GLUCOSE BLD GHB EST-MCNC: 117 MG/DL (ref 0–126)
INR PPP: 0.95 (ref 0.93–1.1)
LYMPHOCYTES # BLD AUTO: 2.6 X10^3/UL (ref 1–3.4)
LYMPHOCYTES NFR BLD AUTO: 23 % (ref 22–44)
MCH RBC QN AUTO: 31.5 PG (ref 27–34.8)
MCHC RBC AUTO-ENTMCNC: 34.7 G/DL (ref 32.4–35.8)
MONOCYTES # BLD AUTO: 1.1 X10^3/UL (ref 0.2–0.8)
MONOCYTES NFR BLD AUTO: 10 % (ref 2–9)
NEUTROPHILS # BLD AUTO: 7.6 X10^3/UL (ref 1.8–6.8)
NEUTROPHILS NFR BLD AUTO: 66 % (ref 42–75)
PLATELET # BLD AUTO: 264 X10^3/UL (ref 130–400)
PMV BLD AUTO: 9.3 FL (ref 7.4–10.4)
PROTHROMBIN TIME: 10.2 SECONDS (ref 9.6–11.5)
RBC # BLD AUTO: 4.44 X10^6/UL (ref 3.82–5.3)

## 2021-06-20 PROCEDURE — 85025 COMPLETE CBC W/AUTO DIFF WBC: CPT

## 2021-06-20 PROCEDURE — 70498 CT ANGIOGRAPHY NECK: CPT

## 2021-06-20 PROCEDURE — 82962 GLUCOSE BLOOD TEST: CPT

## 2021-06-20 PROCEDURE — 96365 THER/PROPH/DIAG IV INF INIT: CPT

## 2021-06-20 PROCEDURE — 80047 BASIC METABLC PNL IONIZED CA: CPT

## 2021-06-20 PROCEDURE — 96368 THER/DIAG CONCURRENT INF: CPT

## 2021-06-20 PROCEDURE — 96376 TX/PRO/DX INJ SAME DRUG ADON: CPT

## 2021-06-20 PROCEDURE — 70553 MRI BRAIN STEM W/O & W/DYE: CPT

## 2021-06-20 PROCEDURE — 97162 PT EVAL MOD COMPLEX 30 MIN: CPT

## 2021-06-20 PROCEDURE — 83735 ASSAY OF MAGNESIUM: CPT

## 2021-06-20 PROCEDURE — 99285 EMERGENCY DEPT VISIT HI MDM: CPT

## 2021-06-20 PROCEDURE — 85730 THROMBOPLASTIN TIME PARTIAL: CPT

## 2021-06-20 PROCEDURE — 93005 ELECTROCARDIOGRAM TRACING: CPT

## 2021-06-20 PROCEDURE — 85610 PROTHROMBIN TIME: CPT

## 2021-06-20 PROCEDURE — 96366 THER/PROPH/DIAG IV INF ADDON: CPT

## 2021-06-20 PROCEDURE — 80048 BASIC METABOLIC PNL TOTAL CA: CPT

## 2021-06-20 PROCEDURE — 70496 CT ANGIOGRAPHY HEAD: CPT

## 2021-06-20 PROCEDURE — 83036 HEMOGLOBIN GLYCOSYLATED A1C: CPT

## 2021-06-20 PROCEDURE — 70450 CT HEAD/BRAIN W/O DYE: CPT

## 2021-06-20 PROCEDURE — 96372 THER/PROPH/DIAG INJ SC/IM: CPT

## 2021-06-20 PROCEDURE — A9575 INJ GADOTERATE MEGLUMI 0.1ML: HCPCS

## 2021-06-20 PROCEDURE — G0378 HOSPITAL OBSERVATION PER HR: HCPCS

## 2021-06-20 PROCEDURE — 96375 TX/PRO/DX INJ NEW DRUG ADDON: CPT

## 2021-06-20 PROCEDURE — 96374 THER/PROPH/DIAG INJ IV PUSH: CPT

## 2021-06-20 NOTE — NUR
RECEIVED REPORT, PT IN TR3 AND AWAKE, AWAITING NEUROLOGY TO COME ON THE TELE 
EVAL. 

PT SITTING UP IN BED.  PT HAS PIV TO RIGHT HAND 20G INTACT.  2ND IV TO LEFT AC 
18G X1 ATTEMPT.  PTS  AT BEDSIDE.

## 2021-06-20 NOTE — NUR
LATE ENTRY: 

PT ARRIVAL TO ED W STROKE LIKE S/S OF SUDDEN ONSET DIZZINESS AND LUE/LLE 
NUMBNESS. DIZZINESS WOKE HER FROM SLEEP AT 0230. LAST KNOWN WELL 0000. CODE 
NEURO CALLED BY DR CALABRESE UPON INITIAL ASSESSMENT. , TAKEN TO CT AND 
RETURNED TO T3 FOR FURTHER MANAGEMENT. PT WITH EMESIS UPON RETURN FROM CT. 
ORDER RECEIVED FOR REGLAN; PT MEDICATED. 



HX RA, HTN, BACK PAIN. DENIES BLOOD THINNERS. BP/SPO2/ECG MONITORING IN PLACE, 
SINUS TACH ON MONITOR. BP ACCEPTABLE LIMIT FOR TPA ADMIN. NEURO TELE ATTEMPTED 
W POOR CONNECTION. NEURO TO CONTACT ERP W RECOMMENDATIONS. 



REPORT TO [facility].

## 2021-06-20 NOTE — NUR
RADHA RN: PT VOMITING AT THIS TIME. ORDER OBTAINED FROM KENDALL PARDO FOR PHENERGEN 
IM. PT PLACED ON O2 2L VIA NC. PT CLEANED UP AND PLACED IN NEW GOWN.



VSS, NAUSEA IMPROVING.

## 2021-06-20 NOTE — NUR
PT PLACED ON BED PAN TO VOID.  PT RESPIRATORY RATE DECREASED TO 22.  PT SITTING 
UP, AWAKE AND ALERT. PT STATES SHE IS STILL DIZZY. MD TO BEDSIDE TO SPEAK WITH 
PT AND  ABOUT PLAN OF CARE. AND PT TO BE ADMITTED.

## 2021-06-20 NOTE — NUR
PT AWAKE AND ALERT, AND STATES THAT THE PHENERGAN HAS HELPED HER A LOT. PT ABLE 
TO MOVE UP IN BED AND SCOOT HERSELF.  NEW PAD PLACED UNDER PT AS SHE HAS ALSO 
USED THE BED PAN AGAIN.  PTS  AT BEDSIDE.

## 2021-06-20 NOTE — NUR
REPORT CALLED TO NAEEM SIMON.  PT PACKAGED AND TRANSPORTED TO FLOOR WITH CR 
MONITOR, AND PT SLEEPY, BUT AWAKENS EASILY. PIV X2 INTACT, NO SWELLING, NO 
REDNESS NOTED.

## 2021-06-20 NOTE — NUR
MD TO BEDSIDE TO EVAL NEURO ON PT, AND PT STATES SHE GOT NAUSEOUS FROM IT.  MD 
SPEAKING WITH NEUROLOGY. PT AWAKE AND ALERT, AND SAT UP IN BED HIGH FOWLERS.

## 2021-07-19 ENCOUNTER — HOSPITAL ENCOUNTER (OUTPATIENT)
Dept: CT IMAGING | Age: 61
Discharge: HOME | End: 2021-07-19
Payer: COMMERCIAL

## 2021-07-19 DIAGNOSIS — M54.16 LUMBAR RADICULOPATHY: ICD-10-CM

## 2021-07-19 DIAGNOSIS — M43.10 SPONDYLOLISTHESIS, UNSPECIFIED SPINAL REGION: ICD-10-CM

## 2021-07-19 PROCEDURE — 72131 CT LUMBAR SPINE W/O DYE: CPT

## 2021-07-23 ENCOUNTER — HOSPITAL ENCOUNTER (OUTPATIENT)
Dept: VASCULAR LAB | Age: 61
Discharge: HOME | End: 2021-07-23
Payer: COMMERCIAL

## 2021-07-23 ENCOUNTER — HOSPITAL ENCOUNTER (OUTPATIENT)
Dept: GENERAL RADIOLOGY | Age: 61
Discharge: HOME | End: 2021-07-23
Payer: COMMERCIAL

## 2021-07-23 DIAGNOSIS — R22.42 MASS OF LOWER LEG, LEFT: ICD-10-CM

## 2021-07-23 DIAGNOSIS — M81.8 OSTEOPOROSIS, IDIOPATHIC: ICD-10-CM

## 2021-07-23 PROCEDURE — 77080 DXA BONE DENSITY AXIAL: CPT

## 2021-07-23 PROCEDURE — 93971 EXTREMITY STUDY: CPT

## 2021-12-30 NOTE — PROGRESS NOTES
[facility] patients having surgery or anesthesia are required to be Covid tested OR to have been vaccinated at least 14 days prior to your procedure. It is very important to return our call to [phone] and notify the staff of your last vaccination date otherwise you will be required to complete Covid PCR test within the 5-6 days prior to surgery & quarantine. The results will need to be faxed to PreAdmission Testing at [phone]. PRIOR TO PROCEDURE DATE:        1. PLEASE FOLLOW ANY  GUIDELINES/ INSTRUCTIONS PRIOR TO YOUR PROCEDURE AS ADVISED BY YOUR SURGEON. 2. Arrange for someone to drive you home and be with you for the first 24 hours after discharge for your safety after your procedure for which you received sedation. Ensure it is someone we can share information with regarding your discharge. 3. You must contact your surgeon for instructions IF:   You are taking any blood thinners, aspirin, anti-inflammatory or vitamin E.   There is a change in your physical condition such as a cold, fever, rash, cuts, sores or any other infection, especially near your surgical site. 4. Do not drink alcohol the day before or day of your procedure. 5. A Pre-op History and Physical for surgery MUST be completed by your Physician or Urgent Care within 30 days of your procedure date. Please bring a copy with you on the day of your procedure and along with any other testing performed. THE DAY OF YOUR PROCEDURE:  1. Follow instructions for ARRIVAL TIME as DIRECTED BY YOUR SURGEON. 2. Enter the MAIN entrance from [address] and follow the signs to the free [employer] or [employer] parking (offered free of charge 6am-5pm). 3. Enter the Main Entrance of the hospital (do not enter from the lower level of the parking garage). Upon entrance, check in with the  at the main desk on your left. If no one is available at the desk, proceed into the [facility] Waiting Room and go through the door directly into the [facility]. There is a Check-in desk ACROSS from Room 5 (marked with a sign hanging from the ceiling). The phone number for the surgery center is [phone]. 4. Please call [phone] option #2 option #2 if you have not been preregistered yet. On the day of your procedure bring your insurance card and photo ID. You will be registered at your bedside once brought back to your room. 5. DO NOT EAT ANYTHING eight hours prior to your arrival for surgery. May have 8 ounces of water 4 hours prior to your arrival for surgery. NOTE: ALL Gastric, Bariatric and Bowel surgery patients MUST follow their surgeon's instructions. 6. MEDICATIONS    Take the following medications with a SMALL sip of water: Norvasc,robaxin,thyroid   Bariatric patient's call surgeon if on diabetic medications as some need to be stopped 1 week preop   Use your usual dose of inhalers the morning of surgery. BRING your rescue inhaler with you to hospital.    Anesthesia does NOT want you to take insulin the morning of surgery. They will control your blood sugar while you are at the hospital. Please contact your ordering physician for instructions regarding your insulin the night before your procedure. If you have an insulin pump, please keep it set on basal rate. 7. Do not swallow water when brushing teeth. No gum, candy, mints or ice chips. Refrain from smoking or at least decrease the amount. 8. Dress in loose, comfortable clothing appropriate for redressing after your procedure. Do not wear jewelry (including body piercings), make-up (especially NO eye make-up), fingernail polish (NO toenail polish if foot/leg surgery), lotion, powders or metal hairclips. 9. Dentures, glasses, or contacts will need to be removed before your procedure.  Bring cases for your glasses, contacts, dentures, or hearing aids to protect them while you are in surgery. 10. If you use a CPAP, please bring it with you on the day of your procedure. 11. We recommend that valuable personal  belongings such as cash, cell phones, e-tablets or jewelry, be left at home during your stay. The hospital will not be responsible for valuables that are not secured in the hospital safe. However, if your insurance requires a co-pay, you may want to bring a method of payment, i.e. Check or credit card, if you wish to pay your co-pay the day of surgery. 12. If you are to stay overnight, you may bring a bag with personal items. Please have any large items you may need brought in by your family after your arrival to your hospital room. 15. If you have a Living Will or Durable Power of , please bring a copy on the day of your procedure. 15. With your permission, one family member may accompany you while you are being prepared for surgery. Once you are ready, additional family members may join you. HOW WE KEEP YOU SAFE and WORK TO PREVENT SURGICAL SITE INFECTIONS:  1. Health care workers should always check your ID bracelet to verify your name and birth date. You will be asked many times to state your name, date of birth, and allergies. 2. Health care workers should always clean their hands with soap or alcohol gel before providing care to you. It is okay to ask anyone if they cleaned their hands before they touch you. 3. You will be actively involved in verifying the type of procedure you are having and ensuring the correct surgical site. This will be confirmed multiple times prior to your procedure. Do NOT duke your surgery site UNLESS instructed to by your surgeon. 4. Do not shave or wax for 72 hours prior to procedure near your operative site. Shaving with a razor can irritate your skin and make it easier to develop an infection.  On the day of your procedure, any hair that needs to be removed near the surgical site will be clipped by a healthcare worker using a special clippers designed to avoid skin irritation. 5. When you are in the operating room, your surgical site will be cleansed with a special soap, and in most cases, you will be given an antibiotic before the surgery begins. What to expect AFTER YOUR PROCEDURE:  1. Immediately following your procedure, your will be taken to the PACU for the first phase of your recovery. Your nurse will help you recover from any potential side effects of anesthesia, such as extreme drowsiness, changes in your vital signs or breathing patterns. Nausea, headache, muscle aches, or sore throat may also occur after anesthesia. Your nurse will help you manage these potential side effects. 2. For comfort and safety, arrange to have someone at home with you for the first 24 hours after discharge. 3. You and your family will be given written instructions about your diet, activity, dressing care, medications, and return visits. 4. Once at home, should issues with nausea, pain, or bleeding occur, or should you notice any signs of infection, you should call your surgeon. 5. Always clean your hands before and after caring for your wound. Do not let your family touch your surgery site without cleaning their hands. 6. Narcotic pain medications can cause significant constipation. You may want to add a stool softener to your postoperative medication schedule or speak to your surgeon on how best to manage this SIDE EFFECT. SPECIAL INSTRUCTIONS bring thyroid pharmacy bottle and give to nurse    Thank you for allowing us to care for you. We strive to exceed your expectations in the delivery of care and service provided to you and your family. If you need to contact the [place] [address] staff for any reason, please call us at [phone]    Instructions reviewed with patient during preadmission testing phone interview.   Isak Ryan RN.12/30/2021 .10:27 AM      ADDITIONAL EDUCATIONAL INFORMATION REVIEWED PER PHONE WITH YOU AND/OR YOUR FAMILY:  Yes Hibiclens® Bathing Instructions   No Antibacterial Soap

## 2021-12-30 NOTE — PROGRESS NOTES
Place patient label inside box (if no patient label, complete below)  Name:  :  MR#:   Ivy Squires / PROCEDURE  1. I (we), Marion Schreiber \"Ritu (Patient Name) authorize Poncho Infante MD (Provider / Clinton Tillman) and/or such assistants as may be selected by him/her, to perform the following operation/procedure(s):L3-4, L4-5, L5-S1 ANTERIOR LUMBAR INTERBODY FUSION WITH PEDICLE SCREW FIXATION       Note: If unable to obtain consent prior to an emergent procedure, document the emergent reason in the medical record. This procedure has been explained to my (our) satisfaction and included in the explanation was:  A) The intended benefit, nature, and extent of the procedure to be performed;  B) The significant risks involved and the probability of success;  C) Alternative procedures and methods of treatment;  D) The dangers and probable consequences of such alternatives (including no procedure or treatment); E) The expected consequences of the procedure on my future health;  F) Whether other qualified individuals would be performing important surgical tasks and/or whether  would be present to advise or support the procedure. I (we) understand that there are other risks of infection and other serious complications in the pre-operative/procedural and postoperative/procedural stages of my (our) care. I (we) have asked all of the questions which I (we) thought were important in deciding whether or not to undergo treatment or diagnosis. These questions have been answered to my (our) satisfaction. I (we) understand that no assurance can be given that the procedure will be a success, and no guarantee or warranty of success has been given to me (us).     2. It has been explained to me (us) that during the course of the operation/procedure, unforeseen conditions may be revealed that necessitate extension of the original procedure(s) or different procedure(s) than those set forth in Paragraph 1. I (we) authorize and request that the above-named physician, his/her assistants or his/her designees, perform procedures as necessary and desirable if deemed to be in my (our) best interest.     Revised 8/2/2021                                                                          Page 1 of 2                   3. I acknowledge that health care personnel may be observing this procedure for the purpose of medical education or other specified purposes as may be necessary as requested and/or approved by my (our) physician. 4. I (we) consent to the disposal by the hospital Pathologist of the removed tissue, parts or organs in accordance with hospital policy. 5. I do ____ do not ____ consent to the use of a local infiltration pain blocking agent that will be used by my provider/surgical provider to help alleviate pain during my procedure. 6. I do ____ do not ____ consent to an emergent blood transfusion in the case of a life-threatening situation that requires blood components to be administered. This consent is valid for 24 hours from the beginning of the procedure. 7. This patient does ____ or does not ____ currently have a DNR status/order. If DNR order is in place, obtain Addendum to the Surgical Consent for ALL Patients with a DNR Order to address liz-operative status for limited intervention or DNR suspension.      8. I have read and fully understand the above Consent for Operation/Procedure and that all blanks were completed before I signed the consent.   _____________________________       _____________________      ____/____am/pm  Signature of Patient or legal representative      Printed Name / Relationship            Date / Time   ____________________________       _____________________      ____/____am/pm  Witness to Signature                                    Printed Name                    Date / Time     If patient is unable to sign or is a minor, complete the following)  Patient is a minor, ____ years of age, or unable to sign because:   ______________________________________________________________________________________________    [facility] If a phone consent is obtained, consent will be documented by using two health care professionals, each affirming that the consenting party has no questions and gives consent for the procedure discussed with the physician/provider.   _____________________          ____________________       _____/_____am/pm   2nd witness to phone consent        Printed name           Date / Time    Informed Consent:  I have provided the explanation described above in section 1 to the patient and/or legal representative.  I have provided the patient and/or legal representative with an opportunity to ask any questions about the proposed operation/procedure.   ___________________________          ____________________         ____/____am/pm  Provider / Proceduralist                            Printed name            Date / Time  Revised 8/2/2021                                                                      Page 2 of 2

## 2021-12-30 NOTE — PROGRESS NOTES
12/30/21 @ 1039  Pt verbalizes understanding of PAT instructions.   I spoke with Dania Anne at PCP office and she will fax EKG tracing to PAT.    Carmel Fischer

## 2022-01-05 ENCOUNTER — ANESTHESIA EVENT (OUTPATIENT)
Dept: OPERATING ROOM | Age: 62
DRG: 454 | End: 2022-01-05
Payer: COMMERCIAL

## 2022-01-06 ENCOUNTER — ANESTHESIA (OUTPATIENT)
Dept: OPERATING ROOM | Age: 62
DRG: 454 | End: 2022-01-06
Payer: COMMERCIAL

## 2022-01-06 ENCOUNTER — APPOINTMENT (OUTPATIENT)
Dept: GENERAL RADIOLOGY | Age: 62
DRG: 454 | End: 2022-01-06
Attending: NEUROLOGICAL SURGERY
Payer: COMMERCIAL

## 2022-01-06 ENCOUNTER — HOSPITAL ENCOUNTER (INPATIENT)
Age: 62
LOS: 4 days | Discharge: HOME HEALTH | DRG: 454 | End: 2022-01-10
Attending: NEUROLOGICAL SURGERY | Admitting: NEUROLOGICAL SURGERY
Payer: COMMERCIAL

## 2022-01-06 VITALS — SYSTOLIC BLOOD PRESSURE: 155 MMHG | DIASTOLIC BLOOD PRESSURE: 69 MMHG | OXYGEN SATURATION: 97 % | TEMPERATURE: 98.1 F

## 2022-01-06 DIAGNOSIS — Z98.1 S/P LUMBAR FUSION: Primary | ICD-10-CM

## 2022-01-06 DIAGNOSIS — R79.89 ELEVATED LFTS: ICD-10-CM

## 2022-01-06 PROBLEM — M43.16 SPONDYLOLISTHESIS OF LUMBAR REGION: Status: ACTIVE | Noted: 2022-01-06

## 2022-01-06 LAB
ABO/RH: NORMAL
ANTIBODY SCREEN: NORMAL
BASOPHILS ABSOLUTE: 0 K/UL (ref 0–0.2)
BASOPHILS RELATIVE PERCENT: 0.7 %
EOSINOPHILS ABSOLUTE: 0.3 K/UL (ref 0–0.6)
EOSINOPHILS RELATIVE PERCENT: 4.1 %
HCT VFR BLD CALC: 42 % (ref 36–48)
HEMOGLOBIN: 14.8 G/DL (ref 12–16)
LYMPHOCYTES ABSOLUTE: 1 K/UL (ref 1–5.1)
LYMPHOCYTES RELATIVE PERCENT: 15.2 %
MCH RBC QN AUTO: 32.3 PG (ref 26–34)
MCHC RBC AUTO-ENTMCNC: 35.2 G/DL (ref 31–36)
MCV RBC AUTO: 91.8 FL (ref 80–100)
MONOCYTES ABSOLUTE: 0.8 K/UL (ref 0–1.3)
MONOCYTES RELATIVE PERCENT: 12 %
NEUTROPHILS ABSOLUTE: 4.5 K/UL (ref 1.7–7.7)
NEUTROPHILS RELATIVE PERCENT: 68 %
PDW BLD-RTO: 13.1 % (ref 12.4–15.4)
PLATELET # BLD: 248 K/UL (ref 135–450)
PMV BLD AUTO: 9.9 FL (ref 5–10.5)
RBC # BLD: 4.58 M/UL (ref 4–5.2)
WBC # BLD: 6.6 K/UL (ref 4–11)

## 2022-01-06 PROCEDURE — 8E0WXBF COMPUTER ASSISTED PROCEDURE OF TRUNK REGION, WITH FLUOROSCOPY: ICD-10-PCS | Performed by: NEUROLOGICAL SURGERY

## 2022-01-06 PROCEDURE — 7100000000 HC PACU RECOVERY - FIRST 15 MIN: Performed by: NEUROLOGICAL SURGERY

## 2022-01-06 PROCEDURE — 2500000003 HC RX 250 WO HCPCS: Performed by: NEUROLOGICAL SURGERY

## 2022-01-06 PROCEDURE — 2709999900 HC NON-CHARGEABLE SUPPLY: Performed by: NEUROLOGICAL SURGERY

## 2022-01-06 PROCEDURE — 86901 BLOOD TYPING SEROLOGIC RH(D): CPT

## 2022-01-06 PROCEDURE — 6360000002 HC RX W HCPCS: Performed by: NEUROLOGICAL SURGERY

## 2022-01-06 PROCEDURE — 0SG10K1 FUSION OF 2 OR MORE LUMBAR VERTEBRAL JOINTS WITH NONAUTOLOGOUS TISSUE SUBSTITUTE, POSTERIOR APPROACH, POSTERIOR COLUMN, OPEN APPROACH: ICD-10-PCS | Performed by: NEUROLOGICAL SURGERY

## 2022-01-06 PROCEDURE — 2720000010 HC SURG SUPPLY STERILE: Performed by: NEUROLOGICAL SURGERY

## 2022-01-06 PROCEDURE — 7100000001 HC PACU RECOVERY - ADDTL 15 MIN: Performed by: NEUROLOGICAL SURGERY

## 2022-01-06 PROCEDURE — 86850 RBC ANTIBODY SCREEN: CPT

## 2022-01-06 PROCEDURE — 6370000000 HC RX 637 (ALT 250 FOR IP): Performed by: ANESTHESIOLOGY

## 2022-01-06 PROCEDURE — C1729 CATH, DRAINAGE: HCPCS | Performed by: NEUROLOGICAL SURGERY

## 2022-01-06 PROCEDURE — C1821 INTERSPINOUS IMPLANT: HCPCS | Performed by: NEUROLOGICAL SURGERY

## 2022-01-06 PROCEDURE — 77011 CT SCAN FOR LOCALIZATION: CPT

## 2022-01-06 PROCEDURE — 2580000003 HC RX 258: Performed by: NEUROLOGICAL SURGERY

## 2022-01-06 PROCEDURE — 1200000000 HC SEMI PRIVATE

## 2022-01-06 PROCEDURE — 85025 COMPLETE CBC W/AUTO DIFF WBC: CPT

## 2022-01-06 PROCEDURE — C9359 IMPLNT,BON VOID FILLER-PUTTY: HCPCS | Performed by: NEUROLOGICAL SURGERY

## 2022-01-06 PROCEDURE — 6360000002 HC RX W HCPCS: Performed by: ANESTHESIOLOGY

## 2022-01-06 PROCEDURE — 3700000000 HC ANESTHESIA ATTENDED CARE: Performed by: NEUROLOGICAL SURGERY

## 2022-01-06 PROCEDURE — C9290 INJ, BUPIVACAINE LIPOSOME: HCPCS | Performed by: NEUROLOGICAL SURGERY

## 2022-01-06 PROCEDURE — 3E0U0GB INTRODUCTION OF RECOMBINANT BONE MORPHOGENETIC PROTEIN INTO JOINTS, OPEN APPROACH: ICD-10-PCS | Performed by: NEUROLOGICAL SURGERY

## 2022-01-06 PROCEDURE — 6370000000 HC RX 637 (ALT 250 FOR IP): Performed by: NEUROLOGICAL SURGERY

## 2022-01-06 PROCEDURE — 3209999900 FLUORO FOR SURGICAL PROCEDURES

## 2022-01-06 PROCEDURE — 3600000004 HC SURGERY LEVEL 4 BASE: Performed by: NEUROLOGICAL SURGERY

## 2022-01-06 PROCEDURE — 0SG30A0 FUSION OF LUMBOSACRAL JOINT WITH INTERBODY FUSION DEVICE, ANTERIOR APPROACH, ANTERIOR COLUMN, OPEN APPROACH: ICD-10-PCS | Performed by: NEUROLOGICAL SURGERY

## 2022-01-06 PROCEDURE — 2580000003 HC RX 258: Performed by: ANESTHESIOLOGY

## 2022-01-06 PROCEDURE — 0SB40ZZ EXCISION OF LUMBOSACRAL DISC, OPEN APPROACH: ICD-10-PCS | Performed by: NEUROLOGICAL SURGERY

## 2022-01-06 PROCEDURE — 2580000003 HC RX 258: Performed by: NURSE ANESTHETIST, CERTIFIED REGISTERED

## 2022-01-06 PROCEDURE — 3700000001 HC ADD 15 MINUTES (ANESTHESIA): Performed by: NEUROLOGICAL SURGERY

## 2022-01-06 PROCEDURE — C1713 ANCHOR/SCREW BN/BN,TIS/BN: HCPCS | Performed by: NEUROLOGICAL SURGERY

## 2022-01-06 PROCEDURE — 2500000003 HC RX 250 WO HCPCS: Performed by: NURSE ANESTHETIST, CERTIFIED REGISTERED

## 2022-01-06 PROCEDURE — 86900 BLOOD TYPING SEROLOGIC ABO: CPT

## 2022-01-06 PROCEDURE — 0SG30K1 FUSION OF LUMBOSACRAL JOINT WITH NONAUTOLOGOUS TISSUE SUBSTITUTE, POSTERIOR APPROACH, POSTERIOR COLUMN, OPEN APPROACH: ICD-10-PCS | Performed by: NEUROLOGICAL SURGERY

## 2022-01-06 PROCEDURE — 72100 X-RAY EXAM L-S SPINE 2/3 VWS: CPT

## 2022-01-06 PROCEDURE — 2780000010 HC IMPLANT OTHER: Performed by: NEUROLOGICAL SURGERY

## 2022-01-06 PROCEDURE — 0SB20ZZ EXCISION OF LUMBAR VERTEBRAL DISC, OPEN APPROACH: ICD-10-PCS | Performed by: NEUROLOGICAL SURGERY

## 2022-01-06 PROCEDURE — 0SG10A0 FUSION OF 2 OR MORE LUMBAR VERTEBRAL JOINTS WITH INTERBODY FUSION DEVICE, ANTERIOR APPROACH, ANTERIOR COLUMN, OPEN APPROACH: ICD-10-PCS | Performed by: NEUROLOGICAL SURGERY

## 2022-01-06 PROCEDURE — 3600000014 HC SURGERY LEVEL 4 ADDTL 15MIN: Performed by: NEUROLOGICAL SURGERY

## 2022-01-06 PROCEDURE — 6360000002 HC RX W HCPCS: Performed by: NURSE ANESTHETIST, CERTIFIED REGISTERED

## 2022-01-06 DEVICE — SET SCR SPNL TI SGL INNR FOR VIPER 2 MINIMALLY INVASIVE: Type: IMPLANTABLE DEVICE | Site: SPINE LUMBAR | Status: FUNCTIONAL

## 2022-01-06 DEVICE — BONE GRAFT KIT 7510400 INFUSE MEDIUM
Type: IMPLANTABLE DEVICE | Site: SPINE LUMBAR | Status: FUNCTIONAL
Brand: INFUSE® BONE GRAFT

## 2022-01-06 DEVICE — IMPLANTABLE DEVICE: Type: IMPLANTABLE DEVICE | Site: SPINE LUMBAR | Status: FUNCTIONAL

## 2022-01-06 DEVICE — SCREW SPNL L45MM OD7MM CORT FIX TI POLYAX FEN EXT TAB MIS: Type: IMPLANTABLE DEVICE | Site: SPINE LUMBAR | Status: FUNCTIONAL

## 2022-01-06 DEVICE — SCREW SPNL ST 5.7X25 MM ANTI BACKOUT ANTR STPL BOWTI: Type: IMPLANTABLE DEVICE | Site: SPINE LUMBAR | Status: FUNCTIONAL

## 2022-01-06 DEVICE — WASHER SPINAL TITANIUM 16 MM 6 MM: Type: IMPLANTABLE DEVICE | Site: SPINE LUMBAR | Status: FUNCTIONAL

## 2022-01-06 DEVICE — ROD SPNL L100MM TI LORDOSED FOR MINIMALLY INVASIVE SURG SYS: Type: IMPLANTABLE DEVICE | Site: SPINE LUMBAR | Status: FUNCTIONAL

## 2022-01-06 DEVICE — SCREW SPNL L40MM OD7MM CORT FIX TI POLYAX FEN EXT TAB MIS: Type: IMPLANTABLE DEVICE | Site: SPINE LUMBAR | Status: FUNCTIONAL

## 2022-01-06 DEVICE — BONE GRFT SUB L 12.5CC BIOACTIVE GLS MTRX: Type: IMPLANTABLE DEVICE | Site: SPINE LUMBAR | Status: FUNCTIONAL

## 2022-01-06 DEVICE — SCREW SPNL L50MM OD6MM CORT FIX TI POLYAX FEN EXT TAB MIS: Type: IMPLANTABLE DEVICE | Site: SPINE LUMBAR | Status: FUNCTIONAL

## 2022-01-06 DEVICE — ROD SPNL L110MM DIA5.5MM POST PEDCL TI LORDOSED VIPER 2: Type: IMPLANTABLE DEVICE | Site: SPINE LUMBAR | Status: FUNCTIONAL

## 2022-01-06 RX ORDER — AMLODIPINE BESYLATE 10 MG/1
10 TABLET ORAL DAILY
Status: DISCONTINUED | OUTPATIENT
Start: 2022-01-06 | End: 2022-01-10 | Stop reason: HOSPADM

## 2022-01-06 RX ORDER — HEPARIN SODIUM 5000 [USP'U]/.5ML
5000 INJECTION, SOLUTION INTRAVENOUS; SUBCUTANEOUS EVERY 8 HOURS
Status: DISCONTINUED | OUTPATIENT
Start: 2022-01-07 | End: 2022-01-08

## 2022-01-06 RX ORDER — PROPOFOL 10 MG/ML
INJECTION, EMULSION INTRAVENOUS PRN
Status: DISCONTINUED | OUTPATIENT
Start: 2022-01-06 | End: 2022-01-06 | Stop reason: SDUPTHER

## 2022-01-06 RX ORDER — DEXAMETHASONE SODIUM PHOSPHATE 4 MG/ML
INJECTION, SOLUTION INTRA-ARTICULAR; INTRALESIONAL; INTRAMUSCULAR; INTRAVENOUS; SOFT TISSUE PRN
Status: DISCONTINUED | OUTPATIENT
Start: 2022-01-06 | End: 2022-01-06 | Stop reason: SDUPTHER

## 2022-01-06 RX ORDER — PROPOFOL 10 MG/ML
INJECTION, EMULSION INTRAVENOUS CONTINUOUS PRN
Status: DISCONTINUED | OUTPATIENT
Start: 2022-01-06 | End: 2022-01-06 | Stop reason: SDUPTHER

## 2022-01-06 RX ORDER — METOCLOPRAMIDE HYDROCHLORIDE 5 MG/ML
10 INJECTION INTRAMUSCULAR; INTRAVENOUS
Status: DISCONTINUED | OUTPATIENT
Start: 2022-01-06 | End: 2022-01-06 | Stop reason: HOSPADM

## 2022-01-06 RX ORDER — SODIUM CHLORIDE, SODIUM LACTATE, POTASSIUM CHLORIDE, CALCIUM CHLORIDE 600; 310; 30; 20 MG/100ML; MG/100ML; MG/100ML; MG/100ML
INJECTION, SOLUTION INTRAVENOUS CONTINUOUS PRN
Status: DISCONTINUED | OUTPATIENT
Start: 2022-01-06 | End: 2022-01-06 | Stop reason: SDUPTHER

## 2022-01-06 RX ORDER — DIAZEPAM 5 MG/1
5 TABLET ORAL EVERY 6 HOURS PRN
Status: DISCONTINUED | OUTPATIENT
Start: 2022-01-06 | End: 2022-01-10 | Stop reason: HOSPADM

## 2022-01-06 RX ORDER — LABETALOL HYDROCHLORIDE 5 MG/ML
5 INJECTION, SOLUTION INTRAVENOUS EVERY 10 MIN PRN
Status: DISCONTINUED | OUTPATIENT
Start: 2022-01-06 | End: 2022-01-06 | Stop reason: HOSPADM

## 2022-01-06 RX ORDER — FENTANYL CITRATE 50 UG/ML
50 INJECTION, SOLUTION INTRAMUSCULAR; INTRAVENOUS ONCE
Status: COMPLETED | OUTPATIENT
Start: 2022-01-06 | End: 2022-01-06

## 2022-01-06 RX ORDER — APREPITANT 40 MG/1
40 CAPSULE ORAL ONCE
Status: COMPLETED | OUTPATIENT
Start: 2022-01-06 | End: 2022-01-06

## 2022-01-06 RX ORDER — OXYCODONE HYDROCHLORIDE 5 MG/1
5 TABLET ORAL EVERY 4 HOURS PRN
Status: DISCONTINUED | OUTPATIENT
Start: 2022-01-06 | End: 2022-01-08

## 2022-01-06 RX ORDER — SODIUM CHLORIDE 9 MG/ML
25 INJECTION, SOLUTION INTRAVENOUS PRN
Status: DISCONTINUED | OUTPATIENT
Start: 2022-01-06 | End: 2022-01-10 | Stop reason: HOSPADM

## 2022-01-06 RX ORDER — MAGNESIUM HYDROXIDE/ALUMINUM HYDROXICE/SIMETHICONE 120; 1200; 1200 MG/30ML; MG/30ML; MG/30ML
15 SUSPENSION ORAL EVERY 6 HOURS PRN
Status: DISCONTINUED | OUTPATIENT
Start: 2022-01-06 | End: 2022-01-10 | Stop reason: HOSPADM

## 2022-01-06 RX ORDER — MORPHINE SULFATE 4 MG/ML
1 INJECTION, SOLUTION INTRAMUSCULAR; INTRAVENOUS EVERY 5 MIN PRN
Status: DISCONTINUED | OUTPATIENT
Start: 2022-01-06 | End: 2022-01-06 | Stop reason: HOSPADM

## 2022-01-06 RX ORDER — FENTANYL CITRATE 50 UG/ML
50 INJECTION, SOLUTION INTRAMUSCULAR; INTRAVENOUS ONCE
Status: DISCONTINUED | OUTPATIENT
Start: 2022-01-06 | End: 2022-01-06 | Stop reason: ALTCHOICE

## 2022-01-06 RX ORDER — SENNA AND DOCUSATE SODIUM 50; 8.6 MG/1; MG/1
1 TABLET, FILM COATED ORAL 2 TIMES DAILY
Status: DISCONTINUED | OUTPATIENT
Start: 2022-01-06 | End: 2022-01-10 | Stop reason: HOSPADM

## 2022-01-06 RX ORDER — HYDROMORPHONE HCL 110MG/55ML
PATIENT CONTROLLED ANALGESIA SYRINGE INTRAVENOUS PRN
Status: DISCONTINUED | OUTPATIENT
Start: 2022-01-06 | End: 2022-01-06 | Stop reason: SDUPTHER

## 2022-01-06 RX ORDER — OXYCODONE HYDROCHLORIDE 5 MG/1
10 TABLET ORAL PRN
Status: COMPLETED | OUTPATIENT
Start: 2022-01-06 | End: 2022-01-06

## 2022-01-06 RX ORDER — SODIUM CHLORIDE, SODIUM LACTATE, POTASSIUM CHLORIDE, CALCIUM CHLORIDE 600; 310; 30; 20 MG/100ML; MG/100ML; MG/100ML; MG/100ML
INJECTION, SOLUTION INTRAVENOUS CONTINUOUS
Status: DISCONTINUED | OUTPATIENT
Start: 2022-01-06 | End: 2022-01-06

## 2022-01-06 RX ORDER — MEPERIDINE HYDROCHLORIDE 25 MG/ML
12.5 INJECTION INTRAMUSCULAR; INTRAVENOUS; SUBCUTANEOUS EVERY 5 MIN PRN
Status: DISCONTINUED | OUTPATIENT
Start: 2022-01-06 | End: 2022-01-06 | Stop reason: HOSPADM

## 2022-01-06 RX ORDER — BISACODYL 10 MG
10 SUPPOSITORY, RECTAL RECTAL DAILY PRN
Status: DISCONTINUED | OUTPATIENT
Start: 2022-01-06 | End: 2022-01-10 | Stop reason: HOSPADM

## 2022-01-06 RX ORDER — POLYETHYLENE GLYCOL 3350 17 G/17G
17 POWDER, FOR SOLUTION ORAL DAILY
Status: DISCONTINUED | OUTPATIENT
Start: 2022-01-06 | End: 2022-01-10 | Stop reason: HOSPADM

## 2022-01-06 RX ORDER — FENTANYL CITRATE 50 UG/ML
INJECTION, SOLUTION INTRAMUSCULAR; INTRAVENOUS PRN
Status: DISCONTINUED | OUTPATIENT
Start: 2022-01-06 | End: 2022-01-06 | Stop reason: SDUPTHER

## 2022-01-06 RX ORDER — OXYCODONE HYDROCHLORIDE 5 MG/1
5 TABLET ORAL PRN
Status: COMPLETED | OUTPATIENT
Start: 2022-01-06 | End: 2022-01-06

## 2022-01-06 RX ORDER — METHOCARBAMOL 750 MG/1
750 TABLET, FILM COATED ORAL EVERY 6 HOURS PRN
Status: DISCONTINUED | OUTPATIENT
Start: 2022-01-07 | End: 2022-01-07

## 2022-01-06 RX ORDER — PROMETHAZINE HYDROCHLORIDE 12.5 MG/1
12.5 TABLET ORAL EVERY 6 HOURS PRN
Status: DISCONTINUED | OUTPATIENT
Start: 2022-01-06 | End: 2022-01-10 | Stop reason: HOSPADM

## 2022-01-06 RX ORDER — DIPHENHYDRAMINE HYDROCHLORIDE 50 MG/ML
12.5 INJECTION INTRAMUSCULAR; INTRAVENOUS
Status: COMPLETED | OUTPATIENT
Start: 2022-01-06 | End: 2022-01-06

## 2022-01-06 RX ORDER — SODIUM CHLORIDE 9 MG/ML
INJECTION, SOLUTION INTRAVENOUS CONTINUOUS
Status: DISCONTINUED | OUTPATIENT
Start: 2022-01-06 | End: 2022-01-07

## 2022-01-06 RX ORDER — FENTANYL CITRATE 50 UG/ML
INJECTION, SOLUTION INTRAMUSCULAR; INTRAVENOUS
Status: DISPENSED
Start: 2022-01-06 | End: 2022-01-07

## 2022-01-06 RX ORDER — MAGNESIUM HYDROXIDE 1200 MG/15ML
LIQUID ORAL CONTINUOUS PRN
Status: COMPLETED | OUTPATIENT
Start: 2022-01-06 | End: 2022-01-06

## 2022-01-06 RX ORDER — SUCCINYLCHOLINE/SOD CL,ISO/PF 200MG/10ML
SYRINGE (ML) INTRAVENOUS PRN
Status: DISCONTINUED | OUTPATIENT
Start: 2022-01-06 | End: 2022-01-06 | Stop reason: SDUPTHER

## 2022-01-06 RX ORDER — PROMETHAZINE HYDROCHLORIDE 25 MG/ML
6.25 INJECTION, SOLUTION INTRAMUSCULAR; INTRAVENOUS
Status: COMPLETED | OUTPATIENT
Start: 2022-01-06 | End: 2022-01-06

## 2022-01-06 RX ORDER — HYDRALAZINE HYDROCHLORIDE 20 MG/ML
5 INJECTION INTRAMUSCULAR; INTRAVENOUS EVERY 10 MIN PRN
Status: DISCONTINUED | OUTPATIENT
Start: 2022-01-06 | End: 2022-01-06 | Stop reason: HOSPADM

## 2022-01-06 RX ORDER — OXYCODONE HYDROCHLORIDE 5 MG/1
10 TABLET ORAL EVERY 4 HOURS PRN
Status: DISCONTINUED | OUTPATIENT
Start: 2022-01-06 | End: 2022-01-08

## 2022-01-06 RX ORDER — SODIUM CHLORIDE 0.9 % (FLUSH) 0.9 %
10 SYRINGE (ML) INJECTION EVERY 12 HOURS SCHEDULED
Status: DISCONTINUED | OUTPATIENT
Start: 2022-01-06 | End: 2022-01-10 | Stop reason: HOSPADM

## 2022-01-06 RX ORDER — ACETAMINOPHEN 325 MG/1
650 TABLET ORAL EVERY 4 HOURS PRN
Status: DISCONTINUED | OUTPATIENT
Start: 2022-01-06 | End: 2022-01-10 | Stop reason: HOSPADM

## 2022-01-06 RX ORDER — ROCURONIUM BROMIDE 10 MG/ML
INJECTION, SOLUTION INTRAVENOUS PRN
Status: DISCONTINUED | OUTPATIENT
Start: 2022-01-06 | End: 2022-01-06 | Stop reason: SDUPTHER

## 2022-01-06 RX ORDER — SCOLOPAMINE TRANSDERMAL SYSTEM 1 MG/1
1 PATCH, EXTENDED RELEASE TRANSDERMAL ONCE
Status: COMPLETED | OUTPATIENT
Start: 2022-01-06 | End: 2022-01-09

## 2022-01-06 RX ORDER — SODIUM CHLORIDE 0.9 % (FLUSH) 0.9 %
10 SYRINGE (ML) INJECTION PRN
Status: DISCONTINUED | OUTPATIENT
Start: 2022-01-06 | End: 2022-01-10 | Stop reason: HOSPADM

## 2022-01-06 RX ORDER — ONDANSETRON 2 MG/ML
4 INJECTION INTRAMUSCULAR; INTRAVENOUS EVERY 6 HOURS PRN
Status: DISCONTINUED | OUTPATIENT
Start: 2022-01-06 | End: 2022-01-10 | Stop reason: HOSPADM

## 2022-01-06 RX ORDER — NALOXONE HYDROCHLORIDE 0.4 MG/ML
0.2 INJECTION, SOLUTION INTRAMUSCULAR; INTRAVENOUS; SUBCUTANEOUS PRN
Status: DISCONTINUED | OUTPATIENT
Start: 2022-01-06 | End: 2022-01-10 | Stop reason: HOSPADM

## 2022-01-06 RX ORDER — REMIFENTANIL HYDROCHLORIDE 1 MG/ML
INJECTION, POWDER, LYOPHILIZED, FOR SOLUTION INTRAVENOUS CONTINUOUS PRN
Status: DISCONTINUED | OUTPATIENT
Start: 2022-01-06 | End: 2022-01-06 | Stop reason: SDUPTHER

## 2022-01-06 RX ORDER — LEVOTHYROXINE AND LIOTHYRONINE 19; 4.5 UG/1; UG/1
120 TABLET ORAL DAILY
Status: DISCONTINUED | OUTPATIENT
Start: 2022-01-07 | End: 2022-01-10 | Stop reason: HOSPADM

## 2022-01-06 RX ADMIN — POLYETHYLENE GLYCOL 3350 17 G: 17 POWDER, FOR SOLUTION ORAL at 21:00

## 2022-01-06 RX ADMIN — SENNOSIDES AND DOCUSATE SODIUM 1 TABLET: 50; 8.6 TABLET ORAL at 20:24

## 2022-01-06 RX ADMIN — SODIUM CHLORIDE: 9 INJECTION, SOLUTION INTRAVENOUS at 18:25

## 2022-01-06 RX ADMIN — ROCURONIUM BROMIDE 25 MG: 10 INJECTION INTRAVENOUS at 08:02

## 2022-01-06 RX ADMIN — METHOCARBAMOL 1000 MG: 100 INJECTION, SOLUTION INTRAMUSCULAR; INTRAVENOUS at 14:32

## 2022-01-06 RX ADMIN — HYDROMORPHONE HYDROCHLORIDE 1 MG: 2 INJECTION, SOLUTION INTRAMUSCULAR; INTRAVENOUS; SUBCUTANEOUS at 13:30

## 2022-01-06 RX ADMIN — HYDROMORPHONE HYDROCHLORIDE 0.5 MG: 1 INJECTION, SOLUTION INTRAMUSCULAR; INTRAVENOUS; SUBCUTANEOUS at 14:04

## 2022-01-06 RX ADMIN — ROCURONIUM BROMIDE 10 MG: 10 INJECTION INTRAVENOUS at 10:01

## 2022-01-06 RX ADMIN — HYDROMORPHONE HYDROCHLORIDE 0.5 MG: 1 INJECTION, SOLUTION INTRAMUSCULAR; INTRAVENOUS; SUBCUTANEOUS at 16:51

## 2022-01-06 RX ADMIN — HYDROMORPHONE HYDROCHLORIDE 1 MG: 1 INJECTION, SOLUTION INTRAMUSCULAR; INTRAVENOUS; SUBCUTANEOUS at 20:32

## 2022-01-06 RX ADMIN — PROPOFOL 100 MG: 10 INJECTION, EMULSION INTRAVENOUS at 07:51

## 2022-01-06 RX ADMIN — HYDROMORPHONE HYDROCHLORIDE 0.5 MG: 1 INJECTION, SOLUTION INTRAMUSCULAR; INTRAVENOUS; SUBCUTANEOUS at 14:09

## 2022-01-06 RX ADMIN — SUGAMMADEX 100 MG: 100 INJECTION, SOLUTION INTRAVENOUS at 10:45

## 2022-01-06 RX ADMIN — APREPITANT 40 MG: 40 CAPSULE ORAL at 07:03

## 2022-01-06 RX ADMIN — FENTANYL CITRATE 50 MCG: 50 INJECTION INTRAMUSCULAR; INTRAVENOUS at 17:46

## 2022-01-06 RX ADMIN — FENTANYL CITRATE 50 MCG: 50 INJECTION INTRAMUSCULAR; INTRAVENOUS at 17:33

## 2022-01-06 RX ADMIN — OXYCODONE 10 MG: 5 TABLET ORAL at 23:24

## 2022-01-06 RX ADMIN — SODIUM CHLORIDE, SODIUM LACTATE, POTASSIUM CHLORIDE, CALCIUM CHLORIDE: 600; 310; 30; 20 INJECTION, SOLUTION INTRAVENOUS at 07:30

## 2022-01-06 RX ADMIN — OXYCODONE HYDROCHLORIDE 10 MG: 5 TABLET ORAL at 17:54

## 2022-01-06 RX ADMIN — PROPOFOL 140 MCG/KG/MIN: 10 INJECTION, EMULSION INTRAVENOUS at 07:40

## 2022-01-06 RX ADMIN — DIPHENHYDRAMINE HYDROCHLORIDE 12.5 MG: 50 INJECTION, SOLUTION INTRAMUSCULAR; INTRAVENOUS at 14:16

## 2022-01-06 RX ADMIN — HYDROMORPHONE HYDROCHLORIDE 0.5 MG: 1 INJECTION, SOLUTION INTRAMUSCULAR; INTRAVENOUS; SUBCUTANEOUS at 15:40

## 2022-01-06 RX ADMIN — PROMETHAZINE HYDROCHLORIDE 6.25 MG: 25 INJECTION INTRAMUSCULAR; INTRAVENOUS at 13:44

## 2022-01-06 RX ADMIN — SODIUM CHLORIDE, SODIUM LACTATE, POTASSIUM CHLORIDE, AND CALCIUM CHLORIDE: .6; .31; .03; .02 INJECTION, SOLUTION INTRAVENOUS at 07:30

## 2022-01-06 RX ADMIN — DIAZEPAM 5 MG: 5 TABLET ORAL at 19:22

## 2022-01-06 RX ADMIN — OXYCODONE 10 MG: 5 TABLET ORAL at 19:22

## 2022-01-06 RX ADMIN — FENTANYL CITRATE 100 MCG: 50 INJECTION, SOLUTION INTRAMUSCULAR; INTRAVENOUS at 07:40

## 2022-01-06 RX ADMIN — ROCURONIUM BROMIDE 25 MG: 10 INJECTION INTRAVENOUS at 09:15

## 2022-01-06 RX ADMIN — PROPOFOL 100 MG: 10 INJECTION, EMULSION INTRAVENOUS at 07:49

## 2022-01-06 RX ADMIN — HYDROMORPHONE HYDROCHLORIDE 0.5 MG: 1 INJECTION, SOLUTION INTRAMUSCULAR; INTRAVENOUS; SUBCUTANEOUS at 15:27

## 2022-01-06 RX ADMIN — REMIFENTANIL HYDROCHLORIDE 0.14 MCG/KG/MIN: 1 INJECTION, POWDER, LYOPHILIZED, FOR SOLUTION INTRAVENOUS at 07:40

## 2022-01-06 RX ADMIN — HYDROMORPHONE HYDROCHLORIDE 1 MG: 2 INJECTION, SOLUTION INTRAMUSCULAR; INTRAVENOUS; SUBCUTANEOUS at 12:31

## 2022-01-06 RX ADMIN — AMLODIPINE BESYLATE 10 MG: 10 TABLET ORAL at 19:22

## 2022-01-06 RX ADMIN — LIDOCAINE HYDROCHLORIDE 80 MG: 20 INJECTION, SOLUTION INTRAVENOUS at 07:40

## 2022-01-06 RX ADMIN — METHOCARBAMOL 1000 MG: 100 INJECTION, SOLUTION INTRAMUSCULAR; INTRAVENOUS at 23:04

## 2022-01-06 RX ADMIN — CEFAZOLIN 2000 MG: 10 INJECTION, POWDER, FOR SOLUTION INTRAVENOUS at 11:30

## 2022-01-06 RX ADMIN — Medication 120 MG: at 07:40

## 2022-01-06 RX ADMIN — HYDROMORPHONE HYDROCHLORIDE 1 MG: 1 INJECTION, SOLUTION INTRAMUSCULAR; INTRAVENOUS; SUBCUTANEOUS at 22:58

## 2022-01-06 RX ADMIN — HYDROMORPHONE HYDROCHLORIDE 0.5 MG: 1 INJECTION, SOLUTION INTRAMUSCULAR; INTRAVENOUS; SUBCUTANEOUS at 13:44

## 2022-01-06 RX ADMIN — SODIUM CHLORIDE, SODIUM LACTATE, POTASSIUM CHLORIDE, AND CALCIUM CHLORIDE: .6; .31; .03; .02 INJECTION, SOLUTION INTRAVENOUS at 08:31

## 2022-01-06 RX ADMIN — DEXAMETHASONE SODIUM PHOSPHATE 4 MG: 4 INJECTION, SOLUTION INTRAMUSCULAR; INTRAVENOUS at 07:40

## 2022-01-06 RX ADMIN — PROPOFOL 200 MG: 10 INJECTION, EMULSION INTRAVENOUS at 07:40

## 2022-01-06 RX ADMIN — SODIUM CHLORIDE, POTASSIUM CHLORIDE, SODIUM LACTATE AND CALCIUM CHLORIDE: 600; 310; 30; 20 INJECTION, SOLUTION INTRAVENOUS at 06:49

## 2022-01-06 RX ADMIN — CEFAZOLIN 2000 MG: 10 INJECTION, POWDER, FOR SOLUTION INTRAVENOUS at 07:30

## 2022-01-06 RX ADMIN — HYDROMORPHONE HYDROCHLORIDE 0.5 MG: 1 INJECTION, SOLUTION INTRAMUSCULAR; INTRAVENOUS; SUBCUTANEOUS at 13:50

## 2022-01-06 RX ADMIN — HYDROMORPHONE HYDROCHLORIDE 0.25 MG: 1 INJECTION, SOLUTION INTRAMUSCULAR; INTRAVENOUS; SUBCUTANEOUS at 17:32

## 2022-01-06 ASSESSMENT — PAIN DESCRIPTION - DESCRIPTORS: DESCRIPTORS: STABBING;ACHING;SHOOTING

## 2022-01-06 ASSESSMENT — PULMONARY FUNCTION TESTS
PIF_VALUE: 24
PIF_VALUE: 25
PIF_VALUE: 26
PIF_VALUE: 22
PIF_VALUE: 23
PIF_VALUE: 21
PIF_VALUE: 25
PIF_VALUE: 23
PIF_VALUE: 18
PIF_VALUE: 18
PIF_VALUE: 9
PIF_VALUE: 23
PIF_VALUE: 25
PIF_VALUE: 23
PIF_VALUE: 18
PIF_VALUE: 4
PIF_VALUE: 25
PIF_VALUE: 10
PIF_VALUE: 24
PIF_VALUE: 23
PIF_VALUE: 10
PIF_VALUE: 26
PIF_VALUE: 25
PIF_VALUE: 22
PIF_VALUE: 3
PIF_VALUE: 17
PIF_VALUE: 25
PIF_VALUE: 23
PIF_VALUE: 20
PIF_VALUE: 22
PIF_VALUE: 18
PIF_VALUE: 8
PIF_VALUE: 21
PIF_VALUE: 23
PIF_VALUE: 21
PIF_VALUE: 17
PIF_VALUE: 4
PIF_VALUE: 21
PIF_VALUE: 25
PIF_VALUE: 18
PIF_VALUE: 18
PIF_VALUE: 24
PIF_VALUE: 24
PIF_VALUE: 0
PIF_VALUE: 24
PIF_VALUE: 0
PIF_VALUE: 18
PIF_VALUE: 25
PIF_VALUE: 23
PIF_VALUE: 24
PIF_VALUE: 25
PIF_VALUE: 23
PIF_VALUE: 22
PIF_VALUE: 20
PIF_VALUE: 16
PIF_VALUE: 21
PIF_VALUE: 18
PIF_VALUE: 25
PIF_VALUE: 25
PIF_VALUE: 23
PIF_VALUE: 4
PIF_VALUE: 18
PIF_VALUE: 3
PIF_VALUE: 23
PIF_VALUE: 22
PIF_VALUE: 19
PIF_VALUE: 17
PIF_VALUE: 25
PIF_VALUE: 4
PIF_VALUE: 20
PIF_VALUE: 23
PIF_VALUE: 24
PIF_VALUE: 21
PIF_VALUE: 23
PIF_VALUE: 24
PIF_VALUE: 25
PIF_VALUE: 4
PIF_VALUE: 28
PIF_VALUE: 24
PIF_VALUE: 18
PIF_VALUE: 21
PIF_VALUE: 25
PIF_VALUE: 9
PIF_VALUE: 25
PIF_VALUE: 3
PIF_VALUE: 25
PIF_VALUE: 10
PIF_VALUE: 22
PIF_VALUE: 24
PIF_VALUE: 23
PIF_VALUE: 23
PIF_VALUE: 25
PIF_VALUE: 8
PIF_VALUE: 18
PIF_VALUE: 25
PIF_VALUE: 24
PIF_VALUE: 22
PIF_VALUE: 25
PIF_VALUE: 18
PIF_VALUE: 14
PIF_VALUE: 25
PIF_VALUE: 9
PIF_VALUE: 25
PIF_VALUE: 25
PIF_VALUE: 1
PIF_VALUE: 14
PIF_VALUE: 20
PIF_VALUE: 24
PIF_VALUE: 25
PIF_VALUE: 22
PIF_VALUE: 21
PIF_VALUE: 26
PIF_VALUE: 21
PIF_VALUE: 10
PIF_VALUE: 20
PIF_VALUE: 22
PIF_VALUE: 21
PIF_VALUE: 25
PIF_VALUE: 18
PIF_VALUE: 26
PIF_VALUE: 25
PIF_VALUE: 23
PIF_VALUE: 25
PIF_VALUE: 23
PIF_VALUE: 22
PIF_VALUE: 22
PIF_VALUE: 26
PIF_VALUE: 24
PIF_VALUE: 25
PIF_VALUE: 20
PIF_VALUE: 22
PIF_VALUE: 25
PIF_VALUE: 25
PIF_VALUE: 1
PIF_VALUE: 22
PIF_VALUE: 25
PIF_VALUE: 25
PIF_VALUE: 23
PIF_VALUE: 22
PIF_VALUE: 25
PIF_VALUE: 21
PIF_VALUE: 25
PIF_VALUE: 23
PIF_VALUE: 23
PIF_VALUE: 10
PIF_VALUE: 24
PIF_VALUE: 21
PIF_VALUE: 18
PIF_VALUE: 24
PIF_VALUE: 25
PIF_VALUE: 22
PIF_VALUE: 23
PIF_VALUE: 26
PIF_VALUE: 23
PIF_VALUE: 22
PIF_VALUE: 25
PIF_VALUE: 29
PIF_VALUE: 21
PIF_VALUE: 25
PIF_VALUE: 23
PIF_VALUE: 22
PIF_VALUE: 22
PIF_VALUE: 17
PIF_VALUE: 27
PIF_VALUE: 26
PIF_VALUE: 23
PIF_VALUE: 20
PIF_VALUE: 21
PIF_VALUE: 25
PIF_VALUE: 25
PIF_VALUE: 2
PIF_VALUE: 0
PIF_VALUE: 26
PIF_VALUE: 21
PIF_VALUE: 4
PIF_VALUE: 21
PIF_VALUE: 25
PIF_VALUE: 23
PIF_VALUE: 25
PIF_VALUE: 24
PIF_VALUE: 24
PIF_VALUE: 3
PIF_VALUE: 25
PIF_VALUE: 22
PIF_VALUE: 5
PIF_VALUE: 18
PIF_VALUE: 23
PIF_VALUE: 22
PIF_VALUE: 21
PIF_VALUE: 8
PIF_VALUE: 3
PIF_VALUE: 25
PIF_VALUE: 21
PIF_VALUE: 24
PIF_VALUE: 17
PIF_VALUE: 23
PIF_VALUE: 26
PIF_VALUE: 19
PIF_VALUE: 9
PIF_VALUE: 22
PIF_VALUE: 26
PIF_VALUE: 25
PIF_VALUE: 18
PIF_VALUE: 23
PIF_VALUE: 25
PIF_VALUE: 1
PIF_VALUE: 19
PIF_VALUE: 18
PIF_VALUE: 4
PIF_VALUE: 24
PIF_VALUE: 25
PIF_VALUE: 25
PIF_VALUE: 23
PIF_VALUE: 24
PIF_VALUE: 25
PIF_VALUE: 23
PIF_VALUE: 18
PIF_VALUE: 22
PIF_VALUE: 24
PIF_VALUE: 26
PIF_VALUE: 10
PIF_VALUE: 26
PIF_VALUE: 25
PIF_VALUE: 23
PIF_VALUE: 26
PIF_VALUE: 21
PIF_VALUE: 24
PIF_VALUE: 25
PIF_VALUE: 25
PIF_VALUE: 22
PIF_VALUE: 25
PIF_VALUE: 19
PIF_VALUE: 44
PIF_VALUE: 25
PIF_VALUE: 22
PIF_VALUE: 26
PIF_VALUE: 24
PIF_VALUE: 26
PIF_VALUE: 21
PIF_VALUE: 20
PIF_VALUE: 23
PIF_VALUE: 23
PIF_VALUE: 25
PIF_VALUE: 25
PIF_VALUE: 24
PIF_VALUE: 26
PIF_VALUE: 25
PIF_VALUE: 25
PIF_VALUE: 19
PIF_VALUE: 21
PIF_VALUE: 23
PIF_VALUE: 22
PIF_VALUE: 3
PIF_VALUE: 20
PIF_VALUE: 25
PIF_VALUE: 2
PIF_VALUE: 9
PIF_VALUE: 0
PIF_VALUE: 7
PIF_VALUE: 23
PIF_VALUE: 25
PIF_VALUE: 23
PIF_VALUE: 20
PIF_VALUE: 25
PIF_VALUE: 23
PIF_VALUE: 18
PIF_VALUE: 26
PIF_VALUE: 3
PIF_VALUE: 25
PIF_VALUE: 25
PIF_VALUE: 10
PIF_VALUE: 22
PIF_VALUE: 19
PIF_VALUE: 23
PIF_VALUE: 25
PIF_VALUE: 25
PIF_VALUE: 24
PIF_VALUE: 22
PIF_VALUE: 23
PIF_VALUE: 23
PIF_VALUE: 25
PIF_VALUE: 25
PIF_VALUE: 23
PIF_VALUE: 16
PIF_VALUE: 22
PIF_VALUE: 25
PIF_VALUE: 23
PIF_VALUE: 23
PIF_VALUE: 18
PIF_VALUE: 25
PIF_VALUE: 21
PIF_VALUE: 25
PIF_VALUE: 26
PIF_VALUE: 25
PIF_VALUE: 21
PIF_VALUE: 19
PIF_VALUE: 25
PIF_VALUE: 18
PIF_VALUE: 23
PIF_VALUE: 24
PIF_VALUE: 16
PIF_VALUE: 21
PIF_VALUE: 25
PIF_VALUE: 25
PIF_VALUE: 20
PIF_VALUE: 25
PIF_VALUE: 20
PIF_VALUE: 25
PIF_VALUE: 25
PIF_VALUE: 16
PIF_VALUE: 9
PIF_VALUE: 22
PIF_VALUE: 18
PIF_VALUE: 1
PIF_VALUE: 21
PIF_VALUE: 0
PIF_VALUE: 25
PIF_VALUE: 19
PIF_VALUE: 4
PIF_VALUE: 25
PIF_VALUE: 23
PIF_VALUE: 21
PIF_VALUE: 23
PIF_VALUE: 18
PIF_VALUE: 13
PIF_VALUE: 25
PIF_VALUE: 22

## 2022-01-06 ASSESSMENT — PAIN SCALES - GENERAL
PAINLEVEL_OUTOF10: 8
PAINLEVEL_OUTOF10: 10
PAINLEVEL_OUTOF10: 10
PAINLEVEL_OUTOF10: 6
PAINLEVEL_OUTOF10: 7
PAINLEVEL_OUTOF10: 6
PAINLEVEL_OUTOF10: 8
PAINLEVEL_OUTOF10: 8
PAINLEVEL_OUTOF10: 9
PAINLEVEL_OUTOF10: 9
PAINLEVEL_OUTOF10: 10
PAINLEVEL_OUTOF10: 10
PAINLEVEL_OUTOF10: 8
PAINLEVEL_OUTOF10: 10

## 2022-01-06 ASSESSMENT — PAIN - FUNCTIONAL ASSESSMENT
PAIN_FUNCTIONAL_ASSESSMENT: 0-10
PAIN_FUNCTIONAL_ASSESSMENT: PREVENTS OR INTERFERES SOME ACTIVE ACTIVITIES AND ADLS

## 2022-01-06 ASSESSMENT — PAIN DESCRIPTION - PAIN TYPE: TYPE: SURGICAL PAIN

## 2022-01-06 ASSESSMENT — PAIN DESCRIPTION - LOCATION: LOCATION: BACK

## 2022-01-06 NOTE — PROGRESS NOTES
S/p L3-4, L4-5, L5-S1 ANTERIOR LUMBAR INTERBODY FUSION WITH PEDICLE SCREW FIXATION. Admitted to PACU bed 13 from OR. Arrived at 12   . Attached to PACU monitoring system. Alarms and parameters set. Report received from anesthesia personnel. No complication reported intraoperatively. Pt on nasal cannula with oxygen at 4 liters. Oral airway removed upon arrival Athrombic wraps in place. VSS; will continue to monitor.

## 2022-01-06 NOTE — OP NOTE
Operative Report    PATIENT NAME: Pablo Bower  YOB: 1960  MEDICAL RECORD# [id number]  SURGERY DATE: 01/06/2022    SURGEON:  Perla Berman MD, PhD    CO-SURGEON: Nasreen Samaniego M.D. (for anterior portion)    ASSISTANT SURGEON: GENIE Brown Dr. was the co-surgeon in the anterior ALIF portion of the case and performed the exposure and closure of anterior abdominal wound. He additionally assisted with the placement of the anterior interbody cages. Ms. Paula Christiansen served as assistant on the posterior portion of the surgery due to the complex nature of the procedure and the lack of a resident or fellow assistant. She provided assistance with opening, manipulation of critical neural elements, and closing. PREOPERATIVE DIAGNOSIS:  1. Advanced degenerative disc disease at L3-4 and L5-S1  2. L4-5 mobile anterolisthesis  3. Left radicular pain and neurogenic claudication     POSTOPERATIVE DIAGNOSIS:  1.  Same. PROCEDURES PERFORMED:  Anterior Portion:   1. Anterior L3-4, L4-5, L5-S1 lumbar discectomy through retroperitoneal approach  2. L3-4, L4-5, L5-S1 anterior lumbar interbody fusion with PEEK graft filled with Infuse and FiberGraft allograft  3. Intraoperative neuromonitoring (SSEP, MEPs, EMG)  4. Intraoperative fluoroscopy for image guidance    Posterior Portion:  1. L3, L4, L5, S1 bilateral percutaneous pedicle screw fixation  2. L3-S1 posterolateral fusion with FiberGraft allograft  3. Computer-assisted stereotactic navigation with Anchanto CT  4. Intraoperative neuromonitoring (SSEP, MEPs, EMG)  5. Intraoperative fluoroscopy for image guidance    ANESTHESIA: GETA    ESTIMATED BLOOD LOSS:  200 mL. INDICATIONS FOR SURGERY: Ms. Dannie Reeves is a 57year old woman who presented with back and left leg pain in a pattern consistent with both radiculopathy and neurogenic claudication. Her symptoms failed to respond to conservative intervention.  Imaging demonstrated evidence of a mobile lumbar spondylolisthesis at L4-5 and retrolisthesis at L3-4. There was also significant spondylosis of the L3-4 and L5-S1 discs with corresponding foraminal narrowing. Given these findings and the patients symptoms, surgical fixation and fusion with indirect decompression was recommended. The patient understands the risks and benefits of the procedures including but not limited to bleeding, infection, worsened neurologic outcome, retrograde ejaculation, vascular injury, cerebral spinal fluid leak, pseudoarthrosis, need for additional procedures, fracture of hardware, anesthesia risks, and death. DETAILS OF PROCEDURE: Under universal protocol and informed consent, the patient was brought to the operating room. General anesthesia was induced and the patient was intubated. A Tomas catheter was placed. Neuromonitoring leads (SSEPs, MEPs, EMG) were placed. The patient was then positioned supine with all pressure points appropriately padded. A vertical linear abdominal incision was planned just left of midline using lateral fluoroscopy to localize the L3-S1 levels. Intravenous antibiotic was given by anesthesia. A time out was completed and the abdominal area was prepped and draped in the usual sterile fashion. Dr. Dori Mccoy began the operation and his part of the surgery will be detailed in a separate operative note. Briefly, he exposed the lumbar spine via a retroperitoneal approach. Lateral fluoroscopic imaging was used to confirm proper localization at the L3-4, L4-5, and L5-S1 levels, and AP fluoroscopy was used to identify and duke the midline of the disc spaces. Once the retroperitoneal exposure had been obtained, Dr. Dori Mccoy provided appropriate retraction of the vessels and we began the discectomy at L5-S1. The disc was incised with the #15 scalpel. Disc material was removed with pituitary rongeur. Endplates were curetted and all disc material was removed down to the posterior longitudinal ligament.  Once the disc was emptied, progressively larger rasps were used to scrape the endplates and prepare them for fusion. Several trial interbodies were then tested to identify the appropriate size for this space. A 13.5 mm interbody graft with 14° of lordosis was selected and filled with Infuse and FiberGraft allograft. Under direct fluoroscopic guidance, the interbody was inserted at the L5-S1 interspace and countersunk to allow subsequent fixation. A lag screw and washer were then placed into the L5 body to prevent interbody extrusion. Dr. Harvey Pena then moved the retractors up to the L4-5 interspace and provided exposure and retraction of the vessels at this level. The procedure was then repeated wherein the disc was removed and the endplates were scraped. Again, the endplates were prepared with rasps and multiple trial interbodies were tested. A 12 mm graft with 10° of lordosis was selected, filled with Infuse and FiberGraft allograft, and placed in the L4-5 disc space under fluoroscopic guidance. Again, a lag screw and washer were then placed into the L4 body to prevent interbody extrusion. The retractors were then moved to the L3-4 interspace. The disc was again cut with a #15 scalpel and removed. The exposed endplates were scraped to remove the cartilaginous component in preparation for fusion. The endplates were further prepared with rasps and multiple trial interbodies were tested. Another 12 mm graft with 10° of lordosis was selected, filled with Infuse and FiberGraft allograft, and placed in the L3-4 disc space under fluoroscopic guidance. Another lag screw and washer were then placed into the L3 body to prevent interbody extrusion. Final lateral and AP fluoroscopy confirmed adequate placement. With the anterior portion completed, Dr. Harvey Pena performed the closure of the abdomen which will also be dictated in his operative note.  When the abdomen was closed, the patient was carefully turned into a prone position on an open [facility] Herb frame. The arms were positioned on armboards. All pressure points were appropriately padded. The right PSIS was marked. A time out was again completed and the lumbrosacral area was prepped and draped in the usual sterile fashion. Two small stab incisions measuring approximately 2 mm in size were made overlying the right posterior superior iliac spine with a #15 scalpel. Two pins were placed as attachments for the Visionnaire image guidance array. An AIRO intraoperative CT scan was then obtained with images transferred to the [address] [place] [address] navigation unit for pedicle screw placement. Using a screw trajectory based on the neuronavigation, right and left vertical paraspinal incisions were planned parallel to each other encompassing the L3, L4, L5, and S1 vertebral bodies. The skin was incised approximately 6 cm bilaterally using a #15 blade and the fascia was opened with Bovie electrocautery. Blunt dissection was then used to develop the plane between the multifidus and longissimus muscles. Using a navigable Jamshidi needle, the pedicles of L3, L4, L5, and S1 on the left were cannulated and K wires placed. Pedicle screws with a polyaxial heads and towers were then placed in each pedicle. The process was then repeated on the right. All pedicle screws were selected based on measurements derived via [address] navigation. The screws were then stimulated and were all assessed at >30 mA . With the bony fixation was achieved, prefabricated titanium rods were selected, bent to fit, and fitted into the polyaxial pedicle screw heads through the MIS towers. Caps were placed and final-tightened at L3, L5, and S1 bilaterally. Using reduction tools at the L4 screws simultaneously, the L4 vertebral body was sequentially reduced to the lordotic rods to partially reduce the spondylolisthesis. The caps were then final-tightened to lock in the rods.  A final AIRO intraoperative CT scan was then obtained to confirm the position of the instrumentation. Meticulous hemostasis was undertaken and the wounds were irrigated copiously with antibiotic solution. The transverse processes on the right at L3-S1 were decorticated with the high speed drill. FiberGraft allograft was overlayed over the transverse to stimulate posterolateral arthrodesis at L3-S1. The navigation pins were removed. The incisions were then closed in layers using 0 Vicryl sutures in the fascia, 2-0 Vicryl sutures in the subcutaneous tissues, and a subcuticular running 4-0 Monocryl suture for the skin. Exparel was injected liz-incisionally in the paraspinous muscles during the closure. The wounds were dressed with Dermabond topical skin adhesive. All needle, sponge, and instrument counts were correct. Notably, neuromonitoring remained stable for the duration of the case. The patient was turned back to supine position onto a hospital bed and extubated without difficulty. She was taken to the PACU in stable condition. I affirm in accordance with CMS regulations that I was present and scrubbed for all critical portions of the case. IMPLANTS:   1. DePuy Syncage spacer  L3-4: 12 mm small @ 10o  L4-5: 12 mm small @ 10o  L5-S1: 13.5 mm Small @ 14o  2. DePuy anterior lag screws and washers x 3  3. DePuy Viper Prime Screw System   L3: 6.0 x 50 mm screws bilaterally   L4: 6.0 x 50 mm screws bilaterally   L5: 7.0 x 45 mm screws bilaterally   S1: 7.0 x 40 mm screws bilaterally   4. DePuy Ti pre-bent 5.5 x 100 mm concepción on left, 5.5 x 110 mm concepción on the right  5. Infuse and FiberGraft allograft    SPECIMEN:   None. DRAINS:   None. DISPOSITION:  PACU in stable condition.       COMPLICATIONS: NONE

## 2022-01-06 NOTE — H&P
Dede Richey    [id number]    [facility] Same Day Surgery Update H & P  Department of General Surgery   Surgical Service   Pre-operative History and Physical  Last H & P within the last 30 days. DIAGNOSIS:   Spondylolisthesis of lumbosacral region [M43.17]  Spinal stenosis of lumbar region, unspecified whether neurogenic claudication present [M48.061]    Procedure(s):  L3-4, L4-5, L5-S1 ANTERIOR LUMBAR INTERBODY FUSION WITH PEDICLE SCREW FIXATION  . History obtained from: Patient interview and EHR     HISTORY OF PRESENT ILLNESS:   The patient is a 57 y.o. female with c/o chronic lumbar pain and bilateral LE (left worse than right) pain, numbness and tingling. Their symptoms have been recalcitrant to conservative treatment and the patient presents today for the above procedure. Covid 19:  Patient denies fever, chills, worsening cough, or known exposure to Covid-19.       Past Medical History:        Diagnosis Date    Arthritis     rheumatoid arthritis    Fibromyalgia     Hypertension     Migraine     MVP (mitral valve prolapse)     Nausea & vomiting     PONV (postoperative nausea and vomiting)     Reflux Doesn't have anymore r/t weight loss    Thyroid disease     goiter treated with radioactive med    Wears glasses      Past Surgical History:        Procedure Laterality Date    ANKLE ARTHROSCOPY Right 3/12    lateral ligament repair, removal spurs    ANKLE SURGERY      right    BACK SURGERY      cyst removed off lower spine    CERVICAL FUSION N/A 12/11/2019    C4-5, C5-6, C6-7 ANTERIOR CERVICAL DISCECTOMY AND FUSION performed by Esther Castellano MD at [facility]      left    JOINT REPLACEMENT Left 2019    KNEE ARTHROSCOPY Left     REVISION TOTAL KNEE ARTHROPLASTY Left 8/28/2020    LEFT TOTAL KNEE REVISION 2 COMPONENT performed by Vivian Bloom MD at [address] [facility] Left 1/26/2021    LEFT KNEE PATELLA REVISON WITH ANTERIOR SYNOVECTOMY performed by Eugenio Escobar MD at [address]      bilateral    TONSILLECTOMY      T & A    WRIST SURGERY Right      Past Social History:  Social History     Socioeconomic History    Marital status:      Spouse name: None    Number of children: None    Years of education: None    Highest education level: None   Occupational History    None   Tobacco Use    Smoking status: Never Smoker    Smokeless tobacco: Never Used   Vaping Use    Vaping Use: Never used   Substance and Sexual Activity    Alcohol use: Not Currently    Drug use: No    Sexual activity: None   Other Topics Concern    None   Social History Narrative    None     Social Determinants of Health     Financial Resource Strain:     Difficulty of Paying Living Expenses: Not on file   Food Insecurity:     Worried About Running Out of Food in the Last Year: Not on file    [place] of Food in the Last Year: Not on file   Transportation Needs:     Lack of Transportation (Medical): Not on file    Lack of Transportation (Non-Medical):  Not on file   Physical Activity:     Days of Exercise per Week: Not on file    Minutes of Exercise per Session: Not on file   Stress:     Feeling of Stress : Not on file   Social Connections:     Frequency of Communication with Friends and Family: Not on file    Frequency of Social Gatherings with Friends and Family: Not on file    Attends Mandaeism Services: Not on file    Active Member of Clubs or Organizations: Not on file    Attends Club or Organization Meetings: Not on file    Marital Status: Not on file   Intimate Partner Violence:     Fear of Current or Ex-Partner: Not on file    Emotionally Abused: Not on file    Physically Abused: Not on file    Sexually Abused: Not on file   Housing Stability:     Unable to Pay for Housing in the Last Year: Not on file    Number of [place] in the Last Year: Not on file    Unstable Housing in the Last Year: Not on file         Medications Prior to Admission:      Prior to Admission medications    Medication Sig Start Date End Date Taking? Authorizing Provider   methocarbamol (ROBAXIN) 500 MG tablet Take 1 tablet by mouth 4 times daily 8/29/20  Yes Pravin [place], [place]   ENBREL 25 MG/0.5ML SOSY Inject 25 mg into the skin Twice a Week  8/3/20  Yes Historical Provider, MD   sennosides-docusate sodium (SENOKOT-S) 8.6-50 MG tablet Take 1 tablet by mouth 2 times daily 12/15/19  Yes Kirit Vazquez MD   folic acid (FOLVITE) 1 MG tablet Take 1 mg by mouth daily   Yes Historical Provider, MD   progesterone (PROMETRIUM) 200 MG capsule Take 600 mg by mouth nightly   Yes Historical Provider, MD   amLODIPine (NORVASC) 10 MG tablet Take 10 mg by mouth daily  5/9/18  Yes Historical Provider, MD   thyroid (ARMOUR) 240 MG tablet Take 120 mg by mouth daily    Yes Historical Provider, MD   triamcinolone (KENALOG) 0.025 % cream Apply topically every 4 hours as needed Apply Topically itchy skin    Historical Provider, MD         Allergies:  Latex, Adhesive tape, Gluten, Plaquenil [hydroxychloroquine sulfate], Vicodin [hydrocodone-acetaminophen], Benzamide derivatives, and Codeine    PHYSICAL EXAM:      BP (!) 141/78   Pulse 89   Temp 97.9 °F (36.6 °C) (Temporal)   Resp 18   Ht 5' 4\" (1.626 m)   Wt 203 lb (92.1 kg)   LMP 09/01/2011   SpO2 96%   BMI 34.84 kg/m²      Airway:  Airway patent with no audible stridor    Heart:  Regular rate and rhythm, No murmur noted    Lungs:  No increased work of breathing, good air exchange, clear to auscultation bilaterally, no crackles or wheezing    Abdomen:  Soft, non-distended, non-tender, no masses palpated    ASSESSMENT AND PLAN    Patient is a 57 y.o. female with above specified procedure planned. 1.  The patients history and physical was obtained and signed off by the pre-admission testing department.   Patient seen and focused exam done today- no new changes since last physical exam on 12/23/21    2. Access to ancillary services are available per request of the provider.     Denisha Negrete, ITZ - CNP     1/6/2022

## 2022-01-06 NOTE — FLOWSHEET NOTE
Patient has received 3.75mg IV dilauded and IV robaxin with no relief. Patient will fall into a deep sleep but will rate pain 10/10 upon wakening. Dr. Toni Alvarez called, order obtained for 1x dose 50mcg fentanyl to see if it works better than Dilaudid.

## 2022-01-06 NOTE — FLOWSHEET NOTE
Patient has had \"a little\" relieve from 50mcg fentanly IV. Dr. Damaris Tracey called, a second dose for fentanyl obtained. Patient is aggreable to taking a pain pill. Eating her gluten free pretzels from home and applesauce now and will give pain pill soon.  called with update.

## 2022-01-06 NOTE — PROGRESS NOTES
PACU Transfer Note    Vitals:    01/06/22 1830   BP: 129/69   Pulse: 109   Resp: 20   Temp: 98.4 °F (36.9 °C)   SpO2: 98% 4L        In: 3040 [P.O.:350; I.V.:2640]  Out: 1200 [Urine:1000]    Pain assessment:  present - adequately treated-receiving treatment  Pain Level: 6    Report given to Receiving unit RN. Patient transported to [facility] via pacu transport on 4L NC with chart and all belongings.      1/6/2022 6:48 PM

## 2022-01-06 NOTE — ANESTHESIA POSTPROCEDURE EVALUATION
Department of Anesthesiology  Postprocedure Note    Patient: Danny Landeros  MRN: [id number]  YOB: 1960  Date of evaluation: 1/6/2022  Time:  5:34 PM     Procedure Summary     Date: 01/06/22 Room / Location: [address]    Anesthesia Start: 0730 Anesthesia Stop: 1330    Procedures:       L3-4, L4-5, L5-S1 ANTERIOR LUMBAR INTERBODY FUSION WITH PEDICLE SCREW FIXATION (N/A )      . (N/A ) Diagnosis:       Spondylolisthesis of lumbosacral region      Spinal stenosis of lumbar region, unspecified whether neurogenic claudication present      (Spondylolisthesis of lumbosacral region [M43.17] Spinal stenosis of lumbar region, unspecified whether neurogenic claudication present Jeri Parker)    Surgeons: Kori Alvarado MD Responsible Provider: Maria Fernanda Comer MD    Anesthesia Type: general, TIVA ASA Status: 3          Anesthesia Type: general, TIVA    Ant Phase I: Ant Score: 9    Ant Phase II:      Last vitals: Reviewed and per EMR flowsheets.        Anesthesia Post Evaluation    Patient location during evaluation: PACU  Patient participation: complete - patient participated  Level of consciousness: awake and alert  Pain score: 8  Airway patency: patent  Nausea & Vomiting: no nausea and no vomiting  Complications: no  Cardiovascular status: hemodynamically stable  Respiratory status: acceptable  Hydration status: euvolemic

## 2022-01-06 NOTE — BRIEF OP NOTE
Brief Postoperative Note      Patient: Jimmy Ascencio  YOB: 1960  MRN: [id number]    Date of Procedure: 1/6/2022    Pre-Op Diagnosis: Spondylolisthesis of lumbosacral region [M43.17] Spinal stenosis of lumbar region, unspecified whether neurogenic claudication present [M48.061]    Post-Op Diagnosis: Same       Procedure(s):  L3-4, L4-5, L5-S1 ANTERIOR LUMBAR INTERBODY FUSION WITH PEDICLE SCREW FIXATION  . Surgeon(s):  MD Radha Mitchell MD    Assistant:  Surgical Assistant: Argelia Abdullahi  Physician Assistant: Crystal Mendoza PA-C    Anesthesia: General    Estimated Blood Loss (mL): 341     Complications: None    Specimens:   * No specimens in log *    Implants:  See operative log      Drains:   Urethral Catheter Latex 16 fr (Active)       Findings: spondylolisthesis at L4-5, spondylosis.      Electronically signed by Fredy Mackay MD on 1/6/2022 at 12:52 PM

## 2022-01-06 NOTE — PROGRESS NOTES
Pt alert and oriented x4. Consents signed and on chart. IV placed x2. Labs pending. IVF infusing. CHG wipes to abdomen.

## 2022-01-06 NOTE — ANESTHESIA PRE PROCEDURE
Department of Anesthesiology  Preprocedure Note       Name:  Giovany Dawson   Age:  57 y.o.  :  1960                                          MRN:  [id number]         Date:  2022      Surgeon: Laverne Martin):  MD Dash Reyes MD    Procedure: Procedure(s):  L3-4, L4-5, L5-S1 ANTERIOR LUMBAR INTERBODY FUSION WITH PEDICLE SCREW FIXATION  . Medications prior to admission:   Prior to Admission medications    Medication Sig Start Date End Date Taking? Authorizing Provider   methocarbamol (ROBAXIN) 500 MG tablet Take 1 tablet by mouth 4 times daily 20  Yes Pravin [place], [place]   ENBREL 25 MG/0.5ML SOSY Inject 25 mg into the skin Twice a Week  8/3/20  Yes Historical Provider, MD   sennosides-docusate sodium (SENOKOT-S) 8.6-50 MG tablet Take 1 tablet by mouth 2 times daily 12/15/19  Yes Kirit Vazquez MD   folic acid (FOLVITE) 1 MG tablet Take 1 mg by mouth daily   Yes Historical Provider, MD   progesterone (PROMETRIUM) 200 MG capsule Take 600 mg by mouth nightly   Yes Historical Provider, MD   amLODIPine (NORVASC) 10 MG tablet Take 10 mg by mouth daily  18  Yes Historical Provider, MD   thyroid (ARMOUR) 240 MG tablet Take 120 mg by mouth daily    Yes Historical Provider, MD   triamcinolone (KENALOG) 0.025 % cream Apply topically every 4 hours as needed Apply Topically itchy skin    Historical Provider, MD       Current medications:    Current Facility-Administered Medications   Medication Dose Route Frequency Provider Last Rate Last Admin    ceFAZolin (ANCEF) 2000 mg in dextrose 5 % 50 mL IVPB  2,000 mg IntraVENous Once Leonardo Wolf MD        lactated ringers infusion   IntraVENous Continuous Sheryle Canny,  mL/hr at 22 0649 New Bag at 22 0649    scopolamine (TRANSDERM-SCOP) transdermal patch 1 patch  1 patch TransDERmal Once Kathy Varghese MD   1 patch at 22 0703       Allergies:     Allergies   Allergen Reactions    Latex Dermatitis  Adhesive Tape      blisters    Gluten     Plaquenil [Hydroxychloroquine Sulfate]      Rash      Vicodin [Hydrocodone-Acetaminophen]      Says she gets a rash after taking it for 3 straight days      Benzamide Derivatives Rash     Benzoin   topical    Codeine Nausea And Vomiting       Problem List:    Patient Active Problem List   Diagnosis Code    Ankle instability M25.373    Achilles tendon injury S86.009A    Left ankle pain M25.572    Ankle pain M25.579    Sciatica M54.30    Peroneal tendon injury S86.309A    Edema of left foot R60.0    Sprain of foot S93.609A    Cervical spondylosis with radiculopathy M47.22    Instability of internal left knee prosthesis ([facility]) C02.380R    Complication of internal left knee prosthesis ([address] T84. [id number], [id number]       Past Medical History:        Diagnosis Date    Arthritis     rheumatoid arthritis    Fibromyalgia     Hypertension     Migraine     MVP (mitral valve prolapse)     Nausea & vomiting     PONV (postoperative nausea and vomiting)     Reflux Doesn't have anymore r/t weight loss    Thyroid disease     goiter treated with radioactive med    Wears glasses        Past Surgical History:        Procedure Laterality Date    ANKLE ARTHROSCOPY Right 3/12    lateral ligament repair, removal spurs    ANKLE SURGERY      right    BACK SURGERY      cyst removed off lower spine    CERVICAL FUSION N/A 12/11/2019    C4-5, C5-6, C6-7 ANTERIOR CERVICAL DISCECTOMY AND FUSION performed by Theresa Grigsby MD at [facility]      left    JOINT REPLACEMENT Left 2019    KNEE ARTHROSCOPY Left     REVISION TOTAL KNEE ARTHROPLASTY Left 8/28/2020    LEFT TOTAL KNEE REVISION 2 COMPONENT performed by Mendel Macleod, MD at [place] [address] Left 1/26/2021    LEFT KNEE PATELLA REVISON WITH ANTERIOR SYNOVECTOMY performed by Mendel Macleod, MD at [address]      bilateral    TONSILLECTOMY T & A    WRIST SURGERY Right        Social History:    Social History     Tobacco Use    Smoking status: Never Smoker    Smokeless tobacco: Never Used   Substance Use Topics    Alcohol use: Not Currently                                Counseling given: Not Answered      Vital Signs (Current):   Vitals:    12/29/21 1715 01/06/22 0616 01/06/22 0619   BP:  (!) 141/78    Pulse:  89    Resp:  18    Temp:  97.9 °F (36.6 °C)    TempSrc:  Temporal    SpO2:  96%    Weight: 203 lb (92.1 kg)  203 lb (92.1 kg)   Height: 5' 4\" (1.626 m)  5' 4\" (1.626 m)                                              BP Readings from Last 3 Encounters:   01/06/22 (!) 141/78   01/26/21 (!) 136/58   01/26/21 127/77       NPO Status: Time of last liquid consumption: 2300                        Time of last solid consumption: 2000                        Date of last liquid consumption: 01/05/22                        Date of last solid food consumption: 01/05/22    BMI:   Wt Readings from Last 3 Encounters:   01/06/22 203 lb (92.1 kg)   01/26/21 189 lb (85.7 kg)   08/28/20 174 lb (78.9 kg)     Body mass index is 34.84 kg/m². CBC:   Lab Results   Component Value Date    WBC 15.2 12/12/2019    RBC 4.27 12/12/2019    HGB 12.7 08/29/2020    HCT 35.8 08/29/2020    MCV 93.4 12/12/2019    RDW 13.2 12/12/2019     12/12/2019       CMP:   Lab Results   Component Value Date     12/12/2019    K 3.5 12/12/2019     12/12/2019    CO2 27 12/12/2019    BUN 7 12/12/2019    CREATININE 0.8 10/26/2020    CREATININE 0.7 12/12/2019    GFRAA >60 10/26/2020    GFRAA >60 03/02/2012    LABGLOM >60 10/26/2020    GLUCOSE 153 12/12/2019    CALCIUM 9.2 12/12/2019       POC Tests: No results for input(s): POCGLU, POCNA, POCK, POCCL, POCBUN, POCHEMO, POCHCT in the last 72 hours.     Coags: No results found for: PROTIME, INR, APTT    HCG (If Applicable): No results found for: PREGTESTUR, PREGSERUM, HCG, HCGQUANT     ABGs: No results found for: PHART, PO2ART, JIX1KKJ, YVG0BZV, BEART, G8OTBIRW     Type & Screen (If Applicable):  No results found for: LABABO, LABRH    Drug/Infectious Status (If Applicable):  No results found for: HIV, HEPCAB    COVID-19 Screening (If Applicable):   Lab Results   Component Value Date    COVID19 Not Detected 01/21/2021    COVID19 NOT DETECTED 08/24/2020           Anesthesia Evaluation  Patient summary reviewed and Nursing notes reviewed   history of anesthetic complications: PONV. Airway: Mallampati: II  TM distance: >3 FB   Neck ROM: full  Mouth opening: > = 3 FB Dental:          Pulmonary:Negative Pulmonary ROS                              Cardiovascular:    (+) hypertension:,                   Neuro/Psych:   (+) headaches: migraine headaches,             GI/Hepatic/Renal: Neg GI/Hepatic/Renal ROS            Endo/Other: Negative Endo/Other ROS                    Abdominal:             Vascular: negative vascular ROS. Other Findings:             Anesthesia Plan      general and TIVA     ASA 1    (23-year-old female presents for L3-4, L4-5, L5-S1 ANTERIOR LUMBAR INTERBODY FUSION WITH PEDICLE SCREW FIXATION. Plan general anesthesia via TIVA with ASA standard monitors. Questions answered. Patient agreeable with anesthetic plan.  )  Induction: intravenous. Anesthetic plan and risks discussed with patient. Plan discussed with CRNA.     Attending anesthesiologist reviewed and agrees with Maya Vega MD   1/6/2022

## 2022-01-07 PROCEDURE — 97535 SELF CARE MNGMENT TRAINING: CPT

## 2022-01-07 PROCEDURE — 6360000002 HC RX W HCPCS: Performed by: NEUROLOGICAL SURGERY

## 2022-01-07 PROCEDURE — 97530 THERAPEUTIC ACTIVITIES: CPT

## 2022-01-07 PROCEDURE — 6360000002 HC RX W HCPCS: Performed by: NURSE PRACTITIONER

## 2022-01-07 PROCEDURE — 6370000000 HC RX 637 (ALT 250 FOR IP): Performed by: NEUROLOGICAL SURGERY

## 2022-01-07 PROCEDURE — 97161 PT EVAL LOW COMPLEX 20 MIN: CPT

## 2022-01-07 PROCEDURE — 94150 VITAL CAPACITY TEST: CPT

## 2022-01-07 PROCEDURE — 2580000003 HC RX 258: Performed by: NEUROLOGICAL SURGERY

## 2022-01-07 PROCEDURE — 1200000000 HC SEMI PRIVATE

## 2022-01-07 PROCEDURE — 2580000003 HC RX 258: Performed by: NURSE PRACTITIONER

## 2022-01-07 PROCEDURE — 6370000000 HC RX 637 (ALT 250 FOR IP): Performed by: NURSE PRACTITIONER

## 2022-01-07 PROCEDURE — 97166 OT EVAL MOD COMPLEX 45 MIN: CPT

## 2022-01-07 RX ORDER — TRIAMCINOLONE ACETONIDE 0.25 MG/G
CREAM TOPICAL 2 TIMES DAILY
Status: DISCONTINUED | OUTPATIENT
Start: 2022-01-07 | End: 2022-01-10 | Stop reason: HOSPADM

## 2022-01-07 RX ORDER — ACETAMINOPHEN 500 MG
1000 TABLET ORAL EVERY 6 HOURS
Status: DISCONTINUED | OUTPATIENT
Start: 2022-01-07 | End: 2022-01-10 | Stop reason: HOSPADM

## 2022-01-07 RX ORDER — OXYCODONE HCL 10 MG/1
10 TABLET, FILM COATED, EXTENDED RELEASE ORAL EVERY 12 HOURS SCHEDULED
Status: DISCONTINUED | OUTPATIENT
Start: 2022-01-07 | End: 2022-01-10

## 2022-01-07 RX ORDER — METHOCARBAMOL 750 MG/1
750 TABLET, FILM COATED ORAL EVERY 6 HOURS PRN
Status: DISCONTINUED | OUTPATIENT
Start: 2022-01-08 | End: 2022-01-07

## 2022-01-07 RX ORDER — METHOCARBAMOL 750 MG/1
750 TABLET, FILM COATED ORAL 4 TIMES DAILY
Status: DISCONTINUED | OUTPATIENT
Start: 2022-01-08 | End: 2022-01-10 | Stop reason: HOSPADM

## 2022-01-07 RX ORDER — DIPHENHYDRAMINE HCL 25 MG
25 TABLET ORAL EVERY 6 HOURS PRN
Status: DISCONTINUED | OUTPATIENT
Start: 2022-01-07 | End: 2022-01-10 | Stop reason: HOSPADM

## 2022-01-07 RX ORDER — METHOCARBAMOL 500 MG/1
500 TABLET, FILM COATED ORAL 4 TIMES DAILY PRN
Status: ON HOLD | COMMUNITY
End: 2022-01-10 | Stop reason: HOSPADM

## 2022-01-07 RX ADMIN — DIAZEPAM 5 MG: 5 TABLET ORAL at 00:59

## 2022-01-07 RX ADMIN — SENNOSIDES AND DOCUSATE SODIUM 1 TABLET: 50; 8.6 TABLET ORAL at 20:20

## 2022-01-07 RX ADMIN — OXYCODONE 10 MG: 5 TABLET ORAL at 03:31

## 2022-01-07 RX ADMIN — DIPHENHYDRAMINE HYDROCHLORIDE 25 MG: 25 TABLET ORAL at 18:10

## 2022-01-07 RX ADMIN — SENNOSIDES AND DOCUSATE SODIUM 1 TABLET: 50; 8.6 TABLET ORAL at 08:15

## 2022-01-07 RX ADMIN — POLYETHYLENE GLYCOL 3350 17 G: 17 POWDER, FOR SOLUTION ORAL at 08:18

## 2022-01-07 RX ADMIN — LEVOTHYROXINE, LIOTHYRONINE 120 MG: 19; 4.5 TABLET ORAL at 06:13

## 2022-01-07 RX ADMIN — AMLODIPINE BESYLATE 10 MG: 10 TABLET ORAL at 08:15

## 2022-01-07 RX ADMIN — OXYCODONE 10 MG: 5 TABLET ORAL at 08:15

## 2022-01-07 RX ADMIN — OXYCODONE 10 MG: 5 TABLET ORAL at 12:14

## 2022-01-07 RX ADMIN — HYDROMORPHONE HYDROCHLORIDE 1 MG: 1 INJECTION, SOLUTION INTRAMUSCULAR; INTRAVENOUS; SUBCUTANEOUS at 09:26

## 2022-01-07 RX ADMIN — METHOCARBAMOL 1000 MG: 100 INJECTION, SOLUTION INTRAMUSCULAR; INTRAVENOUS at 06:25

## 2022-01-07 RX ADMIN — HYDROMORPHONE HYDROCHLORIDE 0.5 MG: 1 INJECTION, SOLUTION INTRAMUSCULAR; INTRAVENOUS; SUBCUTANEOUS at 01:28

## 2022-01-07 RX ADMIN — HYDROMORPHONE HYDROCHLORIDE 1 MG: 1 INJECTION, SOLUTION INTRAMUSCULAR; INTRAVENOUS; SUBCUTANEOUS at 02:00

## 2022-01-07 RX ADMIN — DIAZEPAM 5 MG: 5 TABLET ORAL at 16:15

## 2022-01-07 RX ADMIN — HEPARIN SODIUM 5000 UNITS: 10000 INJECTION, SOLUTION INTRAVENOUS; SUBCUTANEOUS at 21:08

## 2022-01-07 RX ADMIN — HYDROMORPHONE HYDROCHLORIDE 1 MG: 1 INJECTION, SOLUTION INTRAMUSCULAR; INTRAVENOUS; SUBCUTANEOUS at 14:07

## 2022-01-07 RX ADMIN — HYDROMORPHONE HYDROCHLORIDE 1 MG: 1 INJECTION, SOLUTION INTRAMUSCULAR; INTRAVENOUS; SUBCUTANEOUS at 18:10

## 2022-01-07 RX ADMIN — METHOCARBAMOL 1000 MG: 100 INJECTION, SOLUTION INTRAMUSCULAR; INTRAVENOUS at 23:00

## 2022-01-07 RX ADMIN — ACETAMINOPHEN 1000 MG: 500 TABLET ORAL at 21:23

## 2022-01-07 RX ADMIN — ACETAMINOPHEN 1000 MG: 500 TABLET ORAL at 16:16

## 2022-01-07 RX ADMIN — DIAZEPAM 5 MG: 5 TABLET ORAL at 10:43

## 2022-01-07 RX ADMIN — TRIAMCINOLONE ACETONIDE: 0.25 CREAM TOPICAL at 21:24

## 2022-01-07 RX ADMIN — HYDROMORPHONE HYDROCHLORIDE 1 MG: 1 INJECTION, SOLUTION INTRAMUSCULAR; INTRAVENOUS; SUBCUTANEOUS at 04:37

## 2022-01-07 RX ADMIN — OXYCODONE 10 MG: 5 TABLET ORAL at 16:15

## 2022-01-07 RX ADMIN — OXYCODONE HYDROCHLORIDE 10 MG: 10 TABLET, FILM COATED, EXTENDED RELEASE ORAL at 20:52

## 2022-01-07 RX ADMIN — HYDROMORPHONE HYDROCHLORIDE 0.5 MG: 1 INJECTION, SOLUTION INTRAMUSCULAR; INTRAVENOUS; SUBCUTANEOUS at 22:53

## 2022-01-07 ASSESSMENT — PAIN DESCRIPTION - ONSET
ONSET: ON-GOING

## 2022-01-07 ASSESSMENT — PAIN SCALES - GENERAL
PAINLEVEL_OUTOF10: 7
PAINLEVEL_OUTOF10: 6
PAINLEVEL_OUTOF10: 8
PAINLEVEL_OUTOF10: 8
PAINLEVEL_OUTOF10: 7
PAINLEVEL_OUTOF10: 8
PAINLEVEL_OUTOF10: 6
PAINLEVEL_OUTOF10: 7
PAINLEVEL_OUTOF10: 10
PAINLEVEL_OUTOF10: 8
PAINLEVEL_OUTOF10: 7
PAINLEVEL_OUTOF10: 8
PAINLEVEL_OUTOF10: 4
PAINLEVEL_OUTOF10: 10
PAINLEVEL_OUTOF10: 8

## 2022-01-07 ASSESSMENT — PAIN DESCRIPTION - ORIENTATION
ORIENTATION: LOWER

## 2022-01-07 ASSESSMENT — PAIN DESCRIPTION - PROGRESSION
CLINICAL_PROGRESSION: NOT CHANGED

## 2022-01-07 ASSESSMENT — PAIN DESCRIPTION - PAIN TYPE
TYPE: SURGICAL PAIN

## 2022-01-07 ASSESSMENT — PAIN - FUNCTIONAL ASSESSMENT
PAIN_FUNCTIONAL_ASSESSMENT: PREVENTS OR INTERFERES SOME ACTIVE ACTIVITIES AND ADLS
PAIN_FUNCTIONAL_ASSESSMENT: PREVENTS OR INTERFERES SOME ACTIVE ACTIVITIES AND ADLS

## 2022-01-07 ASSESSMENT — PAIN DESCRIPTION - DESCRIPTORS
DESCRIPTORS: SHARP;SHOOTING;DISCOMFORT;ACHING
DESCRIPTORS: ACHING
DESCRIPTORS: ACHING;SHARP

## 2022-01-07 ASSESSMENT — PAIN DESCRIPTION - FREQUENCY
FREQUENCY: CONTINUOUS

## 2022-01-07 ASSESSMENT — PAIN DESCRIPTION - LOCATION
LOCATION: BACK

## 2022-01-07 NOTE — PROGRESS NOTES
NEUROSURGERY PROGRESS NOTE    1/7/2022 3:22 PM                               Rufina Tejeda                      LOS: 1 day   POD# 1 s/p Procedure(s) (LRB):  L3-4, L4-5, L5-S1 ANTERIOR LUMBAR INTERBODY FUSION WITH PEDICLE SCREW FIXATION (N/A)  . (N/A)    Subjective: Patient laying in bed upon entering the room. Patient having difficulty managing pain overnight. Pharmacy consulted to assist with pain control. Physical Exam:  Patient seen and examined    Vitals:    01/07/22 1441   BP: (!) 140/77   Pulse: 120   Resp: 16   Temp: 98.7 °F (37.1 °C)   SpO2: 95%     GCS:  4 - Opens eyes on own  5 - Alert and oriented  6 - Follows simple motor commands  General: Well developed. Alert and cooperative in no acute distress. HENT: atraumatic, neck supple  Eyes: Optic discs: Not tested  Pulmonary: unlabored respiratory effort  Cardiovascular:  Warm well perfused. No peripheral edema  Gastrointestinal: abdomen soft, NT, ND    Neurological:  Mental Status: Awake, alert, oriented x 4, speech clear and appropriate  Attention: Intact  Language: No aphasia or dysarthria noted  Sensation: Intact to all extremities to light touch  Coordination: Intact    Musculoskeletal:   Gait: Not tested   Assist devices: None   Tone: Normal  Motor strength:    Right  Left    Right  Left    Deltoid  5 5  Hip Flex  5 5   Biceps  5 5  Knee Extensors  5 5   Triceps  5 5  Knee Flexors  5 5   Wrist Ext  5 5  Ankle Dorsiflex. 5 5   Wrist Flex  5 5  Ankle Plantarflex. 5 5   Handgrip  5 5  Ext Bam Longus  5 5   Thumb Ext  5 5         Incision:   Abdomen- CDI  Back- CDI    Radiological Findings:  No post-op images      Labs:  Recent Labs     01/06/22  0640   WBC 6.6   HGB 14.8   HCT 42.0          No results for input(s): NA, K, CL, CO2, BUN, CREATININE, GLUCOSE, CALCIUM, PHOS, MG in the last 72 hours. No results for input(s): PROTIME, INR, APTT in the last 72 hours.     Patient Active Problem List    Diagnosis Date Noted   

## 2022-01-07 NOTE — PROGRESS NOTES
4 Eyes Admission Assessment     I agree as the admission nurse that 2 RN's have performed a thorough Head to Toe Skin Assessment on the patient. ALL assessment sites listed below have been assessed on admission. Areas assessed by both nurses:   [x]   Head, Face, and Ears   [x]   Shoulders, Back, and Chest  [x]   Arms, Elbows, and Hands   [x]   Coccyx, Sacrum, and Ischium  [x]   Legs, Feet, and Heels        Does the Patient have Skin Breakdown?   No         Antonio Prevention initiated:  No   Wound Care Orders initiated:  No      [facility] nurse consulted for Pressure Injury (Stage 3,4, Unstageable, DTI, NWPT, and Complex wounds) or Antonio score 18 or lower:  No      Nurse 1 eSignature: Electronically signed by Gregg Jenkins RN on 1/6/22 at 10:11 PM EST    **SHARE this note so that the co-signing nurse is able to place an eSignature**    Nurse 2 eSignature: Electronically signed by Tahmina Gallardo RN on 1/7/22 at 12:31 AM EST

## 2022-01-07 NOTE — CONSULTS
Clinical Pharmacy Progress Note    Admit date: 1/6/2022     Subjective/Objective:  Pt is a 59yoF with a PMHx that includes chronic back and leg pain, fibromyalgia, HTN, migraine, Mitral valve prolapse, RA, and thyroid dx. Admitted s/p retroperitoneal exposure of L3-4, L4-5 and L5-S1 for anterior lumbar interbody fusion with Cooper Ortega and Massimo Grande on 1/6. Pharmacy has been consulted to make pain medication recommendations by HALLIE MOBLEY. 1/7:  Pain scores have been mostly 7 to 10 / 10. Patient reports pain mostly in her back, but yet she is starting to feel burning in her abdomin near the incision. States that the hydromorphone works best to make the pain tolerable, but it only lasts ~1 hr or so. Oral oxycodone does not relieve the pain as well, but she does obtain some relief after a dose. Ups and downs from pain bother her the most; she does not watch the clock and feels she's at a disadvantage about this. Diazepam does provide some anxiety relief, allowing her to sleep. Encouraged using this more. Itching from the narcotics; would like diphenhydramine prn, as well as Eucerin lotion for dryness of skin. Current pain regimen:   Medication  Home med? Amount used last 24 hrs    Diazepam 5mg PO q6h prn No 2 doses   Methocarbamol 1000mg IV q8h x3 doses,   Then 750mg PO q6h prn Yes IV 3 doses completed   Fentanyl 50mcg  No 100mcg  (50mcg x 2 doses)   Hydromorphone 0.5-1mg IV q3h prn No 9.25mg  (0.25mg x 1 dose)  (0.5mg x 8 doses)  (1mg x 5 doses)   Oxycodone 5-10mg PO q4h prn No 50mg  (10mg x 5 doses)                    Morphine Equivalent Daily Dose:  Date MEDD   1/7 151mg                 Bowel regimen:  Miralax PO daily  Senna S one tab PO BID    Last Bowel Movement:  PTA    Assessment/Plan:  1)  Pain regimen recommendations:  · Recommend to start scheduled APAP 1000mg PO q6h for multimodal pain control. Pt states she uses APAP at home, so feels comfortable and believes it will help.

## 2022-01-07 NOTE — OP NOTE
Operative Note      Patient: Danny Landeros  YOB: 1960  MRN: [id number]    Date of Procedure: 1/6/2022    Pre-Op Diagnosis: Spondylolisthesis of lumbosacral region [M43.17] Spinal stenosis of lumbar region, unspecified whether neurogenic claudication present [M48.061]    Post-Op Diagnosis: Same       Procedure: Retroperitoneal exposure of L3-4, L4-5 annd L5-S1 disc spaces for anterior lumbar interbody fusion.      Surgeon(s):  MD Kori Hurst MD    Assistant:   Surgical Assistant: Vilma Krabbe  Physician Assistant: Ben Lock PA-C    Anesthesia: General    Estimated Blood Loss (mL): less than 406      COMPLICATIONS:  None.     REASON FOR THE PROCEDURE:  The patient is a 79-year-old female with  longstanding history of chronic back and leg pain.  She was evaluated by  Dr. Garret Smith and was felt to require anterior posterior fusion at L3-4,  L4-5 and  L5-S1 disk spaces.  I met the patient preoperatively and had an  extensive discussion with her regarding the risks related to the  exposure.  Risks discussed included bleeding; infection; the possibility  of bowel, bladder, or ureteral injuries; possibility of nerve damage;  paresthesias; hernias; lymph leaks; and the possibility of arterial or  venous thrombosis requiring a thrombectomy or bypass and the possibility  of retroperitoneal fluid collections requiring drainage were all  extensively discussed.  The patient understood all these risks and  wished to proceed.     PROCEDURE DETAILS:  The patient was taken to the operating room and  placed on the operating table in supine position.  General endotracheal  anesthesia was induced.  IV antibiotics were administered and a Tomas  catheter was placed.  Preoperative localization of disk spaces was  carried out with fluoroscopy.  The abdomen was then prepped and draped  in the usual fashion.  A  midline incision was made extending from just above the umbilicus to the pubic symphysis   and I dissected through the subcutaneous tissues and  identified the left anterior rectus sheath, which was incised.  The left  rectus abdominis muscle was mobilized from midline towards the left  side.  Retroperitoneum was entered in the left lower quadrant. Peritoneal contents were swept towards the midline.  Bookwalter  retractor was placed in the field. .  Bipolar cautery was used to free up  the soft tissue between the iliac veins.  Middle sacral vessels were  clipped and ligated, and we had good exposure of the L5-S1 disk space.     I then used a bipolar cautery in the lateral aspect of the left common  iliac artery and vein.  Left iliolumbar vein was ligated.  Segmental  vessels were clipped and ligated and we had good exposure of L4-5 disk  space.  More proximal dissection was carried out to get exposure of the L3-4 space.   Steinmann pins were placed at all three levels to confirm the level  of the disk spaces as well as midline.  Once it was completed,  diskectomy and cage placement were carried out by Dr. Bina Stratton at all three  levels.  I then carefully inspected the wound for hemostasis.  Once the  hemostasis was ensured, I reapproximated the anterior rectus sheath with  0 PDS silk sutures.  Subcutaneous tissue was reapproximated in two  layers of 3-0 Vicryl and skin was closed with 4-0 Monocryl.  Fluoroscopy  sweep confirmed no retained sponges or instruments.  I was present for  the entire anterior portion of the procedure, and I assisted Dr. Bina Stratton  with his portion of the procedure, which included soft tissue and blood  vessel retraction to facilitate implant placement.

## 2022-01-07 NOTE — PROGRESS NOTES
L4-5 LAMINECTOMY WITH DECOMPRESSION AND DISCECTOMY  Prognosis: Good  Decision Making: Medium Complexity  OT Education: OT Role;Transfer Training;ADL Adaptive Strategies;Precautions  Patient Education: would benefit from ongoing education and reinforcement  REQUIRES OT FOLLOW UP: Yes  Activity Tolerance  Activity Tolerance: Patient Tolerated treatment well;Patient limited by pain  Safety Devices  Safety Devices in place: Yes  Type of devices: Nurse notified; Left in chair;Chair alarm in place;Call light within reach           Patient Diagnosis(es): There were no encounter diagnoses. has a past medical history of Arthritis, Fibromyalgia, Hypertension, Migraine, MVP (mitral valve prolapse), Nausea & vomiting, PONV (postoperative nausea and vomiting), Reflux, Thyroid disease, and Wears glasses. has a past surgical history that includes shoulder surgery; Ankle surgery; Cholecystectomy; Tonsillectomy; back surgery; Elbow surgery; Ankle arthroscopy (Right, 3/12); Knee arthroscopy (Left); Wrist surgery (Right); cervical fusion (N/A, 12/11/2019); joint replacement (Left, 2019); Revision total knee arthroplasty (Left, 8/28/2020); Revision total knee arthroplasty (Left, 1/26/2021); and lumbar fusion (N/A, 1/6/2022). Treatment Diagnosis: impaired ADLs/transfers s/p L4-5 LAMINECTOMY WITH DECOMPRESSION AND DISCECTOMY      Restrictions  Position Activity Restriction  Spinal Precautions: No Bending,No Lifting,No Twisting  Other position/activity restrictions: Up w/ Assist    Subjective   General  Chart Reviewed: Yes  Additional Pertinent Hx: 57 y.o. F OR 1/6 for L3-4, L4-5, L5-S1 ANTERIOR LUMBAR INTERBODY FUSION WITH PEDICLE SCREW FIXATION. PMH: HTN, Thyroid Disease, Fibromyalgia, MVP, Cervical Fusion (2019), Elbow Sx, L TKR (8/2020). Family / Caregiver Present: No  Referring Practitioner: Dr. Kenya Stacy  Diagnosis: Spondylolisthesis of Lumbosacral Region    Subjective  Subjective: In bed on entry.  Reports she didn't sleep well last night 2* pain. Agreeable to therapy. Plans are to return home w/ initial 24 hr S/A of spouse and dtr. Patient Currently in Pain: Yes (surgical pain - nurse aware and pt was medicated prior to OOB)      Social/Functional History  Social/Functional History  Lives With: Spouse (and dtr to assist w/ care as well)  Type of Home: House  Home Layout: One level,Performs ADL's on one level  Home Access: Stairs to enter without rails (1+1 no rail)  Bathroom Shower/Tub: Walk-in shower  Bathroom Toilet: Handicap height (3in1 over toilet)  Bathroom Equipment: Hand-held shower (has both shower stool and shower chair w/ back)  Bathroom Accessibility: Accessible  Home Equipment: 4 wheeled walker,Rolling walker (wedge for bed; step for tall bed; transport chair)  ADL Assistance: [address] [place] [address] Avenue:  (shares w/ spouse)  Ambulation Assistance: Independent (using wh walker)  Transfer Assistance: Independent  Active : Yes  Occupation: Retired  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  Additional Comments: denies h/o falls       Objective   Vision: Within Functional Limits (wears glasses)  Hearing: Within functional limits      Orientation  Overall Orientation Status: Within Normal Limits        Balance  Sitting Balance: Contact guard assistance  Standing Balance:  (varies CGA to SBA; SBA for grooming at sink level - unilateral support of sink)    Toilet Transfers  Toilet - Technique: Ambulating (using wh walker)  Equipment Used: Standard bedside commode (over toilet (to simulate home environment))  Toilet Transfer: Contact guard assistance    ADL  Grooming: Stand by assistance (to wash hands and brush teeth at sink level, standing)  UE Bathing: Contact guard assistance; Increased time to complete;Verbal cueing (demo poor trunk control while seated on toilet (for spongebath) - needing to maintain unilateral support of bar)  LE Bathing: Maximum assistance (for lower legs and posterior hygiene)  UE Dressing:  (assisted w/ gown change)  LE Dressing: Maximum assistance  Toileting:  (reyes catheter; assist for posterior hygiene)     Coordination  Movements Are Fluid And Coordinated: Yes        Bed mobility  Supine to Sit: Minimal assistance (via log roll, assist for trunk)  Scooting: Contact guard assistance (to EOB)     Transfers  Sit to stand: Contact guard assistance  Stand to sit: Contact guard assistance        Cognition  Overall Cognitive Status: WFL  Cognition Comment: sleepy at times             Sensation  Overall Sensation Status: WFL (denies numbness or tingling)          LUE AROM (degrees)  LUE AROM : WFL  Left Hand AROM (degrees)  Left Hand AROM: WFL  RUE AROM (degrees)  RUE AROM : WFL  Right Hand AROM (degrees)  Right Hand AROM: WFL  LUE Strength  Gross LUE Strength: WFL  RUE Strength  Gross RUE Strength: WFL               Pt seen by OT for eval and treat.  Treatment included: bed mobility, functional transfer/mobility, ADL, pt education         Plan   Plan  Times per week: 5-7  Times per day: Daily  Current Treatment Recommendations: Balance Training,Functional Mobility Training,Endurance Training,Safety Education & Training,Equipment Evaluation, Education, & procurement,Home Management Training,Self-Care / ADL                                                    AM-PAC Score        AM-[facility] Inpatient Daily Activity Raw Score: 17 (01/07/22 1146)  AM-PAC Inpatient ADL T-Scale Score : 37.26 (01/07/22 1146)  ADL Inpatient CMS 0-100% Score: 50.11 (01/07/22 1146)  ADL Inpatient CMS G-Code Modifier : CK (01/07/22 1146)    Goals  Short term goals  Time Frame for Short term goals: Discharge  Short term goal 1: 3in1 transfer w/ SBA  Short term goal 2: LB dressing using AE prn, Min A  Short term goal 3: increase functional standing tolerance to 5 minutes for functional task, SBA  Short term goal 4: independently verbalize back precautions  Short term goal 5: functional mobility for item retrieval, SBA  Patient Goals   Patient goals : to return home from hospital       Therapy Time   Individual Concurrent Group Co-treatment   Time In 1046         Time Out 1127         Minutes 41           Timed Code Treatment Minutes:   26    Total Treatment Minutes:  4[address] Dionicio Saleh OTR/L #[id number]

## 2022-01-07 NOTE — PROGRESS NOTES
Pt complaining of pain to her lower back. Pt medicated per MAR. Per Nikko with [facility] okay to keep reyes in and remove tomorrow at 0600. Pt up X1 with walker and gaitbelt. Pt denies n/v at this time. Back incisions have some scant bloody drainage noted.  Fall precautions in place

## 2022-01-07 NOTE — PROGRESS NOTES
Secure message sent to MD with immediate response to obtain a one time order for pain relief. Order placed and given as instructed.  Patient endorses relief from symptoms

## 2022-01-07 NOTE — PROGRESS NOTES
Physical Therapy    Facility/Department: [facility] 5T ORTHO/NEURO  Initial Assessment    NAME: Kamran Rodriguez  : 1960  MRN: [id number]    Date of Service: 2022    Discharge Recommendations: Kamran Rodriguez scored a 18/24 on the AM-PAC short mobility form. Current research shows that an AM-PAC score of 18 or greater is typically associated with a discharge to the patient's home setting. Based on the patient's AM-PAC score and their current functional mobility deficits, it is recommended that the patient have 2-3 sessions per week of Physical Therapy at d/c to increase the patient's independence. At this time, this patient demonstrates the endurance and safety to discharge home with home PT and a follow up treatment frequency of 2-3x/wk. Please see assessment section for further patient specific details. If patient discharges prior to next session this note will serve as a discharge summary. Please see below for the latest assessment towards goals. PT Equipment Recommendations  Equipment Needed: No    Assessment   Body structures, Functions, Activity limitations: Decreased functional mobility ; Decreased balance;Decreased safe awareness;Decreased strength;Decreased endurance; Increased pain  Assessment: pt is a 58 yo female s/p lumbar fusion presenting slightly below baseline performing functional mobility at RW with CGA/min A limited by pain and fatigue. pt requiring occasional cues for following precautions and inc rest due to pain. pt has support at home and a supportive home environment, pt safe to return home with initial 24hr and HHPT. will follow throughout stay. Treatment Diagnosis: decreased functional mobility  Prognosis: Good  Decision Making: Low Complexity  PT Education: Goals;PT Role;Plan of Care;General Safety; Functional Mobility Training;Precautions  Patient Education: pt verbalized understanding  REQUIRES PT FOLLOW UP: Yes  Activity Tolerance  Activity Tolerance: Patient limited by fatigue;Patient limited by pain       Patient Diagnosis(es): There were no encounter diagnoses. has a past medical history of Arthritis, Fibromyalgia, Hypertension, Migraine, MVP (mitral valve prolapse), Nausea & vomiting, PONV (postoperative nausea and vomiting), Reflux, Thyroid disease, and Wears glasses. has a past surgical history that includes shoulder surgery; Ankle surgery; Cholecystectomy; Tonsillectomy; back surgery; Elbow surgery; Ankle arthroscopy (Right, 3/12); Knee arthroscopy (Left); Wrist surgery (Right); cervical fusion (N/A, 12/11/2019); joint replacement (Left, 2019); Revision total knee arthroplasty (Left, 8/28/2020); Revision total knee arthroplasty (Left, 1/26/2021); and lumbar fusion (N/A, 1/6/2022). Restrictions  Position Activity Restriction  Spinal Precautions: No Bending,No Lifting,No Twisting  Other position/activity restrictions: Up w/ Assist  Vision/Hearing        Subjective  General  Chart Reviewed:  Yes  Additional Pertinent Hx: pt is a 58 yo female s/p L3-4, L4-5, L5-S1 ANTERIOR LUMBAR INTERBODY FUSION WITH PEDICLE SCREW FIXATION  Referring Practitioner: Og House MD  Diagnosis: spondylolithesis of lumbosacral region  Follows Commands: Within Functional Limits  Subjective  Subjective: pt supine in bed and agreeable to PT  Pain Screening  Patient Currently in Pain: Yes (8/10 surgical pain)  Vital Signs  Patient Currently in Pain: Yes (8/10 surgical pain)       Orientation  Orientation  Overall Orientation Status: Within Normal Limits  Social/Functional History  Social/Functional History  Lives With: Spouse (and dtr to assist w/ care as well)  Type of Home: House  Home Layout: One level,Performs ADL's on one level  Home Access: Stairs to enter without rails (1+1 no rail)  Bathroom Shower/Tub: Walk-in shower  Bathroom Toilet: Handicap height (3in1 over toilet)  Bathroom Equipment: Hand-held shower (has both shower stool and shower chair w/ back)  Bathroom Accessibility: Mobility Raw Score : 18 (01/07/22 1206)  AM-PAC Inpatient T-Scale Score : 43.63 (01/07/22 1206)  Mobility Inpatient CMS 0-100% Score: 46.58 (01/07/22 1206)  Mobility Inpatient CMS G-Code Modifier : CK (01/07/22 1206)          Goals  Short term goals  Time Frame for Short term goals: by dc  Short term goal 1: pt will perform bed mobility with sup  Short term goal 2: pt will perform functional transfers with LRAD and Sup  Short term goal 3: pt will ambulate 150' with LRAD and sup  Short term goal 4: pt will negotiate 2 steps with no rail and Sup  Patient Goals   Patient goals : to go home       Therapy Time   Individual Concurrent Group Co-treatment   Time In 1046         Time Out 1102         Minutes 16                 Halie Costella Sicard, PT

## 2022-01-07 NOTE — PLAN OF CARE
Problem: Falls - Risk of:  Goal: Will remain free from falls  Description: Will remain free from falls  1/7/2022 0740 by Luisa Soto RN  Outcome: Ongoing     Problem: Pain:  Goal: Pain level will decrease  Description: Pain level will decrease  1/7/2022 0740 by Luisa Soto RN  Outcome: Ongoing

## 2022-01-07 NOTE — CARE COORDINATION
Case Management Assessment           Initial Evaluation                Date / Time of Evaluation: 1/7/2022 12:53 PM                 Assessment Completed by: Jigna Wakefield RN     Patient is from home with her spouse and plans on returning to home with spouse and assistance from daughter. Family will transport to home at d/c. She has no DME needs but did agree to home care with [facility]. Referral made with Fátima Ramos from [facility] to check on availability. Patient Name: Brian Wells     YOB: 1960  Diagnosis: Spondylolisthesis of lumbosacral region [M43.17]  Spinal stenosis of lumbar region, unspecified whether neurogenic claudication present [M48.061]  Spondylolisthesis of lumbar region [M43.16]     Date / Time: 1/6/2022  5:39 AM    Patient Admission Status: Inpatient    If patient is discharged prior to next notation, then this note serves as note for discharge by case management.      Current PCP: Julian Garza DO  Clinic Patient: No    Chart Reviewed: Yes  Patient/ Family Interviewed: Yes    Initial assessment completed at bedside with: patient    Hospitalization in the last 30 days: No    Emergency Contacts:  Extended Emergency Contact Information  Primary Emergency Contact: Carlos Doss  Address: [address] Phone: [phone]  Mobile Phone: [phone]  Relation: Spouse  Secondary Emergency Contact: Donna [address] Phone: [phone]  Mobile Phone: [phone]  Relation: Child    Advance Directives:   Code Status: [address]: No  Agent: NA  Contact Number: NA    Financial  Payor: Jonelle Boyd / Plan: [address]  / Product Type: *No Product type* /     Pre-cert required for SNF: Yes    Pharmacy    [facility] [address] [facility], [address]  [address]  [address] [facility] [address]  Phone: [phone] Fax: [phone]      Potential assistance Purchasing Medications:    Does Patient want to participate in local refill/ meds to beds program?:      Meds To Beds General Rules:  1. Can ONLY be done Monday- Friday between 8:30am-5pm  2. Prescription(s) must be in pharmacy by 3pm to be filled same day  3. Copy of patient's insurance/ prescription drug card and patient face sheet must be sent along with the prescription(s)  4. Cost of Rx cannot be added to hospital bill. If financial assistance is needed, please contact unit  or ;  or  CANNOT provide pharmacy voucher for patients co-pays  5.  Patients can then  the prescription on their way out of the hospital at discharge, or pharmacy can deliver to the bedside if staff is available. (payment due at time of pick-up or delivery - cash, check, or card accepted)     Able to afford home medications/ co-pay costs: Yes    ADLS  Support Systems: Spouse/Significant Other    PT AM-PAC: 18 /24  OT AM-PAC: 17 /24    [place]: one level home  Steps: 2 steps to enter    Plans to RETURN to current housing: Yes  Barriers to RETURNING to current housing: none noted      DISCHARGE PLAN:  Disposition: Home with [address]: [address] for discharge: family     Factors facilitating achievement of predicted outcomes: Family support, Cooperative and Pleasant    Barriers to discharge: pain not controlled    Additional Case Management Notes: NA    The Plan for Transition of Care is related to the following treatment goals of Spondylolisthesis of lumbosacral region [M43.17]  Spinal stenosis of lumbar region, unspecified whether neurogenic claudication present [M48.061]  Spondylolisthesis of lumbar region [M43.16]    The Patient and/or patient representative Celia Lee and her family were provided with a choice of provider and agrees with the discharge plan Yes    Freedom of choice list was provided with basic dialogue that supports the patient's individualized plan of care/goals and shares the quality data associated with the providers.  Yes    Care Transition patient: No    Raina Brothers RN  [facility]  Case Management Department  Ph: [phone]   Fax: [phone]

## 2022-01-08 ENCOUNTER — APPOINTMENT (OUTPATIENT)
Dept: GENERAL RADIOLOGY | Age: 62
DRG: 454 | End: 2022-01-08
Attending: NEUROLOGICAL SURGERY
Payer: COMMERCIAL

## 2022-01-08 PROCEDURE — 1200000000 HC SEMI PRIVATE

## 2022-01-08 PROCEDURE — 6370000000 HC RX 637 (ALT 250 FOR IP): Performed by: NURSE PRACTITIONER

## 2022-01-08 PROCEDURE — 97530 THERAPEUTIC ACTIVITIES: CPT

## 2022-01-08 PROCEDURE — 6370000000 HC RX 637 (ALT 250 FOR IP): Performed by: NEUROLOGICAL SURGERY

## 2022-01-08 PROCEDURE — 2580000003 HC RX 258: Performed by: NEUROLOGICAL SURGERY

## 2022-01-08 PROCEDURE — 6360000002 HC RX W HCPCS: Performed by: NURSE PRACTITIONER

## 2022-01-08 PROCEDURE — 72100 X-RAY EXAM L-S SPINE 2/3 VWS: CPT

## 2022-01-08 PROCEDURE — 74018 RADEX ABDOMEN 1 VIEW: CPT

## 2022-01-08 PROCEDURE — 2580000003 HC RX 258: Performed by: NURSE PRACTITIONER

## 2022-01-08 PROCEDURE — 97535 SELF CARE MNGMENT TRAINING: CPT

## 2022-01-08 PROCEDURE — 6360000002 HC RX W HCPCS: Performed by: NEUROLOGICAL SURGERY

## 2022-01-08 RX ORDER — OXYCODONE HYDROCHLORIDE 5 MG/1
10 TABLET ORAL EVERY 4 HOURS PRN
Status: DISCONTINUED | OUTPATIENT
Start: 2022-01-08 | End: 2022-01-10 | Stop reason: HOSPADM

## 2022-01-08 RX ORDER — OXYCODONE HYDROCHLORIDE 5 MG/1
10 TABLET ORAL EVERY 4 HOURS PRN
Status: DISCONTINUED | OUTPATIENT
Start: 2022-01-08 | End: 2022-01-08

## 2022-01-08 RX ORDER — OXYCODONE HYDROCHLORIDE 5 MG/1
5 TABLET ORAL EVERY 4 HOURS PRN
Status: DISCONTINUED | OUTPATIENT
Start: 2022-01-08 | End: 2022-01-08

## 2022-01-08 RX ORDER — OXYCODONE HYDROCHLORIDE 15 MG/1
15 TABLET ORAL EVERY 4 HOURS PRN
Status: DISCONTINUED | OUTPATIENT
Start: 2022-01-08 | End: 2022-01-10 | Stop reason: HOSPADM

## 2022-01-08 RX ORDER — HEPARIN SODIUM 5000 [USP'U]/ML
5000 INJECTION, SOLUTION INTRAVENOUS; SUBCUTANEOUS EVERY 8 HOURS SCHEDULED
Status: DISCONTINUED | OUTPATIENT
Start: 2022-01-08 | End: 2022-01-10 | Stop reason: HOSPADM

## 2022-01-08 RX ORDER — CALCIUM POLYCARBOPHIL 625 MG 625 MG/1
625 TABLET ORAL DAILY
Status: DISCONTINUED | OUTPATIENT
Start: 2022-01-08 | End: 2022-01-10 | Stop reason: HOSPADM

## 2022-01-08 RX ADMIN — TRIAMCINOLONE ACETONIDE: 0.25 CREAM TOPICAL at 08:07

## 2022-01-08 RX ADMIN — HYDROMORPHONE HYDROCHLORIDE 1 MG: 1 INJECTION, SOLUTION INTRAMUSCULAR; INTRAVENOUS; SUBCUTANEOUS at 16:31

## 2022-01-08 RX ADMIN — OXYCODONE 10 MG: 5 TABLET ORAL at 15:44

## 2022-01-08 RX ADMIN — ACETAMINOPHEN 1000 MG: 500 TABLET ORAL at 08:09

## 2022-01-08 RX ADMIN — ACETAMINOPHEN 1000 MG: 500 TABLET ORAL at 03:30

## 2022-01-08 RX ADMIN — METHOCARBAMOL 750 MG: 750 TABLET ORAL at 20:14

## 2022-01-08 RX ADMIN — ACETAMINOPHEN 1000 MG: 500 TABLET ORAL at 16:31

## 2022-01-08 RX ADMIN — HYDROMORPHONE HYDROCHLORIDE 0.5 MG: 1 INJECTION, SOLUTION INTRAMUSCULAR; INTRAVENOUS; SUBCUTANEOUS at 01:41

## 2022-01-08 RX ADMIN — HYDROMORPHONE HYDROCHLORIDE 1 MG: 1 INJECTION, SOLUTION INTRAMUSCULAR; INTRAVENOUS; SUBCUTANEOUS at 20:50

## 2022-01-08 RX ADMIN — HEPARIN SODIUM 5000 UNITS: 5000 INJECTION INTRAVENOUS; SUBCUTANEOUS at 21:53

## 2022-01-08 RX ADMIN — OXYCODONE HYDROCHLORIDE 10 MG: 10 TABLET, FILM COATED, EXTENDED RELEASE ORAL at 20:14

## 2022-01-08 RX ADMIN — LEVOTHYROXINE, LIOTHYRONINE 120 MG: 19; 4.5 TABLET ORAL at 06:00

## 2022-01-08 RX ADMIN — SENNOSIDES AND DOCUSATE SODIUM 1 TABLET: 50; 8.6 TABLET ORAL at 08:08

## 2022-01-08 RX ADMIN — OXYCODONE HYDROCHLORIDE 10 MG: 10 TABLET, FILM COATED, EXTENDED RELEASE ORAL at 08:08

## 2022-01-08 RX ADMIN — METHOCARBAMOL 750 MG: 750 TABLET ORAL at 16:31

## 2022-01-08 RX ADMIN — SENNOSIDES AND DOCUSATE SODIUM 1 TABLET: 50; 8.6 TABLET ORAL at 20:14

## 2022-01-08 RX ADMIN — POLYETHYLENE GLYCOL 3350 17 G: 17 POWDER, FOR SOLUTION ORAL at 08:09

## 2022-01-08 RX ADMIN — HEPARIN SODIUM 5000 UNITS: 5000 INJECTION INTRAVENOUS; SUBCUTANEOUS at 15:45

## 2022-01-08 RX ADMIN — HYDROMORPHONE HYDROCHLORIDE 1 MG: 1 INJECTION, SOLUTION INTRAMUSCULAR; INTRAVENOUS; SUBCUTANEOUS at 10:26

## 2022-01-08 RX ADMIN — HYDROMORPHONE HYDROCHLORIDE 1 MG: 1 INJECTION, SOLUTION INTRAMUSCULAR; INTRAVENOUS; SUBCUTANEOUS at 23:31

## 2022-01-08 RX ADMIN — TRIAMCINOLONE ACETONIDE: 0.25 CREAM TOPICAL at 20:12

## 2022-01-08 RX ADMIN — HEPARIN SODIUM 5000 UNITS: 10000 INJECTION, SOLUTION INTRAVENOUS; SUBCUTANEOUS at 05:54

## 2022-01-08 RX ADMIN — AMLODIPINE BESYLATE 10 MG: 10 TABLET ORAL at 08:09

## 2022-01-08 RX ADMIN — BENZOCAINE AND MENTHOL 1 LOZENGE: 15; 3.6 LOZENGE ORAL at 23:31

## 2022-01-08 RX ADMIN — METHOCARBAMOL 1000 MG: 100 INJECTION, SOLUTION INTRAMUSCULAR; INTRAVENOUS at 06:59

## 2022-01-08 RX ADMIN — HYDROMORPHONE HYDROCHLORIDE 1 MG: 1 INJECTION, SOLUTION INTRAMUSCULAR; INTRAVENOUS; SUBCUTANEOUS at 13:29

## 2022-01-08 RX ADMIN — SODIUM CHLORIDE, PRESERVATIVE FREE 10 ML: 5 INJECTION INTRAVENOUS at 08:09

## 2022-01-08 RX ADMIN — OXYCODONE HYDROCHLORIDE 15 MG: 15 TABLET ORAL at 21:53

## 2022-01-08 RX ADMIN — DIAZEPAM 5 MG: 5 TABLET ORAL at 20:51

## 2022-01-08 ASSESSMENT — PAIN - FUNCTIONAL ASSESSMENT
PAIN_FUNCTIONAL_ASSESSMENT: PREVENTS OR INTERFERES SOME ACTIVE ACTIVITIES AND ADLS
PAIN_FUNCTIONAL_ASSESSMENT: PREVENTS OR INTERFERES SOME ACTIVE ACTIVITIES AND ADLS
PAIN_FUNCTIONAL_ASSESSMENT: ACTIVITIES ARE NOT PREVENTED
PAIN_FUNCTIONAL_ASSESSMENT: ACTIVITIES ARE NOT PREVENTED
PAIN_FUNCTIONAL_ASSESSMENT: PREVENTS OR INTERFERES SOME ACTIVE ACTIVITIES AND ADLS

## 2022-01-08 ASSESSMENT — PAIN DESCRIPTION - PROGRESSION
CLINICAL_PROGRESSION: NOT CHANGED
CLINICAL_PROGRESSION: GRADUALLY IMPROVING

## 2022-01-08 ASSESSMENT — PAIN SCALES - GENERAL
PAINLEVEL_OUTOF10: 6
PAINLEVEL_OUTOF10: 8
PAINLEVEL_OUTOF10: 4
PAINLEVEL_OUTOF10: 7
PAINLEVEL_OUTOF10: 8
PAINLEVEL_OUTOF10: 7
PAINLEVEL_OUTOF10: 4
PAINLEVEL_OUTOF10: 7
PAINLEVEL_OUTOF10: 7
PAINLEVEL_OUTOF10: 5
PAINLEVEL_OUTOF10: 6
PAINLEVEL_OUTOF10: 8
PAINLEVEL_OUTOF10: 10
PAINLEVEL_OUTOF10: 7
PAINLEVEL_OUTOF10: 8
PAINLEVEL_OUTOF10: 7

## 2022-01-08 ASSESSMENT — PAIN DESCRIPTION - FREQUENCY
FREQUENCY: CONTINUOUS

## 2022-01-08 ASSESSMENT — PAIN DESCRIPTION - LOCATION
LOCATION: BACK
LOCATION: HEAD;BACK
LOCATION: BACK

## 2022-01-08 ASSESSMENT — PAIN DESCRIPTION - ORIENTATION
ORIENTATION: LOWER
ORIENTATION: ANTERIOR;LOWER
ORIENTATION: LOWER

## 2022-01-08 ASSESSMENT — PAIN DESCRIPTION - PAIN TYPE
TYPE: SURGICAL PAIN
TYPE: SURGICAL PAIN;ACUTE PAIN
TYPE: SURGICAL PAIN

## 2022-01-08 ASSESSMENT — PAIN DESCRIPTION - ONSET
ONSET: ON-GOING

## 2022-01-08 ASSESSMENT — PAIN DESCRIPTION - DESCRIPTORS
DESCRIPTORS: SHARP
DESCRIPTORS: ACHING;SHARP
DESCRIPTORS: SHARP
DESCRIPTORS: ACHING;SPASM;SHARP
DESCRIPTORS: ACHING;SHARP
DESCRIPTORS: SHARP;SHOOTING;ACHING

## 2022-01-08 NOTE — PROGRESS NOTES
No acute events during this shift. Pt complaining of pain 8/10 pain medication given per MAR.  Pt ambulated with walker X 1 assist.

## 2022-01-08 NOTE — PROGRESS NOTES
Physical Therapy and Occupational Therapy  No Treatment    Attempted to see for PT/OT this AM.  Pt off floor for testing. Will try back as time and schedule allow. If not, will follow up per PT/OT plans of care.       Barby Juarez, [facility] #[id number]  Caryl Navarro OTR/L

## 2022-01-08 NOTE — PROGRESS NOTES
Neurosurgery Progress Note    Patient seen and examined on 01/08/22. No acute events overnight. Reports resolution of preoperative left radicular pain. Notes abdominal distention today with some constipation. + flatus. Neurologically intact on exam. Complains of persistent difficulties with pain control. A/P: 56 yo woman POD 2 s/p L3-S1 ALIF    -Neuro stable  -Pain control with PO meds, breakthrough dilaudid  -Muscle relaxants prn  -Mobilize, OOB  -PT/OT  -X-rays lumbar spine today  -KUB   -DVT ppx   -Dispo:  To home tomorrow or Monday      Walter Rollins MD, PhD  [place] [address] [facility], [address] [place], [place], [address]  [phone] (c), [phone] (o)

## 2022-01-08 NOTE — PROGRESS NOTES
Hospitalist Progress Note      PCP: Jenny Watson DO    Date of Admission: 1/6/2022    Subjective: Pt. Reports she has not had a bowel movement. Tmax 99.1 F at 1810 last night. Medications:  Reviewed    Infusion Medications    sodium chloride       Scheduled Medications    polycarbophil  625 mg Oral Daily    acetaminophen  1,000 mg Oral Q6H    oxyCODONE  10 mg Oral 2 times per day    triamcinolone   Topical BID    methocarbamol  750 mg Oral 4x Daily    scopolamine  1 patch TransDERmal Once    amLODIPine  10 mg Oral Daily    thyroid  120 mg Oral Daily    sodium chloride flush  10 mL IntraVENous 2 times per day    polyethylene glycol  17 g Oral Daily    sennosides-docusate sodium  1 tablet Oral BID    heparin (porcine) PF  5,000 Units SubCUTAneous Q8H     PRN Meds: benzocaine-menthol, diphenhydrAMINE, sodium chloride flush, sodium chloride, naloxone, aluminum & magnesium hydroxide-simethicone, bisacodyl, promethazine **OR** ondansetron, acetaminophen, oxyCODONE **OR** oxyCODONE, HYDROmorphone **OR** HYDROmorphone, diazePAM      Intake/Output Summary (Last 24 hours) at 1/8/2022 1220  Last data filed at 1/8/2022 0910  Gross per 24 hour   Intake 480 ml   Output 2750 ml   Net -2270 ml       Physical Exam Performed:    /78   Pulse 106   Temp 98.1 °F (36.7 °C) (Oral)   Resp 18   Ht 5' 4\" (1.626 m)   Wt 203 lb (92.1 kg)   LMP 09/01/2011   SpO2 92%   BMI 34.84 kg/m²     General appearance: No apparent distress, appears stated age and cooperative. HEENT: Pupils equal, round, and reactive to light. Conjunctivae/corneas clear. Neck: Supple, with full range of motion. No jugular venous distention. Trachea midline. Respiratory:  Normal respiratory effort. Clear to auscultation, bilaterally without Rales/Wheezes/Rhonchi. Cardiovascular: Regular rate and rhythm with normal S1/S2 without murmurs, rubs or gallops.   Abdomen: mildly tender to palpation lower abdomen, somewhat distended. Musculoskeletal: No clubbing, cyanosis or edema bilaterally. Full range of motion without deformity. Skin: Skin color, texture, turgor normal.  No rashes or lesions. Neurologic:  AAOX3      Labs:   Recent Labs     01/06/22  0640   WBC 6.6   HGB 14.8   HCT 42.0        No results for input(s): NA, K, CL, CO2, BUN, CREATININE, CALCIUM, PHOS in the last 72 hours. Invalid input(s): MAGNES  No results for input(s): AST, ALT, BILIDIR, BILITOT, ALKPHOS in the last 72 hours. No results for input(s): INR in the last 72 hours. No results for input(s): Norma Derby in the last 72 hours. Urinalysis:      Lab Results   Component Value Date    NITRU Negative 12/13/2019    BLOODU Negative 12/13/2019    SPECGRAV 1.010 12/13/2019    GLUCOSEU Negative 12/13/2019       Radiology:  XR LUMBAR SPINE (2-3 VIEWS)   Final Result      Status post anterior/posterior fusion from L3 through S1 without complicating features. XR ABDOMEN (KUB) (SINGLE AP VIEW)   Final Result      Negative for obstruction. XR LUMBAR SPINE (2-3 VIEWS)   Final Result   Impression: Intraoperative fluoroscopy utilized during lumbar spine surgery, as above. FLUORO FOR SURGICAL PROCEDURES   Final Result   Impression: Intraoperative fluoroscopy utilized during lumbar spine surgery, as above.       CT GUIDED STEREOTACTIC LOCALIZATION   Final Result   Impression: Intraoperative cone beam CT of the lumbar spine, as above      CT GUIDED STEREOTACTIC LOCALIZATION   Final Result   Impression: Intraoperative cone beam CT of the lumbar spine, as above        Assessment/Plan:    Active Hospital Problems    Diagnosis     Spondylolisthesis of lumbar region [M43.16]      Patient is a 79-year-old female who presented for surgery for lumbar spine.   Assessment:  Status post lumbar spinal surgery  Hypertension  Hypothyroidism      Plan:  -Neurosurgery following, pain control, PT/OT following.  -continue home medications including amlodipine, Hendricks thyroid.  -added fiber supplement, already on Maalox, Dulcolax, Glycolax, and Senokot. PT/OT Eval Status: ordered     DVT Prophylaxis: heparin  Diet: ADULT DIET; Regular  Code Status: Full Code    Dispo - discharge per Neurosurgery,possibly tomorrow. Will need some laxatives/fiber supplements at discharge.   Christine José MD

## 2022-01-08 NOTE — PLAN OF CARE
Problem: Falls - Risk of:  Goal: Will remain free from falls  Description: Will remain free from falls  1/8/2022 0922 by Yisel Hylton RN  Outcome: Ongoing     Problem: Falls - Risk of:  Goal: Absence of physical injury  Description: Absence of physical injury  Outcome: Ongoing     Problem: Pain:  Description: Pain management should include both nonpharmacologic and pharmacologic interventions. Goal: Pain level will decrease  Description: Pain level will decrease  1/8/2022 0922 by Yisel Hylton RN  Outcome: Ongoing     Problem: Pain:  Description: Pain management should include both nonpharmacologic and pharmacologic interventions. Goal: Control of acute pain  Description: Control of acute pain  1/8/2022 0922 by Yisel Hylton RN  Outcome: Ongoing     Problem: Pain:  Description: Pain management should include both nonpharmacologic and pharmacologic interventions.   Goal: Control of chronic pain  Description: Control of chronic pain  1/8/2022 0922 by Yisel Hylton RN  Outcome: Ongoing

## 2022-01-08 NOTE — CONSULTS
PCP: Julian Garza DO     Date of Admission: 1/6/2022     Chief Complaint: Lumbar spine surgery     History Of Present Illness:  Patient is a 59-year-old female who presented for surgery for lumbar spine, patient returned my evaluation denies chest pain shortness of breath nausea vomiting diarrhea constipation dysuria fevers or chills.   Patient has been able to walk, denies problems making urine, eating, any acute events overnight, patient mention she tolerated procedure well.        Past Medical History:       Past Medical History             Diagnosis Date    Arthritis       rheumatoid arthritis    Fibromyalgia      Hypertension      Migraine      MVP (mitral valve prolapse)      Nausea & vomiting      PONV (postoperative nausea and vomiting)      Reflux Doesn't have anymore r/t weight loss    Thyroid disease       goiter treated with radioactive med    Wears glasses              Past Surgical History:       Past Surgical History             Procedure Laterality Date    ANKLE ARTHROSCOPY Right 3/12     lateral ligament repair, removal spurs    ANKLE SURGERY         right    BACK SURGERY         cyst removed off lower spine    CERVICAL FUSION N/A 12/11/2019     C4-5, C5-6, C6-7 ANTERIOR CERVICAL DISCECTOMY AND FUSION performed by Poncho Infante MD at [facility]         left    JOINT REPLACEMENT Left 2019    KNEE ARTHROSCOPY Left      LUMBAR FUSION N/A 1/6/2022     L3-4, L4-5, L5-S1 ANTERIOR LUMBAR INTERBODY FUSION WITH PEDICLE SCREW FIXATION performed by Poncho Infante MD at [facility] Left 8/28/2020     LEFT TOTAL KNEE REVISION 2 COMPONENT performed by Scot Kemp MD at [address] [facility] Left 1/26/2021     LEFT KNEE PATELLA REVISON WITH ANTERIOR SYNOVECTOMY performed by Scot Kemp MD at [address]         bilateral    TONSILLECTOMY         T & A    WRIST SURGERY Right         Medications Prior to Admission:      Home Medications           Prior to Admission medications    Medication Sig Start Date End Date Taking? Authorizing Provider   methocarbamol (ROBAXIN) 500 MG tablet Take 500 mg by mouth 4 times daily as needed     Yes Historical Provider, MD   ENBREL 25 MG/0.5ML SOSY Inject 25 mg into the skin Twice a Week  8/3/20   Yes Historical Provider, MD   sennosides-docusate sodium (SENOKOT-S) 8.6-50 MG tablet Take 1 tablet by mouth 2 times daily 12/15/19   Yes Kiirt Vazquez MD   folic acid (FOLVITE) 1 MG tablet Take 1 mg by mouth daily     Yes Historical Provider, MD   progesterone (PROMETRIUM) 200 MG capsule Take 600 mg by mouth nightly     Yes Historical Provider, MD   amLODIPine (NORVASC) 10 MG tablet Take 10 mg by mouth daily  5/9/18   Yes Historical Provider, MD   thyroid (ARMOUR) 240 MG tablet Take 120 mg by mouth daily      Yes Historical Provider, MD   triamcinolone (KENALOG) 0.025 % cream Apply topically every 4 hours as needed Apply Topically itchy skin       Historical Provider, MD            Allergies:  Latex, Adhesive tape, Gluten, Plaquenil [hydroxychloroquine sulfate], Vicodin [hydrocodone-acetaminophen], Benzamide derivatives, and Codeine     Social History:       TOBACCO:   reports that she has never smoked. She has never used smokeless tobacco.  ETOH:   reports previous alcohol use.        Family History:        Reviewed in detail and non contributory        Family History       Problem Relation Age of Onset    Diabetes Mother      Heart Disease Father           enlarged heart    Cancer Father      Cancer Brother      Other Sister              REVIEW OF SYSTEMS:   Pertinent positives as noted in the HPI.  All other systems reviewed and negative.     PHYSICAL EXAM PERFORMED:     /70   Pulse 118   Temp 99.1 °F (37.3 °C) (Oral)   Resp 16   Ht 5' 4\" (1.626 m)   Wt 203 lb (92.1 kg)   LMP 09/01/2011   SpO2 92%   BMI 34.84 kg/m²      General appearance:  No apparent distress, cooperative. HEENT:  Normal cephalic, atraumatic without obvious deformity. Conjunctivae/corneas clear. Neck: Supple, with full range of motion. No cervical lymphadenopathy  Respiratory:  Normal respiratory effort. Clear to auscultation, bilaterally without Rales/Wheezes/Rhonchi. Cardiovascular:  Regular rate and rhythm with normal S1/S2 without murmurs, rubs or gallops. Abdomen: Soft, non-tender, non-distended, normal bowel sounds. Musculoskeletal:  No edema noted bilaterally. No tenderness on palpation   Skin: no rash visible  Neurologic:  Neurologically intact without any focal sensory/motor deficits. grossly non-focal.  Psychiatric:  Alert and oriented, normal mood  Peripheral Pulses: +2 palpable, equal bilaterally         Labs:          Recent Labs     01/06/22  0640   WBC 6.6   HGB 14.8   HCT 42.0         No results for input(s): NA, K, CL, CO2, BUN, CREATININE, CALCIUM, PHOS in the last 72 hours.     Invalid input(s): MAGNES  No results for input(s): AST, ALT, BILIDIR, BILITOT, ALKPHOS in the last 72 hours. No results for input(s): INR in the last 72 hours.   No results for input(s): Everardo Snuffer in the last 72 hours.     Urinalysis:            Lab Results   Component Value Date     NITRU Negative 12/13/2019     BLOODU Negative 12/13/2019     SPECGRAV 1.010 12/13/2019     GLUCOSEU Negative 12/13/2019         Radiology:         XR LUMBAR SPINE (2-3 VIEWS)   Final Result   Impression: Intraoperative fluoroscopy utilized during lumbar spine surgery, as above.       FLUORO FOR SURGICAL PROCEDURES   Final Result   Impression: Intraoperative fluoroscopy utilized during lumbar spine surgery, as above.       CT GUIDED STEREOTACTIC LOCALIZATION   Final Result   Impression: Intraoperative cone beam CT of the lumbar spine, as above       CT GUIDED STEREOTACTIC LOCALIZATION   Final Result   Impression: Intraoperative cone beam CT of the lumbar spine, as above       XR LUMBAR SPINE (2-3 VIEWS)    (Results Pending)                    Active Hospital Problems     Diagnosis Date Noted    Spondylolisthesis of lumbar region [M43.16] 01/06/2022         Patient is a 68-year-old female who presented for surgery for lumbar spine, patient returned my evaluation denies chest pain shortness of breath nausea vomiting diarrhea constipation dysuria fevers or chills. Patient has been able to walk, denies problems making urine, eating, any acute events overnight, patient mention she tolerated procedure well.     Assessment  Status post lumbar spinal surgery  Hypertension  Hypothyroidism        Plan  Neurosurgery following, pain control, PT/OT following, continue home medications including amlodipine, thyroid supplementation  Due to prophylaxis-when okay with neurosurgery-primary team  Diet: ADULT DIET; Regular  Code Status: Full Code     PT/OT Eval Status: ordered     Dispo - pending clinical improvement         Suzanne Bolden MD     The note was completed using EMR and Dragon dictation system. Every effort was made to ensure accuracy; however, inadvertent computerized transcription errors may be present.     Thank you Skyla Granado DO for the opportunity to be involved in this patient's care. If you have any questions or concerns please feel free to contact me at [phone].

## 2022-01-08 NOTE — H&P
Admission medications    Medication Sig Start Date End Date Taking? Authorizing Provider   methocarbamol (ROBAXIN) 500 MG tablet Take 500 mg by mouth 4 times daily as needed   Yes Historical Provider, MD   ENBREL 25 MG/0.5ML SOSY Inject 25 mg into the skin Twice a Week  8/3/20  Yes Historical Provider, MD   sennosides-docusate sodium (SENOKOT-S) 8.6-50 MG tablet Take 1 tablet by mouth 2 times daily 12/15/19  Yes Kirit Vazquez MD   folic acid (FOLVITE) 1 MG tablet Take 1 mg by mouth daily   Yes Historical Provider, MD   progesterone (PROMETRIUM) 200 MG capsule Take 600 mg by mouth nightly   Yes Historical Provider, MD   amLODIPine (NORVASC) 10 MG tablet Take 10 mg by mouth daily  5/9/18  Yes Historical Provider, MD   thyroid (ARMOUR) 240 MG tablet Take 120 mg by mouth daily    Yes Historical Provider, MD   triamcinolone (KENALOG) 0.025 % cream Apply topically every 4 hours as needed Apply Topically itchy skin    Historical Provider, MD       Allergies:  Latex, Adhesive tape, Gluten, Plaquenil [hydroxychloroquine sulfate], Vicodin [hydrocodone-acetaminophen], Benzamide derivatives, and Codeine    Social History:      TOBACCO:   reports that she has never smoked. She has never used smokeless tobacco.  ETOH:   reports previous alcohol use. Family History:       Reviewed in detail and non contributory          Problem Relation Age of Onset    Diabetes Mother     Heart Disease Father         enlarged heart    Cancer Father     Cancer Brother     Other Sister        REVIEW OF SYSTEMS:   Pertinent positives as noted in the HPI. All other systems reviewed and negative. PHYSICAL EXAM PERFORMED:    /70   Pulse 118   Temp 99.1 °F (37.3 °C) (Oral)   Resp 16   Ht 5' 4\" (1.626 m)   Wt 203 lb (92.1 kg)   LMP 09/01/2011   SpO2 92%   BMI 34.84 kg/m²     General appearance:  No apparent distress, cooperative. HEENT:  Normal cephalic, atraumatic without obvious deformity.  Conjunctivae/corneas clear.  Neck: Supple, with full range of motion. No cervical lymphadenopathy  Respiratory:  Normal respiratory effort. Clear to auscultation, bilaterally without Rales/Wheezes/Rhonchi. Cardiovascular:  Regular rate and rhythm with normal S1/S2 without murmurs, rubs or gallops. Abdomen: Soft, non-tender, non-distended, normal bowel sounds. Musculoskeletal:  No edema noted bilaterally. No tenderness on palpation   Skin: no rash visible  Neurologic:  Neurologically intact without any focal sensory/motor deficits. grossly non-focal.  Psychiatric:  Alert and oriented, normal mood  Peripheral Pulses: +2 palpable, equal bilaterally       Labs:     Recent Labs     01/06/22  0640   WBC 6.6   HGB 14.8   HCT 42.0        No results for input(s): NA, K, CL, CO2, BUN, CREATININE, CALCIUM, PHOS in the last 72 hours. Invalid input(s): MAGNES  No results for input(s): AST, ALT, BILIDIR, BILITOT, ALKPHOS in the last 72 hours. No results for input(s): INR in the last 72 hours. No results for input(s): Meldon Carl in the last 72 hours. Urinalysis:      Lab Results   Component Value Date    NITRU Negative 12/13/2019    BLOODU Negative 12/13/2019    SPECGRAV 1.010 12/13/2019    GLUCOSEU Negative 12/13/2019       Radiology:       XR LUMBAR SPINE (2-3 VIEWS)   Final Result   Impression: Intraoperative fluoroscopy utilized during lumbar spine surgery, as above. FLUORO FOR SURGICAL PROCEDURES   Final Result   Impression: Intraoperative fluoroscopy utilized during lumbar spine surgery, as above.       CT GUIDED STEREOTACTIC LOCALIZATION   Final Result   Impression: Intraoperative cone beam CT of the lumbar spine, as above      CT GUIDED STEREOTACTIC LOCALIZATION   Final Result   Impression: Intraoperative cone beam CT of the lumbar spine, as above      XR LUMBAR SPINE (2-3 VIEWS)    (Results Pending)           Active Hospital Problems    Diagnosis Date Noted    Spondylolisthesis of lumbar region [M43.16] 01/06/2022       Patient is a 71-year-old female who presented for surgery for lumbar spine, patient returned my evaluation denies chest pain shortness of breath nausea vomiting diarrhea constipation dysuria fevers or chills. Patient has been able to walk, denies problems making urine, eating, any acute events overnight, patient mention she tolerated procedure well. Assessment  Status post lumbar spinal surgery  Hypertension  Hypothyroidism      Plan  Neurosurgery following, pain control, PT/OT following, continue home medications including amlodipine, thyroid supplementation  Due to prophylaxis-when okay with neurosurgery-primary team  Diet: ADULT DIET; Regular  Code Status: Full Code    PT/OT Eval Status: ordered    Dispo - pending clinical improvement       Al Poe MD    The note was completed using EMR and Dragon dictation system. Every effort was made to ensure accuracy; however, inadvertent computerized transcription errors may be present. Thank you Jenny Watson DO for the opportunity to be involved in this patient's care. If you have any questions or concerns please feel free to contact me at [phone].     Al Poe MD

## 2022-01-08 NOTE — PROGRESS NOTES
Occupational Therapy  Facility/Department: [facility] 5T ORTHO/NEURO  Daily Treatment Note  NAME: Sonal Gimenez  : 1960  MRN: [id number]    Date of Service: 2022    Discharge Recommendations:    Sonal Gimenez scored a 18/24 on the AM-PAC ADL Inpatient form. Current research shows that an AM-PAC score of 18 or greater is typically associated with a discharge to the patient's home setting. Based on the patient's AM-PAC score, and their current ADL deficits, it is recommended that the patient have 2-3 sessions per week of Occupational Therapy at d/c to increase the patient's independence. At this time, this patient demonstrates the endurance and safety to discharge home with  OP services and a follow up treatment frequency of 2-3x/wk. Please see assessment section for further patient specific details. If patient discharges prior to next session this note will serve as a discharge summary. Please see below for the latest assessment towards goals. Assessment   Performance deficits / Impairments: Decreased functional mobility ; Decreased ADL status; Decreased endurance  Assessment: pt demos increased functional mobility performance requiring SBA for transfers and CGA for ambulation with RW. Tolerated toileting and stance at sink. Cont. OT POC. Treatment Diagnosis: impaired ADLs/transfers s/p L4-5 LAMINECTOMY WITH DECOMPRESSION AND DISCECTOMY  Prognosis: Good  OT Education: OT Role;Transfer Training;ADL Adaptive Strategies;Precautions  Patient Education: would benefit from ongoing education and reinforcement  REQUIRES OT FOLLOW UP: Yes  Activity Tolerance  Activity Tolerance: Patient Tolerated treatment well  Safety Devices  Safety Devices in place: Yes  Type of devices: Left in bed;Nurse notified;Call light within reach (RN to turn on bed alarm)         Patient Diagnosis(es): There were no encounter diagnoses.       has a past medical history of Arthritis, Fibromyalgia, Hypertension, Migraine, MVP (mitral valve prolapse), Nausea & vomiting, PONV (postoperative nausea and vomiting), Reflux, Thyroid disease, and Wears glasses. has a past surgical history that includes shoulder surgery; Ankle surgery; Cholecystectomy; Tonsillectomy; back surgery; Elbow surgery; Ankle arthroscopy (Right, 3/12); Knee arthroscopy (Left); Wrist surgery (Right); cervical fusion (N/A, 12/11/2019); joint replacement (Left, 2019); Revision total knee arthroplasty (Left, 8/28/2020); Revision total knee arthroplasty (Left, 1/26/2021); and lumbar fusion (N/A, 1/6/2022). Restrictions  Position Activity Restriction  Spinal Precautions: No Bending,No Lifting,No Twisting  Other position/activity restrictions: Up w/ Assist  Subjective   General  Chart Reviewed: Yes  Additional Pertinent Hx: 57 y.o. F OR 1/6 for L3-4, L4-5, L5-S1 ANTERIOR LUMBAR INTERBODY FUSION WITH PEDICLE SCREW FIXATION. PMH: HTN, Thyroid Disease, Fibromyalgia, MVP, Cervical Fusion (2019), Elbow Sx, L TKR (8/2020). Family / Caregiver Present: No  Referring Practitioner: Dr. Evelyn Kerr  Diagnosis: Spondylolisthesis of Lumbosacral Region  Subjective  Subjective: pt in bed upon arrival, agreeable to OOB ADLs.   Vital Signs  Patient Currently in Pain: Yes (5/10, recently received pain medication)   Orientation  Orientation  Overall Orientation Status: Within Normal Limits  Objective    ADL  Toileting: Minimal assistance (pt participated in all parts of toileting however required assist for posterior pericare.)        Balance  Sitting Balance: Stand by assistance (EOB)  Standing Balance: Stand by assistance  Standing Balance  Time: ~5 min total  Activity: grooming at sink, toileting, functional mobility  Comment: RW, SBA  Functional Mobility  Functional - Mobility Device: Rolling Walker  Activity: To/from bathroom  Assist Level: Contact guard assistance  Functional Mobility Comments: steady pace with RW, CGA  Toilet Transfers  Toilet - Technique: Ambulating  Equipment Used: Standard toilet  Toilet Transfer: Stand by assistance  Bed mobility  Supine to Sit: Contact guard assistance (logroll technique with VCs, CGA)  Sit to Supine: Minimal assistance (VCs to complete logroll technique, min assist for LE)  Comment: Slow and effortful  Transfers  Sit to stand: Stand by assistance  Stand to sit: Stand by assistance  Transfer Comments: <>bed with RW, VCs for hand placement                                                                 Plan   Plan  Times per week: 5-7  Times per day: Daily  Current Treatment Recommendations: Balance Training,Functional Mobility Training,Endurance Training,Safety Education & Training,Equipment Evaluation, Education, & procurement,Home Management Training,Self-Care / ADL                                                    AM-PAC Score        AM-PAC Inpatient Daily Activity Raw Score: 18 (01/08/22 1631)  AM-PAC Inpatient ADL T-Scale Score : 38.66 (01/08/22 1631)  ADL Inpatient CMS 0-100% Score: 46.65 (01/08/22 1631)  ADL Inpatient CMS G-Code Modifier : CK (01/08/22 1631)    Goals  Short term goals  Time Frame for Short term goals: Discharge  Short term goal 1: 3in1 transfer w/ SBA-met 1/8  Short term goal 2: LB dressing using AE prn, Min A  Short term goal 3: increase functional standing tolerance to 5 minutes for functional task, SBA-ongoing  Short term goal 4: independently verbalize back precautions-ongoing  Short term goal 5: functional mobility for item retrieval, SBA  Patient Goals   Patient goals : to return home from hospital       Therapy Time   Individual Concurrent Group Co-treatment   Time In 1510         Time Out 1533         Minutes 23                 Brandi Lewis

## 2022-01-08 NOTE — PROGRESS NOTES
Clinical Pharmacy Progress Note    Admit date: 1/6/2022     Subjective/Objective:  Pt is a 59yoF with a PMHx that includes chronic back and leg pain, fibromyalgia, HTN, migraine, Mitral valve prolapse, RA, and thyroid dx. Admitted s/p retroperitoneal exposure of L3-4, L4-5 and L5-S1 for anterior lumbar interbody fusion with Cooper Adhikari and Sinan Gauthier on 1/6. Pharmacy has been consulted to make pain medication recommendations by HALLIE MOBLEY.      1/8:  Pain scores 4-10 / 10. Oxycodone ER and scheduled APAP started yesterday. Used less Hydromorphone last 24 hrs since starting Oxycodone ER.  1/7:  Pain scores have been mostly 7 to 10 / 10. Patient reports pain mostly in her back, but yet she is starting to feel burning in her abdomin near the incision. States that the hydromorphone works best to make the pain tolerable, but it only lasts ~1 hr or so. Oral oxycodone does not relieve the pain as well, but she does obtain some relief after a dose. Ups and downs from pain bother her the most; she does not watch the clock and feels she's at a disadvantage about this. Diazepam does provide some anxiety relief, allowing her to sleep. Encouraged using this more. Itching from the narcotics; would like diphenhydramine prn, as well as Eucerin lotion for dryness of skin. Current pain regimen:   Medication  Home med?   Amount used last 24 hrs    Acetaminophen 1000mg po q6h No 3 doses   Diazepam 5mg PO q6h prn No 1 dose   Methocarbamol 1000mg IV q8h x3 doses,   Then 750mg PO q6h prn Yes IV x2 doses   Hydromorphone 0.5-1mg IV q3h prn No 1.5mg  (0.5mg x 1 doses)  (1mg x 4 doses)   Oxycodone 5-10mg PO q4h prn No 10mg  (10mg x 1 doses)   Oxycodone ER 10mg po q12h No 2 doses               Morphine Equivalent Daily Dose:  Date MEDD last 24 hr   1/7 151mg   1/8 135mg             Bowel regimen:  Fibercon 625mg PO daily - added 1/8  Miralax PO daily  Senna S one tab PO BID    Last Bowel Movement:  PTA    Assessment/Plan:  1) Pain regimen recommendations:  · Recommend continuing Acetaminophen 1000mg PO q6h scheduled. · Recommend continuing Oxycodone ER 10mg po q12h scheduled. · If pt continues to require frequent doses of IV Hydromorphone for breakthrough pain, may consider increasing this to 20mg po BID. · Could consider increasing Oxycodone IR to 10-15mg po q4h prn moderate to severe pain. · Pt gets relief from Hydromorphone 1mg IV, which is equivalent to Oxycodone IR ~15mg po. · Recommend only using IV Hydromorphone if oral Oxycodone IR is ineffective for breakthrough pain. · Recommend continuing Methocarbamol. Could consider increasing the Methocarbamol dose to 1000mg po QID. · Could consider trying Lidocaine patch to the lower back daily (but do not apply directly over surgical incision).       Please call with questions--  Thanks--  Sanjuana Vinson, PharmD, [address], [address] ([facility])   1/8/2022 3:08 PM

## 2022-01-09 PROCEDURE — 2580000003 HC RX 258: Performed by: NEUROLOGICAL SURGERY

## 2022-01-09 PROCEDURE — 1200000000 HC SEMI PRIVATE

## 2022-01-09 PROCEDURE — 6370000000 HC RX 637 (ALT 250 FOR IP): Performed by: NURSE PRACTITIONER

## 2022-01-09 PROCEDURE — 6360000002 HC RX W HCPCS: Performed by: NEUROLOGICAL SURGERY

## 2022-01-09 PROCEDURE — 6370000000 HC RX 637 (ALT 250 FOR IP): Performed by: FAMILY MEDICINE

## 2022-01-09 PROCEDURE — 6370000000 HC RX 637 (ALT 250 FOR IP): Performed by: NEUROLOGICAL SURGERY

## 2022-01-09 PROCEDURE — 6370000000 HC RX 637 (ALT 250 FOR IP): Performed by: INTERNAL MEDICINE

## 2022-01-09 RX ORDER — CALCIUM POLYCARBOPHIL 625 MG 625 MG/1
625 TABLET ORAL DAILY
Qty: 30 TABLET | Refills: 0 | Status: SHIPPED | OUTPATIENT
Start: 2022-01-10

## 2022-01-09 RX ORDER — MAGNESIUM HYDROXIDE/ALUMINUM HYDROXICE/SIMETHICONE 120; 1200; 1200 MG/30ML; MG/30ML; MG/30ML
15 SUSPENSION ORAL EVERY 6 HOURS PRN
Qty: 1 EACH | Refills: 0 | Status: SHIPPED | OUTPATIENT
Start: 2022-01-09

## 2022-01-09 RX ORDER — PROGESTERONE 100 MG/1
400 CAPSULE ORAL NIGHTLY
Status: DISCONTINUED | OUTPATIENT
Start: 2022-01-09 | End: 2022-01-10 | Stop reason: HOSPADM

## 2022-01-09 RX ORDER — MECLIZINE HYDROCHLORIDE 25 MG/1
12.5 TABLET ORAL 3 TIMES DAILY PRN
Status: DISCONTINUED | OUTPATIENT
Start: 2022-01-09 | End: 2022-01-10 | Stop reason: HOSPADM

## 2022-01-09 RX ADMIN — OXYCODONE HYDROCHLORIDE 15 MG: 15 TABLET ORAL at 11:41

## 2022-01-09 RX ADMIN — ACETAMINOPHEN 1000 MG: 500 TABLET ORAL at 08:01

## 2022-01-09 RX ADMIN — OXYCODONE HYDROCHLORIDE 15 MG: 15 TABLET ORAL at 01:54

## 2022-01-09 RX ADMIN — OXYCODONE HYDROCHLORIDE 10 MG: 10 TABLET, FILM COATED, EXTENDED RELEASE ORAL at 20:02

## 2022-01-09 RX ADMIN — LEVOTHYROXINE, LIOTHYRONINE 120 MG: 19; 4.5 TABLET ORAL at 07:59

## 2022-01-09 RX ADMIN — DIAZEPAM 5 MG: 5 TABLET ORAL at 15:23

## 2022-01-09 RX ADMIN — DIPHENHYDRAMINE HYDROCHLORIDE 25 MG: 25 TABLET ORAL at 21:29

## 2022-01-09 RX ADMIN — OXYCODONE HYDROCHLORIDE 15 MG: 15 TABLET ORAL at 18:07

## 2022-01-09 RX ADMIN — SENNOSIDES AND DOCUSATE SODIUM 1 TABLET: 50; 8.6 TABLET ORAL at 20:02

## 2022-01-09 RX ADMIN — METHOCARBAMOL 750 MG: 750 TABLET ORAL at 18:07

## 2022-01-09 RX ADMIN — HEPARIN SODIUM 5000 UNITS: 5000 INJECTION INTRAVENOUS; SUBCUTANEOUS at 15:24

## 2022-01-09 RX ADMIN — POLYETHYLENE GLYCOL 3350 17 G: 17 POWDER, FOR SOLUTION ORAL at 08:01

## 2022-01-09 RX ADMIN — HYDROMORPHONE HYDROCHLORIDE 1 MG: 1 INJECTION, SOLUTION INTRAMUSCULAR; INTRAVENOUS; SUBCUTANEOUS at 03:32

## 2022-01-09 RX ADMIN — ACETAMINOPHEN 1000 MG: 500 TABLET ORAL at 20:02

## 2022-01-09 RX ADMIN — ACETAMINOPHEN 1000 MG: 500 TABLET ORAL at 15:23

## 2022-01-09 RX ADMIN — TRIAMCINOLONE ACETONIDE: 0.25 CREAM TOPICAL at 08:02

## 2022-01-09 RX ADMIN — HEPARIN SODIUM 5000 UNITS: 5000 INJECTION INTRAVENOUS; SUBCUTANEOUS at 05:41

## 2022-01-09 RX ADMIN — CALCIUM POLYCARBOPHIL 625 MG: 625 TABLET, FILM COATED ORAL at 08:01

## 2022-01-09 RX ADMIN — SODIUM CHLORIDE, PRESERVATIVE FREE 10 ML: 5 INJECTION INTRAVENOUS at 08:00

## 2022-01-09 RX ADMIN — OXYCODONE HYDROCHLORIDE 10 MG: 10 TABLET, FILM COATED, EXTENDED RELEASE ORAL at 08:01

## 2022-01-09 RX ADMIN — DIAZEPAM 5 MG: 5 TABLET ORAL at 05:41

## 2022-01-09 RX ADMIN — OXYCODONE HYDROCHLORIDE 15 MG: 15 TABLET ORAL at 22:26

## 2022-01-09 RX ADMIN — DIAZEPAM 5 MG: 5 TABLET ORAL at 22:26

## 2022-01-09 RX ADMIN — PROGESTERONE 400 MG: 100 CAPSULE ORAL at 21:29

## 2022-01-09 RX ADMIN — METHOCARBAMOL 750 MG: 750 TABLET ORAL at 20:02

## 2022-01-09 RX ADMIN — SENNOSIDES AND DOCUSATE SODIUM 1 TABLET: 50; 8.6 TABLET ORAL at 08:01

## 2022-01-09 RX ADMIN — METHOCARBAMOL 750 MG: 750 TABLET ORAL at 08:01

## 2022-01-09 RX ADMIN — DIPHENHYDRAMINE HYDROCHLORIDE 25 MG: 25 TABLET ORAL at 00:30

## 2022-01-09 RX ADMIN — HYDROMORPHONE HYDROCHLORIDE 1 MG: 1 INJECTION, SOLUTION INTRAMUSCULAR; INTRAVENOUS; SUBCUTANEOUS at 15:23

## 2022-01-09 RX ADMIN — HEPARIN SODIUM 5000 UNITS: 5000 INJECTION INTRAVENOUS; SUBCUTANEOUS at 21:29

## 2022-01-09 RX ADMIN — AMLODIPINE BESYLATE 10 MG: 10 TABLET ORAL at 08:01

## 2022-01-09 ASSESSMENT — PAIN DESCRIPTION - LOCATION
LOCATION: BACK

## 2022-01-09 ASSESSMENT — PAIN SCALES - GENERAL
PAINLEVEL_OUTOF10: 7
PAINLEVEL_OUTOF10: 7
PAINLEVEL_OUTOF10: 0
PAINLEVEL_OUTOF10: 7
PAINLEVEL_OUTOF10: 8
PAINLEVEL_OUTOF10: 7
PAINLEVEL_OUTOF10: 10
PAINLEVEL_OUTOF10: 7
PAINLEVEL_OUTOF10: 7

## 2022-01-09 ASSESSMENT — PAIN - FUNCTIONAL ASSESSMENT
PAIN_FUNCTIONAL_ASSESSMENT: ACTIVITIES ARE NOT PREVENTED
PAIN_FUNCTIONAL_ASSESSMENT: ACTIVITIES ARE NOT PREVENTED
PAIN_FUNCTIONAL_ASSESSMENT: PREVENTS OR INTERFERES SOME ACTIVE ACTIVITIES AND ADLS
PAIN_FUNCTIONAL_ASSESSMENT: PREVENTS OR INTERFERES SOME ACTIVE ACTIVITIES AND ADLS

## 2022-01-09 ASSESSMENT — PAIN DESCRIPTION - ONSET
ONSET: ON-GOING

## 2022-01-09 ASSESSMENT — PAIN DESCRIPTION - PROGRESSION
CLINICAL_PROGRESSION: GRADUALLY WORSENING
CLINICAL_PROGRESSION: NOT CHANGED
CLINICAL_PROGRESSION: NOT CHANGED
CLINICAL_PROGRESSION: GRADUALLY WORSENING

## 2022-01-09 ASSESSMENT — PAIN DESCRIPTION - PAIN TYPE
TYPE: SURGICAL PAIN

## 2022-01-09 ASSESSMENT — PAIN DESCRIPTION - ORIENTATION
ORIENTATION: LOWER

## 2022-01-09 ASSESSMENT — PAIN DESCRIPTION - DESCRIPTORS
DESCRIPTORS: ACHING

## 2022-01-09 ASSESSMENT — PAIN DESCRIPTION - FREQUENCY
FREQUENCY: CONTINUOUS

## 2022-01-09 NOTE — PROGRESS NOTES
Shift assessment complete; see flow sheet. Scheduled medications administered; See MAR. IV flushing without difficulty. Pt c/o back pain 7/10 scheduled oxycodone 10mg given at this time. Pt denies any needs at this time. Call light within reach, bed in low locked position, bed alarm on.

## 2022-01-09 NOTE — PROGRESS NOTES
Medications found in patient belongings include: (each listed is a bottle)    (1) diazepam  (3) meclizine   (1) promethazine  (1) levothyroxine  (1) thyroid    An assorted calender pill box with several days pills missing    Patient admitted to taking a 12.5mg meclizine but denies taking any of the other medications.  Patient has received around the clock IV and PO pain meds all evening

## 2022-01-09 NOTE — PROGRESS NOTES
Up x1 with walker to restroom, voiding without complication. Pain has been well controlled with current PRN med schedule. She did have a very large, formed bowel movement and stated she feels much better and feels she emptied completely. purewick in place overnight to get rest and prevent repeated trips to restroom. No needs expressed at this time.

## 2022-01-09 NOTE — PROGRESS NOTES
Clinical Pharmacy Progress Note    Admit date: 1/6/2022     Subjective/Objective:  Pt is a 59yoF with a PMHx that includes chronic back and leg pain, fibromyalgia, HTN, migraine, Mitral valve prolapse, RA, and thyroid dx. Admitted s/p retroperitoneal exposure of L3-4, L4-5 and L5-S1 for anterior lumbar interbody fusion with Cooper Kwon and Marycruz Leach on 1/6. Pharmacy has been consulted to make pain medication recommendations by HALLIE MOBLEY.      1/9:  Pain scores 5-7 / 10, mostly 7's but no reported 8-10's. Oxycodone IR increased to 10-15mg q4h prn last evening. Pt noted to have own home meds in room overnight with admission she had taken a dose of home Meclizine (meds now stored in pharmacy). Flowsheet documentation shows pt noted to be drowsy today. No Hydromorphone given since 03:30. Had 2 BMs in past 24 hrs. 1/8:  Pain scores 4-10 / 10. Oxycodone ER and scheduled APAP started yesterday. Used less Hydromorphone last 24 hrs since starting Oxycodone ER.  1/7:  Pain scores have been mostly 7 to 10 / 10. Patient reports pain mostly in her back, but yet she is starting to feel burning in her abdomin near the incision. States that the hydromorphone works best to make the pain tolerable, but it only lasts ~1 hr or so. Oral oxycodone does not relieve the pain as well, but she does obtain some relief after a dose. Ups and downs from pain bother her the most; she does not watch the clock and feels she's at a disadvantage about this. Diazepam does provide some anxiety relief, allowing her to sleep. Encouraged using this more. Itching from the narcotics; would like diphenhydramine prn, as well as Eucerin lotion for dryness of skin. Current pain regimen:   Medication  Home med?   Amount used last 24 hrs    Acetaminophen 1000mg po q6h No 2 doses   Diazepam 5mg PO q6h prn No 2 dose   Methocarbamol 750mg PO q6h prn Yes 3 doses   Hydromorphone 0.5-1mg IV q3h prn No 3mg  (1mg x 3 doses)   Oxycodone 10-15mg PO q4h prn No 55mg  (10mg x 1 dose, 15mg x3 doses)   Oxycodone ER 10mg po q12h No 2 doses (20mg)               Morphine Equivalent Daily Dose:  Date MEDD last 24 hr   1/7 151mg   1/8 135mg   1/9 175mg         Bowel regimen:  Fibercon 625mg PO daily - added 1/8  Miralax PO daily  Senna S one tab PO BID    Last Bowel Movement:  1/9    Assessment/Plan:  1)  Pain regimen recommendations:  · Recommend continuing Acetaminophen 1000mg PO q6h scheduled. · Two doses were held overnight - unsure reason. Recommend administering around the clock as APAP has synergistic effects with opiioids. · Recommend continuing Oxycodone ER 10mg po q12h scheduled. · If pt continues to require frequent doses of IV Hydromorphone for breakthrough pain, may consider increasing this to 20mg po BID. · Oxycodone IR dose increased yesterday to 10-15mg po q4h prn - would continue same. · Recommend only using IV Hydromorphone if oral Oxycodone IR is ineffective for breakthrough pain. · Recommend continuing Methocarbamol. Could consider increasing the Methocarbamol dose to 1000mg po QID. · Could consider trying Lidocaine patch to the lower back daily (but do not apply directly over surgical incision). · If pt continues to have drowsiness, may need to decrease Oxycodone back to 5-10mg po q4h prn, or could consider discontinuing Diazepam and increasing scheduled methocarbamol.     Please call with questions--  Thanks--  Hortensia Castro, JudahD, BCPS, BCGP  [id number] ([facility])   1/9/2022 3:00 PM

## 2022-01-09 NOTE — PLAN OF CARE
Problem: Falls - Risk of:  Goal: Will remain free from falls  Description: Will remain free from falls  1/8/2022 2216 by Summer Jacobs RN  Outcome: Ongoing     Problem: Falls - Risk of:  Goal: Absence of physical injury  Description: Absence of physical injury  1/8/2022 2216 by Summer Jacobs RN  Outcome: Ongoing     Problem: Pain:  Goal: Pain level will decrease  Description: Pain level will decrease  1/8/2022 2216 by Summer Jacobs RN  Outcome: Ongoing     Problem: Pain:  Goal: Control of acute pain  Description: Control of acute pain  1/8/2022 2216 by Summer Jacobs RN  Outcome: Ongoing

## 2022-01-09 NOTE — PLAN OF CARE
Problem: Falls - Risk of:  Goal: Will remain free from falls  Description: Will remain free from falls  1/9/2022 0956 by Figueroa Lema RN  Outcome: Ongoing     Problem: Falls - Risk of:  Goal: Absence of physical injury  Description: Absence of physical injury  1/9/2022 0956 by Figueroa Lema RN  Outcome: Ongoing     Problem: Pain:  Description: Pain management should include both nonpharmacologic and pharmacologic interventions. Goal: Pain level will decrease  Description: Pain level will decrease  1/9/2022 0956 by Figueroa Lema RN  Outcome: Ongoing     Problem: Pain:  Description: Pain management should include both nonpharmacologic and pharmacologic interventions. Goal: Control of acute pain  Description: Control of acute pain  1/9/2022 0956 by Figueroa Lema RN  Outcome: Ongoing     Problem: Pain:  Description: Pain management should include both nonpharmacologic and pharmacologic interventions.   Goal: Control of chronic pain  Description: Control of chronic pain  1/9/2022 0956 by Figueroa Lema RN  Outcome: Ongoing

## 2022-01-09 NOTE — PROGRESS NOTES
Hospitalist Progress Note      PCP: Isidro Barthel, DO    Date of Admission: 1/6/2022  Hospital course: Pt. Is a 56 yo F admitted by Neurosurgery for spinal stenosis of lumbar region, s/p ALIF. Hospitalists were consulted for med. Mgmt. Subjective:   Pt. Is doing well. She reports she had 2 bowel movements since yesterday. She denies any abdominal pain. Medications:  Reviewed    Infusion Medications    sodium chloride       Scheduled Medications    polycarbophil  625 mg Oral Daily    heparin (porcine)  5,000 Units SubCUTAneous 3 times per day    acetaminophen  1,000 mg Oral Q6H    oxyCODONE  10 mg Oral 2 times per day    triamcinolone   Topical BID    methocarbamol  750 mg Oral 4x Daily    amLODIPine  10 mg Oral Daily    thyroid  120 mg Oral Daily    sodium chloride flush  10 mL IntraVENous 2 times per day    polyethylene glycol  17 g Oral Daily    sennosides-docusate sodium  1 tablet Oral BID     PRN Meds: HYDROmorphone **OR** HYDROmorphone, oxyCODONE **OR** oxyCODONE, benzocaine-menthol, diphenhydrAMINE, sodium chloride flush, sodium chloride, naloxone, aluminum & magnesium hydroxide-simethicone, bisacodyl, promethazine **OR** ondansetron, acetaminophen, diazePAM      Intake/Output Summary (Last 24 hours) at 1/9/2022 1022  Last data filed at 1/8/2022 1559  Gross per 24 hour   Intake 1000 ml   Output    Net 1000 ml       Physical Exam Performed:    BP (!) 161/87   Pulse 115   Temp 98.1 °F (36.7 °C) (Oral)   Resp 18   Ht 5' 4\" (1.626 m)   Wt 203 lb (92.1 kg)   LMP 09/01/2011   SpO2 93%   BMI 34.84 kg/m²     General appearance: No apparent distress, appears stated age and cooperative. HEENT: Pupils equal, round, and reactive to light. Conjunctivae/corneas clear. Neck: Supple, with full range of motion. No jugular venous distention. Trachea midline. Respiratory:  Normal respiratory effort. Clear to auscultation, bilaterally without Rales/Wheezes/Rhonchi.   Cardiovascular: Regular rate and rhythm with normal S1/S2 without murmurs, rubs or gallops. Abdomen: mildly tender to palpation lower abdomen, somewhat distended. Musculoskeletal: No clubbing, cyanosis or edema bilaterally. Full range of motion without deformity. Skin: Skin color, texture, turgor normal.  No rashes or lesions. Neurologic:  AAOX3      Labs:   No results for input(s): WBC, HGB, HCT, PLT in the last 72 hours. No results for input(s): NA, K, CL, CO2, BUN, CREATININE, CALCIUM, PHOS in the last 72 hours. Invalid input(s): MAGNES  No results for input(s): AST, ALT, BILIDIR, BILITOT, ALKPHOS in the last 72 hours. No results for input(s): INR in the last 72 hours. No results for input(s): Esther Divine in the last 72 hours. Urinalysis:      Lab Results   Component Value Date    NITRU Negative 12/13/2019    BLOODU Negative 12/13/2019    SPECGRAV 1.010 12/13/2019    GLUCOSEU Negative 12/13/2019       Radiology:  XR LUMBAR SPINE (2-3 VIEWS)   Final Result      Status post anterior/posterior fusion from L3 through S1 without complicating features. XR ABDOMEN (KUB) (SINGLE AP VIEW)   Final Result      Negative for obstruction. XR LUMBAR SPINE (2-3 VIEWS)   Final Result   Impression: Intraoperative fluoroscopy utilized during lumbar spine surgery, as above. FLUORO FOR SURGICAL PROCEDURES   Final Result   Impression: Intraoperative fluoroscopy utilized during lumbar spine surgery, as above. CT GUIDED STEREOTACTIC LOCALIZATION   Final Result   Impression: Intraoperative cone beam CT of the lumbar spine, as above      CT GUIDED STEREOTACTIC LOCALIZATION   Final Result   Impression: Intraoperative cone beam CT of the lumbar spine, as above        Assessment/Plan:    Active Hospital Problems    Diagnosis     Spondylolisthesis of lumbar region [M43.16]    Pt. Is a 56 yo F admitted by Neurosurgery for spinal stenosis of lumbar region, s/p ALIF. Hospitalists were consulted for med. Mgmt.     Assessment:  Status post lumbar spinal surgery  Hypertension  Hypothyroidism      Plan:  -Neurosurgery following, pain control, PT/OT following.  -continue home medications including amlodipine, [place] thyroid.  -added fiber supplement, already on Maalox, Dulcolax, Glycolax, and Senokot. Ordered on discharge. PT/OT Eval Status: ordered     DVT Prophylaxis: heparin  Diet: ADULT DIET; Regular  Code Status: Full Code    Dispo - discharge per Neurosurgery tomorrow. Will need some laxatives/fiber supplements at discharge, orders placed in discharge.   Tana Bolanos MD

## 2022-01-09 NOTE — PROGRESS NOTES
When patient was attempting to get up without staff in room earlier it is now known that she was getting up so she could access home medications that she had in her bag. Prior to the start of what she is now calling a panic attack she took a 12.5 mg meclizine because she said she was feeling nauseous.  Pills collected from bag and sent to pharmacy

## 2022-01-09 NOTE — PROGRESS NOTES
Neurosurgery Progress Note    Patient seen and examined on 01/09/22. No acute events overnight. Reports resolution of preoperative left radicular pain. Notes abdominal distention improved today. Neurologically intact on exam.     A/P: 58 yo woman POD 3 s/p L3-S1 ALIF    -Neuro stable  -Pain control with PO meds, breakthrough dilaudid  -Muscle relaxants prn  -Mobilize, OOB  -PT/OT   -DVT ppx   -Dispo:  To home Monday      [address], [address] [facility],[address] (o)

## 2022-01-09 NOTE — PROGRESS NOTES
Upon entering room, patient was noted to be attempting to turn off the bed alarm at the foot of the bed and get up without staff in the room, she did not have her walker near her so was going to walk around in room unassisted without her DME, she stated she needed something out of her bag. Patient is alert and oriented, knows floor policy, policy was reiterated to patient, she responded with understanding. She continues to require both PO and IV pain medicine around the clock, requesting it as soon as it becomes available.

## 2022-01-09 NOTE — PROGRESS NOTES
Patient called this RN into room to say Marcelo Pereiraemma thinks she is having a reaction to the dilaudid\" when asked to explain she said she feels like her heart is racing and feeling general weakness.  Secure message sent to MD. dilaudid will not be given for remainder of shift d/t her statement and PO pain meds will be held for the remainder of shift

## 2022-01-10 VITALS
OXYGEN SATURATION: 97 % | HEART RATE: 97 BPM | DIASTOLIC BLOOD PRESSURE: 79 MMHG | SYSTOLIC BLOOD PRESSURE: 137 MMHG | BODY MASS INDEX: 34.66 KG/M2 | HEIGHT: 64 IN | RESPIRATION RATE: 16 BRPM | TEMPERATURE: 98.2 F | WEIGHT: 203 LBS

## 2022-01-10 PROBLEM — Z98.1 S/P LUMBAR FUSION: Status: ACTIVE | Noted: 2022-01-06

## 2022-01-10 LAB
A/G RATIO: 0.9 (ref 1.1–2.2)
ALBUMIN SERPL-MCNC: 3 G/DL (ref 3.4–5)
ALBUMIN SERPL-MCNC: 3.3 G/DL (ref 3.4–5)
ALP BLD-CCNC: 172 U/L (ref 40–129)
ALP BLD-CCNC: 187 U/L (ref 40–129)
ALT SERPL-CCNC: 103 U/L (ref 10–40)
ALT SERPL-CCNC: 119 U/L (ref 10–40)
ANION GAP SERPL CALCULATED.3IONS-SCNC: 6 MMOL/L (ref 3–16)
AST SERPL-CCNC: 90 U/L (ref 15–37)
AST SERPL-CCNC: 95 U/L (ref 15–37)
BILIRUB SERPL-MCNC: 0.5 MG/DL (ref 0–1)
BILIRUB SERPL-MCNC: 0.5 MG/DL (ref 0–1)
BILIRUBIN DIRECT: <0.2 MG/DL (ref 0–0.3)
BILIRUBIN, INDIRECT: ABNORMAL MG/DL (ref 0–1)
BUN BLDV-MCNC: 4 MG/DL (ref 7–20)
CALCIUM SERPL-MCNC: 8.3 MG/DL (ref 8.3–10.6)
CHLORIDE BLD-SCNC: 100 MMOL/L (ref 99–110)
CO2: 32 MMOL/L (ref 21–32)
CREAT SERPL-MCNC: <0.5 MG/DL (ref 0.6–1.2)
GFR AFRICAN AMERICAN: >60
GFR NON-AFRICAN AMERICAN: >60
GLUCOSE BLD-MCNC: 149 MG/DL (ref 70–99)
HCT VFR BLD CALC: 32.6 % (ref 36–48)
HEMOGLOBIN: 11.4 G/DL (ref 12–16)
MAGNESIUM: 1.8 MG/DL (ref 1.8–2.4)
MCH RBC QN AUTO: 31.8 PG (ref 26–34)
MCHC RBC AUTO-ENTMCNC: 34.9 G/DL (ref 31–36)
MCV RBC AUTO: 90.9 FL (ref 80–100)
PDW BLD-RTO: 12.9 % (ref 12.4–15.4)
PLATELET # BLD: 240 K/UL (ref 135–450)
PMV BLD AUTO: 9 FL (ref 5–10.5)
POTASSIUM REFLEX MAGNESIUM: 3.4 MMOL/L (ref 3.5–5.1)
RBC # BLD: 3.58 M/UL (ref 4–5.2)
SODIUM BLD-SCNC: 138 MMOL/L (ref 136–145)
TOTAL PROTEIN: 6.3 G/DL (ref 6.4–8.2)
TOTAL PROTEIN: 6.4 G/DL (ref 6.4–8.2)
WBC # BLD: 7.6 K/UL (ref 4–11)

## 2022-01-10 PROCEDURE — 6360000002 HC RX W HCPCS: Performed by: NEUROLOGICAL SURGERY

## 2022-01-10 PROCEDURE — 85027 COMPLETE CBC AUTOMATED: CPT

## 2022-01-10 PROCEDURE — 6370000000 HC RX 637 (ALT 250 FOR IP): Performed by: NEUROLOGICAL SURGERY

## 2022-01-10 PROCEDURE — 6370000000 HC RX 637 (ALT 250 FOR IP): Performed by: PHYSICIAN ASSISTANT

## 2022-01-10 PROCEDURE — 6370000000 HC RX 637 (ALT 250 FOR IP): Performed by: NURSE PRACTITIONER

## 2022-01-10 PROCEDURE — 36415 COLL VENOUS BLD VENIPUNCTURE: CPT

## 2022-01-10 PROCEDURE — 2580000003 HC RX 258: Performed by: NURSE PRACTITIONER

## 2022-01-10 PROCEDURE — 80053 COMPREHEN METABOLIC PANEL: CPT

## 2022-01-10 PROCEDURE — 83735 ASSAY OF MAGNESIUM: CPT

## 2022-01-10 PROCEDURE — 6370000000 HC RX 637 (ALT 250 FOR IP): Performed by: FAMILY MEDICINE

## 2022-01-10 PROCEDURE — 2580000003 HC RX 258: Performed by: NEUROLOGICAL SURGERY

## 2022-01-10 RX ORDER — DIAZEPAM 5 MG/1
5 TABLET ORAL EVERY 8 HOURS PRN
Qty: 30 TABLET | Refills: 0 | Status: SHIPPED | OUTPATIENT
Start: 2022-01-10 | End: 2022-01-20

## 2022-01-10 RX ORDER — OXYCODONE HYDROCHLORIDE 10 MG/1
10-15 TABLET ORAL EVERY 4 HOURS PRN
Qty: 60 TABLET | Refills: 0 | Status: SHIPPED | OUTPATIENT
Start: 2022-01-10 | End: 2022-01-17

## 2022-01-10 RX ORDER — SODIUM CHLORIDE, SODIUM LACTATE, POTASSIUM CHLORIDE, AND CALCIUM CHLORIDE .6; .31; .03; .02 G/100ML; G/100ML; G/100ML; G/100ML
1000 INJECTION, SOLUTION INTRAVENOUS ONCE
Status: COMPLETED | OUTPATIENT
Start: 2022-01-10 | End: 2022-01-10

## 2022-01-10 RX ORDER — METHOCARBAMOL 750 MG/1
750 TABLET, FILM COATED ORAL 4 TIMES DAILY
Qty: 10 TABLET | Refills: 0 | Status: SHIPPED | OUTPATIENT
Start: 2022-01-10 | End: 2022-01-13

## 2022-01-10 RX ORDER — MECLIZINE HCL 12.5 MG/1
12.5 TABLET ORAL 3 TIMES DAILY PRN
Qty: 30 TABLET | Refills: 0 | Status: SHIPPED | OUTPATIENT
Start: 2022-01-10 | End: 2022-01-20

## 2022-01-10 RX ORDER — PROMETHAZINE HYDROCHLORIDE 12.5 MG/1
12.5 TABLET ORAL ONCE
Status: COMPLETED | OUTPATIENT
Start: 2022-01-10 | End: 2022-01-10

## 2022-01-10 RX ORDER — PROMETHAZINE HYDROCHLORIDE 12.5 MG/1
12.5 TABLET ORAL EVERY 8 HOURS PRN
Qty: 9 TABLET | Refills: 0 | Status: SHIPPED | OUTPATIENT
Start: 2022-01-10 | End: 2022-01-13

## 2022-01-10 RX ORDER — POTASSIUM CHLORIDE 20 MEQ/1
40 TABLET, EXTENDED RELEASE ORAL PRN
Status: DISCONTINUED | OUTPATIENT
Start: 2022-01-10 | End: 2022-01-10 | Stop reason: HOSPADM

## 2022-01-10 RX ORDER — POTASSIUM CHLORIDE 7.45 MG/ML
10 INJECTION INTRAVENOUS PRN
Status: DISCONTINUED | OUTPATIENT
Start: 2022-01-10 | End: 2022-01-10 | Stop reason: HOSPADM

## 2022-01-10 RX ADMIN — DIAZEPAM 5 MG: 5 TABLET ORAL at 05:21

## 2022-01-10 RX ADMIN — SENNOSIDES AND DOCUSATE SODIUM 1 TABLET: 50; 8.6 TABLET ORAL at 09:36

## 2022-01-10 RX ADMIN — PROMETHAZINE HYDROCHLORIDE 12.5 MG: 12.5 TABLET ORAL at 08:45

## 2022-01-10 RX ADMIN — ACETAMINOPHEN 1000 MG: 500 TABLET ORAL at 02:15

## 2022-01-10 RX ADMIN — DIAZEPAM 5 MG: 5 TABLET ORAL at 12:00

## 2022-01-10 RX ADMIN — METHOCARBAMOL 750 MG: 750 TABLET ORAL at 14:42

## 2022-01-10 RX ADMIN — ACETAMINOPHEN 1000 MG: 500 TABLET ORAL at 09:36

## 2022-01-10 RX ADMIN — SODIUM CHLORIDE, PRESERVATIVE FREE 10 ML: 5 INJECTION INTRAVENOUS at 08:00

## 2022-01-10 RX ADMIN — LEVOTHYROXINE, LIOTHYRONINE 120 MG: 19; 4.5 TABLET ORAL at 06:22

## 2022-01-10 RX ADMIN — MECLIZINE HYDROCHLORIDE 12.5 MG: 25 TABLET ORAL at 04:07

## 2022-01-10 RX ADMIN — CALCIUM POLYCARBOPHIL 625 MG: 625 TABLET, FILM COATED ORAL at 09:36

## 2022-01-10 RX ADMIN — OXYCODONE HYDROCHLORIDE 15 MG: 15 TABLET ORAL at 02:15

## 2022-01-10 RX ADMIN — AMLODIPINE BESYLATE 10 MG: 10 TABLET ORAL at 09:37

## 2022-01-10 RX ADMIN — OXYCODONE HYDROCHLORIDE 15 MG: 15 TABLET ORAL at 11:13

## 2022-01-10 RX ADMIN — HEPARIN SODIUM 5000 UNITS: 5000 INJECTION INTRAVENOUS; SUBCUTANEOUS at 06:22

## 2022-01-10 RX ADMIN — TRIAMCINOLONE ACETONIDE: 0.25 CREAM TOPICAL at 09:40

## 2022-01-10 RX ADMIN — OXYCODONE HYDROCHLORIDE 15 MG: 15 TABLET ORAL at 06:22

## 2022-01-10 RX ADMIN — ONDANSETRON 4 MG: 2 INJECTION INTRAMUSCULAR; INTRAVENOUS at 08:00

## 2022-01-10 RX ADMIN — METHOCARBAMOL 750 MG: 750 TABLET ORAL at 09:37

## 2022-01-10 RX ADMIN — SODIUM CHLORIDE, POTASSIUM CHLORIDE, SODIUM LACTATE AND CALCIUM CHLORIDE 1000 ML: 600; 310; 30; 20 INJECTION, SOLUTION INTRAVENOUS at 08:25

## 2022-01-10 RX ADMIN — POLYETHYLENE GLYCOL 3350 17 G: 17 POWDER, FOR SOLUTION ORAL at 09:37

## 2022-01-10 ASSESSMENT — PAIN DESCRIPTION - FREQUENCY
FREQUENCY: CONTINUOUS

## 2022-01-10 ASSESSMENT — PAIN DESCRIPTION - PAIN TYPE
TYPE: SURGICAL PAIN

## 2022-01-10 ASSESSMENT — PAIN - FUNCTIONAL ASSESSMENT
PAIN_FUNCTIONAL_ASSESSMENT: PREVENTS OR INTERFERES SOME ACTIVE ACTIVITIES AND ADLS
PAIN_FUNCTIONAL_ASSESSMENT: ACTIVITIES ARE NOT PREVENTED

## 2022-01-10 ASSESSMENT — PAIN SCALES - GENERAL
PAINLEVEL_OUTOF10: 8
PAINLEVEL_OUTOF10: 0
PAINLEVEL_OUTOF10: 8
PAINLEVEL_OUTOF10: 7
PAINLEVEL_OUTOF10: 8
PAINLEVEL_OUTOF10: 7
PAINLEVEL_OUTOF10: 6

## 2022-01-10 ASSESSMENT — PAIN DESCRIPTION - LOCATION
LOCATION: BACK

## 2022-01-10 ASSESSMENT — PAIN DESCRIPTION - ONSET
ONSET: ON-GOING

## 2022-01-10 ASSESSMENT — PAIN DESCRIPTION - DESCRIPTORS
DESCRIPTORS: ACHING
DESCRIPTORS: SHARP;BURNING
DESCRIPTORS: SHARP;BURNING

## 2022-01-10 ASSESSMENT — PAIN DESCRIPTION - ORIENTATION
ORIENTATION: LOWER

## 2022-01-10 ASSESSMENT — PAIN DESCRIPTION - PROGRESSION
CLINICAL_PROGRESSION: GRADUALLY WORSENING
CLINICAL_PROGRESSION: NOT CHANGED

## 2022-01-10 NOTE — CARE COORDINATION
Case Management Assessment            Discharge Note                    Date / Time of Note: 1/10/2022 2:30 PM                  Discharge Note Completed by: Marii Hackett RN     Patient will d/c to home and spouse is here to transport. [address] is able to provide care at d/c and she denies DME needs. Notified Christen George from [facility] of d/c to home. Patient Name: Rufina Pollard   YOB: 1960  Diagnosis: Spondylolisthesis of lumbosacral region [M43.17]  Spinal stenosis of lumbar region, unspecified whether neurogenic claudication present [M48.061]  Spondylolisthesis of lumbar region [M43.16]   Date / Time: 1/6/2022  5:39 AM    Current PCP: Mary Lopez DO  Clinic patient: No    Hospitalization in the last 30 days: No    Advance Directives:  Code Status: Full Code  [place] DNR form completed and on chart: Not Indicated    Financial:  Payor: Ronn Vogt / Plan: HUMA[employer] POS OPEN ACCESS  / Product Type: *No Product type* /      Pharmacy:    [facility] [address] [place] [address], [address]  [address]  [address] [facility] [address]  Phone: [phone] Fax: [phone]      Assistance purchasing medications?:    Assistance provided by Case Management: None at this time    Does patient want to participate in local refill/ meds to beds program?:      Meds To Beds General Rules:  1. Can ONLY be done Monday- Friday between 8:30am-5pm  2. Prescription(s) must be in pharmacy by 3pm to be filled same day  3. Copy of patient's insurance/ prescription drug card and patient face sheet must be sent along with the prescription(s)  4. Cost of Rx cannot be added to hospital bill. If financial assistance is needed, please contact unit  or ;  or  CANNOT provide pharmacy voucher for patients co-pays  5.  Patients can then  the prescription on their way out of the hospital at discharge, or pharmacy can deliver to the bedside if staff

## 2022-01-10 NOTE — PROGRESS NOTES
Occupational Therapy  No Treatment    Chart reviewed. Consulted RN. RN currently discharging pt.      [address] [place] [address], [address]DA

## 2022-01-10 NOTE — PROGRESS NOTES
NEUROSURGERY PROGRESS NOTE    1/10/2022 7:13 AM                               Nolan Tejeda                      LOS: 4 days   POD# 4 s/p Procedure(s) (LRB):  L3-4, L4-5, L5-S1 ANTERIOR LUMBAR INTERBODY FUSION WITH PEDICLE SCREW FIXATION (N/A)  . (N/A)    Subjective: Patient sitting up in bed upon entering the room. No acute events overnight. Patient ready to go home, but continues to feel dizzy. Will order CBC & CMP to r/o anemia and/or electrolyte imbalance, and will give 1 L of LR as patient appears to be dry. Physical Exam:  Patient seen and examined    Vitals:    01/10/22 0330   BP: (!) 146/77   Pulse: 108   Resp: 16   Temp: 98.1 °F (36.7 °C)   SpO2: 96%     GCS:  4 - Opens eyes on own  5 - Alert and oriented  6 - Follows simple motor commands  General: Well developed. Alert and cooperative in no acute distress. HENT: atraumatic, neck supple  Eyes: Optic discs: Not tested  Pulmonary: unlabored respiratory effort  Cardiovascular:  Warm well perfused. No peripheral edema  Gastrointestinal: abdomen soft, NT, ND    Neurological:  Mental Status: Awake, alert, oriented x 4, speech clear and appropriate  Attention: Intact  Language: No aphasia or dysarthria noted  Sensation: Intact to all extremities to light touch  Coordination: Intact    Musculoskeletal:   Gait: Not tested   Assist devices: None   Tone: Normal  Motor strength:    Right  Left    Right  Left    Deltoid  5 5  Hip Flex  5 5   Biceps  5 5  Knee Extensors  5 5   Triceps  5 5  Knee Flexors  5 5   Wrist Ext  5 5  Ankle Dorsiflex. 5 5   Wrist Flex  5 5  Ankle Plantarflex. 5 5   Handgrip  5 5  Ext Bam Longus  5 5   Thumb Ext  5 5         Incision:   Abdomen- CDI  Back- CDI    Radiological Findings:  XR LUMBAR SPINE (2-3 VIEWS)  Result Date: 1/8/2022  Status post anterior/posterior fusion from L3 through S1 without complicating features. Labs:  No results for input(s): WBC, HGB, HCT, PLT in the last 72 hours.     No results for input(s): NA, K, CL, CO2, BUN, CREATININE, GLUCOSE, CALCIUM, PHOS, MG in the last 72 hours. No results for input(s): PROTIME, INR, APTT in the last 72 hours. Patient Active Problem List    Diagnosis Date Noted    Spondylolisthesis of lumbar region 01/06/2022    S/P lumbar fusion 61/41/3575    Complication of internal left knee prosthesis ([facility] [address] 01/26/2021    Instability of internal left knee prosthesis ([facility] [address] 08/28/2020    Cervical spondylosis with radiculopathy 12/11/2019    Sprain of foot 07/09/2015    Edema of left foot 06/17/2015    Peroneal tendon injury 12/17/2014    Sciatica 10/21/2014    Ankle pain 06/30/2014    Left ankle pain 06/11/2014    Ankle instability 03/07/2014    Achilles tendon injury 03/07/2014       Assessment:  Patient is a 57 y.o. female s/p Procedure(s) (LRB):  L3-4, L4-5, L5-S1 ANTERIOR LUMBAR INTERBODY FUSION WITH PEDICLE SCREW FIXATION (N/A)  . (N/A)    Plan:  1. Neurologic exams frequency: Q4H  2. For change in exam MUST contact neurosurgery team along with critical care or primary team  3. Spondylolisthesis of lumbosacral region:  - s/p L3-S1 ALIF  - Bowel Regimen: Glycolax and Senokot-S  - Pain control: PRN Roxicodone  - Muscle spasms: Robaxin & PRN Valium  4. Mobility: PT/OT as tolerates  5. Diet: Advance as tolerates  6. DVT Prophylaxis: SCD's & Lovenox  7. Will follow inpatient. Please call with any questions or decline in neurological status    DISPO: IA home today or tomorrow    Patient was seen and examined with Dr. Olivier Yañez who agrees with above assessment and plan.      Electronically signed by: ITZ Bella CNP, APRN-CNP, 1/10/2022 7:13 AM  [phone]

## 2022-01-10 NOTE — PROGRESS NOTES
ANtral Gastritis    Hospitalist Progress Note      PCP: Rosa Tang DO    Date of Admission: 1/6/2022    Subjective:   Chart reviewed including vss/ meds/ labs and neurosurgery and ancillary notes.      Medications:  Reviewed    Infusion Medications    sodium chloride       Scheduled Medications    lactated ringers bolus  1,000 mL IntraVENous Once    progesterone  400 mg Oral Nightly    polycarbophil  625 mg Oral Daily    heparin (porcine)  5,000 Units SubCUTAneous 3 times per day    acetaminophen  1,000 mg Oral Q6H    triamcinolone   Topical BID    methocarbamol  750 mg Oral 4x Daily    amLODIPine  10 mg Oral Daily    thyroid  120 mg Oral Daily    sodium chloride flush  10 mL IntraVENous 2 times per day    polyethylene glycol  17 g Oral Daily    sennosides-docusate sodium  1 tablet Oral BID     PRN Meds: potassium chloride **OR** potassium alternative oral replacement **OR** potassium chloride, meclizine, oxyCODONE **OR** oxyCODONE, benzocaine-menthol, diphenhydrAMINE, sodium chloride flush, sodium chloride, naloxone, aluminum & magnesium hydroxide-simethicone, bisacodyl, promethazine **OR** ondansetron, acetaminophen, diazePAM      Intake/Output Summary (Last 24 hours) at 1/10/2022 1050  Last data filed at 1/10/2022 0815  Gross per 24 hour   Intake 700 ml   Output 1300 ml   Net -600 ml       Physical Exam Performed:    /77   Pulse 105   Temp 98.1 °F (36.7 °C) (Oral)   Resp 16   Ht 5' 4\" (1.626 m)   Wt 203 lb (92.1 kg)   LMP 09/01/2011   SpO2 97%   BMI 34.84 kg/m²       Labs:   Recent Labs     01/10/22  0830   WBC 7.6   HGB 11.4*   HCT 32.6*        Recent Labs     01/10/22  0830      K 3.4*      CO2 32   BUN 4*   CREATININE <0.5*   CALCIUM 8.3     Recent Labs     01/10/22  0830   AST 90*   *   BILITOT 0.5   ALKPHOS 172*         Assessment/Plan:    Active Hospital Problems    Diagnosis     Spondylolisthesis of lumbar region [M43.16]     S/P lumbar fusion [Z98.1]      Will sign

## 2022-01-10 NOTE — PROGRESS NOTES
Patient is AAox4. VSS. Patient is up x1 with a walker and gaitbelt. Patient complains of pain, patient medicated with oral pain medication. Patient has a clean dry and intact incision. Patient is resting in bed with all fall precautions in place. Will continue to monitor.

## 2022-01-10 NOTE — PROGRESS NOTES
Mrs. Rossy Nicole was discharged home with her . We reviewed her discharging medications as well as various self-care principles. We also reviewed what constitutes a medical emergency. We reviewed the need to schedule a follow-up appointment. All questions were answered.

## 2022-01-10 NOTE — DISCHARGE SUMMARY
Discharge Summary    Date of Admission: 1/6/2022  5:39 AM  Date of Discharge: 1/10/2022  Admission Diagnosis: Spondylolisthesis of lumbosacral region [M43.17] Spinal stenosis of lumbar region, unspecified whether neurogenic claudication present [M48.061]  Discharge Diagnosis: Same   Condition on Discharge: good  Attending for Admission: Jaziel Alexandra MD  Procedures: Procedure(s) (LRB):  L3-4, L4-5, L5-S1 ANTERIOR LUMBAR INTERBODY FUSION WITH PEDICLE SCREW FIXATION (N/A)  . (N/A)  Consults: IP CONSULT TO HOSPITALIST  IP CONSULT TO PHARMACY  IP CONSULT TO HOME CARE NEEDS    Reason for Admission:  Kenny Brandt is a 57 y.o. female patient who was admitted to the hospital for complaints of chronic lumbar pain and bilateral LE (left worse than right) pain, numbness and tingling. She underwent the procedure listed above on 1/6/2022. Hospital Course:  After surgery, Her pre-operative chronic lumbar pain and bilateral LE (left worse than right) pain, numbness and tingling was Absent. She complained of surgical pain. The pain was well-controlled on oral medications. The incisions were clean, dry and intact. There was no erythema or edema around the surgical site. Prior to discharge She was eating well, urinating and ambulating with a steady gait. - Continue home medications including amlodipine, [place] thyroid. - Added fiber supplement, already on Maalox, Dulcolax, Glycolax, and Senokot. Ordered on discharge.   - Pain control: PRN Roxicodone  - Muscle spasms: Robaxin & PRN Valium    Discharge Vitals/Labs:  /79   Pulse 97   Temp 98.2 °F (36.8 °C) (Oral)   Resp 16   Ht 5' 4\" (1.626 m)   Wt 203 lb (92.1 kg)   LMP 09/01/2011   SpO2 97%   BMI 34.84 kg/m²   CBC:   Lab Results   Component Value Date    WBC 7.6 01/10/2022    RBC 3.58 01/10/2022    HGB 11.4 01/10/2022    HCT 32.6 01/10/2022    MCV 90.9 01/10/2022    MCH 31.8 01/10/2022    MCHC 34.9 01/10/2022    RDW 12.9 01/10/2022     01/10/2022 MPV 9.0 01/10/2022     BMP:    Lab Results   Component Value Date     01/10/2022    K 3.4 01/10/2022     01/10/2022    CO2 32 01/10/2022    BUN 4 01/10/2022    LABALBU 3.0 01/10/2022    CREATININE <0.5 01/10/2022    CALCIUM 8.3 01/10/2022    GFRAA >60 01/10/2022    GFRAA >60 03/02/2012    LABGLOM >60 01/10/2022    GLUCOSE 149 01/10/2022       Discharge Medications: The patient suffers from a major neurological surgery that pain cannot be managed within an average of 30 MED per day. Severe acute postoperative pain is the reason for exceeding the 30 MED average, and the prescription reflects the same dosage patient received while inpatient, which is the lowest dose consistent with the patients medical condition. Non-opioid treatment options have been considered prior to prescribing opioids, and the patient has been advised of the benefits and risks of the opioid (including the potential for addiction). Medication List      START taking these medications    aluminum & magnesium hydroxide-simethicone 200-200-20 MG/5ML Susp suspension  Commonly known as: MAALOX  Take 15 mLs by mouth every 6 hours as needed for Indigestion     diazePAM 5 MG tablet  Commonly known as: VALIUM  Take 1 tablet by mouth every 8 hours as needed for Anxiety (Muscle spasms) for up to 10 days. meclizine 12.5 MG tablet  Commonly known as: ANTIVERT  Take 1 tablet by mouth 3 times daily as needed for Dizziness     oxyCODONE HCl 10 MG immediate release tablet  Commonly known as: OXY-IR  Take 1-1.5 tablets by mouth every 4 hours as needed for Pain for up to 7 days.      polycarbophil 625 MG tablet  Commonly known as: FIBERCON  Take 1 tablet by mouth daily     promethazine 12.5 MG tablet  Commonly known as: PHENERGAN  Take 1 tablet by mouth every 8 hours as needed for Nausea        CHANGE how you take these medications    methocarbamol 750 MG tablet  Commonly known as: ROBAXIN  Take 1 tablet by mouth 4 times daily for 10 doses  What changed:   · medication strength  · how much to take  · Another medication with the same name was removed. Continue taking this medication, and follow the directions you see here. CONTINUE taking these medications    amLODIPine 10 MG tablet  Commonly known as: NORVASC     folic acid 1 MG tablet  Commonly known as: FOLVITE     progesterone 200 MG capsule  Commonly known as: PROMETRIUM     sennosides-docusate sodium 8.6-50 MG tablet  Commonly known as: SENOKOT-S  Take 1 tablet by mouth 2 times daily     thyroid 240 MG tablet  Commonly known as: ARMOUR     triamcinolone 0.025 % cream  Commonly known as: KENALOG        STOP taking these medications    Enbrel 25 MG/0.5ML Sosy  Generic drug: etanercept           Where to Get Your Medications      You can get these medications from any pharmacy    Bring a paper prescription for each of these medications  · aluminum & magnesium hydroxide-simethicone 200-200-20 MG/5ML Susp suspension  · diazePAM 5 MG tablet  · meclizine 12.5 MG tablet  · methocarbamol 750 MG tablet  · oxyCODONE HCl 10 MG immediate release tablet  · polycarbophil 625 MG tablet  · promethazine 12.5 MG tablet         Discharge Destination:  The patient was discharged to Home. Follow-up:  The patient is to follow-up with Marly Myrick MD in the office in 2 weeks      Discharge Instructions:   Verbal and written discharge instructions were given to the patient at the time of discharge.     Electronically signed by: ITZ Galeana CNP, APRN-CNP, 1/10/2022 1:44 PM  [phone]

## 2022-01-10 NOTE — CONSULTS
Clinical Pharmacy Progress Note    Admit date: 1/6/2022     Subjective/Objective:  Pt is a 59yoF with a PMHx that includes chronic back and leg pain, fibromyalgia, HTN, migraine, Mitral valve prolapse, RA, and thyroid dx. Admitted s/p retroperitoneal exposure of L3-4, L4-5 and L5-S1 for anterior lumbar interbody fusion with Cooper Yañez and Cheko Jordan on 1/6. Pharmacy has been consulted to make pain medication recommendations by HALLIE MOBLEY. 1/10:  Pain scores mostly 6-8, with one noted 10. Oxycodone ER discontinued today. Patient reported \"reaction\" to IV hydromorphone - heart racing and general weakness; IV hydromorphone discontinued this AM too. Patient does report dizziness; receiving an LR 1L bolus. Possible DC home today. 1/9:  Pain scores 5-7 / 10, mostly 7's but no reported 8-10's. Oxycodone IR increased to 10-15mg q4h prn last evening. Pt noted to have own home meds in room overnight with admission she had taken a dose of home Meclizine (meds now stored in pharmacy). Flowsheet documentation shows pt noted to be drowsy today. No Hydromorphone given since 03:30. Had 2 BMs in past 24 hrs. 1/8:  Pain scores 4-10 / 10. Oxycodone ER and scheduled APAP started yesterday. Used less Hydromorphone last 24 hrs since starting Oxycodone ER.  1/7:  Pain scores have been mostly 7 to 10 / 10. Patient reports pain mostly in her back, but yet she is starting to feel burning in her abdomin near the incision. States that the hydromorphone works best to make the pain tolerable, but it only lasts ~1 hr or so. Oral oxycodone does not relieve the pain as well, but she does obtain some relief after a dose. Ups and downs from pain bother her the most; she does not watch the clock and feels she's at a disadvantage about this. Diazepam does provide some anxiety relief, allowing her to sleep. Encouraged using this more.   Itching from the narcotics; would like diphenhydramine prn, as well as Eucerin lotion for dryness of skin. Current pain regimen:   Medication  Home med? Amount used last 24 hrs    Acetaminophen 1000mg po q6h No 2 doses   Diazepam 5mg PO q6h prn No 3 dose   Methocarbamol 750mg PO q6h scheduled Yes 2 doses  (pt refused one dose)    No 1mg  (1mg x 1 dose)   Oxycodone 10-15mg PO q4h prn No 90mg  (15mg x 6 doses)    No 1 dose (10mg)               Morphine Equivalent Daily Dose:  Date MEDD last 24 hr   1/7 151mg   1/8 135mg   1/9 175mg   1/10 154mg     Bowel regimen:  Fibercon 625mg PO daily  Miralax PO daily  Senna S one tab PO BID    Last Bowel Movement:  1/10    Assessment/Plan:  1)  Pain regimen recommendations:  · Recommend continuing Acetaminophen 1000mg PO q6h scheduled. · Oxycodone ER 10mg po q12h was stopped today; last dose 1/9 PM.  · Pt did increase 15mg prn breakthrough usage. · Recommend to continue Oxycodone IR 15mg PO q4h prn upon discharge. · Pharmacy will sign off today.       Please call with questions--  Thanks--  Mesha Feldman PharmD., BCPS   1/10/2022 11:50 AM  Wireless: [phone]

## 2022-01-10 NOTE — PLAN OF CARE
Falls - Risk of:  Goal: Will remain free from falls  Outcome: Ongoing     Pain:  Goal: Control of acute pain  Outcome: Ongoing  Note: Pain level remains high and increases with movement. Mrs. Beth Chavarria does report having muscle spasms as well. It does not prevent her in her daily activities.

## 2022-01-12 NOTE — PROGRESS NOTES
Physician Progress Note      PATIENT:               Ashu Cain  CSN #:                  [id number]  :                       1960  ADMIT DATE:       2022 5:39 AM  DISCH DATE:        1/10/2022 2:55 PM  RESPONDING  PROVIDER #:        Sayda Rose CNP          QUERY TEXT:    Pt admitted with spinal stenosis s/p spinal fusion. Pt noted to have a   dizziness with a drop in hgb to 11.4. If possible, please document in the   progress notes and discharge summary if you are evaluating and/or treating any   of the following: The medical record reflects the following:  Risk Factors: 56 yo female s/p spinal fusion  Clinical Indicators: PN  01/10 \"Patient ready to go home, but continues to   feel dizzy. Will order CBC & CMP to r/o anemia and/or electrolyte imbalance,   and will give 1 L of LR as patient appears to be dry. \" Admitting H/H   42.0/14.8, Labs on 01/10 H/H 32.6/11.4 Per Op note estimated blood loss   approx. 300ml  Treatment: monitoring CBC, LR    Thank you for your time and assistance,  Options provided:  -- Acute blood loss anemia  -- Postoperative acute blood loss anemia  -- Dilutional anemia  -- Precipitous drop in Hemoglobin and Hematocrit  -- Other - I will add my own diagnosis  -- Disagree - Not applicable / Not valid  -- Disagree - Clinically unable to determine / Unknown  -- Refer to Clinical Documentation Reviewer    PROVIDER RESPONSE TEXT:    This patient has a precipitous drop in Hemoglobin and Hematocrit.     Query created by: Mariusz Watson on 2022 6:37 AM      Electronically signed by:  Florentino Rose CNP 2022 10:07 AM

## 2022-02-21 ENCOUNTER — HOSPITAL ENCOUNTER (EMERGENCY)
Age: 62
Discharge: HOME | End: 2022-02-21
Attending: EMERGENCY MEDICINE
Payer: COMMERCIAL

## 2022-02-21 ENCOUNTER — APPOINTMENT (OUTPATIENT)
Dept: GENERAL RADIOLOGY | Age: 62
End: 2022-02-21
Payer: COMMERCIAL

## 2022-02-21 VITALS
SYSTOLIC BLOOD PRESSURE: 157 MMHG | RESPIRATION RATE: 16 BRPM | DIASTOLIC BLOOD PRESSURE: 81 MMHG | TEMPERATURE: 98 F | OXYGEN SATURATION: 100 % | HEART RATE: 86 BPM

## 2022-02-21 DIAGNOSIS — M06.9 RHEUMATOID ARTHRITIS FLARE (HCC): Primary | ICD-10-CM

## 2022-02-21 PROCEDURE — 73110 X-RAY EXAM OF WRIST: CPT

## 2022-02-21 PROCEDURE — 99283 EMERGENCY DEPT VISIT LOW MDM: CPT

## 2022-02-21 ASSESSMENT — ENCOUNTER SYMPTOMS
SORE THROAT: 0
ABDOMINAL PAIN: 1
DIARRHEA: 0
NAUSEA: 1
SHORTNESS OF BREATH: 0
SINUS PAIN: 0
EYE PAIN: 0
SINUS PRESSURE: 0
CONSTIPATION: 0
ABDOMINAL DISTENTION: 0
VOMITING: 0
RHINORRHEA: 0
COUGH: 0
WHEEZING: 0

## 2022-02-21 ASSESSMENT — PAIN SCALES - GENERAL
PAINLEVEL_OUTOF10: 10
PAINLEVEL_OUTOF10: 10

## 2022-02-21 ASSESSMENT — PAIN DESCRIPTION - DESCRIPTORS: DESCRIPTORS: ACHING;CONSTANT

## 2022-02-21 ASSESSMENT — PAIN DESCRIPTION - LOCATION: LOCATION: WRIST

## 2022-02-21 ASSESSMENT — PAIN DESCRIPTION - ORIENTATION: ORIENTATION: RIGHT

## 2022-02-21 ASSESSMENT — PAIN DESCRIPTION - PAIN TYPE: TYPE: ACUTE PAIN

## 2022-02-21 ASSESSMENT — PAIN - FUNCTIONAL ASSESSMENT: PAIN_FUNCTIONAL_ASSESSMENT: 0-10

## 2022-02-21 NOTE — ED PROVIDER NOTES
1 [facility]  EMERGENCY DEPARTMENT ENCOUNTER          [facility] RESIDENT NOTE       Date of evaluation: 2/21/2022    Chief Complaint     Wrist Pain (right wrist pain/recent back surgery/denies injury)      History of Present Illness     Monika Hernandez is a 57 y.o. female with a past medical history significant for rheumatoid arthritis, fibromyalgia, HTN who presents with right wrist pain that has been constant since last night. Patient states that onset occurred all of a sudden, with no inciting action or trauma. Patient reports associated pain 10/10, and swelling that extends into her fingers as well as the joint being hot. Patient attempted to use ice, massage, and her pain medication (oxycodone and gabapentin) from her recent back surgery to no avail. Patient states she has had surgery on this wrist before a few years ago due to recurrent osteophytes secondary to her RA. Patient reached out to her rheumatologist for a possible steroid injection, but was recommended to go to the ED for evaluation of a possible septic joint. Review of Systems     Review of Systems   Constitutional: Positive for chills. Negative for fatigue and fever. HENT: Negative for congestion, ear pain, postnasal drip, rhinorrhea, sinus pressure, sinus pain, sneezing and sore throat. Eyes: Negative for pain and visual disturbance. Respiratory: Negative for cough, shortness of breath and wheezing. Cardiovascular: Negative for chest pain, palpitations and leg swelling. Gastrointestinal: Positive for abdominal pain (chronic 2/2 to previous spinal surgery) and nausea. Negative for abdominal distention, constipation, diarrhea and vomiting. Endocrine: Positive for polyuria. Genitourinary: Negative for dysuria, frequency and hematuria.        Past Medical, Surgical, Family, and Social History     She has a past medical history of Arthritis, Fibromyalgia, Hypertension, Migraine, MVP (mitral valve prolapse), Nausea & vomiting, PONV (postoperative nausea and vomiting), Reflux, Thyroid disease, and Wears glasses. She has a past surgical history that includes shoulder surgery; Ankle surgery; Cholecystectomy; Tonsillectomy; back surgery; Elbow surgery; Ankle arthroscopy (Right, 3/12); Knee arthroscopy (Left); Wrist surgery (Right); cervical fusion (N/A, 12/11/2019); joint replacement (Left, 2019); Revision total knee arthroplasty (Left, 8/28/2020); Revision total knee arthroplasty (Left, 1/26/2021); and lumbar fusion (N/A, 1/6/2022). Her family history includes Cancer in her brother and father; Diabetes in her mother; Heart Disease in her father; Other in her sister. She reports that she has never smoked. She has never used smokeless tobacco. She reports previous alcohol use. She reports that she does not use drugs. Medications     Previous Medications    ALUMINUM & MAGNESIUM HYDROXIDE-SIMETHICONE (MAALOX) 200-200-20 MG/5ML SUSP SUSPENSION    Take 15 mLs by mouth every 6 hours as needed for Indigestion    AMLODIPINE (NORVASC) 10 MG TABLET    Take 10 mg by mouth daily     FOLIC ACID (FOLVITE) 1 MG TABLET    Take 1 mg by mouth daily    POLYCARBOPHIL (FIBERCON) 625 MG TABLET    Take 1 tablet by mouth daily    PROGESTERONE (PROMETRIUM) 200 MG CAPSULE    Take 600 mg by mouth nightly    SENNOSIDES-DOCUSATE SODIUM (SENOKOT-S) 8.6-50 MG TABLET    Take 1 tablet by mouth 2 times daily    THYROID (ARMOUR) 240 MG TABLET    Take 120 mg by mouth daily     TRIAMCINOLONE (KENALOG) 0.025 % CREAM    Apply topically every 4 hours as needed Apply Topically itchy skin       Allergies     She is allergic to latex, adhesive tape, gluten, plaquenil [hydroxychloroquine sulfate], vicodin [hydrocodone-acetaminophen], benzamide derivatives, and codeine. Physical Exam     INITIAL VITALS: BP: (!) 155/99, Temp: 98 °F (36.7 °C), Pulse: 96, Resp: 16, SpO2: 96 %   Physical Exam  Constitutional:       General: She is not in acute distress.      Appearance: Normal appearance. She is obese. She is not ill-appearing, toxic-appearing or diaphoretic. HENT:      Head: Normocephalic and atraumatic. Right Ear: External ear normal.      Left Ear: External ear normal.      Nose: Nose normal.      Mouth/Throat:      Mouth: Mucous membranes are moist.      Pharynx: Oropharynx is clear. Eyes:      General: No scleral icterus. Right eye: No discharge. Left eye: No discharge. Extraocular Movements: Extraocular movements intact. Pupils: Pupils are equal, round, and reactive to light. Cardiovascular:      Rate and Rhythm: Normal rate and regular rhythm. Heart sounds: Normal heart sounds. No murmur heard. No friction rub. No gallop. Pulmonary:      Effort: No respiratory distress. Breath sounds: Normal breath sounds. No stridor. No wheezing, rhonchi or rales. Abdominal:      General: Abdomen is flat. Bowel sounds are normal. There is no distension. Palpations: Abdomen is soft. There is no mass. Tenderness: There is no abdominal tenderness. There is no guarding or rebound. Hernia: No hernia is present. Comments: Midline surgical scar present   Musculoskeletal:         General: Swelling (in r wrist and fingers) and tenderness present. No deformity or signs of injury. Cervical back: Normal range of motion. Right lower leg: No edema. Left lower leg: Edema (non pitting, minimal) present. Comments: ROM of right hand and wrist limited d/t pain, strength limited d/t pain    RUE bicep/tricep and shoulder muscle strength 5/5 intact. Skin:     General: Skin is warm and dry. Coloration: Skin is not jaundiced. Neurological:      Mental Status: She is alert and oriented to person, place, and time. Mental status is at baseline. Psychiatric:         Mood and Affect: Mood normal.         Behavior: Behavior normal.         Thought Content:  Thought content normal.         Judgment: Judgment normal. Diagnostic Results     EKG       RADIOLOGY:  XR WRIST RIGHT (MIN 3 VIEWS)   Final Result      Chronic findings as above. No acute findings. LABS:   No results found for this visit on 02/21/22. ED BEDSIDE ULTRASOUND:      RECENT VITALS:  BP: (!) 157/81, Temp: 98 °F (36.7 °C),Pulse: 86, Resp: 16, SpO2: 100 %     Procedures         ED Course     Nursing Notes, Past Medical Hx, Past Surgical Hx, Social Hx, Allergies, and FamilyHx were reviewed. The patient was giventhe following medications:  Orders Placed This Encounter   Medications    diclofenac sodium (VOLTAREN) 1 % GEL     Sig: Apply 2 g topically 4 times daily As needed for right wrist swelling/pain     Dispense:  50 g     Refill:  0       CONSULTS:  None    MEDICAL DECISION MAKING / ASSESSMENT / Efrem March is a 57 y.o. female with a PMHx of RA, fibromyalgia, HTN, recent back surgery presents with a 1 day history of acute right wrist pain. Pain has an acute onset, with some radiation into her hand, elbow and shoulder. Patient endorses swelling into her R wrist and fingers as well as chills, mild nausea, and 10/10 pain. Patient has tried ice, massage and oxycodone at home for pain control to no avail. Upon presentation to the ED, patient has stable vital signs afebrile, HR 86, RR 16, satting 100% on room air, and hypertensive to 157/81. Patient obtained a right wrist x-ray which reveals triscaphe and trapeziometacarpal joint osteoarthritis. Old ulnar styloid injury. No acute fracture. On physical exam, wrist is warm but without erythema. Based on presentation, lower suspicion for septic arthritis. Likely patient is having a more severe rheumatoid arthritis flare. Patient is stable for discharge, and given a script for Voltaren gel for her right wrist pain.  Patient is instructed to follow-up with her rheumatologist for evaluation for possible corticosteroid injection, otherwise to follow up with her PCP if symptoms persist, and return to the ED if symptoms significantly worsen. This patient was also evaluated by the attending physician. All care plans were discussed and agreed upon. Clinical Impression     1.  Rheumatoid arthritis flare ([address]        Disposition     PATIENT REFERRED TO:  Isidro Barthel, DO [facility]  [address]  [phone]      As needed    [facility] Emergency Department  [address]  [address]  [phone]    If symptoms worsen      DISCHARGE MEDICATIONS:  New Prescriptions    DICLOFENAC SODIUM (VOLTAREN) 1 % GEL    Apply 2 g topically 4 times daily As needed for right wrist swelling/pain       DISPOSITION    Discharge home       Kaci Khan DO  Resident  02/21/22 [id number]

## 2022-02-22 NOTE — ED PROVIDER NOTES
ED Attending Attestation Note     Date of evaluation: 2/21/2022    This patient was seen by the resident. I have seen and examined the patient, agree with the workup, evaluation, management and diagnosis. The care plan has been discussed. My assessment reveals a 78-year-old female with history of rheumatoid arthritis and chronic right wrist pain who presents to the emerge department with worsening right wrist pain. This has been progressing in nature. No recent falls or injuries. On examination the wrist is stiff however without any overlying skin changes such as erythema. Is not warm to the touch. She has no lymphangitic streaking. She has no fever or tachycardia here. Overall low concern for septic arthritis. We will proceed with radiographs and likely symptomatic management and outpatient follow-up. Orion Andrade MD MPH   Physician.         Sherley Rollins MD  02/21/22 [id number]

## 2022-05-15 NOTE — H&P
met with patient for intake assessment/discharge planning. She
presents alert and oriented, and she consents to a discussion stating, "I want
to go home. Tell the doctor I'm ready to go home." Patient informs she lives
at home with her daughter Ayana, and her son-in-law Adalberto Arce ([phone]
[phone]) and their two children ages 3 and 5. Another 15 year old child lives
with them part-time. She states her home is large enough for all of them, and
for this she is grateful. She confirms she is independent in her ADLS/IADLS at
home, and she uses no medical equipment; she has no oxygen needs. She states
her primary care physician is Dr. Cui and Dr. Page manages her
"platelet levels." She expresses frustration that Dr. Page hasn't been
reached at her treatment team's attempt to contact yesterday and today.
Patient denies difficulty obtaining her medications, and [facility]. She has received DPOA-HC paperwork and discussed with her family.
Her daughter took the paperwork home and her son-in-law Adalberto and her son,
Jermaine Ortiz ([phone]), will be appointed as her DPOA-HC; daughter
will assure the paperwork is completed and filed in her medical record.
Patient's only request at this time is that her physician comes to see her to
discuss discharge plans; she identifies no needs at this time and a plan to go
home to her family.
 
*Discharge plan: home with adult child and family* Matt Ledesma    [phone]    [facility] Same Day Surgery Update H & P  Department of General Surgery   Surgical Service   Pre-operative History and Physical  Last H & P within the last 30 days. DIAGNOSIS:   Instability of internal left knee prosthesis, initial encounter ([facility] [T84.023A]    PROCEDURE:  LA REVISE KNEE JOINT REPLACE,ALL PARTS [59700] (LEFT TOTAL KNEE REVISION 2 COMPONENT)     HISTORY OF PRESENT ILLNESS:   Patient with c/o left knee pain, swelling and instability in the setting of previous TKA. The symptoms have been recalcitrant to conservative treatment and the patient presents today for the above procedure. Covid 19:  Patient denies fever, chills, cough or known exposure to Covid-19.   Patient reports they have been quarantined at home since Covid-19 test.      Past Medical History:        Diagnosis Date    Arthritis     rheumatoid arthritis    Fibromyalgia     Migraine     MVP (mitral valve prolapse)     Nausea & vomiting     PONV (postoperative nausea and vomiting)     Reflux     Thyroid disease     goiter treated with radioactive med    Wears glasses      Past Surgical History:        Procedure Laterality Date    ANKLE ARTHROSCOPY Right 3/12    lateral ligament repair, removal spurs    ANKLE SURGERY      right    BACK SURGERY      cyst removed off lower spine    CERVICAL FUSION N/A 12/11/2019    C4-5, C5-6, C6-7 ANTERIOR CERVICAL DISCECTOMY AND FUSION performed by Nila Mann MD at [address]      left    JOINT REPLACEMENT Left 2019    KNEE ARTHROSCOPY Left     SHOULDER SURGERY      bilateral    TONSILLECTOMY      T & A    WRIST SURGERY Right      Past Social History:  Social History     Socioeconomic History    Marital status:      Spouse name: Not on file    Number of children: Not on file    Years of education: Not on file    Highest education level: Not on file   Occupational History    Not on file   Social Needs    Financial resource strain: Not on file    Food insecurity     Worry: Not on file     Inability: Not on file    Transportation needs     Medical: Not on file     Non-medical: Not on file   Tobacco Use    Smoking status: Never Smoker    Smokeless tobacco: Never Used   Substance and Sexual Activity    Alcohol use: Yes     Comment: monthly    Drug use: No    Sexual activity: Not on file   Lifestyle    Physical activity     Days per week: Not on file     Minutes per session: Not on file    Stress: Not on file   Relationships    Social connections     Talks on phone: Not on file     Gets together: Not on file     Attends Latter-day service: Not on file     Active member of club or organization: Not on file     Attends meetings of clubs or organizations: Not on file     Relationship status: Not on file    Intimate partner violence     Fear of current or ex partner: Not on file     Emotionally abused: Not on file     Physically abused: Not on file     Forced sexual activity: Not on file   Other Topics Concern    Not on file   Social History Narrative    Not on file         Medications Prior to Admission:      Prior to Admission medications    Medication Sig Start Date End Date Taking? Authorizing Provider   sennosides-docusate sodium (SENOKOT-S) 8.6-50 MG tablet Take 1 tablet by mouth 2 times daily 12/15/19  Yes Kirit Vazquez MD   progesterone (PROMETRIUM) 200 MG capsule Take 600 mg by mouth nightly   Yes Historical Provider, MD   amLODIPine (NORVASC) 10 MG tablet Take 10 mg by mouth daily  5/9/18  Yes Historical Provider, MD   thyroid (ARMOUR) 240 MG tablet Take 120 mg by mouth daily    Yes Historical Provider, MD   ENBREL 25 MG/0.5ML SOSY  8/3/20   Historical Provider, MD   folic acid (FOLVITE) 1 MG tablet Take 1 mg by mouth daily    Historical Provider, MD   methocarbamol (ROBAXIN) 750 MG tablet Take 750 mg by mouth 4 times daily    Historical Provider, MD   metoprolol tartrate (LOPRESSOR) 25 MG tablet Take 25 mg by mouth as needed (heart racing)    Historical Provider, MD   triamcinolone (KENALOG) 0.025 % cream Apply topically every 4 hours as needed Apply Topically itchy skin    Historical Provider, MD         Allergies:  Latex; Adhesive tape; Gluten; Plaquenil [hydroxychloroquine sulfate]; Vicodin [hydrocodone-acetaminophen]; Benzamide derivatives; and Codeine    PHYSICAL EXAM:      /79   Pulse 90   Temp 98.1 °F (36.7 °C) (Oral)   Resp 16   Ht 5' 4\" (1.626 m)   Wt 174 lb (78.9 kg)   LMP 09/01/2011   SpO2 96%   BMI 29.87 kg/m²      Airway:  Airway patent with no audible stridor    Heart:  Regular rate and rhythm, No murmur noted    Lungs:  No increased work of breathing, good air exchange, clear to auscultation bilaterally, no crackles or wheezing    Abdomen:  Soft, non-distended, non-tender, normal active bowel sounds, no masses palpated    ASSESSMENT AND PLAN    Patient is a 63 y.o. female with above specified procedure planned. 1.  Patient seen and focused exam done today- no new changes since last physical exam on 8/10/20    2. Access to ancillary services are available per request of the provider.     ITZ Vela - CNP     8/28/2020

## 2022-07-14 ENCOUNTER — OFFICE VISIT (OUTPATIENT)
Dept: ORTHOPEDIC SURGERY | Age: 62
End: 2022-07-14
Payer: COMMERCIAL

## 2022-07-14 VITALS — HEIGHT: 64 IN | WEIGHT: 203 LBS | BODY MASS INDEX: 34.66 KG/M2

## 2022-07-14 DIAGNOSIS — M67.912 DISORDER OF ROTATOR CUFF, LEFT: ICD-10-CM

## 2022-07-14 DIAGNOSIS — M25.512 LEFT SHOULDER PAIN, UNSPECIFIED CHRONICITY: Primary | ICD-10-CM

## 2022-07-14 PROCEDURE — 99203 OFFICE O/P NEW LOW 30 MIN: CPT | Performed by: ORTHOPAEDIC SURGERY

## 2022-07-14 RX ORDER — ETANERCEPT 25 MG/.5ML
SOLUTION SUBCUTANEOUS
COMMUNITY
Start: 2022-02-10

## 2022-07-14 RX ORDER — DIAZEPAM 5 MG/1
TABLET ORAL
Qty: 2 TABLET | Refills: 0 | Status: SHIPPED | OUTPATIENT
Start: 2022-07-14 | End: 2022-07-21

## 2022-07-14 NOTE — PROGRESS NOTES
Chief Complaint    Shoulder Pain (NP LT shoulder)      History of Present Illness:  Karen Betts is a pleasant,  60 y.o. female here today for evaluation of left shoulder. She previously underwent bilateral shoulder open rotator cuff repair about 25 years ago. She was doing relatively well till about one year ago . She denies any injury to her left shoulder. She reports gradual onset   left shoulder pain ,5/10 in severity and affecting her daily activities. It also waking her up from sleep. She denies numbness, tingling or any other neurological symptoms. The patient also has a history of cervical spine and lumber spine surgeries     Medical History:    No notes on file    Patient's medications, allergies, past medical, surgical, social and family histories were reviewed and updated as appropriate.     Past Medical History:   Diagnosis Date    Arthritis     rheumatoid arthritis    Fibromyalgia     Hypertension     Migraine     MVP (mitral valve prolapse)     Nausea & vomiting     PONV (postoperative nausea and vomiting)     Reflux Doesn't have anymore r/t weight loss    Thyroid disease     goiter treated with radioactive med    Wears glasses       Social History     Socioeconomic History    Marital status:      Spouse name: Not on file    Number of children: Not on file    Years of education: Not on file    Highest education level: Not on file   Occupational History    Not on file   Tobacco Use    Smoking status: Never Smoker    Smokeless tobacco: Never Used   Vaping Use    Vaping Use: Never used   Substance and Sexual Activity    Alcohol use: Not Currently    Drug use: No    Sexual activity: Not on file   Other Topics Concern    Not on file   Social History Narrative    Not on file     Social Determinants of Health     Financial Resource Strain:     Difficulty of Paying Living Expenses: Not on file   Food Insecurity:     Worried About Running Out of Food in the Last Year: Not on file    Ran Out of Food in the Last Year: Not on file   Transportation Needs:     Lack of Transportation (Medical): Not on file    Lack of Transportation (Non-Medical):  Not on file   Physical Activity:     Days of Exercise per Week: Not on file    Minutes of Exercise per Session: Not on file   Stress:     Feeling of Stress : Not on file   Social Connections:     Frequency of Communication with Friends and Family: Not on file    Frequency of Social Gatherings with Friends and Family: Not on file    Attends Confucianist Services: Not on file    Active Member of Clubs or Organizations: Not on file    Attends Club or Organization Meetings: Not on file    Marital Status: Not on file   Intimate Partner Violence:     Fear of Current or Ex-Partner: Not on file    Emotionally Abused: Not on file    Physically Abused: Not on file    Sexually Abused: Not on file   Housing Stability:     Unable to Pay for Housing in the Last Year: Not on file    Number of [place] in the Last Year: Not on file    Unstable Housing in the Last Year: Not on file       Allergies   Allergen Reactions    Latex Dermatitis    Adhesive Tape      blisters    Gluten     Plaquenil [Hydroxychloroquine Sulfate]      Rash      Vicodin [Hydrocodone-Acetaminophen]      Says she gets a rash after taking it for 3 straight days      Benzamide Derivatives Rash     Benzoin   topical    Codeine Nausea And Vomiting     Current Outpatient Medications on File Prior to Visit   Medication Sig Dispense Refill    etanercept (ENBREL) 25 MG/0.5ML SOSY       diclofenac sodium (VOLTAREN) 1 % GEL Apply 2 g topically 4 times daily As needed for right wrist swelling/pain 50 g 0    folic acid (FOLVITE) 1 MG tablet Take 1 mg by mouth daily      progesterone (PROMETRIUM) 200 MG capsule Take 600 mg by mouth nightly      triamcinolone (KENALOG) 0.025 % cream Apply topically every 4 hours as needed Apply Topically itchy skin      amLODIPine (NORVASC) 10 MG tablet Take 10 mg by mouth daily       thyroid (ARMOUR) 240 MG tablet Take 120 mg by mouth daily       aluminum & magnesium hydroxide-simethicone (MAALOX) 200-200-20 MG/5ML SUSP suspension Take 15 mLs by mouth every 6 hours as needed for Indigestion (Patient not taking: Reported on 7/14/2022) 1 each 0    polycarbophil (FIBERCON) 625 MG tablet Take 1 tablet by mouth daily (Patient not taking: Reported on 7/14/2022) 30 tablet 0    sennosides-docusate sodium (SENOKOT-S) 8.6-50 MG tablet Take 1 tablet by mouth 2 times daily (Patient not taking: Reported on 7/14/2022)       No current facility-administered medications on file prior to visit. Review of Systems  A 14 point review of systems was completed by the patient on 7/14/2022 and is available in the media section of the scanned medical record and was reviewed on 7/14/2022. The review is negative with the exception of those things mentioned in the HPI and Past Medical History    Vital Signs: There were no vitals filed for this visit. General Exam:   Constitutional: Patient is adequately groomed with no evidence of malnutrition      Left  Shoulder Examination:    Inspection:  No skin lesions, no deformity, no swelling. Palpation:  Pain over rotator cuff , slight pain over biceps tendon     Active Range of Motion:  100/90/30/L2    Passive Range of Motion: 150/140/30/L1    Strength:  External Rotation 4/5, Internal Rotation 4/5, Champagne Toast 4-/5, Supraspinatus 4-/5    Special Tests: Weak beer hug test.  Weak belly press test.  No Pierre deformity. Neurovascular: Palpable radial pulse, normal sensation in the median, ulnar, radial nerve distributions        Comparison right shoulder Examination:    Inspection:  No skin lesions, no deformity, no swelling. Palpation: No tenderness    Active Range of Motion:   Forward Elevation 160, Abduction 160, External Rotation 50, Internal Rotation T12    Passive Range of Motion: Same as active    Strength:  5/5    Special Tests:  No Pierre Deformity    Neurovascular: further evaluation of her tear. She was in agreement with this plan and all questions were answered to the patient's satisfaction. She was encouraged to call with any questions. 11:40 AM  7/14/2022    Anna Guajardo MD  Clinical Fellow at [address] [place] [address]    During this examination, Isaias Seo MD functioned as a scribe for Dr. Shasta Roe. This dictation was performed with a verbal recognition program (DRAGON) and it was checked for errors. It is possible that there are still dictated errors within this office note. If so, please bring any errors to my attention for an addendum. All efforts were made to ensure that this office note is accurate.  ______________  I was physically present and personally supervised the Orthopaedic Sports Medicine Fellow in the evaluation and development of a treatment plan for this patient. I personally interviewed the patient and performed a physical examination. In addition, I discussed the patient's condition and treatment options with them. I have also reviewed and agree with the past medical, family and social history unless otherwise noted. All of the patient's questions were answered. Dash Rajan MD, PhD  7/14/2022

## 2022-07-21 ENCOUNTER — OFFICE VISIT (OUTPATIENT)
Dept: ORTHOPEDIC SURGERY | Age: 62
End: 2022-07-21
Payer: COMMERCIAL

## 2022-07-21 VITALS — WEIGHT: 203 LBS | BODY MASS INDEX: 34.66 KG/M2 | HEIGHT: 64 IN

## 2022-07-21 DIAGNOSIS — M25.512 LEFT SHOULDER PAIN, UNSPECIFIED CHRONICITY: Primary | ICD-10-CM

## 2022-07-21 DIAGNOSIS — M75.122 NONTRAUMATIC COMPLETE TEAR OF LEFT ROTATOR CUFF: ICD-10-CM

## 2022-07-21 PROCEDURE — L3670 SO ACRO/CLAV CAN WEB PRE OTS: HCPCS | Performed by: ORTHOPAEDIC SURGERY

## 2022-07-21 PROCEDURE — 99214 OFFICE O/P EST MOD 30 MIN: CPT | Performed by: ORTHOPAEDIC SURGERY

## 2022-07-21 NOTE — PROGRESS NOTES
Chief Complaint    Shoulder Pain      History of Present Illness:  Dee Dinero is a pleasant, 60 y.o., female, here today for follow up of left shoulder. She is here today to review her left shoulder MRI. She has historoy of left shoulder open rotator cuff repair about 25 years ago. She has started recently to have shoulder pain, that progressively getting worse with time. She denies any shoulder injury. Her pain is affecting her day to day activities and her sleep. History of Present Illness from 7/14/2022:  Dee Dinero is a pleasant,  60 y.o. female here today for evaluation of left shoulder. She previously underwent bilateral shoulder open rotator cuff repair about 25 years ago. She was doing relatively well till about one year ago . She denies any injury to her left shoulder. She reports gradual onset   left shoulder pain ,5/10 in severity and affecting her daily activities. It also waking her up from sleep. She denies numbness, tingling or any other neurological symptoms. The patient also has a history of cervical spine and lumber spine surgeries    Pain Assessment  Location of Pain: Shoulder  Severity of Pain: 2      Medical History:  Patient's medications, allergies, past medical, surgical, social and family histories were reviewed and updated as appropriate. No notes on file    Review of Systems  A 14 point review of systems was completed by the patient on  and is available in the media section of the scanned medical record and was reviewed on 7/21/2022. The review is negative with the exception of those things mentioned in the HPI and Past Medical History    Vital Signs: There were no vitals filed for this visit. General/Appearance: Alert and oriented and in no apparent distress. Skin:  There are no skin lesions, cellulitis, or extreme edema. The patient has warm and well-perfused Bilateral upper extremities with brisk capillary refill.       Left  Shoulder Examination: Continue diuretics and follow up on labs for K and Cr. Thanks.    Procedures    DJO ultrasling IV Shoulder Sling     Patient was prescribed a DJO Ultrasling IV Shoulder Brace. The left shoulder will require stabilization / immobilization from this orthosis. The orthosis will assist in protecting the affected area, provide functional support and facilitate healing. The device was ordered and fit on 7/21/22. The patient was educated and fit by a healthcare professional with expert knowledge and specialization in brace application while under the direct supervision of the treating physician. Verbal and written instructions for the use of and application of this item were provided. They were instructed to contact the office immediately should the brace result in increased pain, decreased sensation, increased swelling or worsening of the condition. Treatment Plan: We have discussed both surgical and non surgical options with the patient. We do think she is a good candidate for left shoulder arthroscopic rotator cuff repair. We would like her to have the surgery as soon as possible because she has very good rotator cuff muscle quality with only grade 1 fatty infiltration. Patient agrees for our plan and she would like to proceed with the surgery. We will book her for left shoulder diagnostic arthroscopy and revision rotator cuff repair. Risks, benefits and potential complications of arthroscopic shoulder surgery were discussed with the patient. Risks discussed include but are not limited to bleeding, infection, anesthetic risk, injury to nerves and blood vessels, deep vein thrombosis, residual stiffness and weakness, and the need for revision surgery. The patient also understands that anesthetic risks include cardiopulmonary issues, drug reactions and even death. The patient voices an understanding of the importance of physical therapy and home exercises after surgery. All questions were answered and written informed consent for surgery was obtained today. All questions were answered to patient's satisfaction and She was encouraged to call with any further questions or concerns. Nayana Collins is in agreement with this plan.    7/21/2022  5:17 PM    Mirta Carlos MD  Clinical Fellow at [place] [address]    During this examination, Jenna Santos MD functioned as a scribe for Dr. Charlotte Sánchez    This dictation was performed with a verbal recognition program (DRAGON) and it was checked for errors. It is possible that there are still dictated errors within this office note. If so, please bring any errors to my attention for an addendum. All efforts were made to ensure that this office note is accurate.  ______________   I was physically present and personally supervised the Orthopaedic Sports Medicine Fellow in the evaluation and development of a treatment plan for this patient. I personally interviewed the patient and performed a physical examination. In addition, I discussed the patient's condition and treatment options with them. I have also reviewed and agree with the past medical, family and social history unless otherwise noted. All of the patient's questions were answered. Dash Hdez MD, PhD  7/21/2022

## 2022-07-22 ENCOUNTER — TELEPHONE (OUTPATIENT)
Dept: ORTHOPEDIC SURGERY | Age: 62
End: 2022-07-22

## 2022-07-22 NOTE — TELEPHONE ENCOUNTER
General Question     Subject: [facility] SURGERY   Patient and /or Facility Request: Tess Petty \"Ritu\"  Contact Number: [phone]    PATIENT WAS CALLING IN TO LET THE OFFICE KNOW THAT SHE READY TO SCHEDULE FOR SURGERY. PATIENT STATES THAT HE COMPLETED HER PHYSICAL. [place] PLEASE CALL PATIENT BACK AT THE ABOVE NUMBER. [place]

## 2022-07-27 ENCOUNTER — TELEPHONE (OUTPATIENT)
Dept: ORTHOPEDIC SURGERY | Age: 62
End: 2022-07-27

## 2022-07-27 NOTE — TELEPHONE ENCOUNTER
PA requsted via [facility] [employer] by online thru [employer] w/clinicals. Reference # [id number]      Pending: In RN review  Tracking #[id number]  Note: [employer] patients must complete physical therapy before being eligible for Arthroscopic surgery. Our records indicate PT has not been attempted in the past three months. If PT has not been performed, please consider withdrawing this request. If PT has been performed, please submit clinical documentation of completed physical therapy in the past three months.

## 2022-07-28 NOTE — TELEPHONE ENCOUNTER
Checked [employer] website still pending. Case w/drawn due to needing additional information within an hour. Will restart once PT information is uploaded.

## 2022-08-02 ENCOUNTER — TELEPHONE (OUTPATIENT)
Dept: ORTHOPEDIC SURGERY | Age: 62
End: 2022-08-02

## 2022-08-02 NOTE — PROGRESS NOTES
Place patient label inside box (if no patient label, complete below)  Name:  :  MR#:   Shilpa Blake / Estefania Bonilla (we), Connor Alvarado.  authorize DR Pancho Jorgensen  and/or such assistants as may be selected by him/her, to perform the following operation/procedure(s): LEFT SHOULDER ARTHROSCOPIC ROTATOR CUFF REPAIR        Note: If unable to obtain consent prior to an emergent procedure, document the emergent reason in the medical record. This procedure has been explained to my (our) satisfaction and included in the explanation was: The intended benefit, nature, and extent of the procedure to be performed; The significant risks involved and the probability of success; Alternative procedures and methods of treatment; The dangers and probable consequences of such alternatives (including no procedure or treatment); The expected consequences of the procedure on my future health; Whether other qualified individuals would be performing important surgical tasks and/or whether  would be present to advise or support the procedure. I (we) understand that there are other risks of infection and other serious complications in the pre-operative/procedural and postoperative/procedural stages of my (our) care. I (we) have asked all of the questions which I (we) thought were important in deciding whether or not to undergo treatment or diagnosis. These questions have been answered to my (our) satisfaction. I (we) understand that no assurance can be given that the procedure will be a success, and no guarantee or warranty of success has been given to me (us). It has been explained to me (us) that during the course of the operation/procedure, unforeseen conditions may be revealed that necessitate extension of the original procedure(s) or different procedure(s) than those set forth in Paragraph 1.  I (we) authorize and request that the above-named physician, his/her assistants or his/her designees, perform procedures as necessary and desirable if deemed to be in my (our) best interest.     Revised 8/2/2021                                                                          Page 1 of 2           I acknowledge that health care personnel may be observing this procedure for the purpose of medical education or other specified purposes as may be necessary as requested and/or approved by my (our) physician. I (we) consent to the disposal by the hospital Pathologist of the removed tissue, parts or organs in accordance with hospital policy. I do ____ do not ____ consent to the use of a local infiltration pain blocking agent that will be used by my provider/surgical provider to help alleviate pain during my procedure. I do ____ do not ____ consent to an emergent blood transfusion in the case of a life-threatening situation that requires blood components to be administered. This consent is valid for 24 hours from the beginning of the procedure. This patient does ____ or does not ____ currently have a DNR status/order. If DNR order is in place, obtain Addendum to the Surgical Consent for ALL Patients with a DNR Order to address liz-operative status for limited intervention or DNR suspension.      I have read and fully understand the above Consent for Operation/Procedure and that all blanks were completed before I signed the consent.   _____________________________       _____________________      ____/____am/pm  Signature of Patient or legal representative      Printed Name / Relationship            Date / Time   ____________________________       _____________________      ____/____am/pm  Witness to Signature                                    Printed Name                    Date / Time    If patient is unable to sign or is a minor, complete the following)  Patient is a minor, ____ years of age, or unable to sign because: ______________________________________________________________________________________________    If a phone consent is obtained, consent will be documented by using two health care professionals, each affirming that the consenting party has no questions and gives consent for the procedure discussed with the physician/provider.   _____________________          ____________________       _____/_____am/pm   2nd witness to phone consent        Printed name           Date / Time    Informed Consent:  I have provided the explanation described above in section 1 to the patient and/or legal representative.  I have provided the patient and/or legal representative with an opportunity to ask any questions about the proposed operation/procedure.   ___________________________          ____________________         ____/____am/pm  Provider / Proceduralist                            Printed name            Date / Time  Revised 8/2/2021                                                                      Page 2 of 2

## 2022-08-02 NOTE — TELEPHONE ENCOUNTER
Re-Request.  PA requsted via [facility]  by online thru [employer] w/clinicals.   Reference # [id number]

## 2022-08-02 NOTE — TELEPHONE ENCOUNTER
General Question     Subject: PATIENT WANTING TO KNOW STATUE FOR SURGERY APPROVAL. PLEASE ADVISE.    Patient Sophy Concepcion \"Ritu\"  Contact Number: [phone]

## 2022-08-02 NOTE — PROGRESS NOTES
[facility] PRE-SURGICAL TESTING INSTRUCTIONS                                  PRIOR TO PROCEDURE DATE:        1. PLEASE FOLLOW ANY  GUIDELINES/ INSTRUCTIONS PRIOR TO YOUR PROCEDURE AS ADVISED BY YOUR SURGEON. 2. Arrange for someone to drive you home and be with you for the first 24 hours after discharge for your safety after your procedure for which you received sedation. Ensure it is someone we can share information with regarding your discharge. 3. You must contact your surgeon for instructions IF:  You are taking any blood thinners, aspirin, anti-inflammatory or vitamin E. There is a change in your physical condition such as a cold, fever, rash, cuts, sores or any other infection, especially near your surgical site. 4. Do not drink alcohol the day before or day of your procedure. 5. A Pre-op History and Physical for surgery MUST be completed by your Physician or Urgent Care within 30 days of your procedure date. Please bring a copy with you on the day of your procedure and along with any other testing performed. THE DAY OF YOUR PROCEDURE:  1. Follow instructions for ARRIVAL TIME as DIRECTED BY YOUR SURGEON. 2. Enter the MAIN entrance from [address] and follow the signs to the free [employer] or [employer] parking (offered free of charge 6am-5pm). 3. Enter the Main Entrance of the hospital (do not enter from the lower level of the parking garage). Upon entrance, check in with the  at the main desk on your left. If no one is available at the desk, proceed into the [facility] Waiting Room and go through the door directly into the [facility]. There is a Check-in desk ACROSS from Room 5 (marked with a sign hanging from the ceiling). The phone number for the surgery center is [phone]. 4. Please call [phone] option #2 option #2 if you have not been preregistered yet.   On the day of your procedure bring your insurance card and photo ID. You will be registered at your bedside once brought back to your room. 5. DO NOT EAT ANYTHING eight hours prior to your arrival for surgery. May have 8 ounces of water 4 hours prior to your arrival for surgery. NOTE: ALL Gastric, Bariatric and Bowel surgery patients MUST follow their surgeon's instructions. 6. MEDICATIONS   Take the following medications with a SMALL sip of water: thyroid    Bariatric patient's call surgeon if on diabetic medications as some need to be stopped 1 week preop  Use your usual dose of inhalers the morning of surgery. BRING your rescue inhaler with you to hospital.   Anesthesia does NOT want you to take insulin the morning of surgery. They will control your blood sugar while you are at the hospital. Please contact your ordering physician for instructions regarding your insulin the night before your procedure. If you have an insulin pump, please keep it set on basal rate. 7. Do not swallow water when brushing teeth. No gum, candy, mints or ice chips. Refrain from smoking or at least decrease the amount. 8. Dress in loose, comfortable clothing appropriate for redressing after your procedure. Do not wear jewelry (including body piercings), make-up (especially NO eye make-up), fingernail polish (NO toenail polish if foot/leg surgery), lotion, powders or metal hairclips. 9. Dentures, glasses, or contacts will need to be removed before your procedure. Bring cases for your glasses, contacts, dentures, or hearing aids to protect them while you are in surgery. 10. If you use a CPAP, please bring it with you on the day of your procedure. 11. We recommend that valuable personal  belongings such as cash, cell phones, e-tablets or jewelry, be left at home during your stay. The hospital will not be responsible for valuables that are not secured in the hospital safe.  However, if your insurance requires a co-pay, you may want to bring a method of payment, i.e. Check or

## 2022-08-03 ENCOUNTER — TELEPHONE (OUTPATIENT)
Dept: ORTHOPEDIC SURGERY | Age: 62
End: 2022-08-03

## 2022-08-03 NOTE — TELEPHONE ENCOUNTER
JOHANA BARBOZA/ AUTHORIZATION @ [facility], NEEDS TO KNOW IF THE SURGERY FOR 8/5 HAS BEEN APPROVED.   PLEASE CALL  [phone]

## 2022-08-04 ENCOUNTER — ANESTHESIA EVENT (OUTPATIENT)
Dept: OPERATING ROOM | Age: 62
End: 2022-08-04
Payer: COMMERCIAL

## 2022-08-04 NOTE — TELEPHONE ENCOUNTER
Afua Sherry [id number]    Date: 08/05/22 thru 11/03/22  Type of SX:  Outpatient  Location: [facility]  CPT: 83432   DX Code: J75.478, M75.122  SX area: [address] [place] [address]  [place] [facility]

## 2022-08-05 ENCOUNTER — HOSPITAL ENCOUNTER (OUTPATIENT)
Age: 62
Setting detail: OUTPATIENT SURGERY
Discharge: HOME | End: 2022-08-05
Attending: ORTHOPAEDIC SURGERY | Admitting: ORTHOPAEDIC SURGERY
Payer: COMMERCIAL

## 2022-08-05 ENCOUNTER — ANESTHESIA (OUTPATIENT)
Dept: OPERATING ROOM | Age: 62
End: 2022-08-05
Payer: COMMERCIAL

## 2022-08-05 VITALS
HEART RATE: 100 BPM | TEMPERATURE: 97.5 F | OXYGEN SATURATION: 97 % | WEIGHT: 194.6 LBS | BODY MASS INDEX: 33.22 KG/M2 | SYSTOLIC BLOOD PRESSURE: 144 MMHG | HEIGHT: 64 IN | DIASTOLIC BLOOD PRESSURE: 75 MMHG | RESPIRATION RATE: 16 BRPM

## 2022-08-05 DIAGNOSIS — M75.122 NONTRAUMATIC COMPLETE TEAR OF LEFT ROTATOR CUFF: Primary | ICD-10-CM

## 2022-08-05 PROCEDURE — 2580000003 HC RX 258: Performed by: ANESTHESIOLOGY

## 2022-08-05 PROCEDURE — 2580000003 HC RX 258: Performed by: ORTHOPAEDIC SURGERY

## 2022-08-05 PROCEDURE — 2709999900 HC NON-CHARGEABLE SUPPLY: Performed by: ORTHOPAEDIC SURGERY

## 2022-08-05 PROCEDURE — 64415 NJX AA&/STRD BRCH PLXS IMG: CPT | Performed by: ANESTHESIOLOGY

## 2022-08-05 PROCEDURE — 3700000000 HC ANESTHESIA ATTENDED CARE: Performed by: ORTHOPAEDIC SURGERY

## 2022-08-05 PROCEDURE — 7100000011 HC PHASE II RECOVERY - ADDTL 15 MIN: Performed by: ORTHOPAEDIC SURGERY

## 2022-08-05 PROCEDURE — 6360000002 HC RX W HCPCS: Performed by: ORTHOPAEDIC SURGERY

## 2022-08-05 PROCEDURE — 2500000003 HC RX 250 WO HCPCS: Performed by: NURSE ANESTHETIST, CERTIFIED REGISTERED

## 2022-08-05 PROCEDURE — 3700000001 HC ADD 15 MINUTES (ANESTHESIA): Performed by: ORTHOPAEDIC SURGERY

## 2022-08-05 PROCEDURE — C1713 ANCHOR/SCREW BN/BN,TIS/BN: HCPCS | Performed by: ORTHOPAEDIC SURGERY

## 2022-08-05 PROCEDURE — C9290 INJ, BUPIVACAINE LIPOSOME: HCPCS | Performed by: ANESTHESIOLOGY

## 2022-08-05 PROCEDURE — 3600000014 HC SURGERY LEVEL 4 ADDTL 15MIN: Performed by: ORTHOPAEDIC SURGERY

## 2022-08-05 PROCEDURE — 7100000010 HC PHASE II RECOVERY - FIRST 15 MIN: Performed by: ORTHOPAEDIC SURGERY

## 2022-08-05 PROCEDURE — 6360000002 HC RX W HCPCS: Performed by: ANESTHESIOLOGY

## 2022-08-05 PROCEDURE — 7100000000 HC PACU RECOVERY - FIRST 15 MIN: Performed by: ORTHOPAEDIC SURGERY

## 2022-08-05 PROCEDURE — C1763 CONN TISS, NON-HUMAN: HCPCS | Performed by: ORTHOPAEDIC SURGERY

## 2022-08-05 PROCEDURE — 3600000004 HC SURGERY LEVEL 4 BASE: Performed by: ORTHOPAEDIC SURGERY

## 2022-08-05 PROCEDURE — 6370000000 HC RX 637 (ALT 250 FOR IP): Performed by: ANESTHESIOLOGY

## 2022-08-05 PROCEDURE — 2500000003 HC RX 250 WO HCPCS: Performed by: ANESTHESIOLOGY

## 2022-08-05 PROCEDURE — 2720000010 HC SURG SUPPLY STERILE: Performed by: ORTHOPAEDIC SURGERY

## 2022-08-05 PROCEDURE — 6360000002 HC RX W HCPCS: Performed by: NURSE ANESTHETIST, CERTIFIED REGISTERED

## 2022-08-05 PROCEDURE — 7100000001 HC PACU RECOVERY - ADDTL 15 MIN: Performed by: ORTHOPAEDIC SURGERY

## 2022-08-05 DEVICE — BONE ANCHORS 3 WITH ARTHROSCOPIC DELIVERY SYSTEM ADVANCED
Type: IMPLANTABLE DEVICE | Site: SHOULDER | Status: FUNCTIONAL
Brand: BONE ANCHORS WITH ARTHROSCOPIC DELIVERY SYSTEM - ADVANCED

## 2022-08-05 DEVICE — IMPLANTABLE DEVICE
Type: IMPLANTABLE DEVICE | Site: SHOULDER | Status: FUNCTIONAL
Brand: BIOINDUCTIVE IMPLANT WITH ARTHROSCOPIC DELIVERY SYSTEM - LARGE

## 2022-08-05 DEVICE — ANCHOR TEND 8 FOR REGENETEN BIOINDUCTIVE IMPL SYS: Type: IMPLANTABLE DEVICE | Site: SHOULDER | Status: FUNCTIONAL

## 2022-08-05 DEVICE — ANCHOR SUT L14.7MM DIA5.5MM BIOCOMPOSITE W/ 3 SZ 2: Type: IMPLANTABLE DEVICE | Site: SHOULDER | Status: FUNCTIONAL

## 2022-08-05 RX ORDER — PROPOFOL 10 MG/ML
INJECTION, EMULSION INTRAVENOUS PRN
Status: DISCONTINUED | OUTPATIENT
Start: 2022-08-05 | End: 2022-08-05 | Stop reason: SDUPTHER

## 2022-08-05 RX ORDER — FAMOTIDINE 10 MG/ML
INJECTION, SOLUTION INTRAVENOUS PRN
Status: DISCONTINUED | OUTPATIENT
Start: 2022-08-05 | End: 2022-08-05 | Stop reason: SDUPTHER

## 2022-08-05 RX ORDER — SODIUM CHLORIDE 0.9 % (FLUSH) 0.9 %
5-40 SYRINGE (ML) INJECTION PRN
Status: DISCONTINUED | OUTPATIENT
Start: 2022-08-05 | End: 2022-08-05 | Stop reason: HOSPADM

## 2022-08-05 RX ORDER — SENNA PLUS 8.6 MG/1
1 TABLET ORAL 2 TIMES DAILY
Qty: 60 TABLET | Refills: 11 | Status: SHIPPED | OUTPATIENT
Start: 2022-08-05 | End: 2023-08-05

## 2022-08-05 RX ORDER — ONDANSETRON 2 MG/ML
4 INJECTION INTRAMUSCULAR; INTRAVENOUS
Status: DISCONTINUED | OUTPATIENT
Start: 2022-08-05 | End: 2022-08-05 | Stop reason: HOSPADM

## 2022-08-05 RX ORDER — SODIUM CHLORIDE, SODIUM LACTATE, POTASSIUM CHLORIDE, CALCIUM CHLORIDE 600; 310; 30; 20 MG/100ML; MG/100ML; MG/100ML; MG/100ML
INJECTION, SOLUTION INTRAVENOUS CONTINUOUS
Status: DISCONTINUED | OUTPATIENT
Start: 2022-08-05 | End: 2022-08-05 | Stop reason: HOSPADM

## 2022-08-05 RX ORDER — FENTANYL CITRATE 50 UG/ML
INJECTION, SOLUTION INTRAMUSCULAR; INTRAVENOUS PRN
Status: DISCONTINUED | OUTPATIENT
Start: 2022-08-05 | End: 2022-08-05 | Stop reason: SDUPTHER

## 2022-08-05 RX ORDER — APREPITANT 40 MG/1
40 CAPSULE ORAL ONCE
Status: DISCONTINUED | OUTPATIENT
Start: 2022-08-05 | End: 2022-08-05 | Stop reason: HOSPADM

## 2022-08-05 RX ORDER — ONDANSETRON 2 MG/ML
INJECTION INTRAMUSCULAR; INTRAVENOUS PRN
Status: DISCONTINUED | OUTPATIENT
Start: 2022-08-05 | End: 2022-08-05

## 2022-08-05 RX ORDER — PROCHLORPERAZINE EDISYLATE 5 MG/ML
5 INJECTION INTRAMUSCULAR; INTRAVENOUS
Status: DISCONTINUED | OUTPATIENT
Start: 2022-08-05 | End: 2022-08-05 | Stop reason: HOSPADM

## 2022-08-05 RX ORDER — SUCCINYLCHOLINE CHLORIDE 20 MG/ML
INJECTION INTRAMUSCULAR; INTRAVENOUS PRN
Status: DISCONTINUED | OUTPATIENT
Start: 2022-08-05 | End: 2022-08-05 | Stop reason: SDUPTHER

## 2022-08-05 RX ORDER — GLYCOPYRROLATE 0.2 MG/ML
INJECTION INTRAMUSCULAR; INTRAVENOUS PRN
Status: DISCONTINUED | OUTPATIENT
Start: 2022-08-05 | End: 2022-08-05 | Stop reason: SDUPTHER

## 2022-08-05 RX ORDER — LEVOTHYROXINE SODIUM 0.07 MG/1
75 TABLET ORAL DAILY
COMMUNITY
Start: 2021-11-11

## 2022-08-05 RX ORDER — ROCURONIUM BROMIDE 10 MG/ML
INJECTION, SOLUTION INTRAVENOUS PRN
Status: DISCONTINUED | OUTPATIENT
Start: 2022-08-05 | End: 2022-08-05 | Stop reason: SDUPTHER

## 2022-08-05 RX ORDER — FENTANYL CITRATE 50 UG/ML
100 INJECTION, SOLUTION INTRAMUSCULAR; INTRAVENOUS ONCE
Status: COMPLETED | OUTPATIENT
Start: 2022-08-05 | End: 2022-08-05

## 2022-08-05 RX ORDER — SCOLOPAMINE TRANSDERMAL SYSTEM 1 MG/1
1 PATCH, EXTENDED RELEASE TRANSDERMAL ONCE
Status: DISCONTINUED | OUTPATIENT
Start: 2022-08-05 | End: 2022-08-05 | Stop reason: HOSPADM

## 2022-08-05 RX ORDER — DOCUSATE SODIUM 100 MG/1
100 CAPSULE, LIQUID FILLED ORAL 2 TIMES DAILY
Qty: 60 CAPSULE | Refills: 0 | Status: SHIPPED | OUTPATIENT
Start: 2022-08-05 | End: 2022-09-04

## 2022-08-05 RX ORDER — SODIUM CHLORIDE 0.9 % (FLUSH) 0.9 %
5-40 SYRINGE (ML) INJECTION EVERY 12 HOURS SCHEDULED
Status: DISCONTINUED | OUTPATIENT
Start: 2022-08-05 | End: 2022-08-05 | Stop reason: HOSPADM

## 2022-08-05 RX ORDER — LABETALOL HYDROCHLORIDE 5 MG/ML
10 INJECTION, SOLUTION INTRAVENOUS
Status: DISCONTINUED | OUTPATIENT
Start: 2022-08-05 | End: 2022-08-05 | Stop reason: HOSPADM

## 2022-08-05 RX ORDER — ONDANSETRON 4 MG/1
4 TABLET, FILM COATED ORAL EVERY 8 HOURS PRN
Qty: 30 TABLET | Refills: 0 | Status: SHIPPED | OUTPATIENT
Start: 2022-08-05

## 2022-08-05 RX ORDER — MEPERIDINE HYDROCHLORIDE 25 MG/ML
12.5 INJECTION INTRAMUSCULAR; INTRAVENOUS; SUBCUTANEOUS EVERY 5 MIN PRN
Status: DISCONTINUED | OUTPATIENT
Start: 2022-08-05 | End: 2022-08-05 | Stop reason: HOSPADM

## 2022-08-05 RX ORDER — BUPIVACAINE HYDROCHLORIDE 5 MG/ML
30 INJECTION, SOLUTION EPIDURAL; INTRACAUDAL ONCE
Status: DISCONTINUED | OUTPATIENT
Start: 2022-08-05 | End: 2022-08-05 | Stop reason: HOSPADM

## 2022-08-05 RX ORDER — OXYCODONE HYDROCHLORIDE AND ACETAMINOPHEN 5; 325 MG/1; MG/1
1 TABLET ORAL EVERY 6 HOURS PRN
Qty: 28 TABLET | Refills: 0 | Status: SHIPPED | OUTPATIENT
Start: 2022-08-05 | End: 2022-08-11 | Stop reason: SDUPTHER

## 2022-08-05 RX ORDER — BUPIVACAINE HYDROCHLORIDE 5 MG/ML
INJECTION, SOLUTION EPIDURAL; INTRACAUDAL
Status: COMPLETED | OUTPATIENT
Start: 2022-08-05 | End: 2022-08-05

## 2022-08-05 RX ORDER — LIDOCAINE HYDROCHLORIDE 20 MG/ML
INJECTION, SOLUTION INFILTRATION; PERINEURAL PRN
Status: DISCONTINUED | OUTPATIENT
Start: 2022-08-05 | End: 2022-08-05 | Stop reason: SDUPTHER

## 2022-08-05 RX ORDER — SODIUM CHLORIDE 9 MG/ML
INJECTION, SOLUTION INTRAVENOUS PRN
Status: DISCONTINUED | OUTPATIENT
Start: 2022-08-05 | End: 2022-08-05 | Stop reason: HOSPADM

## 2022-08-05 RX ORDER — DEXAMETHASONE SODIUM PHOSPHATE 4 MG/ML
INJECTION, SOLUTION INTRA-ARTICULAR; INTRALESIONAL; INTRAMUSCULAR; INTRAVENOUS; SOFT TISSUE PRN
Status: DISCONTINUED | OUTPATIENT
Start: 2022-08-05 | End: 2022-08-05 | Stop reason: SDUPTHER

## 2022-08-05 RX ORDER — OXYCODONE HYDROCHLORIDE 5 MG/1
5 TABLET ORAL
Status: COMPLETED | OUTPATIENT
Start: 2022-08-05 | End: 2022-08-05

## 2022-08-05 RX ORDER — HYDRALAZINE HYDROCHLORIDE 20 MG/ML
10 INJECTION INTRAMUSCULAR; INTRAVENOUS
Status: DISCONTINUED | OUTPATIENT
Start: 2022-08-05 | End: 2022-08-05 | Stop reason: HOSPADM

## 2022-08-05 RX ORDER — FENTANYL CITRATE 50 UG/ML
25 INJECTION, SOLUTION INTRAMUSCULAR; INTRAVENOUS EVERY 5 MIN PRN
Status: COMPLETED | OUTPATIENT
Start: 2022-08-05 | End: 2022-08-05

## 2022-08-05 RX ORDER — ONDANSETRON 2 MG/ML
INJECTION INTRAMUSCULAR; INTRAVENOUS PRN
Status: DISCONTINUED | OUTPATIENT
Start: 2022-08-05 | End: 2022-08-05 | Stop reason: SDUPTHER

## 2022-08-05 RX ORDER — MIDAZOLAM HYDROCHLORIDE 1 MG/ML
2 INJECTION INTRAMUSCULAR; INTRAVENOUS ONCE
Status: COMPLETED | OUTPATIENT
Start: 2022-08-05 | End: 2022-08-05

## 2022-08-05 RX ADMIN — FENTANYL CITRATE 100 MCG: 50 INJECTION, SOLUTION INTRAMUSCULAR; INTRAVENOUS at 13:47

## 2022-08-05 RX ADMIN — PHENYLEPHRINE HYDROCHLORIDE 100 MCG: 10 INJECTION, SOLUTION INTRAMUSCULAR; INTRAVENOUS; SUBCUTANEOUS at 15:21

## 2022-08-05 RX ADMIN — FENTANYL CITRATE 50 MCG: 50 INJECTION, SOLUTION INTRAMUSCULAR; INTRAVENOUS at 15:35

## 2022-08-05 RX ADMIN — SUGAMMADEX 200 MG: 100 INJECTION, SOLUTION INTRAVENOUS at 16:51

## 2022-08-05 RX ADMIN — PHENYLEPHRINE HYDROCHLORIDE 100 MCG: 10 INJECTION, SOLUTION INTRAMUSCULAR; INTRAVENOUS; SUBCUTANEOUS at 15:40

## 2022-08-05 RX ADMIN — PHENYLEPHRINE HYDROCHLORIDE 100 MCG: 10 INJECTION, SOLUTION INTRAMUSCULAR; INTRAVENOUS; SUBCUTANEOUS at 15:25

## 2022-08-05 RX ADMIN — GLYCOPYRROLATE 0.2 MG: 0.2 INJECTION INTRAMUSCULAR; INTRAVENOUS at 14:53

## 2022-08-05 RX ADMIN — ONDANSETRON 4 MG: 2 INJECTION INTRAMUSCULAR; INTRAVENOUS at 16:10

## 2022-08-05 RX ADMIN — ROCURONIUM BROMIDE 40 MG: 10 INJECTION INTRAVENOUS at 15:23

## 2022-08-05 RX ADMIN — LIDOCAINE HYDROCHLORIDE 100 MG: 20 INJECTION, SOLUTION INFILTRATION; PERINEURAL at 14:56

## 2022-08-05 RX ADMIN — FENTANYL CITRATE 25 MCG: 50 INJECTION, SOLUTION INTRAMUSCULAR; INTRAVENOUS at 18:48

## 2022-08-05 RX ADMIN — CEFAZOLIN SODIUM 2000 MG: 10 INJECTION, POWDER, FOR SOLUTION INTRAVENOUS at 14:48

## 2022-08-05 RX ADMIN — PROPOFOL 200 MG: 10 INJECTION, EMULSION INTRAVENOUS at 14:56

## 2022-08-05 RX ADMIN — FENTANYL CITRATE 25 MCG: 50 INJECTION, SOLUTION INTRAMUSCULAR; INTRAVENOUS at 18:18

## 2022-08-05 RX ADMIN — PHENYLEPHRINE HYDROCHLORIDE 100 MCG: 10 INJECTION, SOLUTION INTRAMUSCULAR; INTRAVENOUS; SUBCUTANEOUS at 15:44

## 2022-08-05 RX ADMIN — PROPOFOL 30 MG: 10 INJECTION, EMULSION INTRAVENOUS at 16:00

## 2022-08-05 RX ADMIN — DEXAMETHASONE SODIUM PHOSPHATE 4 MG: 4 INJECTION, SOLUTION INTRAMUSCULAR; INTRAVENOUS at 15:03

## 2022-08-05 RX ADMIN — FENTANYL CITRATE 50 MCG: 50 INJECTION, SOLUTION INTRAMUSCULAR; INTRAVENOUS at 14:56

## 2022-08-05 RX ADMIN — FAMOTIDINE 20 MG: 10 INJECTION, SOLUTION INTRAVENOUS at 14:46

## 2022-08-05 RX ADMIN — PHENYLEPHRINE HYDROCHLORIDE 100 MCG: 10 INJECTION, SOLUTION INTRAMUSCULAR; INTRAVENOUS; SUBCUTANEOUS at 15:12

## 2022-08-05 RX ADMIN — ROCURONIUM BROMIDE 10 MG: 10 INJECTION INTRAVENOUS at 14:55

## 2022-08-05 RX ADMIN — SUCCINYLCHOLINE CHLORIDE 200 MG: 20 INJECTION, SOLUTION INTRAMUSCULAR; INTRAVENOUS; PARENTERAL at 14:57

## 2022-08-05 RX ADMIN — BUPIVACAINE HYDROCHLORIDE 20 ML: 5 INJECTION, SOLUTION EPIDURAL; INTRACAUDAL; PERINEURAL at 13:51

## 2022-08-05 RX ADMIN — BUPIVACAINE 20 ML: 13.3 INJECTION, SUSPENSION, LIPOSOMAL INFILTRATION at 13:51

## 2022-08-05 RX ADMIN — ONDANSETRON 4 MG: 2 INJECTION INTRAMUSCULAR; INTRAVENOUS at 15:07

## 2022-08-05 RX ADMIN — OXYCODONE 5 MG: 5 TABLET ORAL at 19:11

## 2022-08-05 RX ADMIN — PHENYLEPHRINE HYDROCHLORIDE 100 MCG: 10 INJECTION, SOLUTION INTRAMUSCULAR; INTRAVENOUS; SUBCUTANEOUS at 15:16

## 2022-08-05 RX ADMIN — SODIUM CHLORIDE, POTASSIUM CHLORIDE, SODIUM LACTATE AND CALCIUM CHLORIDE: 600; 310; 30; 20 INJECTION, SOLUTION INTRAVENOUS at 13:34

## 2022-08-05 RX ADMIN — MIDAZOLAM 2 MG: 1 INJECTION INTRAMUSCULAR; INTRAVENOUS at 13:47

## 2022-08-05 RX ADMIN — PHENYLEPHRINE HYDROCHLORIDE 50 MCG: 10 INJECTION, SOLUTION INTRAMUSCULAR; INTRAVENOUS; SUBCUTANEOUS at 14:54

## 2022-08-05 ASSESSMENT — PAIN SCALES - GENERAL
PAINLEVEL_OUTOF10: 0
PAINLEVEL_OUTOF10: 6

## 2022-08-05 ASSESSMENT — PAIN DESCRIPTION - DESCRIPTORS
DESCRIPTORS: ACHING
DESCRIPTORS: DISCOMFORT;ACHING
DESCRIPTORS: ACHING

## 2022-08-05 ASSESSMENT — PAIN DESCRIPTION - ORIENTATION
ORIENTATION: LEFT

## 2022-08-05 ASSESSMENT — PAIN DESCRIPTION - LOCATION
LOCATION: ELBOW;SHOULDER
LOCATION: ELBOW;WRIST
LOCATION: ELBOW;WRIST

## 2022-08-05 ASSESSMENT — PAIN DESCRIPTION - PAIN TYPE: TYPE: SURGICAL PAIN

## 2022-08-05 NOTE — PROGRESS NOTES
PACU Discharge Note    Current Allergies: Latex, Adhesive tape, Gluten, Plaquenil [hydroxychloroquine sulfate], Vicodin [hydrocodone-acetaminophen], Benzamide derivatives, and Codeine    Pt meets criteria for discharge to home per Tacho Score and ASPAN standards. Discussed with patient and responsible individual () receiving instructions how to measure pain per numerical scale and when to contact doctor if prescribed medications are not helping with post operative pain. Discharge instructions reviewed with patient and  Razia Alvarez. Both verbalized understanding of instructions. Gave patient and family opportunity to ask questions. All questions reviewed and answered. Documents signed and copy of discharge instructions given. Vitals:    08/05/22 1900   BP: (!) 144/75   Pulse: 100   Resp: 16   Temp: 97.5 °F (36.4 °C)   SpO2: 97%      BP within 20% of pt's admitting BP per TACHO SCORE      Intake/Output Summary (Last 24 hours) at 8/5/2022 1941  Last data filed at 8/5/2022 1651  Gross per 24 hour   Intake 1000 ml   Output 50 ml   Net 950 ml         Pain assessment:  present - adequately treated  Pain Level: 6    Offered patient voided in restroom prior to discharge. Assisted with clothing, eyeglasses put on face and the rest of belongings in hand.     Patient discharged to home/self care via wheel chair by transporter/RN with  Razia Alvarez.       8/5/2022 7:41 PM normal sinus rhythm

## 2022-08-05 NOTE — BRIEF OP NOTE
Brief Postoperative Note      Patient: Hailey Santos  YOB: 1960  MRN: [id number]    Date of Procedure: 8/5/2022    Pre-Op Diagnosis: Nontraumatic complete tear of left rotator cuff [M75.122]    Post-Op Diagnosis: Same       Procedure(s):  DIAGNOSTIC LEFT SHOULDER ARTHROSCOPY, EXAMUNDER ANESTHESIA, EXTENSIVE DEBRIDEMENT, LYSIS OF ADHESIONS, REVISION  ROTATOR CUFF REPAIR WITHPATCH AUGMENTATION, SUBACROMIAL DECOMPRESSION    Surgeon(s):  Anca Canchola MD    Assistant:  Surgical Assistant: Tonja Gramajo    Anesthesia: General    Estimated Blood Loss (mL): 50    Complications: None    Specimens:   * No specimens in log *    Implants:  * No implants in log *      Drains: * No LDAs found *    Findings: see dictation    Electronically signed by James Lindsay DO on 8/5/2022 at 4:49 PM

## 2022-08-05 NOTE — ANESTHESIA PRE PROCEDURE
Department of Anesthesiology  Preprocedure Note       Name:  Yuan Segura   Age:  60 y.o.  :  1960                                          MRN:  [id number]         Date:  2022      Surgeon: Aamir Herrera):  Jeff Figueroa MD    Procedure: Procedure(s):  LEFT SHOULDER ARTHROSCOPIC ROTATOR CUFF REPAIR    Medications prior to admission:   Prior to Admission medications    Medication Sig Start Date End Date Taking? Authorizing Provider   etanercept (ENBREL) 25 MG/0.5ML SOSY  2/10/22   Historical Provider, MD   diclofenac sodium (VOLTAREN) 1 % GEL Apply 2 g topically 4 times daily As needed for right wrist swelling/pain 22   Toyin Colon Do, DO   aluminum & magnesium hydroxide-simethicone (MAALOX) 200-200-20 MG/5ML SUSP suspension Take 15 mLs by mouth every 6 hours as needed for Indigestion  Patient not taking: No sig reported 22   Giovanny Villalobos MD   polycarbophil (FIBERCON) 625 MG tablet Take 1 tablet by mouth daily 1/10/22   Giovanny Villalobos MD   sennosides-docusate sodium (SENOKOT-S) 8.6-50 MG tablet Take 1 tablet by mouth 2 times daily 12/15/19   Kirit Vazquez MD   folic acid (FOLVITE) 1 MG tablet Take 1 mg by mouth in the morning. Historical Provider, MD   progesterone (PROMETRIUM) 200 MG capsule Take 600 mg by mouth nightly    Historical Provider, MD   triamcinolone (KENALOG) 0.025 % cream Apply topically every 4 hours as needed Apply Topically itchy skin  Patient not taking: Reported on 2022    Historical Provider, MD   amLODIPine (NORVASC) 10 MG tablet Take 10 mg by mouth daily  18   Historical Provider, MD   thyroid (ARMOUR) 240 MG tablet Take 120 mg by mouth daily     Historical Provider, MD       Current medications:    No current facility-administered medications for this visit. No current outpatient medications on file.      Facility-Administered Medications Ordered in Other Visits   Medication Dose Route Frequency Provider Last Rate Last Admin    ceFAZolin (ANCEF) 3000 mg in dextrose 5 % 100 mL IVPB  3,000 mg IntraVENous Once Sancho Cohen MD        lactated ringers infusion   IntraVENous Continuous Derrel Feliz,         midazolam (VERSED) injection 2 mg  2 mg IntraVENous Once Garrett Finley MD        fentaNYL (SUBLIMAZE) injection 100 mcg  100 mcg IntraVENous Once Garrett Finley MD        bupivacaine (PF) (MARCAINE) 0.5 % injection 150 mg  30 mL Infiltration Once Garrett Finley MD           Allergies: Allergies   Allergen Reactions    Latex Dermatitis    Adhesive Tape      blisters    Gluten Nausea Only    Plaquenil [Hydroxychloroquine Sulfate]      Rash      Vicodin [Hydrocodone-Acetaminophen]      Says she gets a rash after taking it for 3 straight days      Benzamide Derivatives Rash     Benzoin   topical    Codeine Nausea And Vomiting       Problem List:    Patient Active Problem List   Diagnosis Code    Ankle instability M25.373    Achilles tendon injury S86.009A    Left ankle pain M25.572    Ankle pain M25.579    Sciatica M54.30    Peroneal tendon injury S86.309A    Edema of left foot R60.0    Sprain of foot S93.609A    Cervical spondylosis with radiculopathy M47.22    Instability of internal left knee prosthesis ([facility]) O65.085P    Complication of internal left knee prosthesis ([address] T84. 9XXA, [id number]    Spondylolisthesis of lumbar region M43.16    S/P lumbar fusion Z98.1       Past Medical History:        Diagnosis Date    Arthritis     rheumatoid arthritis    Fibromyalgia     Hypertension     Migraine     MVP (mitral valve prolapse)     Nausea & vomiting     PONV (postoperative nausea and vomiting)     Reflux Doesn't have anymore r/t weight loss    Thyroid disease     goiter treated with radioactive med    Wears glasses     Wears glasses        Past Surgical History:        Procedure Laterality Date    ANKLE ARTHROSCOPY Right 3/12    lateral ligament repair, removal spurs    ANKLE SURGERY      right    BACK SURGERY      cyst removed off lower spine    CERVICAL FUSION N/A 12/11/2019    C4-5, C5-6, C6-7 ANTERIOR CERVICAL DISCECTOMY AND FUSION performed by Hardy Epley, MD at [address]      left    JOINT REPLACEMENT Left 2019    KNEE ARTHROSCOPY Left     LUMBAR FUSION N/A 1/6/2022    L3-4, L4-5, L5-S1 ANTERIOR LUMBAR INTERBODY FUSION WITH PEDICLE SCREW FIXATION performed by Hardy Epley, MD at [address] Left 8/28/2020    LEFT TOTAL KNEE REVISION 2 COMPONENT performed by Daryle Denver, MD at [address] Left 1/26/2021    LEFT KNEE PATELLA REVISON WITH ANTERIOR SYNOVECTOMY performed by Daryle Denver, MD at [address] [facility]      bilateral    TONSILLECTOMY      T & A    WRIST SURGERY Right        Social History:    Social History     Tobacco Use    Smoking status: Never    Smokeless tobacco: Never   Substance Use Topics    Alcohol use: Not Currently                                Counseling given: Not Answered      Vital Signs (Current): There were no vitals filed for this visit.                                            BP Readings from Last 3 Encounters:   08/05/22 138/79   02/21/22 (!) 157/81   01/10/22 137/79       NPO Status:                                                                                 BMI:   Wt Readings from Last 3 Encounters:   08/05/22 194 lb 9.6 oz (88.3 kg)   07/21/22 203 lb (92.1 kg)   07/14/22 203 lb (92.1 kg)     There is no height or weight on file to calculate BMI.    CBC:   Lab Results   Component Value Date/Time    WBC 7.6 01/10/2022 08:30 AM    RBC 3.58 01/10/2022 08:30 AM    HGB 11.4 01/10/2022 08:30 AM    HCT 32.6 01/10/2022 08:30 AM    MCV 90.9 01/10/2022 08:30 AM    RDW 12.9 01/10/2022 08:30 AM     01/10/2022 08:30 AM       CMP:   Lab Results   Component Value Date/Time     01/10/2022 08:30 AM    K 3.4 01/10/2022 08:30 AM     01/10/2022 08:30 AM    CO2 32 01/10/2022 08:30 AM    BUN 4 01/10/2022 08:30 AM    CREATININE <0.5 01/10/2022 08:30 AM    GFRAA >60 01/10/2022 08:30 AM    GFRAA >60 03/02/2012 01:55 PM    AGRATIO 0.9 01/10/2022 08:30 AM    LABGLOM >60 01/10/2022 08:30 AM    GLUCOSE 149 01/10/2022 08:30 AM    PROT 6.4 01/10/2022 12:58 PM    CALCIUM 8.3 01/10/2022 08:30 AM    BILITOT 0.5 01/10/2022 12:58 PM    ALKPHOS 187 01/10/2022 12:58 PM    AST 95 01/10/2022 12:58 PM     01/10/2022 12:58 PM       POC Tests: No results for input(s): POCGLU, POCNA, POCK, POCCL, POCBUN, POCHEMO, POCHCT in the last 72 hours. Coags: No results found for: PROTIME, INR, APTT    HCG (If Applicable): No results found for: PREGTESTUR, PREGSERUM, HCG, HCGQUANT     ABGs: No results found for: PHART, PO2ART, YHU5GDG, XDY9CIZ, BEART, L9GSYWSC     Type & Screen (If Applicable):  No results found for: LABABO, LABRH    Drug/Infectious Status (If Applicable):  No results found for: HIV, HEPCAB    COVID-19 Screening (If Applicable):   Lab Results   Component Value Date/Time    COVID19 Not Detected 01/21/2021 09:35 AM    COVID19 NOT DETECTED 08/24/2020 10:01 AM           Anesthesia Evaluation  Patient summary reviewed and Nursing notes reviewed   history of anesthetic complications: PONV. Airway: Mallampati: II  TM distance: >3 FB   Neck ROM: full  Mouth opening: > = 3 FB   Dental:          Pulmonary:Negative Pulmonary ROS                              Cardiovascular:    (+) hypertension:,          Beta Blocker:  Not on Beta Blocker         Neuro/Psych:   (+) neuromuscular disease:, headaches: migraine headaches,              ROS comment: Sciatica  Fibromyalgia   GI/Hepatic/Renal: Neg GI/Hepatic/Renal ROS            Endo/Other: Negative Endo/Other ROS   (+) hypothyroidism::., .                 Abdominal:             Vascular: negative vascular ROS. Other Findings:             Anesthesia Plan      general and TIVA     ASA 3     (  )  Induction: intravenous.     MIPS: Postoperative opioids intended and Prophylactic antiemetics administered. Anesthetic plan and risks discussed with patient. Plan discussed with CRNA.     Attending anesthesiologist reviewed and agrees with Tennille Lopez MD   8/5/2022

## 2022-08-05 NOTE — PROGRESS NOTES
Procedure: peripheral block  MD: Dr. Bradley Woodson performed. Pt monitored closely on heart monitor, 2L NC, continuous pulse oximetry, EtCO2, and frequent BPs. Pt remained alert and oriented x4. pt tolerated procedure well.

## 2022-08-05 NOTE — DISCHARGE INSTRUCTIONS
[facility] [address] [facility]  Discharge Instructions  Shoulder Surgery    Date:  2022    Name:  Jing Craft  Address:  [address] [place] [address]    :  1960      Age:   60 y.o.    SSN:  [id number]      Medical Record Number:  [id number]    Instructions:  Keep dressing clean and dry. Take Percocet as needed for pain. Keep operative extremity in sling. Call Dr. Clara Pineda to make f/u appt [phone]    Your Surgeon or Anesthesiologist gave you Exparel     Exparel (bupivicaine liposome injectable suspension) is a medication injected into the surgical incision  just before the end of the procedure by your surgeon to help control your pain after surgery. It can also be given as a nerve block by the anesthesiologist. This local anesthetic provides pain relief by numbing the tissue around the surgical site. Exparel releases pain medication over time lasting up to 5 days or 120 hours. Exparel may cause a temporary loss of sensation or the ability to move in the area where the medication was injected. Side effects of Exparel that may occur include nausea, vomiting or constipation. Call immediately if you experience numbness or tingling of the mouth or lips, lightheadedness or severe anxiety. Notify your physician if you experience these or any other possible side effects of the medication. Note: Other forms of bupivacaine should not be administered within 96 hours (4 days) following administration of Exparel. Please keep ID band in place for 96 hours (4 days). PERIPHERAL NERVE BLOCK INSTRUCTIONS:    Please remember while having a nerve block you are at an increased risk for [address] were given a nerve block today from the anesthesiologist. Most nerve blocks last anywhere from 6-36 hours. You should start taking your pain medication before the block wears off or when you first begin feeling discomfort.  It takes at least 30-60 minutes for a pain pill to take effect. Pain medications should be taken with food. Consider setting an alarm through the night to help manage your pain level so you do not wake up with too much pain. Pain medicines can cause more sedation and decrease your breathing so ONLY take as directed. If you have Sleep Apnea, you need to use your C-Pap machine. What to expect after a nerve block:    Numbness, tingling- affected area feels heavy or asleep   Weakness or inability to move or control your affected area   Inability to feel temperature changes to your affected area  Usually the weakness wears off first, followed by the tingling or heaviness. You may notice more pain at this point and should start taking your pain meds. If you had a shoulder block, you may experience:   Mild shortness of breath (may be relieved by sitting up in a chair or recliner)   Hoarse voice   Blurry vision   Unequal pupils   Drooping of your face (eye or lip) on the same side as the nerve block   Swelling at the injection site on the side of your neck  These side effects should resolve as the block wears off   IF you have severe or prolonged shortness of breath- GO to the nearest Emergency Room  If you had a nerve block of your arm or leg:   Protect the arm or leg from extreme hot or cold temperatures   Protect the arm or leg from obstructing blood flow by frequent position changes- Make sure fingers/ toes stay pink and warm. Call surgeon with any changes   For leg block, get assistance walking until the block wears off, i.e.:  crutches, walker, support person    If you continue to feel the effects of the nerve block for longer than 72 hours- call [facility] at [phone] and ask to speak with the Anesthesiologist on call. [facility]    There are potential side effects of anesthesia or sedation you may experience for the first 24 hours.   These side effects include:    Confusion or Memory loss, Dizziness, or Delayed Reaction Times   [x]A responsible person should be with you for the next 24 hours. Do not operate any vehicles (automobiles, bicycles, motorcycles) or power tools or machinery for 24 hours. Do not sign any legal documents or make any legal decisions for 24 hours. Do not drink alcohol for 24 hours or while taking narcotic pain medication. Nausea    [x]Start with light diet and progress to your normal diet as you feel like eating. However, if you experience nausea or repeated episodes of vomiting which persist beyond 12-24 hours, notify your physician. Once nausea has passed, remember to keep drinking fluids. Difficulty Passing Urine  [x]Drink extra amounts of fluid today. Notify your physician if you have not urinated within 8 hours after your procedure or you feel uncomfortable. Irritated Throat from a Breathing Tube  [x]Drink extra amounts of fluid today. Lozenges may help. Muscle Aches  [x]You may experience some generalized body aches as your muscles recover from medications used to relax them during surgery. These will gradually subside. ACTIVITY INSTRUCTIONS:  [x]Rest today. Increase activity as tolerated. [x]No heavy lifting or strenuous activity  [x]No driving for 24 hours or while taking narcotic pain medications  []Elevate operative limb  [x]Sling to operative limb  []Use Crutches  []Use Walker  []Weight bearing: []None / []Partial /  []Full as tolerated  []Other:    WOUND DRESSING INSTRUCTIONS:  []May shower  []May bathe  [x]Keep dressing dry  []Do not remove dressing  []Remove dressing on:  []Leave steri strips in place  []Derma Ellis dressing -Do Not apply liquid or ointment medications or any other product to your wound while the adhesive is in place. These may loosen the film before your wound is healed. You may occasionally and briefly wet your wound in the shower.   After showering, gently blot you wound dry with a soft towel. []Drain Care  []Ice to operative site for 15-30 minutes of each hour while awake for 24-36 hours  []Use cooling system as instructed  []Post-op shoe-wear when up and about  []Other-Wear bra continuously for 24-36 hours  []Other:    MEDICATION INSTRUCTIONS:  Prescription(S) x  4  sent with you. Use as directed. When taking pain medications, you may experience the side effect of dizziness or drowsiness. Do not drink alcohol or drive when taking these medications. [x]Give the list of your medications to your primary care physician on your next visit. Keep your med list updated and carry it with in case of emergencies. Narcotic pain medications can cause the side effect of significant constipation. You may want to add a stool softener to your postoperative medication schedule or speak to your surgeon on how best to manage this side effect. NARCOTIC SAFETY:  Your pain medicine is only for you to take. Safely store your medicines. Store pills up high and out of reach of children and pets. Ensure safety caps are snapped tightly  Keep track of how many pills you have left    Unused medication can be disposed of by taking them to a drop-off box or take-back program that is authorized by the [facility]. Access to a site near you can be found on the [facility] [employer] website ([address] [facility] Observer.gov). If you have a CPAP machine, it is very important that you use it daily during all periods of sleep and daytime rest during your recovery at home. Surgery and Anesthesia place a significant amount of stress on your body. Using your CPAP will help keep you safe and lessen the negative effects of that stress.     FOLLOW-UP RECOVERY CARE:  [x]Call the office of Dr. Suzie Morrissey at [phone] for follow-up appointment and problems    Watch for these possible complications or symptoms:   Signs of INFECTION   > Fever over 100.4°     > Redness, swelling, hardness or warmth at the operative site   >Foul smelling or cloudy drainage at the operative site   Blood soaked dressing. (Some oozing may be normal)  Numb, pale, blue, cold or tingling extremity    Notify your physician if they occur or you have prolonged anesthesia/sedation side effects. Date/time 8/5/2022 5:12 PM         PACU:  [phone]   M-F 700 AM - 7 PM      SAME DAY SERVICES:  [phone] M-F 7AM-6PM          PACU:  [phone]      SAME DAY SERVICES:  [phone]      (M-F 8am - 8pm)                    (M-F 6am - 6pm)        If you smoke STOP. We care about your health! FAQs  (frequently asked questions)  About Surgical Site Infections    What is a Surgical Site Infection (SSI)? A surgical site infection is an infection that occurs after surgery in the part of the body where the surgery took place. Most patients who have surgery do not develop an infection. However, infections develop in about 1 to 3 out of every 100 patients who have surgery. Some common symptoms of a surgical site infection are:   Redness and pain around the area where you had surgery  Drainage of cloudy fluid from your surgical wound   Fever    Can SSIs be treated? Yes. Most surgical site infections can be treated with antibiotics. The antibiotic given to you depends on the bacteria (germs) causing the infection. Sometimes patients with SSIs also need another surgery to treat the infection. What are some of the things that hospitals are doing to prevent SSIs? To prevent SSIs, doctors, nurses and other healthcare providers:   Clean their hands and arms up to their elbows with an antiseptic agent just before the surgery. Clean their hands with soap and water or an alcohol-based hand rub before and after caring for each patient. May remove some of your hair immediately before your surgery using electric clippers if the hair is in the same area where the procedure will occur. They should not shave you with a razor.    Wear special hair covers, masks, gowns, and gloves during surgery to keep the surgery area clean. Give you antibiotics before your surgery starts. In most cases, you should get antibiotics within 60 minutes before the surgery starts and the antibiotics should be stopped within 24 hours after surgery. Clean the skin at the site of your surgery with a special soap that kills germs. What can I do to help prevent SSIs? Before you surgery:  Tell your doctor about other medical problems you may have. Health problems such as allergies, diabetes, and obesity could affect your surgery and your treatment. Quit smoking. Patients who smoke get more infections. Talk to your doctor about how you can quit before your surgery. Do not shave near where you will have surgery. Shaving with a razor can irritate your skin and make it easier to develop an infection. At the time of your surgery:  Speak up if someone tries to shave you with a razor before surgery. Ask why you need to be shaved and talk with your surgeon if you have any concerns. Ask if you will get antibiotics before surgery. After your surgery:  Make sure that your healthcare providers clean their hands before examining you, either with soap and water or an alcohol-based hand rub. IF YOU DO NOT SEE YOUR PROVIDERS CLEAN THEIR HANDS, PLEASE ASK THEM TO DO SO. Family and friends who visit you should not touch the surgical wound or dressings. Family and friends should clean their hands with soap and water or an alcohol-based hand rub before and after visiting you. If you do not see them clean their hands, ask them to clean their hands. What do I need to do when I go home from the hospital?  Before you go home, your doctor nurses should explain everything you need to know about taking care of your wound. Make sure you understand how to care for your wound before you leave the hospital.    Always clean your hands before and after caring for your wound. Before you go home, make sure you know who to contact if you have questions or problems after you get home. If you have any symptoms of an infection, such as redness and pain at the surgery site, drainage, or fever, call your doctor immediately. If you have additional questions, please ask your doctor or nurse. Thank you for allowing us to care for you. We hope we have exceeded your expectations and provided a very good overall experience while taking care of you and your family. If you have additional questions, please ask your doctor or nurse. Thank you for allowing us to care for you. We hope we have exceeded your expectations and provided a very good overall experience while taking care of you and your family.

## 2022-08-05 NOTE — H&P
Orpha Dys    [id number]    [facility] Same Day Surgery Update H & P  Department of General Surgery   Surgical Service   Pre-operative History and Physical  Last H & P within the last 30 days. DIAGNOSIS:   Nontraumatic complete tear of left rotator cuff [M75.122]    Procedure(s):  LEFT SHOULDER ARTHROSCOPIC ROTATOR CUFF REPAIR     History obtained from: Patient interview and EHR     HISTORY OF PRESENT ILLNESS:   The patient is a 60 y.o. female with c/o left shoulder pain, weakness and limited ROM in the setting of MRI demonstrated recurrent rotator cuff tear. Their symptoms have been recalcitrant to conservative treatment and the patient presents today for the above procedure. Illness Screening: Patient denies fever, chills, worsening cough, or close contact with sick individuals.        Past Medical History:        Diagnosis Date    Arthritis     rheumatoid arthritis    Fibromyalgia     Hypertension     Migraine     MVP (mitral valve prolapse)     Nausea & vomiting     PONV (postoperative nausea and vomiting)     Reflux Doesn't have anymore r/t weight loss    Thyroid disease     goiter treated with radioactive med    Wears glasses     Wears glasses      Past Surgical History:        Procedure Laterality Date    ANKLE ARTHROSCOPY Right 3/12    lateral ligament repair, removal spurs    ANKLE SURGERY      right    BACK SURGERY      cyst removed off lower spine    CERVICAL FUSION N/A 12/11/2019    C4-5, C5-6, C6-7 ANTERIOR CERVICAL DISCECTOMY AND FUSION performed by Kia Davis MD at [address] [facility]      left    JOINT REPLACEMENT Left 2019    KNEE ARTHROSCOPY Left     LUMBAR FUSION N/A 1/6/2022    L3-4, L4-5, L5-S1 ANTERIOR LUMBAR INTERBODY FUSION WITH PEDICLE SCREW FIXATION performed by Kia Davis MD at [address] [facility] Left 8/28/2020    LEFT TOTAL KNEE REVISION 2 COMPONENT performed by Maira Daniel MD at [place] [address] ARTHROPLASTY Left 1/26/2021    LEFT KNEE PATELLA REVISON WITH ANTERIOR SYNOVECTOMY performed by Jami Gutiérrez MD at [facility]      bilateral    TONSILLECTOMY      T & A    WRIST SURGERY Right        Medications Prior to Admission:      Prior to Admission medications    Medication Sig Start Date End Date Taking? Authorizing Provider   etanercept (ENBREL) 25 MG/0.5ML SOSY  2/10/22   Historical Provider, MD   diclofenac sodium (VOLTAREN) 1 % GEL Apply 2 g topically 4 times daily As needed for right wrist swelling/pain 2/21/22   Macy Chao Do, DO   aluminum & magnesium hydroxide-simethicone (MAALOX) 200-200-20 MG/5ML SUSP suspension Take 15 mLs by mouth every 6 hours as needed for Indigestion  Patient not taking: No sig reported 1/9/22   Paula Mccarthy MD   polycarbophil (FIBERCON) 625 MG tablet Take 1 tablet by mouth daily  Patient not taking: No sig reported 1/10/22   Paula Mccarthy MD   sennosides-docusate sodium (SENOKOT-S) 8.6-50 MG tablet Take 1 tablet by mouth 2 times daily  Patient not taking: No sig reported 12/15/19   Keiko Olivarez MD   folic acid (FOLVITE) 1 MG tablet Take 1 mg by mouth daily  Patient not taking: Reported on 8/2/2022    Historical Provider, MD   progesterone (PROMETRIUM) 200 MG capsule Take 600 mg by mouth nightly    Historical Provider, MD   triamcinolone (KENALOG) 0.025 % cream Apply topically every 4 hours as needed Apply Topically itchy skin    Historical Provider, MD   amLODIPine (NORVASC) 10 MG tablet Take 10 mg by mouth daily  5/9/18   Historical Provider, MD   thyroid (ARMOUR) 240 MG tablet Take 120 mg by mouth daily     Historical Provider, MD         Allergies:  Latex, Adhesive tape, Gluten, Plaquenil [hydroxychloroquine sulfate], Vicodin [hydrocodone-acetaminophen], Benzamide derivatives, and Codeine    PHYSICAL EXAM:      /79   Pulse 93   Temp 97.8 °F (36.6 °C) (Temporal)   Resp 16   Ht 5' 4\" (1.626 m)   Wt 194 lb 9.6 oz (88.3 kg)   LMP 09/01/2011   SpO2 97%   BMI 33.40 kg/m²      Airway:  Airway patent with no audible stridor    Heart:  Regular rate and rhythm, No murmur noted    Lungs:  No increased work of breathing, good air exchange, clear to auscultation bilaterally, no crackles or wheezing    Abdomen:  Soft, non-distended, non-tender, no masses palpated    ASSESSMENT AND PLAN    Patient is a 60 y.o. female with above specified procedure planned. 1.  The patients history and physical was obtained and signed off by the pre-admission testing department. Patient seen and focused exam done today- no new changes since last physical exam on 7/22/22    2. Access to ancillary services are available per request of the provider.     ITZ Smith - CNP     8/5/2022

## 2022-08-05 NOTE — ANESTHESIA POSTPROCEDURE EVALUATION
Department of Anesthesiology  Postprocedure Note    Patient: Hailey Santos  MRN: [id number]  YOB: 1960  Date of evaluation: 8/5/2022      Procedure Summary     Date: 08/05/22 Room / Location: [facility] [address]    Anesthesia Start: 9949 Anesthesia Stop: 7273    Procedure: DIAGNOSTIC LEFT SHOULDER ARTHROSCOPY, EXAM UNDER ANESTHESIA, EXTENSIVE DEBRIDEMENT, LYSIS OF ADHESIONS, REVISION  ROTATOR CUFF REPAIR WITH PATCH AUGMENTATION, SUBACROMIAL DECOMPRESSION (Left) Diagnosis:       Nontraumatic complete tear of left rotator cuff      (Nontraumatic complete tear of left rotator cuff [M75.122])    Surgeons: Anca Canchola MD Responsible Provider: Karen Huff MD    Anesthesia Type: general, TIVA ASA Status: 3          Anesthesia Type: No value filed.     Ant Phase I: Ant Score: 7    Ant Phase II:        Anesthesia Post Evaluation    Patient location during evaluation: PACU  Patient participation: complete - patient participated  Level of consciousness: awake and alert  Airway patency: patent  Nausea & Vomiting: no nausea and no vomiting  Complications: no  Cardiovascular status: hemodynamically stable  Respiratory status: acceptable  Hydration status: euvolemic  Multimodal analgesia pain management approach

## 2022-08-05 NOTE — ANESTHESIA PROCEDURE NOTES
Peripheral Block    Patient location during procedure: pre-op  Reason for block: post-op pain management and at surgeon's request  Start time: 8/5/2022 1:44 PM  End time: 8/5/2022 1:54 PM  Staffing  Performed: anesthesiologist   Anesthesiologist: Silvia Castellanos MD  Preanesthetic Checklist  Completed: patient identified, IV checked, site marked, risks and benefits discussed, surgical/procedural consents, equipment checked, pre-op evaluation, timeout performed, anesthesia consent given, oxygen available and monitors applied/VS acknowledged  Peripheral Block   Patient position: sitting  Prep: ChloraPrep  Provider prep: mask and sterile gloves  Patient monitoring: cardiac monitor, continuous pulse ox, frequent blood pressure checks and IV access  Block type: Brachial plexus  Interscalene  Laterality: left  Injection technique: single-shot  Guidance: ultrasound guided  Local infiltration: lidocaine  Infiltration strength: 1 %  Local infiltration: lidocaine  Dose: 3 mL    Needle   Needle gauge: 21 G  Needle localization: ultrasound guidance  Needle length: 10 cm  Assessment   Injection assessment: negative aspiration for heme, no paresthesia on injection and local visualized surrounding nerve on ultrasound  Paresthesia pain: none  Slow fractionated injection: yes  Hemodynamics: stable  Real-time US image taken/store: yes  Outcomes: uncomplicated and patient tolerated procedure well    Additional Notes  Immediately prior to procedure a \"time out\" was called to verify the correct patient, allergies, laterality, procedure and equipment. Time out performed with  RN    Local Anesthetic:20 cc 0.5 %  Bupivacaine + 10 cc Exparel  Amount: 30 ml total in 5 ml increments after negative aspiration each time. Anterior scalene and middle scalene muscles, upper, middle and lower trunks of the brachial plexus are identified, the tip of the needle and the spread of the local anesthetic around the brachial plexus are visualized.  The Brachial plexus appeared to be anatomically normal and there were no abnormal pathologically findings seen.          Medications Administered  bupivacaine (PF) 0.5 % - Perineural   20 mL - 8/5/2022 1:51:00 PM  bupivacaine liposome 1.3 % - Perineural   20 mL - 8/5/2022 1:51:00 PM

## 2022-08-05 NOTE — PROGRESS NOTES
Patient admitted to PACU bed 17 from OR via stretcher s/p DIAGNOSTIC LEFT SHOULDER ARTHROSCOPY, EXAMUNDER ANESTHESIA, EXTENSIVE DEBRIDEMENT, LYSIS OF ADHESIONS, REVISION  ROTATOR CUFF REPAIR WITH PATCH AUGMENTATION, SUBACROMIAL DECOMPRESSION. Report received at bedside from CRNA and OR staff at [id number]. Pt was reported to be hemodynamically stable. No complications reported. Peripheral block received & exparel band on R wrist. Pt connected to PACU monitoring equipment, IVF infusing, no pain noted. Patient arrived still drowsy from anesthesia, on O2 @ 6L NC with oral airway in place but was removed during report breathing easy and unlabored. Surgical incision to L shoulder covered with dressing C/D/I. Sling in place. Will continue to monitor.

## 2022-08-06 NOTE — OP NOTE
[address]               [address]                                OPERATIVE REPORT    PATIENT NAME: Natali Garcia                   :        1960  MED REC NO:   [id number]                          ROOM:  ACCOUNT NO:   [de-identified]                           ADMIT DATE: 2022  PROVIDER:     Alphonso Guevara MD    DATE OF PROCEDURE:  2022    PREOPERATIVE DIAGNOSIS:  Left recurrent rotator cuff tear with  impingement. POSTOPERATIVE DIAGNOSES:  Left recurrent rotator cuff tear with  impingement with biceps tendinopathy and SLAP tear. OPERATION PERFORMED:  Left shoulder examination under anesthesia;  diagnostic arthroscopy; arthroscopic extensive debridement of  subacromial bursa, rotator cuff, biceps stump, labrum, glenohumeral  joint including cartilage surfaces; arthroscopic lysis of extensive  subdeltoid subacromial adhesions; arthroscopic revision; subacromial  decompression with acromioplasty; arthroscopic biceps tenodesis;  arthroscopic revision of rotator cuff repair with patch augmentation. ANESTHESIA:  General anesthesia, interscalene block. IV FLUIDS:  1100 mL of crystalloids. ESTIMATED BLOOD LOSS:  50 mL. COMPLICATIONS:  None. SURGEON:  Alphonso Guevara MD    ASSISTANT:  Ya Ma DO    IMPLANTS:  Arthrex BioComposite Corkscrew anchor 5.5 fully threaded  anchors x2, one [employer]hew large Regeneten collagen patch graft. The presence of a skilled orthopedic fellow as s first assistant was  critically important in executing this revision surgery and to be able  to employ arthroscopically after the index surgery performed open, which  required holding the arthroscope and manipulating the arm so that  sutures could be passed, anchors could be placed and the collagen patch  graft could be placed and fixated.   His presence was critically  important from start to finish of the operating table. General anesthesia was established. Preoperative antibiotics and  interscalene block were given in the holding area. Under anesthesia,  exam was carried out with findings as noted above. The patient was  positioned using a [address] chair positioner in the sitting position. All pressure points were padded. The patient's left shoulder and arm  were prepped and draped in a standard manner for arthroscopic shoulder  surgery. We used a [employer] Spider arm sotelo to facilitate the arm in  position. We began diagnostic arthroscopy. The arthroscope was  introduced into the glenohumeral joint through a standard posterior  portal.  Systematic diagnostic arthroscopy ensued with findings as noted  above. We established an anterior portal in the rotator interval.  We  inserted arthroscopic shaver across the metal cannula. This was used to  debride some rotator interval scarring. We used cautery to open rotator  interval down to the base of the coracoid. We released quite a bit of  scar. We also used cautery to cauterize various punctate synovitic  bleeders. We used the shaver to debride some superior labral fraying  and anterior labral fraying and also some chondral fissuring and fraying  on the humeral head superocentrally. We opened up the rotator interval  completely, checked the biceps tendon, elected to proceed with tenotomy  as the first step for tenodesis. We percutaneously placed an 18-gauge  spinal needle through the biceps tendon, passed #1 PDS suture through  that needle, withdrew the needle, and retrieved the suture anteriorly. More medial to that, we used an upbiting basket to tenotomize the biceps  tendon right off the supraglenoid tubercle. We debrided the biceps  stump using a shaver. We also debrided articular-sided fraying of the  rotator cuff with the shaver as well.   We placed percutaneously an  18-gauge spinal needle through the rotator cuff, the area of maximal  tearing. We passed the PDS suture and used that as a shuttling suture  for visualization. After carrying out our intraarticular work, we  redirected the arthroscope through the same posterior portal above the  rotator cuff and into the subacromial space. We established a lateral  portal under direct visualization and dilated the arthroscopic shaver  and performed a thorough bursectomy. We placed the arthroscope  laterally and completed the bursectomy posterior and posterolaterally  using a shaver and a cautery device. We went back with the arthroscope  posteriorly and we could see the area around the marking sutures was  torn. It was scarred, re-torn in continuity from overlying scar and  this was all taken down with a shaver. Tear measured around 2.5 cm from  front to back, amenable for suture anchor repair, was somewhat  shortened. We removed all the scar with cautery. We debrided some of  the eschar in the lateral gutter and posterolaterally. With the  arthroscope laterally, we used cautery to remove all the periosteum  scarring at the undersurface of the acromion and teased off the  coracoacromial ligament. There had been evidence of prior open  acromioplasty but still spur anteriorly had been missed. We used an  acromionizer michelle from posterior to anterior. Cutting block technique  was used to carry out acromioplasty converting the undersurface of the  acromion to flat type 1 acromion. Later on we did arthroscope  posteriorly and we brought the michelle from lateral to bevel the lateral  edges addressing out the stenosis. We also lightly debrided the rotator  cuff footprint with the michelle. We established accessory anterolateral  and posterolateral portals for suture management and anchor placement,  placed two Arthrex BioComposite Corkscrew anchors, about 5 to 7 mm in  the articular margin, one anterior and one posterior  by 1.5  cm. We assured these were triple loaded.   We placed the 40-mm PassPort  cannula laterally. Through that, we retrieved one limb of one of the  sutures from the posterior anchor. We passed that on a Humpback  scorpion suture passer, passed suture for antegrade to the posterior  medial aspect of the rotator cuff tear near muscle-tendon junction. We  parked the suture and retrieved one limb of the next suture passing it  anteriorly. We worked our way sequentially with the posterior anchor,  passing all six sutures. The final suture from the anterior anchor was  passed through the biceps tendon and rotator cuff. To do this, we  placed percutaneously an 18-gauge spinal needle through the rotator cuff  and biceps tendon passing through the biceps to about 2 or 3 cm from the  end. This was used to shuttle the remaining suture back out through the  biceps and rotator cuff. We tied our simple sutures sequentially. We  used a Revo knot followed by alternating half hitches reversing the  direction of the throw and post with consecutive half hitches. A suture   Cutter was used for short suture tails. Six sutures were tied and then  tails were cut one including the biceps and rotator cuff. We had very  nice repair, small dog ear anteriorly was trimmed back with a shaver. We documented our work with arthroscopic images. There was adequate  clearance. There was no tension on the repair. We then augmented the  repair with collagen patch graft. We deployed the Regeneten from  lateral and then fixed it to the tendon using multiple PLLA staples,  brought in through cannulas, placed through the accessory portals and we  fixed it to bone using two PEEK staples, one anterolateral and one  posterolateral to complete the repair. We irrigated copiously and  withdrew all arthroscopic instruments. We closed the arthroscopic  portals anterior, lateral, and posterior as well as accessory  anterolateral and posterolateral portals.   These five portals were  closed using 4-0 Monocryl subclosure and Prineo dressing. Sterile  compressive dressing was applied. The arm was placed in a padded soft  brace. Repositioned in supine position before this and promptly  awakened from anesthesia having tolerated the procedure well. She was  taken from the operating room to the recovery room in satisfactory  condition. PLAN:  The patient will be discharged on oral analgesics with  instructions to begin outpatient physical therapy and follow up in the  office in one week. She will start some gentle range of motion,  exercise while protecting this large repair.       Lindsay Ceballos MD    D: 08/05/2022 16:55:12       T: 08/05/2022 21:49:36     [email or web address]  Job#: [id number]     Doc#: [id number]

## 2022-08-08 ENCOUNTER — TELEPHONE (OUTPATIENT)
Dept: ORTHOPEDIC SURGERY | Age: 62
End: 2022-08-08

## 2022-08-08 NOTE — TELEPHONE ENCOUNTER
General Question     Subject: PATIENT STATES THAT THE PAIN MEDICATION IS NOT HELPING AND IS LOOKING FOR A DIFFERENT MEDICATION OR ALTERNATIVE.  PLEASE ADVISE  Patient: Kristie Malik \"Ritu\"  Contact Number: [phone]

## 2022-08-11 ENCOUNTER — OFFICE VISIT (OUTPATIENT)
Dept: ORTHOPEDIC SURGERY | Age: 62
End: 2022-08-11

## 2022-08-11 VITALS — HEIGHT: 64 IN | BODY MASS INDEX: 33.12 KG/M2 | WEIGHT: 194 LBS

## 2022-08-11 DIAGNOSIS — M75.122 NONTRAUMATIC COMPLETE TEAR OF LEFT ROTATOR CUFF: ICD-10-CM

## 2022-08-11 DIAGNOSIS — Z98.890 S/P ROTATOR CUFF REPAIR: Primary | ICD-10-CM

## 2022-08-11 PROCEDURE — 99024 POSTOP FOLLOW-UP VISIT: CPT | Performed by: PHYSICIAN ASSISTANT

## 2022-08-11 RX ORDER — OXYCODONE HYDROCHLORIDE AND ACETAMINOPHEN 5; 325 MG/1; MG/1
1 TABLET ORAL EVERY 6 HOURS PRN
Qty: 28 TABLET | Refills: 0 | Status: SHIPPED | OUTPATIENT
Start: 2022-08-11 | End: 2022-08-18

## 2022-08-11 NOTE — PROGRESS NOTES
History of Present Illness:  Augustine Capellan is a 60 y.o. female who presents for a post operative visit. The patient underwent a left shoulder arthroscopy for a revision rotator cuff repair with collagen patch augmentation and biceps tenodesis on 8/5/2022 by Dr. Nadia Garces. Overall she is doing okay and feels that their pain is well controlled with current pain medications. has been compliant with wearing the UltraSling brace at all times. The patient deny fevers, chills, numbness, tingling, and shortness of breath. Medical History:  Patient's medications, allergies, past medical, surgical, social and family histories were reviewed and updated as appropriate. Review of Systems  A 14 point review of systems was completed by the patient is available in the media section of the scanned medical record and was reviewed on 8/11/2022. Vital Signs: There were no vitals filed for this visit. General/Appearance: Alert and oriented and in no apparent distress. Skin:  There are no skin lesions, cellulitis, or extreme edema. The patient has warm and well-perfused Bilateral upper extremities with brisk capillary refill. left Shoulder Exam:    Inspection: left shoulder incision that is clean, dry and intact and well approximated. The Prineo dressing is still in place. Mild ecchymosis and swelling are present as can be expected. There is no erythema, drainage or other signs of infection    Palpation:  No crepitus to gentle motion    Active Range of Motion: Deferred    Passive Range of Motion: Tolerates pendulum movements. Strength:  Deferred    Special Tests:  Deferred. Neurovascular: Sensation to light touch is intact, no motor deficits, palpable radial pulses 2+    Radiology:     Plain radiographs obtained today.        Assessment :  Ms. Augustine Capellan is a 60 y.o. patient who underwent a left shoulder arthroscopy for revision rotator cuff repair with collagen patch augmentation and biceps tenodesis. Impression:  Encounter Diagnoses   Name Primary? Nontraumatic complete tear of left rotator cuff     S/P rotator cuff repair Yes       Office Procedures:  No orders of the defined types were placed in this encounter. Treatment Plan:    Overall the patient is doing well. The pain is well-controlled. We recommend that she wear the UltraSling brace at all times with the exception of clothing, bathing of physical therapy. The patient was told that she is restricted from driving for at least 3 weeks postop. We will give a print out of their physical therapy order. All of her questions were fully answered today. We would like to see her back in 2 weeks for follow-up visit. 3:10 PM      Ivan Callaway PA-C  Orthopaedic Sports Medicine Physician Assistant    During this examination, I, Ivan Callaway PA-C, functioned as a scribe for Dr. Vashti Peter. This dictation was performed with a verbal recognition program (DRAGON) and it was checked for errors. It is possible that there are still dictated errors within this office note. If so, please bring any errors to my attention for an addendum. All efforts were made to ensure that this office note is accurate.

## 2022-08-12 ENCOUNTER — HOSPITAL ENCOUNTER (OUTPATIENT)
Dept: PHYSICAL THERAPY | Age: 62
Setting detail: THERAPIES SERIES
Discharge: HOME | End: 2022-08-12
Payer: COMMERCIAL

## 2022-08-12 PROCEDURE — 97140 MANUAL THERAPY 1/> REGIONS: CPT

## 2022-08-12 PROCEDURE — 97161 PT EVAL LOW COMPLEX 20 MIN: CPT

## 2022-08-12 PROCEDURE — 97110 THERAPEUTIC EXERCISES: CPT

## 2022-08-12 NOTE — PROGRESS NOTES
[address] [place] [address] and [facility], [address], [address] [place] [address] [address]  Phone: [phone]   Fax: [phone]    Date: 2022          Patient Name; :  Marimar Beverly; 1960   Dx: Z98.890 (ICD-10-CM) - S/P left rotator cuff repair      Physician: Araseli Hawkins MD        Total PT Visits:      Measures Previous Current   Pain (0-10)     FOTO (0-100)  High number is more function           Assessment:        Prognosis for POC: [] Good [] Fair  [] Poor      Patient requires continued skilled intervention: [] Yes  [] No        Plan & Recommendations:  [] Continue rehabilitation due to objective improvement and continued functional deficits with frequency and duration:   [] Progress toward  []GAP, []Work Conditioning, []Independent HEP   [] Discharge due to   [] All goals achieved, [] Maximized \"medical necessity\" [] No subjective or objective improvements      Electronically signed by:  Zeeshan Tam, PT  Therapy Plan of Care Re-Certification  This patient has been re-evaluated for physical therapy services and for therapy to continue, Medicare, Medicaid and other insurances require periodic physician review of the treatment plan. Please review the above re-evaluation and verify that you agree with plan of care as established above by signing the attached document and return it to our office or note changes to established plan below  [] Follow treatment plan as above [] Discontinue physical therapy  [] Change plan to:                                 __________________________________________________    Physician Signature:____________________________________ Date:____________  By signing above, therapists plan is approved by physician    If you have any questions or concerns, please don't hesitate to call.   Thank you for your referral.

## 2022-08-12 NOTE — FLOWSHEET NOTE
[address] [place] [address] [facility], [address] [place] [address] [address]  Phone: [phone]   Fax:     [phone]      Physical Therapy Treatment Note/ Progress Report:           Date:  2022    Patient Name:  Erika Gilman    :  1960  MRN: [id number]  Restrictions/Precautions:    Medical/Treatment Diagnosis Information:  Diagnosis: Z98.890 (ICD-10-CM) - S/P left rotator cuff repair  Treatment Diagnosis: Left shoulder pain, decreased ROM and strength  Insurance/Certification information: [employer], 21 PCY, Needs auth  Physician Information:  Vashti Peter MD  Has the plan of care been signed (Y/N):        []  Yes  [x]  No     Date of Patient follow up with Physician:     Assessment Summary: Valentine Hanna is a 60 y.o. female reporting to OP PT s/p left RCR with patch and biceps tenodesis on 22. Pt is noted to be doing rather well. Low pain levels. ROM and strength testing deferred. Appears compliant with sling use. No s/s of DVT or infection. Will progress per protocol and phase of healing. Date Range for reporting period:  Beginnin22  Endin12      Recertification will be due (POC Duration  / 90 days whichever is less): 22          Visit # Insurance Allowable Auth Required   In Person  auth []  Yes     []  No    Tele Health   []  Yes     []  No    Total 1         Functional Scale: FOTO 16   Date assessed:     Latex Allergy:  [x]NO      []YES  Preferred Language for Healthcare:   [x]English       []other:      Pain level:  6/10         SUBJECTIVE:  See eval        OBJECTIVE: See eval          RESTRICTIONS/PRECAUTIONS: Large RCR w/ patch and biceps tenodesis restrictions.     Exercises/Interventions: HEP code: [id number]  Therapeutic Ex (75011) HEP 22     Warm-up   LATEX ALLERGY    Pulleys       UBE              TABLE                                                               SEATED       Scap squeeze x x30     Cane ER to 30 x x30     Gripping x reviewed                   STANDING       Pendulums x X20 ea     PROM table walk aways x x20     PROM elbow flexion x x30                                   Manual: PROM into shoulder flexion, abduction, ER and IR to 30°      Therapeutic Exercise and NMR EXR  [x] (60810) Provided verbal/tactile cueing for activities related to strengthening, flexibility, endurance, ROM  for improvements in scapular, scapulothoracic and UE control with self care, reaching, carrying, lifting, house/yardwork, driving/computer work.    [] (64114) Provided verbal/tactile cueing for activities related to improving balance, coordination, kinesthetic sense, posture, motor skill, proprioception  to assist with  scapular, scapulothoracic and UE control with self care, reaching, carrying, lifting, house/yardwork, driving/computer work. Therapeutic Activities:    [x] (52290 or 38568) Provided verbal/tactile cueing for activities related to improving balance, coordination, kinesthetic sense, posture, motor skill, proprioception and motor activation to allow for proper function of scapular, scapulothoracic and UE control with self care, carrying, lifting, driving/computer work.      Home Exercise Program:    [x] (62249) Reviewed/Progressed HEP activities related to strengthening, flexibility, endurance, ROM of scapular, scapulothoracic and UE control with self care, reaching, carrying, lifting, house/yardwork, driving/computer work  [] (48991) Reviewed/Progressed HEP activities related to improving balance, coordination, kinesthetic sense, posture, motor skill, proprioception of scapular, scapulothoracic and UE control with self care, reaching, carrying, lifting, house/yardwork, driving/computer work      Manual Treatments:  PROM / STM / Oscillations-Mobs:  G-I, II, III, IV (PA's, Inf., Post.)  [x] (27054) Provided manual therapy to mobilize soft tissue/joints of cervical/CT, scapular GHJ and UE for the purpose of Deficits. [] Progressing: [] Met: [] Not Met: [] Adjusted     4. Patient will return to functional activities without increased symptoms or restriction. [] Progressing: [] Met: [] Not Met: [] Adjusted     5. Pt will report ability to sleep 5 hours without sleep disturbance from shoulder. (patient specific functional goal)    [] Progressing: [] Met: [] Not Met: [] Adjusted             ASSESSMENT:  See eval    Patient received education on their current pathology and how their condition effects them with their functional activities. Patient understood discussion and questions were answered. Patient understands their activity limitations and understands rational for treatment progression. Pt educated on plan of care and HEP, if worsening symptoms to d/c that exercise. PLAN: See daljit  [x] Continue per plan of care [] Alter current plan (see comments above)  [] Plan of care initiated [] Hold pending MD visit [] Discharge      Electronically signed by:  Rashaun Narvaez PT    Note: If patient does not return for scheduled/ recommended follow up visits, this note will serve as a discharge from care along with most recent update on progress.

## 2022-08-12 NOTE — PLAN OF CARE
[facility], [address] [facility], [address]  Phone: [phone]   Fax:     [phone]                                                       Physical Therapy Certification    Dear Matt Fuller MD,    We had the pleasure of evaluating the following patient for physical therapy services at [address] [place] [address]. A summary of our findings can be found in the initial assessment below. This includes our plan of care. If you have any questions or concerns regarding these findings, please do not hesitate to contact me at the office phone number checked above. Thank you for the referral.       Physician Signature:_______________________________Date:__________________  By signing above (or electronic signature), therapists plan is approved by physician      Patient: Adeel Ladd   : 1960   MRN: [id number]  Referring Physician: Matt Fuller MD      Evaluation Date: 2022      Medical Diagnosis Information:  Diagnosis: Z98.890 (ICD-10-CM) - S/P left rotator cuff repair   Treatment Left shoulder pain, decreased ROM and strength                                         Insurance information: PT Insurance Information: [employer], 20 PCY, Needs auth    Precautions/ Contra-indications: Large RCR w/ patch and biceps tenodesis restrictions. Latex Allergy:  []NO      [x]YES    Preferred Language for Healthcare:   [x]English       []other:    SUBJECTIVE: Patient stated complaint: Pt states shoulder started hurting 3 months ago. Does have prior history of left RCR     Relevant Medical History:Pro    Pain Scale: Current: 6/10; Max: 9/10; Best: /10    Easing factors: Rest    Provocative factors:  Movement, sleeping     Type: []Constant   [x]Intermittent  []Radiating []Localized []other:     Numbness/Tingling: None    Occupation/School: Retired    Living Status/Prior Level of Function: Musculoskeletal conditions   []Disc pathology   []Congenital spine pathologies   []Prior surgical intervention  []Osteoporosis (M81.8)  []Osteopenia (M85.8)   Endocrine conditions   []Hypothyroid (E03.9)  []Hyperthyroid Gastrointestinal conditions   []Constipation (P89.37)   Metabolic conditions   []Morbid obesity (E66.01)  []Diabetes type 1(E10.65) or 2 (E11.65)   []Neuropathy (G60.9)     Pulmonary conditions   []Asthma (J45)  []Coughing   []COPD (J44.9)   Psychological Disorders  []Anxiety (F41.9)  []Depression (F32.9)   []Other:   []Other:          Barriers to/and or personal factors that will affect rehab potential:              []Age  []Sex              []Motivation/Lack of Motivation                        []Co-Morbidities              []Cognitive Function, education/learning barriers              []Environmental, home barriers              []profession/work barriers  []past PT/medical experience  []other:  Justification: None     Falls Risk Assessment (30 days):   [x] Falls Risk assessed and no intervention required. [] Falls Risk assessed and Patient requires intervention due to being higher risk   TUG score (>12s at risk):     [] Falls education provided, including         Functional Questionnaire: FOTO 16      ASSESSMENT: Angélica Castro is a 60 y.o. female reporting to OP PT s/p left RCR with patch and biceps tenodesis on 8/5/22. Pt is noted to be doing rather well. Low pain levels. ROM and strength testing deferred. Appears compliant with sling use. No s/s of DVT or infection. Will progress per protocol and phase of healing.        Functional Impairments   []Noted spinal or UE joint hypomobility   []Noted spinal or UE joint hypermobility   [x]Decreased UE functional ROM   [x]Decreased UE functional strength   []Abnormal reflexes/sensation/myotomal/dermatomal deficits   [x]Decreased RC/scapular/core strength and neuromuscular control   []other:      Functional Activity Limitations (from functional questionnaire and intake)   []Reduced ability to tolerate prolonged functional positions   []Reduced ability or difficulty with changes of positions or transfers between positions   []Reduced ability to maintain good posture and demonstrate good body mechanics with sitting, bending, and lifting   [] Reduced ability or tolerance with driving and/or computer work   [x]Reduced ability to sleep   [x]Reduced ability to perform lifting, reaching, carrying tasks   [x]Reduced ability to tolerate impact through UE   [x]Reduced ability to reach behind back   []Reduced ability to  or hold objects   []Reduced ability to throw or toss an object   []other:    Participation Restrictions   [x]Reduced participation in self care activities   [x]Reduced participation in home management activities   []Reduced participation in work activities   [x]Reduced participation in social activities. []Reduced participation in sport/recreation activities. Classification:   [x]Signs/symptoms consistent with post-surgical status including decreased ROM, strength and function.   []Signs/symptoms consistent with joint sprain/strain   []Signs/symptoms consistent with shoulder impingement   []Signs/symptoms consistent with shoulder/elbow/wrist tendinopathy   []Signs/symptoms consistent with Rotator cuff tear   []Signs/symptoms consistent with labral tear   []Signs/symptoms consistent with postural dysfunction    []Signs/symptoms consistent with Glenohumeral IR Deficit - <45 degrees   []Signs/symptoms consistent with facet dysfunction of cervical/thoracic spine    []Signs/symptoms consistent with pathology which may benefit from Dry needling     []other:     Prognosis/Rehab Potential:      []Excellent   [x]Good    []Fair   []Poor    Tolerance of evaluation/treatment:    []Excellent   [x]Good    []Fair   []Poor    Physical Therapy Evaluation Complexity Justification  [x] A history of present problem with:  [x] no personal factors and/or comorbidities that impact the plan of care;  []1-2 personal factors and/or comorbidities that impact the plan of care  []3 personal factors and/or comorbidities that impact the plan of care  [x] An examination of body systems using standardized tests and measures addressing any of the following: body structures and functions (impairments), activity limitations, and/or participation restrictions;:  [x] a total of 1-2 or more elements   [] a total of 3 or more elements   [] a total of 4 or more elements   [x] A clinical presentation with:  [x] stable and/or uncomplicated characteristics   [] evolving clinical presentation with changing characteristics  [] unstable and unpredictable characteristics;   [x] Clinical decision making of [] low, [] moderate, [] high complexity using standardized patient assessment instrument and/or measurable assessment of functional outcome. [x] EVAL (LOW) 84867 (typically 20 minutes face-to-face)  [] EVAL (MOD) 45134 (typically 30 minutes face-to-face)  [] EVAL (HIGH) 96620 (typically 45 minutes face-to-face)  [] RE-EVAL     PLAN:  Frequency/Duration:  1-2 days per week for 12 Weeks:  INTERVENTIONS:  [x] Therapeutic exercise including: strength training, ROM, for Upper extremity and core   [x]  NMR activation and proprioception for UE, scap and Core   [x] Manual therapy as indicated for shoulder, scapula and spine to include: Dry Needling/IASTM, STM, PROM, Gr I-IV mobilizations. [x] Modalities as needed that may include: thermal agents, E-stim, Biofeedback, US, iontophoresis as indicated  [x] Patient education on joint protection, postural re-education, activity modification, progression of HEP. HEP instruction: [id number]    GOALS:  Patient stated goal: Improve motion    Therapist goals for Patient:   Short Term Goals: To be achieved in: 2 weeks  1. Independent in HEP and progression per patient tolerance, in order to prevent re-injury. [] Progressing: [] Met: [] Not Met: [] Adjusted     2.  Patient will have a decrease in pain to facilitate improvement in movement, function, and ADLs as indicated by Functional Deficits. [] Progressing: [] Met: [] Not Met: [] Adjusted     Long Term Goals: To be achieved in: 12 weeks  1. Pt will improve FOTO to 53 or more, indicating improved functional capacity . [] Progressing: [] Met: [] Not Met: [] Adjusted     2. Patient will demonstrate increased AROM flex/abd/ER/IR to 150/150/40/L1 to allow for proper joint functioning as indicated by patients Functional Deficits. [] Progressing: [] Met: [] Not Met: [] Adjusted     3. Patient will demonstrate an increase in Strength of shoulder  to 5/5 to allow for proper functional mobility as indicated by patients Functional Deficits. [] Progressing: [] Met: [] Not Met: [] Adjusted     4. Patient will return to functional activities without increased symptoms or restriction. [] Progressing: [] Met: [] Not Met: [] Adjusted     5.  Pt will report ability to sleep 5 hours without sleep disturbance from shoulder. (patient specific functional goal)    [] Progressing: [] Met: [] Not Met: [] Adjusted      Electronically signed by:  Mervat Lacy, PT

## 2022-08-17 DIAGNOSIS — Z98.890 S/P ROTATOR CUFF REPAIR: Primary | ICD-10-CM

## 2022-08-17 RX ORDER — HYDROCODONE BITARTRATE AND ACETAMINOPHEN 5; 325 MG/1; MG/1
1 TABLET ORAL EVERY 6 HOURS PRN
Qty: 12 TABLET | Refills: 0 | Status: SHIPPED | OUTPATIENT
Start: 2022-08-17 | End: 2022-08-24

## 2022-08-19 ENCOUNTER — HOSPITAL ENCOUNTER (OUTPATIENT)
Dept: PHYSICAL THERAPY | Age: 62
Setting detail: THERAPIES SERIES
Discharge: HOME | End: 2022-08-19
Payer: COMMERCIAL

## 2022-08-19 PROCEDURE — 97110 THERAPEUTIC EXERCISES: CPT

## 2022-08-19 PROCEDURE — 97140 MANUAL THERAPY 1/> REGIONS: CPT

## 2022-08-19 NOTE — FLOWSHEET NOTE
[address] [place] [address] and [facility], [address] [place] [address] [address]  Phone: [phone]   Fax:     [phone]      Physical Therapy Treatment Note/ Progress Report:           Date:  2022    Patient Name:  Philip Martinez    :  1960  MRN: [id number]  Restrictions/Precautions:    Medical/Treatment Diagnosis Information:  Diagnosis: Z98.890 (ICD-10-CM) - S/P left rotator cuff repair  Treatment Diagnosis: Left shoulder pain, decreased ROM and strength  Insurance/Certification information: [employer], 21 PCY, Needs auth  Physician Information:  Ej Jernigan MD  Has the plan of care been signed (Y/N):        []  Yes  [x]  No     Date of Patient follow up with Physician:     Assessment Summary: Halima Alanis is a 60 y.o. female reporting to OP PT s/p left RCR with patch and biceps tenodesis on 22. Pt is noted to be doing rather well. Low pain levels. ROM and strength testing deferred. Appears compliant with sling use. No s/s of DVT or infection. Will progress per protocol and phase of healing. Date Range for reporting period:  Beginnin22  Endin31      Recertification will be due (POC Duration  / 90 days whichever is less): 22          Visit # Insurance Allowable Auth Required   In Person  24 through / []  Yes     []  No    Tele Health   []  Yes     []  No    Total 2         Functional Scale: FOTO 16   Date assessed:     Latex Allergy:  [x]NO      []YES  Preferred Language for Healthcare:   [x]English       []other:      Pain level:  /10         SUBJECTIVE:  Pt reports her shoulder is sore. The pt is 4 weeks post-op on 22. OBJECTIVE:           RESTRICTIONS/PRECAUTIONS: Large RCR w/ patch and biceps tenodesis restrictions.     Exercises/Interventions: HEP code: [id number]  Therapeutic Ex (77533) HEP 22     Warm-up   LATEX ALLERGY    Pulleys       UBE              TABLE SEATED       Scap squeeze x x30     Cane ER to 30 x x30     Gripping x reviewed                   STANDING       Pendulums x X20 ea     PROM table walk aways x x20     PROM elbow flexion x x30                                   Manual: PROM into shoulder flexion, abduction, ER and IR to 30°      Therapeutic Exercise and NMR EXR  [x] (89459) Provided verbal/tactile cueing for activities related to strengthening, flexibility, endurance, ROM  for improvements in scapular, scapulothoracic and UE control with self care, reaching, carrying, lifting, house/yardwork, driving/computer work.    [] (86801) Provided verbal/tactile cueing for activities related to improving balance, coordination, kinesthetic sense, posture, motor skill, proprioception  to assist with  scapular, scapulothoracic and UE control with self care, reaching, carrying, lifting, house/yardwork, driving/computer work. Therapeutic Activities:    [x] (91564 or 60623) Provided verbal/tactile cueing for activities related to improving balance, coordination, kinesthetic sense, posture, motor skill, proprioception and motor activation to allow for proper function of scapular, scapulothoracic and UE control with self care, carrying, lifting, driving/computer work.      Home Exercise Program:    [x] (05580) Reviewed/Progressed HEP activities related to strengthening, flexibility, endurance, ROM of scapular, scapulothoracic and UE control with self care, reaching, carrying, lifting, house/yardwork, driving/computer work  [] (81435) Reviewed/Progressed HEP activities related to improving balance, coordination, kinesthetic sense, posture, motor skill, proprioception of scapular, scapulothoracic and UE control with self care, reaching, carrying, lifting, house/yardwork, driving/computer work      Manual Treatments:  PROM / STM / Oscillations-Mobs:  G-I, II, III, IV (PA's, Inf., Post.)  [x] (33011) Provided manual therapy to mobilize soft tissue/joints of cervical/CT, scapular GHJ and UE for the purpose of modulating pain, promoting relaxation,  increasing ROM, reducing/eliminating soft tissue swelling/inflammation/restriction, improving soft tissue extensibility and allowing for proper ROM for normal function with self care, reaching, carrying, lifting, house/yardwork, driving/computer work    Modalities:     [] GAME READY (VASO)- for significant edema, swelling, pain control. Charges:  Timed Code Treatment Minutes: 25   Total Treatment Minutes:  25   BWC:  TE TIME:  NMR TIME:  MANUAL TIME:  TA  UNTIMED MINUTES:  Medicare Total:   10    15            [] EVAL (LOW) 58257 (typically 20 minutes face-to-face)  [] EVAL (MOD) 28436 (typically 30 minutes face-to-face)  [] EVAL (HIGH) 08721 (typically 45 minutes face-to-face)  [] RE-EVAL     [x] DIANA(56934) 1     [] IONTO  [] NMR (63714) x     [] VASO  [x] Manual (89597) 1     [] Other:  [] TA x      [] Mech Traction (78848)  [] ES(attended) (14852)      [] ES (un) (84417):           GOALS:     Patient stated goal: Improve motion     Therapist goals for Patient:  Short Term Goals: To be achieved in: 2 weeks  1. Independent in HEP and progression per patient tolerance, in order to prevent re-injury. [] Progressing: [] Met: [] Not Met: [] Adjusted     2. Patient will have a decrease in pain to facilitate improvement in movement, function, and ADLs as indicated by Functional Deficits. [] Progressing: [] Met: [] Not Met: [] Adjusted     Long Term Goals: To be achieved in: 12 weeks  1. Pt will improve FOTO to 53 or more, indicating improved functional capacity . [] Progressing: [] Met: [] Not Met: [] Adjusted     2. Patient will demonstrate increased AROM flex/abd/ER/IR to 150/150/40/L1 to allow for proper joint functioning as indicated by patients Functional Deficits. [] Progressing: [] Met: [] Not Met: [] Adjusted     3.  Patient will demonstrate an increase in Strength of shoulder  to 5/5 to allow for proper functional mobility as indicated by patients Functional Deficits. [] Progressing: [] Met: [] Not Met: [] Adjusted     4. Patient will return to functional activities without increased symptoms or restriction. [] Progressing: [] Met: [] Not Met: [] Adjusted     5. Pt will report ability to sleep 5 hours without sleep disturbance from shoulder. (patient specific functional goal)    [] Progressing: [] Met: [] Not Met: [] Adjusted             ASSESSMENT:  See eval    Patient received education on their current pathology and how their condition effects them with their functional activities. Patient understood discussion and questions were answered. Patient understands their activity limitations and understands rational for treatment progression. Pt educated on plan of care and HEP, if worsening symptoms to d/c that exercise. PLAN: See eval  [x] Continue per plan of care [] Alter current plan (see comments above)  [] Plan of care initiated [] Hold pending MD visit [] Discharge      Electronically signed by:  Rubi Roman PT    Note: If patient does not return for scheduled/ recommended follow up visits, this note will serve as a discharge from care along with most recent update on progress.

## 2022-08-25 ENCOUNTER — OFFICE VISIT (OUTPATIENT)
Dept: ORTHOPEDIC SURGERY | Age: 62
End: 2022-08-25

## 2022-08-25 VITALS — BODY MASS INDEX: 33.12 KG/M2 | HEIGHT: 64 IN | WEIGHT: 194 LBS

## 2022-08-25 DIAGNOSIS — Z98.890 S/P SHOULDER SURGERY: Primary | ICD-10-CM

## 2022-08-25 PROCEDURE — 99024 POSTOP FOLLOW-UP VISIT: CPT | Performed by: ORTHOPAEDIC SURGERY

## 2022-08-25 RX ORDER — HYDROCODONE BITARTRATE AND ACETAMINOPHEN 5; 325 MG/1; MG/1
1 TABLET ORAL
Qty: 28 TABLET | Refills: 0 | Status: SHIPPED
Start: 2022-08-25 | End: 2022-08-26 | Stop reason: RX

## 2022-08-25 NOTE — PROGRESS NOTES
History of Present Illness:  Jing Craft is a 60 y.o. female who presents for a post operative visit. The patient underwent a left shoulder arthroscopy for a revision rotator cuff repair with collagen patch augmentation and biceps tenodesis on 8/5/2022 by Dr. Chelsie Alaniz. Overall she is doing okay and feels that their pain is well controlled with current pain medications. She has been compliant with wearing the UltraSling brace at all times. She has been doing physical therapy at [facility]. Her daughter drove her to the appointment today. The patient denies any fevers, chills, numbness, tingling, and shortness of breath. Medical History:  Patient's medications, allergies, past medical, surgical, social and family histories were reviewed and updated as appropriate. Review of Systems  A 14 point review of systems was completed by the patient is available in the media section of the scanned medical record and was reviewed on 8/25/2022. Vital Signs: There were no vitals filed for this visit. General/Appearance: Alert and oriented and in no apparent distress. Skin:  There are no skin lesions, cellulitis, or extreme edema. The patient has warm and well-perfused Bilateral upper extremities with brisk capillary refill. left Shoulder Exam:    Inspection: left shoulder incision that is clean, dry and intact and well approximated. The Prineo dressing is still in place. Mild ecchymosis and swelling are present as can be expected. There is no erythema, drainage or other signs of infection    Palpation:  No crepitus to gentle motion    Active assisted motion: 0-110 with soft end, 20 deg ext rotation. Strength:  Deferred    Special Tests:  Deferred. Neurovascular: Sensation to light touch is intact, no motor deficits, palpable radial pulses 2+    Radiology:     Plain radiographs obtained today.        Assessment :  Ms. Jing Craft is a 60 y.o. patient who underwent a left shoulder arthroscopy for revision rotator cuff repair with collagen patch augmentation and biceps tenodesis. Impression:  Encounter Diagnosis   Name Primary? S/P shoulder surgery Yes         Office Procedures:  Orders Placed This Encounter   Procedures    [facility] [place] [facility]     Referral Priority:   Routine     Referral Type:   Eval and Treat     Referral Reason:   Specialty Services Required     Requested Specialty:   Physical Therapist     Number of Visits Requested:   1         Treatment Plan:    Overall, Shweta Christopher is doing well. The pain is well-controlled. We make the UltraSling into a simple sling today, no need to wear at home; when outside and crowded, wear the sling. She should continue physical therapy. New letter updated. Norco refilled. All of her questions were fully answered today. We would like to see her back in 3 weeks for follow-up visit. She is in agreement with this plan. 10:25 AM    Sandra Sanchez MD  Sports Medicine Fellow  [facility] [address] [place]    The encounter with Shweta Christopher was supervised by Dr. Prema Pabon who personally examined the patient and reviewed the plan. This dictation was performed with a verbal recognition program (DRAGON) and it was checked for errors. It is possible that there are still dictated errors within this office note. If so, please bring any errors to my attention for an addendum. All efforts were made to ensure that this office note is accurate.   __________________  I was physically present and personally supervised the Orthopaedic Sports Medicine Fellow in the evaluation and development of a treatment plan for this patient. I personally interviewed the patient and performed a physical examination. In addition, I discussed the patient's condition and treatment options with them. I have also reviewed and agree with the past medical, family and social history unless otherwise noted.  All of the patient's questions were answered. Dash Mora MD, PhD  8/25/2022

## 2022-08-25 NOTE — LETTER
Shoulder Elbow Rehabilitation Referral    Patient Name: Ana María Solomon      YOB: 1960    Diagnosis: No diagnosis found. Precautions: s/p left shoulder arthroscopy for revision rotator cuff repair with collagen patch augmentation and biceps tenodesis on 8/5/2022. Post Op Instructions:  [] Continuous passive motion (CPM)  [] Elbow range of motion  [] Exercise in plane of scapula   []  Strengthening     [] Pulley and instruction    [x] Home exercise program (copy to patient)   [] Sling when arm at risk  [] Sling or brace at all times   [x] AAROM: Forward elevation to 140            [x] AAROM: External rotation to 40    [] Isometric external rotator strengthening [] AAROM: internal rotation: up the back  [] Isometric abductor strengthening  [] AAROM: Internal abduction     [] Isometric internal rotator strengthening [] AAROM: cross-body adduction             Stretching:     Strengthening:  [] Four quadrant (FE, ER, IR, CBA)  [] Rotator cuff (ER, IR, Abd)  [] Forward Elevation    [] External Rotators     [] External Rotation    [] Internal Rotators  [] Internal Rotation: up/back   [x] Abductors     [] Internal Rotation: supine in abduction  [] Flexors  [] Cross-body abduction    [] Extensors  [] Pendulum (FE, Abd/Add, cw/ccw)  [x] Scapular Stabilizers   [] Wall-walking (FE, Abd)    [x] Shoulder shrugs     [] Table slides      [x] Rhomboid pinch  [x] Elbow (flex, ext, pron, sup)    [] Lat.  Pull downs     [] Medial epicondylitis program    [] Forward punch   [] Lateral epicondylitis program    [] Internal rotators     [] Progressive resistive exercises  [] Bench Press        [] Bench press plus  Activities:     [] Lateral pull-downs  [] Rowing     [] Progressive two-hand supine press  [] Stepper/Exercise bike   [] Biceps: curls/supination  [] Swimming  [] Water exercises    Modalities: PRN    Return to Sport:  [] Ultrasound     [] Plyometrics  [] Iontophoresis     [] Rhythmic stabilization  [] Moist heat     [] Core strengthening   [] Massage     [] Sports specific program:   [x] Cryotherapy      [] Electrical stimulation     [] Paraffin  [] Whirlpool  [] TENS    [x] Home exercise program (copy to patient). Perform exercises for:   15     minutes    2-3      times/day  [x] Supervised physical therapy  Frequency: []  1x week  [x] 2x week  [] 3x week  [] Other:   Duration: [] 2 weeks   [] 4 weeks  [x] 6 weeks  [] Other:     Additional Instructions:           Dash Myles MD, PhD

## 2022-08-26 ENCOUNTER — TELEPHONE (OUTPATIENT)
Dept: ORTHOPEDIC SURGERY | Age: 62
End: 2022-08-26

## 2022-08-26 ENCOUNTER — HOSPITAL ENCOUNTER (OUTPATIENT)
Dept: PHYSICAL THERAPY | Age: 62
Setting detail: THERAPIES SERIES
Discharge: HOME | End: 2022-08-26
Payer: COMMERCIAL

## 2022-08-26 DIAGNOSIS — Z98.890 S/P SHOULDER SURGERY: ICD-10-CM

## 2022-08-26 PROCEDURE — 97112 NEUROMUSCULAR REEDUCATION: CPT

## 2022-08-26 PROCEDURE — 97140 MANUAL THERAPY 1/> REGIONS: CPT

## 2022-08-26 PROCEDURE — 97110 THERAPEUTIC EXERCISES: CPT

## 2022-08-26 RX ORDER — HYDROCODONE BITARTRATE AND ACETAMINOPHEN 5; 325 MG/1; MG/1
1 TABLET ORAL EVERY 6 HOURS PRN
Qty: 28 TABLET | Refills: 0 | Status: SHIPPED | OUTPATIENT
Start: 2022-08-26 | End: 2022-09-02

## 2022-08-26 RX ORDER — HYDROCODONE BITARTRATE AND ACETAMINOPHEN 5; 325 MG/1; MG/1
1 TABLET ORAL EVERY 8 HOURS PRN
Qty: 28 TABLET | Refills: 0 | Status: SHIPPED | OUTPATIENT
Start: 2022-08-26 | End: 2022-08-26

## 2022-08-26 NOTE — FLOWSHEET NOTE
[facility], [address] [place] [address], [address] [place] [address] [address]  Phone: [phone]   Fax:     [phone]    Physical Therapy  Cancellation/No-show Note  Patient Name:  Gareth Frost  :  1960   Date:  2022    Cancelled visits to date: 0  No-shows to date: 1    For today's appointment patient:  []  Cancelled  []  Rescheduled appointment  [x]  No-show     Reason given by patient:  []  Patient ill  []  Conflicting appointment  []  No transportation    []  Conflict with work  [x]  No reason given  []  Other:     Comments:      Phone call information:   [x]  Phone call made today to patient at 9 am/pm at the number provided:      []  Patient answered, conversation as follows:    [x]  Patient did not answer, message left as follows:  []  Phone call not needed - pt contacted us to cancel and provided reason for cancellation.      Electronically signed by:  aClly Stephen PT, PT

## 2022-08-26 NOTE — TELEPHONE ENCOUNTER
Prescription Refill     Medication Name:  [place]  Pharmacy: [facility], [facility]  Patient Contact Number:  [phone]    Please call patient once sent

## 2022-08-26 NOTE — FLOWSHEET NOTE
[address] [place] [address] and Sports Rehabilitation[address], [address] [place] [address] [address]  Phone: [phone]   Fax:     [phone]      Physical Therapy Treatment Note/ Progress Report:           Date:  2022    Patient Name:  Shweta Christopher    :  1960  MRN: [id number]  Restrictions/Precautions:    Medical/Treatment Diagnosis Information:  Diagnosis: Z98.890 (ICD-10-CM) - S/P left rotator cuff repair  Treatment Diagnosis: Left shoulder pain, decreased ROM and strength  Insurance/Certification information: [employer], 21 PCY, Needs auth  Physician Information:  Eliezer Escoto MD  Has the plan of care been signed (Y/N):        []  Yes  [x]  No     Date of Patient follow up with Physician:     Assessment Summary: Cayla Meyer is a 60 y.o. female reporting to OP PT s/p left RCR with patch and biceps tenodesis on 22. Pt is noted to be doing rather well. Low pain levels. ROM and strength testing deferred. Appears compliant with sling use. No s/s of DVT or infection. Will progress per protocol and phase of healing. Date Range for reporting period:  Beginnin22  Ending:   3/68/87      Recertification will be due (POC Duration  / 90 days whichever is less): 22          Visit # Insurance Allowable Auth Required   In Person  24 through / []  Yes     []  No    Tele Health   []  Yes     []  No    Total 3         Functional Scale: FOTO 16   Date assessed:     Latex Allergy:  [x]NO      []YES  Preferred Language for Healthcare:   [x]English       []other:      Pain level:  /10         SUBJECTIVE:  Pt reports she has been sore since taking the abduction pillow off her sling. The pt is 4 weeks post-op on 22. OBJECTIVE:           RESTRICTIONS/PRECAUTIONS: Large RCR w/ patch and biceps tenodesis restrictions.     Exercises/Interventions: HEP code: [id number]  Therapeutic Ex (78733) HEP 22     Warm-up   LATEX ALLERGY    Pulleys  NV     UBE TABLE       Cane flexion  2x10                                                      SEATED       Scap squeeze x x30     Cane ER to 30 x x30     Gripping x reviewed     Table slides  2x10            STANDING       Pendulums x X20 ea     PROM table walk aways x x20     PROM elbow flexion x x30                                   Manual: PROM into shoulder flexion, abduction, ER and IR to 30°      Therapeutic Exercise and NMR EXR  [x] (85057) Provided verbal/tactile cueing for activities related to strengthening, flexibility, endurance, ROM  for improvements in scapular, scapulothoracic and UE control with self care, reaching, carrying, lifting, house/yardwork, driving/computer work.    [] (32184) Provided verbal/tactile cueing for activities related to improving balance, coordination, kinesthetic sense, posture, motor skill, proprioception  to assist with  scapular, scapulothoracic and UE control with self care, reaching, carrying, lifting, house/yardwork, driving/computer work. Therapeutic Activities:    [x] (64159 or 82728) Provided verbal/tactile cueing for activities related to improving balance, coordination, kinesthetic sense, posture, motor skill, proprioception and motor activation to allow for proper function of scapular, scapulothoracic and UE control with self care, carrying, lifting, driving/computer work.      Home Exercise Program:    [x] (79479) Reviewed/Progressed HEP activities related to strengthening, flexibility, endurance, ROM of scapular, scapulothoracic and UE control with self care, reaching, carrying, lifting, house/yardwork, driving/computer work  [] (33634) Reviewed/Progressed HEP activities related to improving balance, coordination, kinesthetic sense, posture, motor skill, proprioception of scapular, scapulothoracic and UE control with self care, reaching, carrying, lifting, house/yardwork, driving/computer work      Manual Treatments:  PROM / STM / Oscillations-Mobs:  G-I, II, III, IV (PA's, Inf., Post.)  [x] (20685) Provided manual therapy to mobilize soft tissue/joints of cervical/CT, scapular GHJ and UE for the purpose of modulating pain, promoting relaxation,  increasing ROM, reducing/eliminating soft tissue swelling/inflammation/restriction, improving soft tissue extensibility and allowing for proper ROM for normal function with self care, reaching, carrying, lifting, house/yardwork, driving/computer work    Modalities:     [] GAME READY (VASO)- for significant edema, swelling, pain control. Charges:  Timed Code Treatment Minutes: 38   Total Treatment Minutes:  38   BWC:  TE TIME:  NMR TIME:  MANUAL TIME:  TA  UNTIMED MINUTES:  Medicare Total:   20  8  10            [] EVAL (LOW) 87400 (typically 20 minutes face-to-face)  [] EVAL (MOD) 20288 (typically 30 minutes face-to-face)  [] EVAL (HIGH) 71007 (typically 45 minutes face-to-face)  [] RE-EVAL     [x] XT(03219) 1     [] IONTO  [x] NMR (12746) x 1    [] VASO  [x] Manual (05217) 1     [] Other:  [] TA x      [] Mech Traction (85092)  [] ES(attended) (10784)      [] ES (un) (54890):           GOALS:     Patient stated goal: Improve motion     Therapist goals for Patient:  Short Term Goals: To be achieved in: 2 weeks  1. Independent in HEP and progression per patient tolerance, in order to prevent re-injury. [] Progressing: [] Met: [] Not Met: [] Adjusted     2. Patient will have a decrease in pain to facilitate improvement in movement, function, and ADLs as indicated by Functional Deficits. [] Progressing: [] Met: [] Not Met: [] Adjusted     Long Term Goals: To be achieved in: 12 weeks  1. Pt will improve FOTO to 53 or more, indicating improved functional capacity . [] Progressing: [] Met: [] Not Met: [] Adjusted     2. Patient will demonstrate increased AROM flex/abd/ER/IR to 150/150/40/L1 to allow for proper joint functioning as indicated by patients Functional Deficits. [] Progressing: [] Met: [] Not Met: [] Adjusted     3.  Patient will demonstrate an increase in Strength of shoulder  to 5/5 to allow for proper functional mobility as indicated by patients Functional Deficits. [] Progressing: [] Met: [] Not Met: [] Adjusted     4. Patient will return to functional activities without increased symptoms or restriction. [] Progressing: [] Met: [] Not Met: [] Adjusted     5. Pt will report ability to sleep 5 hours without sleep disturbance from shoulder. (patient specific functional goal)    [] Progressing: [] Met: [] Not Met: [] Adjusted             ASSESSMENT:  See eval    Patient received education on their current pathology and how their condition effects them with their functional activities. Patient understood discussion and questions were answered. Patient understands their activity limitations and understands rational for treatment progression. Pt educated on plan of care and HEP, if worsening symptoms to d/c that exercise. PLAN: See daljit  [x] Continue per plan of care [] Alter current plan (see comments above)  [] Plan of care initiated [] Hold pending MD visit [] Discharge      Electronically signed by:  Aissatou Albarran, PT    Note: If patient does not return for scheduled/ recommended follow up visits, this note will serve as a discharge from care along with most recent update on progress.

## 2022-08-26 NOTE — TELEPHONE ENCOUNTER
Prescription Refill     Medication Name: Hydrocodone  Pharmacy: [facility]  Patient Contact Number: [phone]    Patient advised that the pharmacy told her the wrong provider signed the script please contact patient

## 2022-08-27 NOTE — TELEPHONE ENCOUNTER
Attempted to call patient twice earlier today, cell VM was full, and the home number kept ringing without answer. Pain medication was resent to her pharmacy.

## 2022-08-29 ENCOUNTER — TELEPHONE (OUTPATIENT)
Dept: ORTHOPEDIC SURGERY | Age: 62
End: 2022-08-29

## 2022-08-29 NOTE — TELEPHONE ENCOUNTER
General Question     Subject: PAIN INCREASE SINCE FRIDAY  Patient and /or Facility Request: Emily Gallagher \"Ritu\"  Contact Number: [phone]     Pt ASKING TO SPEAK TO A NURSE, AS PAIN IS MUCH WORSE SINCE Friday. PLEASE CALL TO DISCUSS ASAP @ THE # ABOVE.

## 2022-08-29 NOTE — TELEPHONE ENCOUNTER
Spoke with Ritu this morning. She is still struggleing with pain control despite the Norco.  She was asked to take additional Tylenol on top of the Norco prn, but not to go over the 3000mg per day, she can add an NSAID after 10/2. She was asked to go to PT twice this week. She was asked to continue icing the shoulder. If it continues to worsen, she was asked to call the office for an earlier appointment.

## 2022-08-30 ENCOUNTER — HOSPITAL ENCOUNTER (OUTPATIENT)
Dept: PHYSICAL THERAPY | Age: 62
Setting detail: THERAPIES SERIES
Discharge: HOME | End: 2022-08-30
Payer: COMMERCIAL

## 2022-08-30 PROCEDURE — 97140 MANUAL THERAPY 1/> REGIONS: CPT

## 2022-08-30 NOTE — FLOWSHEET NOTE
[address] [place] [address] and Sports Rehabilitation[address], [address] [place] [address] [address]  Phone: [phone]   Fax:     [phone]      Physical Therapy Treatment Note/ Progress Report:           Date:  2022    Patient Name:  Anne Jack    :  1960  MRN: [id number]  Restrictions/Precautions:    Medical/Treatment Diagnosis Information:  Diagnosis: Z98.890 (ICD-10-CM) - S/P left rotator cuff repair  Treatment Diagnosis: Left shoulder pain, decreased ROM and strength  Insurance/Certification information: [employer], 21 PCY, Needs auth  Physician Information:  Sancho Cohen MD  Has the plan of care been signed (Y/N):        []  Yes  [x]  No     Date of Patient follow up with Physician:     Assessment Summary: Darling Berkowitz is a 60 y.o. female reporting to OP PT s/p left RCR with patch and biceps tenodesis on 22. Pt is noted to be doing rather well. Low pain levels. ROM and strength testing deferred. Appears compliant with sling use. No s/s of DVT or infection. Will progress per protocol and phase of healing. Date Range for reporting period:  Beginnin22  Endin59      Recertification will be due (POC Duration  / 90 days whichever is less): 22          Visit # Insurance Allowable Auth Required   In Person  24 through / []  Yes     []  No    Tele Health   []  Yes     []  No    Total 4         Functional Scale: FOTO 16   Date assessed:     Latex Allergy:  [x]NO      []YES  Preferred Language for Healthcare:   [x]English       []other:      Pain level:  6-8/10         SUBJECTIVE:  Pt reports her shoulder has been very painful since Friday. Pt reported she pushed herself up off the bed Saturday and felt a lot of pain in her surgical shoulder. The pt is 4 weeks post-op on 22. OBJECTIVE: PT PROM is good this date with no evidence of tightness or restriction. Biceps tendon seems intact.  Pt able to produce some light ER pressure with some pain. RESTRICTIONS/PRECAUTIONS: Large RCR w/ patch and biceps tenodesis restrictions. Exercises/Interventions: HEP code: [id number]  Therapeutic Ex (91650) HEP 8/26/22     Warm-up   LATEX ALLERGY    Pulleys  NV     UBE              TABLE       Cane flexion                                                       SEATED       Scap squeeze x x30     Cane ER to 30 x x30     Gripping x reviewed     Table slides  2x10            STANDING       Pendulums x X20 ea     PROM table walk aways x x20     PROM elbow flexion x x30                                   Manual: PROM into shoulder flexion, abduction, ER and IR to 30°      Therapeutic Exercise and NMR EXR  [x] (98401) Provided verbal/tactile cueing for activities related to strengthening, flexibility, endurance, ROM  for improvements in scapular, scapulothoracic and UE control with self care, reaching, carrying, lifting, house/yardwork, driving/computer work.    [] (81772) Provided verbal/tactile cueing for activities related to improving balance, coordination, kinesthetic sense, posture, motor skill, proprioception  to assist with  scapular, scapulothoracic and UE control with self care, reaching, carrying, lifting, house/yardwork, driving/computer work. Therapeutic Activities:    [x] (68011 or 99421) Provided verbal/tactile cueing for activities related to improving balance, coordination, kinesthetic sense, posture, motor skill, proprioception and motor activation to allow for proper function of scapular, scapulothoracic and UE control with self care, carrying, lifting, driving/computer work.      Home Exercise Program:    [x] (49738) Reviewed/Progressed HEP activities related to strengthening, flexibility, endurance, ROM of scapular, scapulothoracic and UE control with self care, reaching, carrying, lifting, house/yardwork, driving/computer work  [] (90609) Reviewed/Progressed HEP activities related to improving balance, coordination, kinesthetic sense, posture, motor skill, proprioception of scapular, scapulothoracic and UE control with self care, reaching, carrying, lifting, house/yardwork, driving/computer work      Manual Treatments:  PROM / STM / Oscillations-Mobs:  G-I, II, III, IV (PA's, Inf., Post.)  [x] (91365) Provided manual therapy to mobilize soft tissue/joints of cervical/CT, scapular GHJ and UE for the purpose of modulating pain, promoting relaxation,  increasing ROM, reducing/eliminating soft tissue swelling/inflammation/restriction, improving soft tissue extensibility and allowing for proper ROM for normal function with self care, reaching, carrying, lifting, house/yardwork, driving/computer work    Modalities:     [] GAME READY (VASO)- for significant edema, swelling, pain control. Charges:  Timed Code Treatment Minutes: 15   Total Treatment Minutes:  15   BWC:  TE TIME:  NMR TIME:  MANUAL TIME:  TA  UNTIMED MINUTES:  Medicare Total:       15            [] EVAL (LOW) 04169 (typically 20 minutes face-to-face)  [] EVAL (MOD) 90206 (typically 30 minutes face-to-face)  [] EVAL (HIGH) 02191 (typically 45 minutes face-to-face)  [] RE-EVAL     [] WP(52652)     [] IONTO  [] NMR (33511) x     [] VASO  [x] Manual (82952) 1     [] Other:  [] TA x      [] Mech Traction (58175)  [] ES(attended) (94632)      [] ES (un) (14774):           GOALS:     Patient stated goal: Improve motion     Therapist goals for Patient:  Short Term Goals: To be achieved in: 2 weeks  1. Independent in HEP and progression per patient tolerance, in order to prevent re-injury. [] Progressing: [] Met: [] Not Met: [] Adjusted     2. Patient will have a decrease in pain to facilitate improvement in movement, function, and ADLs as indicated by Functional Deficits. [] Progressing: [] Met: [] Not Met: [] Adjusted     Long Term Goals: To be achieved in: 12 weeks  1. Pt will improve FOTO to 53 or more, indicating improved functional capacity .   [] Progressing: [] Met: [] Not Met: [] Adjusted     2. Patient will demonstrate increased AROM flex/abd/ER/IR to 150/150/40/L1 to allow for proper joint functioning as indicated by patients Functional Deficits. [] Progressing: [] Met: [] Not Met: [] Adjusted     3. Patient will demonstrate an increase in Strength of shoulder  to 5/5 to allow for proper functional mobility as indicated by patients Functional Deficits. [] Progressing: [] Met: [] Not Met: [] Adjusted     4. Patient will return to functional activities without increased symptoms or restriction. [] Progressing: [] Met: [] Not Met: [] Adjusted     5. Pt will report ability to sleep 5 hours without sleep disturbance from shoulder. (patient specific functional goal)    [] Progressing: [] Met: [] Not Met: [] Adjusted             ASSESSMENT:  See daljit    Patient received education on their current pathology and how their condition effects them with their functional activities. Patient understood discussion and questions were answered. Patient understands their activity limitations and understands rational for treatment progression. Pt educated on plan of care and HEP, if worsening symptoms to d/c that exercise. PLAN: See daljit  [x] Continue per plan of care [] Alter current plan (see comments above)  [] Plan of care initiated [] Hold pending MD visit [] Discharge      Electronically signed by:  Aissatou Albarran PT    Note: If patient does not return for scheduled/ recommended follow up visits, this note will serve as a discharge from care along with most recent update on progress.

## 2022-08-31 ENCOUNTER — OFFICE VISIT (OUTPATIENT)
Dept: ORTHOPEDIC SURGERY | Age: 62
End: 2022-08-31

## 2022-08-31 ENCOUNTER — TELEPHONE (OUTPATIENT)
Dept: ORTHOPEDIC SURGERY | Age: 62
End: 2022-08-31

## 2022-08-31 VITALS — BODY MASS INDEX: 33.12 KG/M2 | WEIGHT: 194 LBS | HEIGHT: 64 IN

## 2022-08-31 DIAGNOSIS — Z98.890 S/P ROTATOR CUFF REPAIR: Primary | ICD-10-CM

## 2022-08-31 PROCEDURE — 99024 POSTOP FOLLOW-UP VISIT: CPT | Performed by: ORTHOPAEDIC SURGERY

## 2022-08-31 RX ORDER — CELECOXIB 200 MG/1
200 CAPSULE ORAL DAILY
Qty: 60 CAPSULE | Refills: 3 | Status: SHIPPED | OUTPATIENT
Start: 2022-08-31

## 2022-08-31 RX ORDER — PROGESTERONE 200 MG/1
CAPSULE ORAL
COMMUNITY
Start: 2022-06-14

## 2022-08-31 RX ORDER — OXYCODONE HYDROCHLORIDE AND ACETAMINOPHEN 5; 325 MG/1; MG/1
1 TABLET ORAL EVERY 8 HOURS PRN
Qty: 28 TABLET | Refills: 0 | Status: SHIPPED | OUTPATIENT
Start: 2022-08-31 | End: 2022-09-15 | Stop reason: SDUPTHER

## 2022-08-31 RX ORDER — SPIRONOLACTONE 50 MG/1
TABLET, FILM COATED ORAL
COMMUNITY
Start: 2022-08-02

## 2022-08-31 NOTE — TELEPHONE ENCOUNTER
General Question     Subject: LT SHLDR PAIN  Patient and /or Facility Request: Rosangela Weiss \"Ritu\"  Contact Number: [phone]    PATIENT IS CALLING REQ TO SPEAK TO PA REGARDING HER LT SHLDR PAIN. SHE STATED THE THERAPIST WAS GOING TO SEND PA A NOTE REGARDING THIS. PLEASE RETURN CALL TO ABOVE NUMBER.

## 2022-08-31 NOTE — PROGRESS NOTES
History of Present Illness:  Yuan Segura is a 60 y.o. female who presents for a post operative visit. The patient underwent a left shoulder arthroscopy for a revision rotator cuff repair with collagen patch augmentation and biceps tenodesis on 8/5/2022 by Dr. Jeff Figueroa. She was doing fine at her last visit a week ago, but had acute pain last Friday 8/26 in absence of trauma or injury. She has RA and last took Enbrel on Monday of 8/29 but it did not help with pain at all. She finished 20 norco since last Friday. She has been doing physical therapy at [facility]. Her daughter drove her to the appointment today. The patient denies any fevers, chills, numbness, tingling, and shortness of breath. Medical History:  Patient's medications, allergies, past medical, surgical, social and family histories were reviewed and updated as appropriate. Review of Systems  A 14 point review of systems was completed by the patient is available in the media section of the scanned medical record and was reviewed on 8/31/2022. Vital Signs: There were no vitals filed for this visit. General/Appearance: Alert and oriented and in no apparent distress. Skin:  There are no skin lesions, cellulitis, or extreme edema. The patient has warm and well-perfused Bilateral upper extremities with brisk capillary refill. left Shoulder Exam:    Inspection: left shoulder incision that is clean, dry and intact and well approximated. The Prineo dressing is still in place. Mild ecchymosis and swelling are present as can be expected. There is no erythema, drainage or other signs of infection    Palpation:  No crepitus to gentle motion    Active assisted motion: 0-110 with soft end, 20 deg ext rotation. Strength:  Deferred    Special Tests:  Deferred. Neurovascular: Sensation to light touch is intact, no motor deficits, palpable radial pulses 2+    Radiology:     Plain radiographs obtained today.        Assessment :  Ms. Adeel Ladd is a 60 y.o. patient who underwent a left shoulder arthroscopy for revision rotator cuff repair with collagen patch augmentation and biceps tenodesis on 8/5/2022. Impression:  Encounter Diagnosis   Name Primary? S/P rotator cuff repair Yes       Office Procedures:  Orders Placed This Encounter   Procedures    [facility]  [facility]     Referral Priority:   Routine     Referral Type:   Eval and Treat     Referral Reason:   Specialty Services Required     Requested Specialty:   Physical Therapist     Number of Visits Requested:   1         Treatment Plan:    Dr. Adam Romero reassured Adeel Ladd today in clinic after examining her. Her incisions are benign appearing and she tolerates passive range of motion very well with no crepitus or stiffness. We think her pain is largely within expected limits after large cuff repair. There is no sign of intra-articular infection or postoperative frozen shoulder. we want her to discontinue the sling. She should continue physical therapy. New letter updated. We provided a new prescription of the Percocet and anti-inflammatory medication Celebrex. All of her questions were fully answered today. We would like to see her back in 3 weeks for follow-up visit. She is in agreement with this plan. 12:07 PM    Bel Duggan MD  Sports Medicine Fellow  [place] [address] [address] [facility]    The encounter with Adeel Ladd was supervised by Dr. Adam Romero who personally examined the patient and reviewed the plan. This dictation was performed with a verbal recognition program (DRAGON) and it was checked for errors. It is possible that there are still dictated errors within this office note. If so, please bring any errors to my attention for an addendum.   All efforts were made to ensure that this office note is accurate.   ________________  I was physically present and personally supervised the Orthopaedic Sports Medicine Fellow in the evaluation and development of a treatment plan for this patient. I personally interviewed the patient and performed a physical examination. In addition, I discussed the patient's condition and treatment options with them. I have also reviewed and agree with the past medical, family and social history unless otherwise noted. All of the patient's questions were answered. Dash Alvarado MD, PhD  8/31/2022

## 2022-09-02 ENCOUNTER — HOSPITAL ENCOUNTER (OUTPATIENT)
Dept: PHYSICAL THERAPY | Age: 62
Setting detail: THERAPIES SERIES
Discharge: HOME | End: 2022-09-02
Payer: COMMERCIAL

## 2022-09-02 PROCEDURE — 97140 MANUAL THERAPY 1/> REGIONS: CPT

## 2022-09-02 PROCEDURE — 97110 THERAPEUTIC EXERCISES: CPT

## 2022-09-02 PROCEDURE — 97016 VASOPNEUMATIC DEVICE THERAPY: CPT

## 2022-09-02 NOTE — FLOWSHEET NOTE
restrictions. Exercises/Interventions: HEP code: [id number]  Therapeutic Ex (05863) HEP 8/26/22     Warm-up   LATEX ALLERGY    Pulleys  NV     UBE              TABLE       Cane flexion                                                       SEATED       Scap squeeze x x30     Cane ER to 30 x x30     Gripping x reviewed     Table slides  2x10            STANDING       Pendulums x X20 ea     PROM table walk aways x x20     PROM elbow flexion x x30                                   Manual: PROM into shoulder flexion, abduction, ER and IR to 30°      Therapeutic Exercise and NMR EXR  [x] (39289) Provided verbal/tactile cueing for activities related to strengthening, flexibility, endurance, ROM  for improvements in scapular, scapulothoracic and UE control with self care, reaching, carrying, lifting, house/yardwork, driving/computer work.    [] (99497) Provided verbal/tactile cueing for activities related to improving balance, coordination, kinesthetic sense, posture, motor skill, proprioception  to assist with  scapular, scapulothoracic and UE control with self care, reaching, carrying, lifting, house/yardwork, driving/computer work. Therapeutic Activities:    [x] (76230 or 04715) Provided verbal/tactile cueing for activities related to improving balance, coordination, kinesthetic sense, posture, motor skill, proprioception and motor activation to allow for proper function of scapular, scapulothoracic and UE control with self care, carrying, lifting, driving/computer work.      Home Exercise Program:    [x] (77510) Reviewed/Progressed HEP activities related to strengthening, flexibility, endurance, ROM of scapular, scapulothoracic and UE control with self care, reaching, carrying, lifting, house/yardwork, driving/computer work  [] (11261) Reviewed/Progressed HEP activities related to improving balance, coordination, kinesthetic sense, posture, motor skill, proprioception of scapular, scapulothoracic and UE control with 150/150/40/L1 to allow for proper joint functioning as indicated by patients Functional Deficits. [] Progressing: [] Met: [] Not Met: [] Adjusted     3. Patient will demonstrate an increase in Strength of shoulder  to 5/5 to allow for proper functional mobility as indicated by patients Functional Deficits. [] Progressing: [] Met: [] Not Met: [] Adjusted     4. Patient will return to functional activities without increased symptoms or restriction. [] Progressing: [] Met: [] Not Met: [] Adjusted     5. Pt will report ability to sleep 5 hours without sleep disturbance from shoulder. (patient specific functional goal)    [] Progressing: [] Met: [] Not Met: [] Adjusted             ASSESSMENT:  See eval    Patient received education on their current pathology and how their condition effects them with their functional activities. Patient understood discussion and questions were answered. Patient understands their activity limitations and understands rational for treatment progression. Pt educated on plan of care and HEP, if worsening symptoms to d/c that exercise. PLAN: See eval  [x] Continue per plan of care [] Alter current plan (see comments above)  [] Plan of care initiated [] Hold pending MD visit [] Discharge      Electronically signed by:  Edward Moody, PT    Note: If patient does not return for scheduled/ recommended follow up visits, this note will serve as a discharge from care along with most recent update on progress.

## 2022-09-09 ENCOUNTER — HOSPITAL ENCOUNTER (OUTPATIENT)
Dept: PHYSICAL THERAPY | Age: 62
Setting detail: THERAPIES SERIES
Discharge: HOME | End: 2022-09-09
Payer: COMMERCIAL

## 2022-09-09 PROCEDURE — 97110 THERAPEUTIC EXERCISES: CPT

## 2022-09-09 PROCEDURE — 97112 NEUROMUSCULAR REEDUCATION: CPT

## 2022-09-09 PROCEDURE — 97140 MANUAL THERAPY 1/> REGIONS: CPT

## 2022-09-09 NOTE — FLOWSHEET NOTE
[address] [place] [address] and Sports Rehabilitation[address], [address] [place] [address] [address]  Phone: [phone]   Fax:     [phone]      Physical Therapy Treatment Note/ Progress Report:           Date:  2022    Patient Name:  Dee Dinero    :  1960  MRN: [id number]  Restrictions/Precautions:    Medical/Treatment Diagnosis Information:  Diagnosis: Z98.890 (ICD-10-CM) - S/P left rotator cuff repair  Treatment Diagnosis: Left shoulder pain, decreased ROM and strength  Insurance/Certification information: [employer], 21 PCY, Needs auth  Physician Information:  Diana Berger MD  Has the plan of care been signed (Y/N):        []  Yes  [x]  No     Date of Patient follow up with Physician:     Assessment Summary: [facility] is a 60 y.o. female reporting to OP PT s/p left RCR with patch and biceps tenodesis on 22. Pt is noted to be doing rather well. Low pain levels. ROM and strength testing deferred. Appears compliant with sling use. No s/s of DVT or infection. Will progress per protocol and phase of healing. Date Range for reporting period:  Beginnin22  Endin      Recertification will be due (POC Duration  / 90 days whichever is less): 22          Visit # Insurance Allowable Auth Required   In Person  24 through / []  Yes     []  No    Tele Health   []  Yes     []  No    Total 6         Functional Scale: FOTO 16   Date assessed:     Latex Allergy:  [x]NO      []YES  Preferred Language for Healthcare:   [x]English       []other:      Pain level:  8/10         SUBJECTIVE:  Pt reports her shoulder continues to hurt with a sharp pain in her deltoid region which radiates down her biceps into her forearm and hand. The pt is 4 weeks post-op on 22. OBJECTIVE: PT PROM is good this date with no evidence of tightness or restriction. Biceps tendon seems intact. Pt able to produce some light ER pressure with some pain. RESTRICTIONS/PRECAUTIONS: Large RCR w/ patch and biceps tenodesis restrictions. Exercises/Interventions: HEP code: [id number]  Therapeutic Ex (77528) HEP 8/26/22     Warm-up   LATEX ALLERGY    Pulleys  NV     UBE              TABLE       Cane flexion                                                       SEATED       Scap squeeze x x30     Cane ER to 30 x x30     Gripping x reviewed     Table slides  2x10            STANDING       Pendulums x X20 ea     PROM table walk aways x x20     PROM elbow flexion x x30     SB roll flexion AAROM  2x10     Iso shoulder abd  5''x10                     Manual: PROM into shoulder flexion, abduction, ER and IR to 30°      Therapeutic Exercise and NMR EXR  [x] (48112) Provided verbal/tactile cueing for activities related to strengthening, flexibility, endurance, ROM  for improvements in scapular, scapulothoracic and UE control with self care, reaching, carrying, lifting, house/yardwork, driving/computer work.    [] (42561) Provided verbal/tactile cueing for activities related to improving balance, coordination, kinesthetic sense, posture, motor skill, proprioception  to assist with  scapular, scapulothoracic and UE control with self care, reaching, carrying, lifting, house/yardwork, driving/computer work. Therapeutic Activities:    [x] (34362 or 90112) Provided verbal/tactile cueing for activities related to improving balance, coordination, kinesthetic sense, posture, motor skill, proprioception and motor activation to allow for proper function of scapular, scapulothoracic and UE control with self care, carrying, lifting, driving/computer work.      Home Exercise Program:    [x] (32598) Reviewed/Progressed HEP activities related to strengthening, flexibility, endurance, ROM of scapular, scapulothoracic and UE control with self care, reaching, carrying, lifting, house/yardwork, driving/computer work  [] (96633) Reviewed/Progressed HEP activities related to improving balance, coordination, kinesthetic sense, posture, motor skill, proprioception of scapular, scapulothoracic and UE control with self care, reaching, carrying, lifting, house/yardwork, driving/computer work      Manual Treatments:  PROM / STM / Oscillations-Mobs:  G-I, II, III, IV (PA's, Inf., Post.)  [x] (14554) Provided manual therapy to mobilize soft tissue/joints of cervical/CT, scapular GHJ and UE for the purpose of modulating pain, promoting relaxation,  increasing ROM, reducing/eliminating soft tissue swelling/inflammation/restriction, improving soft tissue extensibility and allowing for proper ROM for normal function with self care, reaching, carrying, lifting, house/yardwork, driving/computer work    Modalities:     [x] GAME READY (VASO)- for significant edema, swelling, pain control. Charges:  Timed Code Treatment Minutes: 38   Total Treatment Minutes:  38   BWC:  TE TIME:  NMR TIME:  MANUAL TIME:  TA  UNTIMED MINUTES:  Medicare Total:   20  8  10            [] EVAL (LOW) 06636 (typically 20 minutes face-to-face)  [] EVAL (MOD) 89729 (typically 30 minutes face-to-face)  [] EVAL (HIGH) 53087 (typically 45 minutes face-to-face)  [] RE-EVAL     [x] MO(54625) x 1   [] IONTO  [x] NMR (35255) x 1    [] VASO   [x] Manual (13531) 1     [] Other:  [] TA x      [] Mech Traction (77710)  [] ES(attended) (96867)      [] ES (un) (71361):           GOALS:     Patient stated goal: Improve motion     Therapist goals for Patient:  Short Term Goals: To be achieved in: 2 weeks  1. Independent in HEP and progression per patient tolerance, in order to prevent re-injury. [] Progressing: [] Met: [] Not Met: [] Adjusted     2. Patient will have a decrease in pain to facilitate improvement in movement, function, and ADLs as indicated by Functional Deficits. [] Progressing: [] Met: [] Not Met: [] Adjusted     Long Term Goals: To be achieved in: 12 weeks  1.  Pt will improve FOTO to 53 or more, indicating improved functional capacity . [] Progressing: [] Met: [] Not Met: [] Adjusted     2. Patient will demonstrate increased AROM flex/abd/ER/IR to 150/150/40/L1 to allow for proper joint functioning as indicated by patients Functional Deficits. [] Progressing: [] Met: [] Not Met: [] Adjusted     3. Patient will demonstrate an increase in Strength of shoulder  to 5/5 to allow for proper functional mobility as indicated by patients Functional Deficits. [] Progressing: [] Met: [] Not Met: [] Adjusted     4. Patient will return to functional activities without increased symptoms or restriction. [] Progressing: [] Met: [] Not Met: [] Adjusted     5. Pt will report ability to sleep 5 hours without sleep disturbance from shoulder. (patient specific functional goal)    [] Progressing: [] Met: [] Not Met: [] Adjusted             ASSESSMENT:  See eval    Patient received education on their current pathology and how their condition effects them with their functional activities. Patient understood discussion and questions were answered. Patient understands their activity limitations and understands rational for treatment progression. Pt educated on plan of care and HEP, if worsening symptoms to d/c that exercise. PLAN: See eval  [x] Continue per plan of care [] Alter current plan (see comments above)  [] Plan of care initiated [] Hold pending MD visit [] Discharge      Electronically signed by:  Zohaib Leblanc PT    Note: If patient does not return for scheduled/ recommended follow up visits, this note will serve as a discharge from care along with most recent update on progress.

## 2022-09-12 ENCOUNTER — TELEPHONE (OUTPATIENT)
Dept: ORTHOPEDIC SURGERY | Age: 62
End: 2022-09-12

## 2022-09-12 NOTE — TELEPHONE ENCOUNTER
General Question     Subject: PATIENT SAYS SHE IS STILL IN A LOT OF PAIN AFTER SURGERY AND WOULD LIKE TO SPEAK WITH SOMEONE. Patient and /or Facility Request: Harika Lerma \"[facility]\"  Contact Number:  [phone]    PATIENT SAYS SHE IS STILL IN A LOT OF PAIN AFTER SURGERY AND WOULD LIKE TO SPEAK WITH SOMEONE. PLEASE CALL PATIENT BACK AT THE NUMBER ABOVE .

## 2022-09-15 ENCOUNTER — OFFICE VISIT (OUTPATIENT)
Dept: ORTHOPEDIC SURGERY | Age: 62
End: 2022-09-15

## 2022-09-15 ENCOUNTER — TELEPHONE (OUTPATIENT)
Dept: ORTHOPEDIC SURGERY | Age: 62
End: 2022-09-15

## 2022-09-15 VITALS — HEIGHT: 64 IN | BODY MASS INDEX: 33.12 KG/M2 | WEIGHT: 194 LBS

## 2022-09-15 DIAGNOSIS — M25.512 LEFT SHOULDER PAIN, UNSPECIFIED CHRONICITY: ICD-10-CM

## 2022-09-15 DIAGNOSIS — M25.612 SHOULDER STIFFNESS, LEFT: ICD-10-CM

## 2022-09-15 DIAGNOSIS — Z98.890 S/P ROTATOR CUFF REPAIR: ICD-10-CM

## 2022-09-15 DIAGNOSIS — Z98.890 S/P ROTATOR CUFF REPAIR: Primary | ICD-10-CM

## 2022-09-15 PROCEDURE — 99024 POSTOP FOLLOW-UP VISIT: CPT | Performed by: ORTHOPAEDIC SURGERY

## 2022-09-15 RX ORDER — OXYCODONE HYDROCHLORIDE AND ACETAMINOPHEN 5; 325 MG/1; MG/1
1 TABLET ORAL EVERY 8 HOURS PRN
Qty: 28 TABLET | Refills: 0 | Status: SHIPPED | OUTPATIENT
Start: 2022-09-15 | End: 2022-09-22

## 2022-09-15 RX ORDER — METHYLPREDNISOLONE 4 MG/1
4 TABLET ORAL SEE ADMIN INSTRUCTIONS
Qty: 1 KIT | Refills: 0 | Status: SHIPPED | OUTPATIENT
Start: 2022-09-15 | End: 2022-09-21

## 2022-09-15 NOTE — LETTER
Physical Therapy Rehabilitation Referral    Patient Name:  Aron Tang      YOB: 1960    Diagnosis:    1. S/P rotator cuff repair    2. Left shoulder pain, unspecified chronicity        Precautions:     [x] Evaluate and Treat    Post Op Instructions:  [] Continuous passive motion (CPM) [x] Elbow ROM  [x] Exercise in plane of scapula  [x]  Strengthening     [x] Pulley and instruction   [x] Home exercise program (copy to patient)   [] Sling when arm at risk  [] Sling or brace at all times   [x] AAROM: Forward elevation to  140            [x] AAROM: External rotation  To  40    [] Isometric external rotator strengthening [x] AAROM: internal rotation: up the back  [x] Isometric abductor strengthening  [x] AAROM: Internal abduction   [] Isometric internal rotator strengthening [x] AAROM: cross-body adduction             Stretching:     Strengthening:  [] Four quadrant (FE, ER, IR, CBA)  [] Rotator cuff (ER, IR, Abd)  [x] Forward Elevation    [] External Rotators     [x] External Rotation    [] Internal Rotators  [x] Internal Rotation: up/back   [] Abductors     [] Internal Rotation: supine in abduction  [] Sleeper Stretch    [] Flexors  [] Cross-body abduction    [] Extensors  [x] Pendulum (FE, Abd/Add, cw/ccw)  [x] Scapular Stabilizers   [x] Wall-walking (FE, Abd)        [x] Shoulder shrugs     [x] Table slides (FE)                [x] Rhomboid pinch  [] Elbow (flex, ext, pron, sup)        [] Lat.  Pull downs     [] Medial epicondylitis program       [] Forward punch   [] Lateral epicondylitis program       [] Internal rotators     [] Progressive resistive exercises  [] Bench Press        [] Bench press plus  Activities:     [] Lateral pull-downs  [] Rowing     [x] Progressive two-hand supine press  [] Stepper/Exercise bike   [x] Biceps: curls/supination  [] Swimming  [] Water exercises    Modalities:     Return to Sport:  [x] Of Choice      [] Plyometrics  [] Ultrasound     [] Rhythmic stabilization  [] Iontophoresis    [] Core strengthening   [] Moist heat     [] Sports specific program:   [] Massage         [x] Cryotherapy      [] Electrical stimulation     [] Paraffin  [] Whirlpool  [] TENS    [x] Home exercise program (copy to patient). Perform exercises for:   15     minutes    3      times/day  [x] Supervised physical therapy  Frequency: []  1x week  [x] 2x week  [] 3x week  [] Other:   Duration: [] 2 weeks   [] 4 weeks  [x] 6 weeks  [] Other:     Additional Instructions:     Dash Romero MD, PhD

## 2022-09-15 NOTE — PROGRESS NOTES
History of Present Illness:  Anne Jack is a 60 y.o. female who presents for a post operative visit. The patient underwent a left arthroscopic revision rotator cuff repair with collagen patch augmentation and biceps tenodesis on 8/5/2022. The patient began having acute pain on 8/26 in the absence of trauma or injury which was documented at her last visit. The patient reports that this has continued to stay extremely persistent. Patient reports the pain is down the deltoid and it gets worse with any movement and at night when she is sleeping. She reports it is also present at rest as well. The patient deny fevers, chills, numbness, tingling, and shortness of breath. She had a Cervical fusion C4-5, C5-6, C6-7 ANTERIOR CERVICAL DISCECTOMY AND FUSION performed by Natalya Ko on 12/11/2019. Medical History:  Patient's medications, allergies, past medical, surgical, social and family histories were reviewed and updated as appropriate. No notes on file    Review of Systems  A 14 point review of systems was completed by the patient is available in the media section of the scanned medical record and was reviewed on 9/15/2022. Vital Signs: There were no vitals filed for this visit. General/Appearance: Alert and oriented and in no apparent distress. Skin:  There are no skin lesions, cellulitis, or extreme edema. The patient has warm and well-perfused Bilateral upper extremities with brisk capillary refill. left Shoulder Exam:    Inspection: left shoulder incision that is clean, dry and intact and well approximated. There is no erythema, drainage or other signs of infection    Palpation:  No subacromial crepitus to gentle motion    Active Range of Motion: Forward elevation of 90 and pain on arm descent. External rotation of 20. Supine position she has forward elevation to 130 with pain past that    Passive Range of Motion: Forward elevation can be further increased to 130.     Strength:4/5 external rotation with resistance    Special Tests:  Deferred. Neurovascular: Sensation to light touch is intact, no motor deficits, palpable radial pulses 2+    Radiology:     Plain radiographs not obtained. Assessment :  Ms. Rosa Elena Rodriguez is a 60 y.o. patient underwent a left arthroscopic revision rotator cuff repair with collagen patch augmentation and biceps tenodesis on 8/5/2022. She has a very low level of capsulitis that is brewing which could be contributing to her acute pain level. We are fairly confident that the rotator cuff repair is still doing well. Impression:  Encounter Diagnoses   Name Primary? S/P rotator cuff repair Yes    Left shoulder pain, unspecified chronicity        Office Procedures:  Orders Placed This Encounter   Procedures    [address] [facility] ([facility])- [facility]     Referral Priority:   Routine     Referral Type:   Eval and Treat     Referral Reason:   Specialty Services Required     Requested Specialty:   Physical Therapist     Number of Visits Requested:   1       Treatment Plan:    Overall the patient is doing well. The pain is well-controlled. We recommend that she discontinue her sling completely. We recommend that she stick with formal physical therapy to work on movement and endrange movements. Therapy orders were updated. The patient was told that she would benefit with continued Celebrex which she stopped taking. We also would like to call in a prescription for a Medrol Dosepak to her pharmacy that she was asked to take. Additionally we will go ahead and have her change her physical therapy location to the [facility] office. All of her questions were fully answered today. We would like to see she her back in 3 weeks for follow-up visit. 10:31 AM      Candelaria Meeks PA-C  Orthopaedic Sports Medicine Physician Assistant    During this examination, ICandelaria PA-C, functioned as a scribe for Dr. Deb Berger. This dictation was performed with a verbal recognition program (DRAGON) and it was checked for errors. It is possible that there are still dictated errors within this office note. If so, please bring any errors to my attention for an addendum. All efforts were made to ensure that this office note is accurate.  _______________________  I, Dr. Anca Canchola, personally performed the services described in this documentation as described by Ata Hines PA-C in my presence, and it is both accurate and complete. Dash Gannon MD, PhD  9/15/2022

## 2022-09-15 NOTE — TELEPHONE ENCOUNTER
General Question     Subject: Megan Washington RX NOT AT PHARMACY   Patient and /or Facility Request: Fang Bond \"[facility]\"  Contact Number: [phone]     [facility] [id number] [facility] [address], [facility] [phone] - F [phone]    Pt WANTING TO MAKE CERTAIN RX WILL BE SENT IN TODAY. IT HAS NOT BEEN RCV'D BY THE PHARMACY AND SHE IS CONCERNED. PLEASE CALL Pt TO LET HER KNOW THE RX HAS BEEN SENT IN.

## 2022-09-16 ENCOUNTER — APPOINTMENT (OUTPATIENT)
Dept: PHYSICAL THERAPY | Age: 62
End: 2022-09-16
Payer: COMMERCIAL

## 2022-09-16 ENCOUNTER — HOSPITAL ENCOUNTER (OUTPATIENT)
Dept: PHYSICAL THERAPY | Age: 62
Setting detail: THERAPIES SERIES
Discharge: HOME | End: 2022-09-16
Payer: COMMERCIAL

## 2022-09-16 ENCOUNTER — TELEPHONE (OUTPATIENT)
Dept: ORTHOPEDIC SURGERY | Age: 62
End: 2022-09-16

## 2022-09-16 PROCEDURE — 97110 THERAPEUTIC EXERCISES: CPT | Performed by: PHYSICAL THERAPIST

## 2022-09-16 PROCEDURE — 97140 MANUAL THERAPY 1/> REGIONS: CPT | Performed by: PHYSICAL THERAPIST

## 2022-09-16 NOTE — TELEPHONE ENCOUNTER
Prescription Refill     Medication Name:  [address]: [address]  Patient Contact Number:  [phone]    Patient is req a refill of Percocet. Is leaving to go out town today @ noon, and is req to pick by that time.     Please Advise

## 2022-09-19 NOTE — FLOWSHEET NOTE
The [facility] and [address],  Sports Performance and Rehabilitation, [facility] [address]199 12[address]  793 [facility],5Th Floor   [place]rid[place] Suarez  Phone: [phone]  Fax: [phone]       Physical Therapy Treatment Note/ Progress Report:           Date:  2022    Patient Name:  Delbert Warner    :  1960  MRN: [id number]  Restrictions/Precautions:    Medical/Treatment Diagnosis Information:  Diagnosis: Z98.890 (ICD-10-CM) - S/P left rotator cuff repair  Treatment Diagnosis: Left shoulder pain, decreased ROM and strength  Insurance/Certification information: [facility], [address] [place] [address], [facility] auth  Physician Information:  Hina Dumont MD  Has the plan of care been signed (Y/N):        []  Yes  [x]  No     Date of Patient follow up with Physician:     Assessment Summary: Janie Joyce is a 60 y.o. female reporting to OP PT s/p left RCR with patch and biceps tenodesis on 22. Pt is noted to be doing rather well. Low pain levels. ROM and strength testing deferred. Appears compliant with sling use. No s/s of DVT or infection. Will progress per protocol and phase of healing. Date Range for reporting period:  Beginnin22  Endin      Recertification will be due (POC Duration  / 90 days whichever is less): 22          Visit # Insurance Allowable Auth Required   In Person  24 through / []  Yes     []  No    [facility]   []  Yes     []  No    Total 7         Functional Scale: FOTO 16   Date assessed:     Latex Allergy:  [x]NO      []YES  Preferred Language for Healthcare:   [x]English       []other:      Pain level:  8/10         SUBJECTIVE:         The pt is 4 weeks post-op on 22. OBJECTIVE: I with hep           RESTRICTIONS/PRECAUTIONS: Large RCR w/ patch and biceps tenodesis restrictions.     Exercises/Interventions: HEP code: [id number]  Therapeutic Ex (93881) HEP 22     Warm-up   LATEX ALLERGY    Pulleys  NV     UBE              TABLE       Cane flexion                                                       SEATED       Scap squeeze x x30     Cane ER to 30 x x30     Gripping x reviewed     Table slides  2x10            STANDING       Pendulums x X20 ea     PROM table walk aways x x20     PROM elbow flexion x x30     SB roll flexion AAROM  2x10     Iso shoulder abd  5''x10                     Manual: PROM into shoulder flexion, abduction, ER and IR to 30° - 25'      Therapeutic Exercise and NMR EXR  [x] (14950) Provided verbal/tactile cueing for activities related to strengthening, flexibility, endurance, ROM  for improvements in scapular, scapulothoracic and UE control with self care, reaching, carrying, lifting, house/yardwork, driving/computer work.    [] (62541) Provided verbal/tactile cueing for activities related to improving balance, coordination, kinesthetic sense, posture, motor skill, proprioception  to assist with  scapular, scapulothoracic and UE control with self care, reaching, carrying, lifting, house/yardwork, driving/computer work. Therapeutic Activities:    [x] (07647 or 07034) Provided verbal/tactile cueing for activities related to improving balance, coordination, kinesthetic sense, posture, motor skill, proprioception and motor activation to allow for proper function of scapular, scapulothoracic and UE control with self care, carrying, lifting, driving/computer work.      Home Exercise Program:    [x] (03507) Reviewed/Progressed HEP activities related to strengthening, flexibility, endurance, ROM of scapular, scapulothoracic and UE control with self care, reaching, carrying, lifting, house/yardwork, driving/computer work  [] (95925) Reviewed/Progressed HEP activities related to improving balance, coordination, kinesthetic sense, posture, motor skill, proprioception of scapular, scapulothoracic and UE control with self care, reaching, carrying, lifting, house/yardwork, driving/computer work      Manual Treatments:  PROM / STM / Oscillations-Mobs:  G-I, II, III, IV (PA's, Inf., Post.)  [x] (99531) Provided manual therapy to mobilize soft tissue/joints of cervical/CT, scapular GHJ and UE for the purpose of modulating pain, promoting relaxation,  increasing ROM, reducing/eliminating soft tissue swelling/inflammation/restriction, improving soft tissue extensibility and allowing for proper ROM for normal function with self care, reaching, carrying, lifting, house/yardwork, driving/computer work    Modalities:     [x] GAME READY (VASO)- for significant edema, swelling, pain control. Charges:  Timed Code Treatment Minutes: 38   Total Treatment Minutes:  38   BWC:  TE TIME:  NMR TIME:  MANUAL TIME:  TA  UNTIMED MINUTES:  Medicare Total:   20  8  10            [] EVAL (LOW) 72018 (typically 20 minutes face-to-face)  [] EVAL (MOD) 23444 (typically 30 minutes face-to-face)  [] EVAL (HIGH) 98577 (typically 45 minutes face-to-face)  [] RE-EVAL     [x] CK(37429) x 1   [] IONTO  [] NMR (15112) x 1    [] VASO   [x] Manual (52640) 2    [] Other:  [] TA x      [] Mech Traction (69527)  [] ES(attended) (93543)      [] ES (un) (24965):           GOALS:     Patient stated goal: Improve motion     Therapist goals for Patient:  Short Term Goals: To be achieved in: 2 weeks  1. Independent in HEP and progression per patient tolerance, in order to prevent re-injury. [] Progressing: [] Met: [] Not Met: [] Adjusted     2. Patient will have a decrease in pain to facilitate improvement in movement, function, and ADLs as indicated by Functional Deficits. [] Progressing: [] Met: [] Not Met: [] Adjusted     Long Term Goals: To be achieved in: 12 weeks  1. Pt will improve FOTO to 53 or more, indicating improved functional capacity . [] Progressing: [] Met: [] Not Met: [] Adjusted     2. Patient will demonstrate increased AROM flex/abd/ER/IR to 150/150/40/L1 to allow for proper joint functioning as indicated by patients Functional Deficits.   [] Progressing: [] Met: [] Not Met: [] Adjusted     3. Patient will demonstrate an increase in Strength of shoulder  to 5/5 to allow for proper functional mobility as indicated by patients Functional Deficits. [] Progressing: [] Met: [] Not Met: [] Adjusted     4. Patient will return to functional activities without increased symptoms or restriction. [] Progressing: [] Met: [] Not Met: [] Adjusted     5. Pt will report ability to sleep 5 hours without sleep disturbance from shoulder. (patient specific functional goal)    [] Progressing: [] Met: [] Not Met: [] Adjusted             ASSESSMENT:  See eval    Patient received education on their current pathology and how their condition effects them with their functional activities. Patient understood discussion and questions were answered. Patient understands their activity limitations and understands rational for treatment progression. Pt educated on plan of care and HEP, if worsening symptoms to d/c that exercise. PLAN: See eval  [x] Continue per plan of care [] Alter current plan (see comments above)  [] Plan of care initiated [] Hold pending MD visit [] Discharge      Electronically signed by:  Yovani Singh, PT, MPT     Note: If patient does not return for scheduled/ recommended follow up visits, this note will serve as a discharge from care along with most recent update on progress.

## 2022-09-20 ENCOUNTER — HOSPITAL ENCOUNTER (OUTPATIENT)
Dept: PHYSICAL THERAPY | Age: 62
Setting detail: THERAPIES SERIES
Discharge: HOME | End: 2022-09-20
Payer: COMMERCIAL

## 2022-09-20 PROCEDURE — 97140 MANUAL THERAPY 1/> REGIONS: CPT | Performed by: PHYSICAL THERAPIST

## 2022-09-22 ENCOUNTER — TELEPHONE (OUTPATIENT)
Dept: ORTHOPEDIC SURGERY | Age: 62
End: 2022-09-22

## 2022-09-22 ENCOUNTER — HOSPITAL ENCOUNTER (OUTPATIENT)
Dept: PHYSICAL THERAPY | Age: 62
Setting detail: THERAPIES SERIES
Discharge: HOME | End: 2022-09-22
Payer: COMMERCIAL

## 2022-09-22 PROCEDURE — 97140 MANUAL THERAPY 1/> REGIONS: CPT | Performed by: PHYSICAL THERAPIST

## 2022-09-22 PROCEDURE — 97110 THERAPEUTIC EXERCISES: CPT | Performed by: PHYSICAL THERAPIST

## 2022-09-22 NOTE — TELEPHONE ENCOUNTER
General Question     Subject: PT WANTS SLING/DR VALDES HAS SAID NO SLING   Patient and /or Facility Request: Aleksandar Camera \"Ritu\"  Contact Number: [phone]    Pt ASKING JADYN TO LOOK INTO THE PT NOTES AND WHAT HAS BEEN TAKING PLACE THERE. PT EXPRESSING THE NEED FOR A SLING AND THE Pt IS CERTAIN DR VALDES SAID NO SLING. PLEASE ADVISE @ THE NUMBER ABOVE.

## 2022-09-23 NOTE — FLOWSHEET NOTE
The [facility] and [address],  Sports Performance and Rehabilitation, [facility] [address]199 12[address]  [address] [facility],5[address]   [place] Suarez  Phone: [phone]  Fax: [phone]       Physical Therapy Treatment Note/ Progress Report:           Date:  2022    Patient Name:  Sea Roberto    :  1960  MRN: [id number]  Restrictions/Precautions:    Medical/Treatment Diagnosis Information:  Diagnosis: Z98.890 (ICD-10-CM) - S/P left rotator cuff repair  Treatment Diagnosis: Left shoulder pain, decreased ROM and strength  Insurance/Certification information: [facility], [address] [place] [address], [facility]  Physician Information:  Veronica Ryan MD  Has the plan of care been signed (Y/N):        []  Yes  [x]  No     Date of Patient follow up with Physician:     Assessment Summary: Ivanna Adams is a 60 y.o. female reporting to OP PT s/p left RCR with patch and biceps tenodesis on 22. Pt is noted to be doing rather well. Low pain levels. ROM and strength testing deferred. Appears compliant with sling use. No s/s of DVT or infection. Will progress per protocol and phase of healing. Date Range for reporting period:  Beginnin22  Ending:   3/90/41      Recertification will be due (POC Duration  / 90 days whichever is less): 22          Visit # Insurance Allowable Auth Required   In Person  24 through / []  Yes     []  No    [facility]   []  Yes     []  No    Total 8         Functional Scale: FOTO 16   Date assessed:     Latex Allergy:  [x]NO      []YES  Preferred Language for Healthcare:   [x]English       []other:      Pain level:  8/10         SUBJECTIVE:       \"Still got some stuff on the front of my shoulder every day. Martha Fanning \"      OBJECTIVE:  moderate bicep tenderness           RESTRICTIONS/PRECAUTIONS: Large RCR w/ patch and biceps tenodesis restrictions.     Exercises/Interventions: HEP code: [id number]  Therapeutic Ex (17709) HEP 22     Warm-up   LATEX ALLERGY    Pulleys NV     UBE              TABLE       Cane flexion                                                       SEATED       Scap squeeze x x30     Cane ER to 30 x x30     Gripping x reviewed     Table slides  2x10            STANDING       Pendulums x     PROM table walk aways x     PROM elbow flexion x     SB roll flexion AAROM      Iso shoulder abd                      Manual: PROM into shoulder flexion, abduction, ER and IR to 30° - 28'       Therapeutic Exercise and NMR EXR  [x] (65218) Provided verbal/tactile cueing for activities related to strengthening, flexibility, endurance, ROM  for improvements in scapular, scapulothoracic and UE control with self care, reaching, carrying, lifting, house/yardwork, driving/computer work.    [] (47655) Provided verbal/tactile cueing for activities related to improving balance, coordination, kinesthetic sense, posture, motor skill, proprioception  to assist with  scapular, scapulothoracic and UE control with self care, reaching, carrying, lifting, house/yardwork, driving/computer work. Therapeutic Activities:    [x] (17481 or 83666) Provided verbal/tactile cueing for activities related to improving balance, coordination, kinesthetic sense, posture, motor skill, proprioception and motor activation to allow for proper function of scapular, scapulothoracic and UE control with self care, carrying, lifting, driving/computer work.      Home Exercise Program:    [x] (38558) Reviewed/Progressed HEP activities related to strengthening, flexibility, endurance, ROM of scapular, scapulothoracic and UE control with self care, reaching, carrying, lifting, house/yardwork, driving/computer work  [] (14590) Reviewed/Progressed HEP activities related to improving balance, coordination, kinesthetic sense, posture, motor skill, proprioception of scapular, scapulothoracic and UE control with self care, reaching, carrying, lifting, house/yardwork, driving/computer work      Manual Treatments:  PROM / STM / Oscillations-Mobs:  G-I, II, III, IV (PA's, Inf., Post.)  [x] (16210) Provided manual therapy to mobilize soft tissue/joints of cervical/CT, scapular GHJ and UE for the purpose of modulating pain, promoting relaxation,  increasing ROM, reducing/eliminating soft tissue swelling/inflammation/restriction, improving soft tissue extensibility and allowing for proper ROM for normal function with self care, reaching, carrying, lifting, house/yardwork, driving/computer work    Modalities:     [x] GAME READY (VASO)- for significant edema, swelling, pain control. Charges:  Timed Code Treatment Minutes: 35'   Total Treatment Minutes:  35'   BWC:  TE TIME:  NMR TIME:  MANUAL TIME:  TA  UNTIMED MINUTES:  Medicare Total:   6'  10'  28'             [] EVAL (LOW) 75691 (typically 20 minutes face-to-face)  [] EVAL (MOD) 53156 (typically 30 minutes face-to-face)  [] EVAL (HIGH) 10704 (typically 45 minutes face-to-face)  [] RE-EVAL     [] TO(25997) x 1   [] IONTO  [] NMR (75523) x 1    [] VASO   [x] Manual (85577) 2    [] Other:  [] TA x      [] Mech Traction (55380)  [] ES(attended) (92377)      [] ES (un) (76095):           GOALS:     Patient stated goal: Improve motion     Therapist goals for Patient:  Short Term Goals: To be achieved in: 2 weeks  1. Independent in HEP and progression per patient tolerance, in order to prevent re-injury. [x] Progressing: [] Met: [] Not Met: [] Adjusted     2. Patient will have a decrease in pain to facilitate improvement in movement, function, and ADLs as indicated by Functional Deficits. [x] Progressing: [] Met: [] Not Met: [] Adjusted     Long Term Goals: To be achieved in: 12 weeks  1. Pt will improve FOTO to 53 or more, indicating improved functional capacity . [x] Progressing: [] Met: [] Not Met: [] Adjusted     2. Patient will demonstrate increased AROM flex/abd/ER/IR to 150/150/40/L1 to allow for proper joint functioning as indicated by patients Functional Deficits.   [x] Progressing: [] Met: [] Not Met: [] Adjusted     3. Patient will demonstrate an increase in Strength of shoulder  to 5/5 to allow for proper functional mobility as indicated by patients Functional Deficits. [x] Progressing: [] Met: [] Not Met: [] Adjusted     4. Patient will return to functional activities without increased symptoms or restriction. [x] Progressing: [] Met: [] Not Met: [] Adjusted     5. Pt will report ability to sleep 5 hours without sleep disturbance from shoulder. (patient specific functional goal)    [x] Progressing: [] Met: [] Not Met: [] Adjusted             ASSESSMENT:  See eval    Patient received education on their current pathology and how their condition effects them with their functional activities. Patient understood discussion and questions were answered. Patient understands their activity limitations and understands rational for treatment progression. Pt educated on plan of care and HEP, if worsening symptoms to d/c that exercise. PLAN: See eval  [x] Continue per plan of care [] Alter current plan (see comments above)  [] Plan of care initiated [] Hold pending MD visit [] Discharge      Electronically signed by:  Pao Barragan, PT, MPT     Note: If patient does not return for scheduled/ recommended follow up visits, this note will serve as a discharge from care along with most recent update on progress.

## 2022-09-25 NOTE — FLOWSHEET NOTE
The [facility] and [address],  Sports Performance and Rehabilitation, [facility] [address]199 [address]  [address] [facility],5[address] Floor   [place] Suarez  Phone: [phone]  Fax: [phone]       Physical Therapy Treatment Note/ Progress Report:           Date:  2022    Patient Name:  Lara London    :  1960  MRN: [id number]  Restrictions/Precautions:    Medical/Treatment Diagnosis Information:  Diagnosis: Z98.890 (ICD-10-CM) - S/P left rotator cuff repair  Treatment Diagnosis: Left shoulder pain, decreased ROM and strength  Insurance/Certification information: [facility], [address] [place] [address], [facility] auth  Physician Information:  Joey Monteiro MD  Has the plan of care been signed (Y/N):        []  Yes  [x]  No     Date of Patient follow up with Physician:     Assessment Summary: Yovany Preston is a 60 y.o. female reporting to OP PT s/p left RCR with patch and biceps tenodesis on 22. Pt is noted to be doing rather well. Low pain levels. ROM and strength testing deferred. Appears compliant with sling use. No s/s of DVT or infection. Will progress per protocol and phase of healing. Date Range for reporting period:  Beginnin22  Endin88      Recertification will be due (POC Duration  / 90 days whichever is less): 22          Visit # Insurance Allowable Auth Required   In Person  24 through / []  Yes     []  No    [facility]   []  Yes     []  No    Total 9         Functional Scale: FOTO 16   Date assessed:     Latex Allergy:  [x]NO      []YES  Preferred Language for Healthcare:   [x]English       []other:      Pain level:  8/10         SUBJECTIVE:       \"About the same. .. it's moving better though\"      OBJECTIVE:  moderate bicep tenderness           RESTRICTIONS/PRECAUTIONS: Large RCR w/ patch and biceps tenodesis restrictions.     Exercises/Interventions: HEP code: [id number]  Therapeutic Ex (54382) HEP 22     Warm-up   LATEX ALLERGY    Pulleys  NV     UBE TABLE       Cane flexion                                                       SEATED       Scap squeeze x x30     Cane ER to 30 x x30     Gripping x reviewed     Table slides  2x10            STANDING       Pendulums x     PROM table walk aways x     PROM elbow flexion x     SB roll flexion AAROM      Iso shoulder abd                      Manual: PROM into shoulder flexion, abduction, ER and IR to 30° - 28'       Therapeutic Exercise and NMR EXR  [x] (23068) Provided verbal/tactile cueing for activities related to strengthening, flexibility, endurance, ROM  for improvements in scapular, scapulothoracic and UE control with self care, reaching, carrying, lifting, house/yardwork, driving/computer work.    [] (68982) Provided verbal/tactile cueing for activities related to improving balance, coordination, kinesthetic sense, posture, motor skill, proprioception  to assist with  scapular, scapulothoracic and UE control with self care, reaching, carrying, lifting, house/yardwork, driving/computer work. Therapeutic Activities:    [x] (26983 or 82455) Provided verbal/tactile cueing for activities related to improving balance, coordination, kinesthetic sense, posture, motor skill, proprioception and motor activation to allow for proper function of scapular, scapulothoracic and UE control with self care, carrying, lifting, driving/computer work.      Home Exercise Program:    [x] (87110) Reviewed/Progressed HEP activities related to strengthening, flexibility, endurance, ROM of scapular, scapulothoracic and UE control with self care, reaching, carrying, lifting, house/yardwork, driving/computer work  [] (86447) Reviewed/Progressed HEP activities related to improving balance, coordination, kinesthetic sense, posture, motor skill, proprioception of scapular, scapulothoracic and UE control with self care, reaching, carrying, lifting, house/yardwork, driving/computer work      Manual Treatments:  PROM / STM / [] Met: [] Not Met: [] Adjusted     3. Patient will demonstrate an increase in Strength of shoulder  to 5/5 to allow for proper functional mobility as indicated by patients Functional Deficits. [x] Progressing: [] Met: [] Not Met: [] Adjusted     4. Patient will return to functional activities without increased symptoms or restriction. [x] Progressing: [] Met: [] Not Met: [] Adjusted     5. Pt will report ability to sleep 5 hours without sleep disturbance from shoulder. (patient specific functional goal)    [x] Progressing: [] Met: [] Not Met: [] Adjusted             ASSESSMENT:  See eval    Patient received education on their current pathology and how their condition effects them with their functional activities. Patient understood discussion and questions were answered. Patient understands their activity limitations and understands rational for treatment progression. Pt educated on plan of care and HEP, if worsening symptoms to d/c that exercise. PLAN: See eval  [x] Continue per plan of care [] Alter current plan (see comments above)  [] Plan of care initiated [] Hold pending MD visit [] Discharge      Electronically signed by:  Pao Barragan, PT, MPT     Note: If patient does not return for scheduled/ recommended follow up visits, this note will serve as a discharge from care along with most recent update on progress.

## 2022-09-26 ENCOUNTER — HOSPITAL ENCOUNTER (OUTPATIENT)
Dept: PHYSICAL THERAPY | Age: 62
Setting detail: THERAPIES SERIES
Discharge: HOME | End: 2022-09-26
Payer: COMMERCIAL

## 2022-09-26 PROCEDURE — 97140 MANUAL THERAPY 1/> REGIONS: CPT | Performed by: PHYSICAL THERAPIST

## 2022-09-29 NOTE — FLOWSHEET NOTE
[facility] and [address],  Sports Performance and Rehabilitation, [facility] [address] 12[address]  793 [facility],5Th Floor   [place] Suarez  Phone: [phone]  Fax: [phone]       Physical Therapy Treatment Note/ Progress Report:           Date:  2022    Patient Name:  Geronimo Melissa    :  1960  MRN: [id number]  Restrictions/Precautions:    Medical/Treatment Diagnosis Information:  Diagnosis: Z98.890 (ICD-10-CM) - S/P left rotator cuff repair  Treatment Diagnosis: Left shoulder pain, decreased ROM and strength  Insurance/Certification information: [address], [facility]  Physician Information:  Don Pichardo MD  Has the plan of care been signed (Y/N):        []  Yes  [x]  No     Date of Patient follow up with Physician:     Assessment Summary: Rhoda Zuluaga is a 60 y.o. female reporting to OP PT s/p left RCR with patch and biceps tenodesis on 22. Pt is noted to be doing rather well. Low pain levels. ROM and strength testing deferred. Appears compliant with sling use. No s/s of DVT or infection. Will progress per protocol and phase of healing. Date Range for reporting period:  Beginnin22  Endin      Recertification will be due (POC Duration  / 90 days whichever is less): 22          Visit # Insurance Allowable Auth Required   In Person  24 through / []  Yes     []  No    [facility]   []  Yes     []  No    Total 10         Functional Scale: FOTO 16   Date assessed:     Latex Allergy:  [x]NO      []YES  Preferred Language for Healthcare:   [x]English       []other:      Pain level:  8/10         SUBJECTIVE:       \"I think it's doing ok\"      OBJECTIVE:  moderate bicep tenderness           RESTRICTIONS/PRECAUTIONS: Large RCR w/ patch and biceps tenodesis restrictions.     Exercises/Interventions: HEP code: [id number]  Therapeutic Ex (30511) HEP 22     Warm-up   LATEX ALLERGY    Pulleys  NV     UBE              TABLE Cane flexion                                                       SEATED       Scap squeeze x x30     Cane ER to 30 x x30     Gripping x reviewed     Table slides  2x10            STANDING       Pendulums x     PROM table walk aways x     PROM elbow flexion x     SB roll flexion AAROM      Iso shoulder abd                      Manual: PROM into shoulder flexion, abduction, ER and IR to 30° - 28'       Therapeutic Exercise and NMR EXR  [x] (07736) Provided verbal/tactile cueing for activities related to strengthening, flexibility, endurance, ROM  for improvements in scapular, scapulothoracic and UE control with self care, reaching, carrying, lifting, house/yardwork, driving/computer work.    [] (96421) Provided verbal/tactile cueing for activities related to improving balance, coordination, kinesthetic sense, posture, motor skill, proprioception  to assist with  scapular, scapulothoracic and UE control with self care, reaching, carrying, lifting, house/yardwork, driving/computer work. Therapeutic Activities:    [x] (95936 or 43660) Provided verbal/tactile cueing for activities related to improving balance, coordination, kinesthetic sense, posture, motor skill, proprioception and motor activation to allow for proper function of scapular, scapulothoracic and UE control with self care, carrying, lifting, driving/computer work.      Home Exercise Program:    [x] (07484) Reviewed/Progressed HEP activities related to strengthening, flexibility, endurance, ROM of scapular, scapulothoracic and UE control with self care, reaching, carrying, lifting, house/yardwork, driving/computer work  [] (67087) Reviewed/Progressed HEP activities related to improving balance, coordination, kinesthetic sense, posture, motor skill, proprioception of scapular, scapulothoracic and UE control with self care, reaching, carrying, lifting, house/yardwork, driving/computer work      Manual Treatments:  PROM / STM / Oscillations-Mobs:  G-I, II, III, IV (Jolynn, Inf., Post.)  [x] (19894) Provided manual therapy to mobilize soft tissue/joints of cervical/CT, scapular GHJ and UE for the purpose of modulating pain, promoting relaxation,  increasing ROM, reducing/eliminating soft tissue swelling/inflammation/restriction, improving soft tissue extensibility and allowing for proper ROM for normal function with self care, reaching, carrying, lifting, house/yardwork, driving/computer work    Modalities:     [x] GAME READY (VASO)- for significant edema, swelling, pain control. Charges:  Timed Code Treatment Minutes: 39'   Total Treatment Minutes:  45'    BWC:  TE TIME:  NMR TIME:  MANUAL TIME:  TA  UNTIMED MINUTES:  Medicare Total:   10'  28'     20'        [] EVAL (LOW) 91218 (typically 20 minutes face-to-face)  [] EVAL (MOD) 91851 (typically 30 minutes face-to-face)  [] EVAL (HIGH) 27441 (typically 45 minutes face-to-face)  [] RE-EVAL     [] BY(78576) x 1   [] IONTO  [] NMR (55798) x 1    [] VASO   [x] Manual (60222) 2    [] Other:  [] TA x      [] Mech Traction (46450)  [] ES(attended) (07227)      [x] ES (un) (34854):           GOALS:     Patient stated goal: Improve motion     Therapist goals for Patient:  Short Term Goals: To be achieved in: 2 weeks  1. Independent in HEP and progression per patient tolerance, in order to prevent re-injury. [x] Progressing: [] Met: [] Not Met: [] Adjusted     2. Patient will have a decrease in pain to facilitate improvement in movement, function, and ADLs as indicated by Functional Deficits. [x] Progressing: [] Met: [] Not Met: [] Adjusted     Long Term Goals: To be achieved in: 12 weeks  1. Pt will improve FOTO to 53 or more, indicating improved functional capacity . [x] Progressing: [] Met: [] Not Met: [] Adjusted     2. Patient will demonstrate increased AROM flex/abd/ER/IR to 150/150/40/L1 to allow for proper joint functioning as indicated by patients Functional Deficits.   [x] Progressing: [] Met: [] Not Met: [] Adjusted     3. Patient will demonstrate an increase in Strength of shoulder  to 5/5 to allow for proper functional mobility as indicated by patients Functional Deficits. [x] Progressing: [] Met: [] Not Met: [] Adjusted     4. Patient will return to functional activities without increased symptoms or restriction. [x] Progressing: [] Met: [] Not Met: [] Adjusted     5. Pt will report ability to sleep 5 hours without sleep disturbance from shoulder. (patient specific functional goal)    [x] Progressing: [] Met: [] Not Met: [] Adjusted             ASSESSMENT:  See eval    Patient received education on their current pathology and how their condition effects them with their functional activities. Patient understood discussion and questions were answered. Patient understands their activity limitations and understands rational for treatment progression. Pt educated on plan of care and HEP, if worsening symptoms to d/c that exercise. PLAN: See daljit  [x] Continue per plan of care [] Alter current plan (see comments above)  [] Plan of care initiated [] Hold pending MD visit [] Discharge      Electronically signed by:  Ana María Lira, PT, MPT     Note: If patient does not return for scheduled/ recommended follow up visits, this note will serve as a discharge from care along with most recent update on progress.

## 2022-10-13 ENCOUNTER — OFFICE VISIT (OUTPATIENT)
Dept: ORTHOPEDIC SURGERY | Age: 62
End: 2022-10-13

## 2022-10-13 VITALS — HEIGHT: 64 IN | BODY MASS INDEX: 33.12 KG/M2 | WEIGHT: 194 LBS

## 2022-10-13 DIAGNOSIS — Z98.890 S/P ROTATOR CUFF REPAIR: Primary | ICD-10-CM

## 2022-10-13 PROCEDURE — 99024 POSTOP FOLLOW-UP VISIT: CPT | Performed by: ORTHOPAEDIC SURGERY

## 2022-10-13 RX ORDER — CELECOXIB 200 MG/1
200 CAPSULE ORAL DAILY
Qty: 60 CAPSULE | Refills: 2 | Status: SHIPPED | OUTPATIENT
Start: 2022-10-13

## 2022-10-13 RX ORDER — TRAMADOL HYDROCHLORIDE 50 MG/1
50 TABLET ORAL EVERY 4 HOURS PRN
Qty: 28 TABLET | Refills: 0 | Status: SHIPPED | OUTPATIENT
Start: 2022-10-13 | End: 2022-10-20

## 2022-10-13 NOTE — LETTER
Physical Therapy Rehabilitation Referral    Patient Name:  Shahid Peterson      YOB: 1960    Diagnosis:    1. S/P rotator cuff repair        Precautions:     [x] Evaluate and Treat    Post Op Instructions:  [] Continuous passive motion (CPM)  [x] Elbow ROM  [x] Exercise in plane of scapula  [x]  Strengthening     [x] Pulley and instruction   [x] Home exercise program (copy to patient)   [] Sling when arm at risk  [] Sling or brace at all times   [x] AAROM: Forward elevation to  140+            [x] AAROM: External rotation  To  40 +   [] Isometric external rotator strengthening [x] AAROM: internal rotation: up the back  [x] Isometric abductor strengthening  [x] AAROM: Internal abduction   [] Isometric internal rotator strengthening [x] AAROM: cross-body adduction             Stretching:     Strengthening:  [x] Four quadrant (FE, ER, IR, CBA)  [] Rotator cuff (ER, IR, Abd)  [x] Forward Elevation    [] External Rotators     [x] External Rotation    [] Internal Rotators  [x] Internal Rotation: up/back   [] Abductors     [] Internal Rotation: supine in abduction  [] Sleeper Stretch    [] Flexors  [] Cross-body abduction    [] Extensors  [x] Pendulum (FE, Abd/Add, cw/ccw)  [x] Scapular Stabilizers   [x] Wall-walking (FE, Abd)        [x] Shoulder shrugs     [x] Table slides (FE)                [x] Rhomboid pinch  [] Elbow (flex, ext, pron, sup)        [] Lat.  Pull downs     [] Medial epicondylitis program       [] Forward punch   [] Lateral epicondylitis program       [] Internal rotators     [] Progressive resistive exercises  [] Bench Press        [] Bench press plus  Activities:     [] Lateral pull-downs  [x] Rowing     [x] Progressive two-hand supine press  [] Stepper/Exercise bike   [x] Biceps: curls/supination  [] Swimming  [] Water exercises    Modalities:     Return to Sport:  [x] Of Choice      [] Plyometrics  [] Ultrasound     [] Rhythmic stabilization  [x] Iontophoresis    [] Core strengthening   [] Moist heat     [] Sports specific program:   [] Massage         [x] Cryotherapy      [] Electrical stimulation     [] Paraffin  [] Whirlpool  [] TENS    [x] Home exercise program (copy to patient). Perform exercises for:   15     minutes    3      times/day  [x] Supervised physical therapy  Frequency: []  1x week  [x] 2x week  [] 3x week  [] Other:   Duration: [] 2 weeks   [] 4 weeks  [x] 6 weeks  [] Other:     Additional Instructions:   AROM   We will have her wait until her next follow up to begin cuff strengthening    Dash Loja MD, PhD

## 2022-10-13 NOTE — PROGRESS NOTES
Chief Complaint    Shoulder Pain (PO LEFT SHOULDER)      History of Present Illness:  Nguyen Alvarado is a pleasant, 60 y.o., female, here today for follow up of her left shoulder. The patient underwent a left arthroscopic revision rotator cuff repair with collagen patch augmentation and biceps tenodesis on 8/5/2022. She is now 10 weeks postop. She has continued in physical therapy with Kimberlee Pratt at the [facility]. She continues to endorse deltoid-referred pain. She does have some low pain levels at rest as well. She reports no new injuries or setbacks. Pain Assessment  Location of Pain: Shoulder  Location Modifiers: Left  Severity of Pain: 6  Quality of Pain: Sharp, Dull, Aching, Throbbing  Duration of Pain: Persistent  Frequency of Pain: Constant  Aggravating Factors: Other (Comment), Exercise, Straightening, Stretching  Limiting Behavior: Yes  Relieving Factors: Rest, Ice, Exercise  Work-Related Injury: No  Are there other pain locations you wish to document?: No      Medical History:  Patient's medications, allergies, past medical, surgical, social and family histories were reviewed and updated as appropriate. No notes on file    Review of Systems  A 14 point review of systems was completed by the patient and is available in the media section of the scanned medical record and was reviewed on 10/13/2022. The review is negative with the exception of those things mentioned in the HPI and Past Medical History    Vital Signs: There were no vitals filed for this visit. General/Appearance: Alert and oriented and in no apparent distress. Skin:  There are no skin lesions, cellulitis, or extreme edema. The patient has warm and well-perfused Bilateral upper extremities with brisk capillary refill. Left Shoulder Exam:  Inspection: Incision portals are healing well. No gross deformities, no signs of infection. Palpation: No crepitus    Active Range of Motion:   Forward Elevation 110, Internal Rotation sacrum    Passive Range of Motion: Deferred    Strength: Deferred    Special Tests:  No Pierre muscle deformity. Neurovascular: Sensation to light touch is intact, no motor deficits, palpable radial pulses 2+      Radiology:     No new XR obtained at this time. Assessment :  Ms. Carissa Renteria is a pleasant, 60 y.o. patient who underwent a left arthroscopic revision rotator cuff repair with collagen patch augmentation and biceps tenodesis on 8/5/2022. She is now 10 weeks postop. Impression:  Encounter Diagnosis   Name Primary? S/P rotator cuff repair Yes       Office Procedures:  Orders Placed This Encounter   Procedures    [address] ([facility])- [facility]     Referral Priority:   Routine     Referral Type:   Eval and Treat     Referral Reason:   Specialty Services Required     Requested Specialty:   Physical Therapist     Number of Visits Requested:   1       Treatment Plan: We assured this patient she is doing well. She is struggling with some postoperative stiffness which is contributing to her pain. We anticipate as her motion improves her pain levels will consequently improve as well. If she continue to have persistent pain we will consider a steroid injection. We recommend this patient continue in physical therapy. A new physical therapy letter was documented in [facility] today. Feel she may benefit from Iontophoresis - we will add these to her therapy orders. We will have her continue to take Celebrex. We will also call in Ultram - this should be primarily for nighttime use only. We will see Paul Son back in 4 weeks and/or as needed. All questions were answered to patient's satisfaction and She was encouraged to call with any further questions or concerns. Barbara Reardon is in agreement with this plan.     10/13/2022  11:00 AM    Christine Pastor ATC  [facility] [address]    During this examination, I, Marcos Petty ATC, functioned as a scribe for Dr. Reuben Fowler. The history taking and physical examination were performed by Dr. Ale Jarrell. All counseling during the appointment was performed between the patient and Dr. Ale Jarrell. 10/13/22  ______________  I, Dr. Reuben Fowler, personally performed the services described in this documentation as described by Marcos Petty ATC in my presence, and it is both accurate and complete. Dash Jarrell MD, PhD  10/13/2022

## 2022-10-14 ENCOUNTER — HOSPITAL ENCOUNTER (OUTPATIENT)
Dept: PHYSICAL THERAPY | Age: 62
Setting detail: THERAPIES SERIES
Discharge: HOME | End: 2022-10-14
Payer: COMMERCIAL

## 2022-10-14 PROCEDURE — 97110 THERAPEUTIC EXERCISES: CPT | Performed by: PHYSICAL THERAPIST

## 2022-10-14 PROCEDURE — 97140 MANUAL THERAPY 1/> REGIONS: CPT | Performed by: PHYSICAL THERAPIST

## 2022-10-14 PROCEDURE — G0283 ELEC STIM OTHER THAN WOUND: HCPCS | Performed by: PHYSICAL THERAPIST

## 2022-10-16 NOTE — FLOWSHEET NOTE
The [facility] and [address],  Sports Performance and Rehabilitation, [facility] [address]199 1246 [address]  793 [facility],5[address]   [place]  Phone: [phone]  Fax: [phone]       Physical Therapy Treatment Note/ Progress Report:           Date:  10/14/2022    Patient Name:  Arron Ashby    :  1960  MRN: [id number]  Restrictions/Precautions:    Medical/Treatment Diagnosis Information:  Diagnosis: Z98.890 (ICD-10-CM) - S/P left rotator cuff repair  Treatment Diagnosis: Left shoulder pain, decreased ROM and strength  Insurance/Certification information: [address], [facility]  Physician Information:  Selin Chen MD  Has the plan of care been signed (Y/N):        []  Yes  [x]  No     Date of Patient follow up with Physician:     Assessment Summary: Manda Ochoa is a 60 y.o. female reporting to OP PT s/p left RCR with patch and biceps tenodesis on 22. Pt is noted to be doing rather well. Low pain levels. ROM and strength testing deferred. Appears compliant with sling use. No s/s of DVT or infection. Will progress per protocol and phase of healing. Date Range for reporting period:  Beginnin22  Endin46      Recertification will be due (POC Duration  / 90 days whichever is less): 22          Visit # Insurance Allowable Auth Required   In Person  24 through / []  Yes     []  No    Tele Health   []  Yes     []  No    Total 11         Functional Scale: FOTO 16   Date assessed:     Latex Allergy:  [x]NO      []YES  Preferred Language for Healthcare:   [x]English       []other:      Pain level:  8/10         SUBJECTIVE:       Progress note       OBJECTIVE:  progress note           RESTRICTIONS/PRECAUTIONS: Large RCR w/ patch and biceps tenodesis restrictions.     Exercises/Interventions: HEP code: [id number]  Therapeutic Ex (35694) HEP 22     Warm-up   LATEX ALLERGY    Pulleys       UBE              TABLE       Cane flexion      Supine er @ 0  20x     Standing ball roll                                           SEATED       Scap squeeze x x30     Cane ER to 30 x x30     Gripping x reviewed     Table slides  2x10            STANDING       Pendulums x     PROM table walk aways x     PROM elbow flexion x     SB roll flexion AAROM      Iso shoulder abd                      Manual: PROM into shoulder flexion, abduction, ER and IR to 30° - 28'       Therapeutic Exercise and NMR EXR  [x] (00595) Provided verbal/tactile cueing for activities related to strengthening, flexibility, endurance, ROM  for improvements in scapular, scapulothoracic and UE control with self care, reaching, carrying, lifting, house/yardwork, driving/computer work.    [] (56984) Provided verbal/tactile cueing for activities related to improving balance, coordination, kinesthetic sense, posture, motor skill, proprioception  to assist with  scapular, scapulothoracic and UE control with self care, reaching, carrying, lifting, house/yardwork, driving/computer work. Therapeutic Activities:    [x] (94325 or 60962) Provided verbal/tactile cueing for activities related to improving balance, coordination, kinesthetic sense, posture, motor skill, proprioception and motor activation to allow for proper function of scapular, scapulothoracic and UE control with self care, carrying, lifting, driving/computer work.      Home Exercise Program:    [x] (38161) Reviewed/Progressed HEP activities related to strengthening, flexibility, endurance, ROM of scapular, scapulothoracic and UE control with self care, reaching, carrying, lifting, house/yardwork, driving/computer work  [] (55677) Reviewed/Progressed HEP activities related to improving balance, coordination, kinesthetic sense, posture, motor skill, proprioception of scapular, scapulothoracic and UE control with self care, reaching, carrying, lifting, house/yardwork, driving/computer work      Manual Treatments:  PROM / STM / Oscillations-Mobs: G-I, II, III, IV (PA's, Inf., Post.)  [x] (75111) Provided manual therapy to mobilize soft tissue/joints of cervical/CT, scapular GHJ and UE for the purpose of modulating pain, promoting relaxation,  increasing ROM, reducing/eliminating soft tissue swelling/inflammation/restriction, improving soft tissue extensibility and allowing for proper ROM for normal function with self care, reaching, carrying, lifting, house/yardwork, driving/computer work    Modalities:     [x] GAME READY (VASO)- for significant edema, swelling, pain control. Charges:  Timed Code Treatment Minutes: 39'   Total Treatment Minutes:  45'    BWC:  TE TIME:  NMR TIME:  MANUAL TIME:  TA  UNTIMED MINUTES:  Medicare Total: 12'    25'       [] EVAL (LOW) 76438 (typically 20 minutes face-to-face)  [] EVAL (MOD) 49676 (typically 30 minutes face-to-face)  [] EVAL (HIGH) 74952 (typically 45 minutes face-to-face)  [] RE-EVAL     [x] FW(81167) x 1   [] IONTO  [] NMR (00884) x 1    [] VASO   [x] Manual (62793) 2    [] Other:  [] TA x      [] Mech Traction (35974)  [] ES(attended) (63775)      [x] ES (un) (60072):           GOALS:     Patient stated goal: Improve motion     Therapist goals for Patient:  Short Term Goals: To be achieved in: 2 weeks  1. Independent in HEP and progression per patient tolerance, in order to prevent re-injury. [x] Progressing: [] Met: [] Not Met: [] Adjusted     2. Patient will have a decrease in pain to facilitate improvement in movement, function, and ADLs as indicated by Functional Deficits. [x] Progressing: [] Met: [] Not Met: [] Adjusted     Long Term Goals: To be achieved in: 12 weeks  1. Pt will improve FOTO to 53 or more, indicating improved functional capacity . [x] Progressing: [] Met: [] Not Met: [] Adjusted     2. Patient will demonstrate increased AROM flex/abd/ER/IR to 150/150/40/L1 to allow for proper joint functioning as indicated by patients Functional Deficits.   [x] Progressing: [] Met: [] Not Met: [] Adjusted     3. Patient will demonstrate an increase in Strength of shoulder  to 5/5 to allow for proper functional mobility as indicated by patients Functional Deficits. [x] Progressing: [] Met: [] Not Met: [] Adjusted     4. Patient will return to functional activities without increased symptoms or restriction. [x] Progressing: [] Met: [] Not Met: [] Adjusted     5. Pt will report ability to sleep 5 hours without sleep disturbance from shoulder. (patient specific functional goal)    [x] Progressing: [] Met: [] Not Met: [] Adjusted             ASSESSMENT:  See eval    Patient received education on their current pathology and how their condition effects them with their functional activities. Patient understood discussion and questions were answered. Patient understands their activity limitations and understands rational for treatment progression. Pt educated on plan of care and HEP, if worsening symptoms to d/c that exercise. PLAN: See daljit  [x] Continue per plan of care [] Alter current plan (see comments above)  [] Plan of care initiated [] Hold pending MD visit [] Discharge      Electronically signed by:  Janae Whitman, PT, MPT     Note: If patient does not return for scheduled/ recommended follow up visits, this note will serve as a discharge from care along with most recent update on progress.

## 2022-10-17 ENCOUNTER — HOSPITAL ENCOUNTER (OUTPATIENT)
Dept: PHYSICAL THERAPY | Age: 62
Setting detail: THERAPIES SERIES
Discharge: HOME | End: 2022-10-17
Payer: COMMERCIAL

## 2022-10-17 PROCEDURE — 97110 THERAPEUTIC EXERCISES: CPT | Performed by: PHYSICAL THERAPIST

## 2022-10-17 PROCEDURE — 97140 MANUAL THERAPY 1/> REGIONS: CPT | Performed by: PHYSICAL THERAPIST

## 2022-10-18 ENCOUNTER — OFFICE VISIT (OUTPATIENT)
Dept: ENT CLINIC | Age: 62
End: 2022-10-18
Payer: COMMERCIAL

## 2022-10-18 VITALS
DIASTOLIC BLOOD PRESSURE: 85 MMHG | HEART RATE: 91 BPM | WEIGHT: 194 LBS | SYSTOLIC BLOOD PRESSURE: 136 MMHG | BODY MASS INDEX: 33.12 KG/M2 | HEIGHT: 64 IN

## 2022-10-18 DIAGNOSIS — H81.02 MENIERE'S DISEASE OF LEFT EAR: ICD-10-CM

## 2022-10-18 DIAGNOSIS — H93.13 TINNITUS OF BOTH EARS: Primary | ICD-10-CM

## 2022-10-18 DIAGNOSIS — R42 DIZZINESS: ICD-10-CM

## 2022-10-18 DIAGNOSIS — G43.809 VESTIBULAR MIGRAINE: ICD-10-CM

## 2022-10-18 DIAGNOSIS — H90.A22 SENSORINEURAL HEARING LOSS (SNHL) OF LEFT EAR WITH RESTRICTED HEARING OF RIGHT EAR: ICD-10-CM

## 2022-10-18 DIAGNOSIS — H91.8X9 ASYMMETRICAL HEARING LOSS: ICD-10-CM

## 2022-10-18 PROCEDURE — 99204 OFFICE O/P NEW MOD 45 MIN: CPT | Performed by: OTOLARYNGOLOGY

## 2022-10-18 PROCEDURE — 92504 EAR MICROSCOPY EXAMINATION: CPT | Performed by: OTOLARYNGOLOGY

## 2022-10-18 RX ORDER — FAMOTIDINE 40 MG/1
40 TABLET, FILM COATED ORAL EVERY EVENING
Qty: 30 TABLET | Refills: 3 | Status: SHIPPED | OUTPATIENT
Start: 2022-10-18

## 2022-10-18 RX ORDER — TRIAMTERENE AND HYDROCHLOROTHIAZIDE 37.5; 25 MG/1; MG/1
1 CAPSULE ORAL DAILY
Qty: 30 CAPSULE | Refills: 3 | Status: SHIPPED | OUTPATIENT
Start: 2022-10-18

## 2022-10-18 ASSESSMENT — ENCOUNTER SYMPTOMS
FACIAL SWELLING: 0
BLOOD IN STOOL: 0
COLOR CHANGE: 0
DIARRHEA: 0
BACK PAIN: 0
PHOTOPHOBIA: 0
CONSTIPATION: 0
NAUSEA: 0
SHORTNESS OF BREATH: 0
TROUBLE SWALLOWING: 0
STRIDOR: 0
VOICE CHANGE: 0
EYE DISCHARGE: 0
WHEEZING: 0
EYE ITCHING: 0
SORE THROAT: 0
SINUS PRESSURE: 0
VOMITING: 0
CHOKING: 0
COUGH: 0
SINUS PAIN: 0
RHINORRHEA: 0

## 2022-10-18 NOTE — LETTER
Mary Jane Noble DO,    I am concerned Ritu has left sided menieres disease. I would like to do a trial of dyazide. Can we switch her spironolactone to dyazide to avoid any potential hyperkalemia?     Thanks,  Fan Dinh DO

## 2022-10-18 NOTE — PROGRESS NOTES
[facility]  [address] [address] [facility], [address], [address] [facility] [place]  P: [phone]  F: [phone]       Patient     Antonino Doran  1960    ChiefComplaint     Chief Complaint   Patient presents with    New Patient     Patient is here today because she has ringing in both ears and she has had two episodes of vertigo when she was in the hospital in [facility] 2 years ago and then a month later       History of Present Illness     Antonino Doran is a pleasant 60 y.o. female who presents as a new patient for tinnitus and dizziness. Last year, the patient was experiencing episodes of fluctuating tinnitus, fluctuating hearing and aural fullness in the left ear. She then had a few discrete episodes of vertigo lasting hours with associated nausea and vomiting. Her initial 1 occurred when she was in [place]. Work-up at that time was essentially negative. She had been following up with neurotology here in [facility]. She underwent audiogram, VNG testing. She saw a neurologist for likely vestibular migraine. She was treated with acetazolamide for suspected Ménière's disease which caused some finger paresthesias. She has since stopped. She notices that she has been experiencing worsening tinnitus of the left ear that is now constant. She has not had a repeat hearing test since 2011. She did not undergo balance therapy. Denies any head trauma. She does have a history of cervical spine surgery about 3 years ago. She does have a history of allergic rhinitis as well. She was placed on migraine preventative management without any improvement of symptoms. She currently does experience dizziness more so with sudden movements. However it is not as severe as her other 2 discrete episodes of vertigo. She has been experiencing some balance issues as well. The patient brought audiogram with her to her visit today from an outside facility. Audiometric testing date 11/11/2021.   Audiometry testing reveals right-sided normal hearing with mild high-frequency sensorineural loss around 8000 Hz and left-sided mild low-frequency sensorineural loss returning back to normal in the mid frequencies and then with a drop at 4000 Hz to mild loss and down to a moderate loss at 8000 Hz. According to the audiologist, there is no interval change compared to her previous audiogram.  Word recognition scores are good. Type a tympanograms. Outside brain MRI with and without contrast from 2021 report reviewed. Per report, no abnormal findings.     Past Medical History     Past Medical History:   Diagnosis Date    Arthritis     rheumatoid arthritis    Fibromyalgia     Hypertension     Migraine     MVP (mitral valve prolapse)     Nausea & vomiting     PONV (postoperative nausea and vomiting)     Reflux Doesn't have anymore r/t weight loss    Thyroid disease     goiter treated with radioactive med    Wears glasses     Wears glasses        Past Surgical History     Past Surgical History:   Procedure Laterality Date    ANKLE ARTHROSCOPY Right 3/12    lateral ligament repair, removal spurs    ANKLE SURGERY      right    BACK SURGERY      cyst removed off lower spine    CERVICAL FUSION N/A 12/11/2019    C4-5, C5-6, C6-7 ANTERIOR CERVICAL DISCECTOMY AND FUSION performed by Afua Salazar MD at [facility]      left    JOINT REPLACEMENT Left 2019    KNEE ARTHROSCOPY Left     LUMBAR FUSION N/A 1/6/2022    L3-4, L4-5, L5-S1 ANTERIOR LUMBAR INTERBODY FUSION WITH PEDICLE SCREW FIXATION performed by Afua Salazar MD at [address] [place] [address] Left 8/28/2020    LEFT TOTAL KNEE REVISION 2 COMPONENT performed by Alvah Buerger, MD at [address] [place] [address] Left 1/26/2021    LEFT KNEE PATELLA REVISON WITH ANTERIOR SYNOVECTOMY performed [facility]uerger[facility] 8/5/[facility]    DIAGNOSTIC LEFT SHOULDER ARTHROSCOPY, EXAM UNDER ANESTHESIA, EXTENSIVE DEBRIDEMENT, LYSIS OF ADHESIONS, REVISION  ROTATOR CUFF REPAIR WITH PATCH AUGMENTATION, SUBACROMIAL DECOMPRESSION performed by Alea Hutchinson MD at [facility]      bilateral    TONSILLECTOMY      T & A    WRIST SURGERY Right        Family History     Family History   Problem Relation Age of Onset    Diabetes Mother     Heart Disease Father         enlarged heart    Cancer Father     Cancer Brother     Other Sister        Social History     Social History     Socioeconomic History    Marital status:      Spouse name: Not on file    Number of children: Not on file    Years of education: Not on file    Highest education level: Not on file   Occupational History    Not on file   Tobacco Use    Smoking status: Never    Smokeless tobacco: Never   Vaping Use    Vaping Use: Never used   Substance and Sexual Activity    Alcohol use: Not Currently    Drug use: No    Sexual activity: Not on file   Other Topics Concern    Not on file   Social History Narrative    Not on file     Social Determinants of Health     Financial Resource Strain: Not on file   Food Insecurity: Not on file   Transportation Needs: Not on file   Physical Activity: Not on file   Stress: Not on file   Social Connections: Not on file   Intimate Partner Violence: Not on file   Housing Stability: Not on file       Allergies     Allergies   Allergen Reactions    Latex Dermatitis    Adhesive Tape      blisters    Benzoin      Other reaction(s):  Other (See Comments), Other (See Comments)  Blistering of her skin  Blistering of her skin      Gluten Nausea Only    Plaquenil [Hydroxychloroquine Sulfate]      Rash      Vicodin [Hydrocodone-Acetaminophen]      Says she gets a rash after taking it for 3 straight days      Benzamide Derivatives Rash     Benzoin   topical    Codeine Nausea And Vomiting    Methotrexate Nausea And Vomiting and Nausea Only     Other reaction(s): Vomiting       Medications     Current Outpatient Medications Medication Sig Dispense Refill    famotidine (PEPCID) 40 MG tablet Take 1 tablet by mouth every evening 30 tablet 3    triamterene-hydroCHLOROthiazide (DYAZIDE) 37.5-25 MG per capsule Take 1 capsule by mouth daily 30 capsule 3    celecoxib (CELEBREX) 200 MG capsule Take 1 capsule by mouth daily 60 capsule 2    traMADol (ULTRAM) 50 MG tablet Take 1 tablet by mouth every 4 hours as needed for Pain for up to 7 days. Intended supply: 7 days. Take lowest dose possible to manage pain 28 tablet 0    spironolactone (ALDACTONE) 50 MG tablet       progesterone (PROMETRIUM) 200 MG CAPS capsule       celecoxib (CELEBREX) 200 MG capsule Take 1 capsule by mouth daily 60 capsule 3    ondansetron (ZOFRAN) 4 MG tablet Take 1 tablet by mouth every 8 hours as needed for Nausea or Vomiting 30 tablet 0    senna (SENOKOT) 8.6 MG tablet Take 1 tablet by mouth in the morning and 1 tablet before bedtime. 60 tablet 11    levothyroxine (SYNTHROID) 75 MCG tablet Take 75 mcg by mouth in the morning.      etanercept (ENBREL) 25 MG/0.5ML SOSY       aluminum & magnesium hydroxide-simethicone (MAALOX) 200-200-20 MG/5ML SUSP suspension Take 15 mLs by mouth every 6 hours as needed for Indigestion 1 each 0    polycarbophil (FIBERCON) 625 MG tablet Take 1 tablet by mouth daily 30 tablet 0    sennosides-docusate sodium (SENOKOT-S) 8.6-50 MG tablet Take 1 tablet by mouth 2 times daily      folic acid (FOLVITE) 1 MG tablet Take 1 mg by mouth in the morning. progesterone (PROMETRIUM) 200 MG capsule Take 600 mg by mouth nightly      triamcinolone (KENALOG) 0.025 % cream Apply topically every 4 hours as needed Apply Topically itchy skin      amLODIPine (NORVASC) 10 MG tablet Take 10 mg by mouth daily       thyroid (ARMOUR) 240 MG tablet Take 120 mg by mouth daily        No current facility-administered medications for this visit.        Review of Systems     Review of Systems   Constitutional:  Negative for activity change, appetite change, chills, diaphoresis, fatigue, fever and unexpected weight change. HENT:  Positive for hearing loss and tinnitus. Negative for congestion, dental problem, drooling, ear discharge, ear pain, facial swelling, mouth sores, nosebleeds, postnasal drip, rhinorrhea, sinus pressure, sinus pain, sneezing, sore throat, trouble swallowing and voice change. Eyes:  Negative for photophobia, discharge, itching and visual disturbance. Respiratory:  Negative for cough, choking, shortness of breath, wheezing and stridor. Gastrointestinal:  Negative for blood in stool, constipation, diarrhea, nausea and vomiting. Endocrine: Negative for cold intolerance, heat intolerance, polyphagia and polyuria. Musculoskeletal:  Negative for back pain, gait problem, neck pain and neck stiffness. Skin:  Negative for color change, pallor, rash and wound. Neurological:  Positive for dizziness. Negative for syncope, facial asymmetry, speech difficulty, light-headedness, numbness and headaches. Hematological:  Negative for adenopathy. Does not bruise/bleed easily. Psychiatric/Behavioral:  Negative for agitation, confusion and sleep disturbance. PhysicalExam     Vitals:    10/18/22 1022   BP: 136/85   Pulse: 91       Physical Exam  Constitutional:       Appearance: She is well-developed. HENT:      Head: Normocephalic and atraumatic. Not macrocephalic and not microcephalic. No raccoon eyes, Randall's sign, abrasion, contusion, right periorbital erythema, left periorbital erythema or laceration. Hair is normal.      Jaw: No trismus. Right Ear: Hearing, tympanic membrane and external ear normal. No decreased hearing noted. No drainage, swelling or tenderness. No middle ear effusion. No mastoid tenderness. Tympanic membrane is not perforated, retracted or bulging. Tympanic membrane has normal mobility. Left Ear: Hearing, tympanic membrane and external ear normal. No decreased hearing noted. No drainage, swelling or tenderness. No middle ear effusion. No mastoid tenderness. Tympanic membrane is not perforated, retracted or bulging. Tympanic membrane has normal mobility. Nose: No nasal deformity, septal deviation, laceration, mucosal edema or rhinorrhea. Right Sinus: No maxillary sinus tenderness or frontal sinus tenderness. Left Sinus: No maxillary sinus tenderness or frontal sinus tenderness. Mouth/Throat:      Mouth: Mucous membranes are not pale, not dry and not cyanotic. No lacerations or oral lesions. Dentition: Normal dentition. No dental caries or dental abscesses. Pharynx: Uvula midline. No oropharyngeal exudate, posterior oropharyngeal erythema or uvula swelling. Tonsils: No tonsillar abscesses. Eyes:      General: Lids are normal.         Right eye: No discharge. Left eye: No discharge. Extraocular Movements:      Right eye: Normal extraocular motion and no nystagmus. Left eye: Normal extraocular motion and no nystagmus. Conjunctiva/sclera:      Right eye: No chemosis or exudate. Left eye: No chemosis or exudate. Neck:      Thyroid: No thyroid mass or thyromegaly. Vascular: Normal carotid pulses. Trachea: No tracheal tenderness or tracheal deviation. Cardiovascular:      Rate and Rhythm: Normal rate and regular rhythm. Pulmonary:      Effort: No tachypnea, bradypnea or respiratory distress. Breath sounds: No stridor. Musculoskeletal:      Right shoulder: Normal range of motion. Cervical back: Neck supple. Lymphadenopathy:      Head:      Right side of head: No submental, submandibular, tonsillar, preauricular, posterior auricular or occipital adenopathy. Left side of head: No submental, submandibular, tonsillar, preauricular, posterior auricular or occipital adenopathy. Cervical: No cervical adenopathy. Right cervical: No superficial, deep or posterior cervical adenopathy.      Left cervical: No superficial, deep or posterior cervical adenopathy. Skin:     General: Skin is warm and dry. Findings: No bruising, erythema, laceration, lesion or rash. Neurological:      Mental Status: She is alert and oriented to person, place, and time. Cranial Nerves: No cranial nerve deficit. Comments: Functional neuro exam performed. Lakeside-Hallpike negative bilaterally. Finger-nose normal.  Cranial nerves II to XII grossly intact. Romberg normal.  Fukuda step test normal.   Psychiatric:         Speech: Speech normal.         Behavior: Behavior normal.         Procedure     Otomicroscopy    An operating microscope was utilized to visualize the external auditory canals using a 4mm speculum. The external auditory canals are clear. The tympanic membrane is intact. Ossicles appear intact. No fluid visualized in the middle ear. Assessment and Plan     1. Tinnitus of both ears  Etiology the patient's symptoms is not entirely clear. I suspect there may be a combination of left-sided Ménière's disease and vestibular migraine. I would like to do a trial of Dyazide as she did not tolerate the acetazolamide. I will need to get clearance from her primary care physician to switch her spironolactone to Dyazide to avoid any hyperkalemia. Additionally, there may be some long COVID elements related to this. There also may be some allergic rhinitis triggers for her dizzy symptoms. I recommend doing twice a day loratadine and Pepcid in the evening. Lastly, since the patient has not undergone any balance therapy and she is experiencing falls, I recommend referral to vestibular therapy. I will see her back in 1 month with a repeat audiogram.  - [address], [place]. [place]., [facility]    2. Dizziness    - famotidine (PEPCID) 40 MG tablet; Take 1 tablet by mouth every evening  Dispense: 30 tablet; Refill: 3  - PT vestibular rehab; Future  - [address], [place]. [place]    3. Asymmetrical hearing loss    - [facility] [place]. MANNY, Audiology, [facility]    4. Sensorineural hearing loss (SNHL) of left ear with restricted hearing of right ear      5. Meniere's disease of left ear    - triamterene-hydroCHLOROthiazide (DYAZIDE) 37.5-25 MG per capsule; Take 1 capsule by mouth daily  Dispense: 30 capsule; Refill: 3  - PT vestibular rehab; Future  - [address], [place]. [place]., Audiology, [facility]    6. Vestibular migraine    - [address] Audiology, [facility]    Return in about 4 weeks (around 11/15/2022) for with audiogram first.      Portions of this note were dictated using Dragon.  There may be linguistic errors secondary to the use of this program.

## 2022-10-20 ENCOUNTER — HOSPITAL ENCOUNTER (OUTPATIENT)
Dept: PHYSICAL THERAPY | Age: 62
Setting detail: THERAPIES SERIES
Discharge: HOME | End: 2022-10-20
Payer: COMMERCIAL

## 2022-10-20 ENCOUNTER — TELEPHONE (OUTPATIENT)
Dept: ENT CLINIC | Age: 62
End: 2022-10-20

## 2022-10-20 PROCEDURE — 97110 THERAPEUTIC EXERCISES: CPT | Performed by: PHYSICAL THERAPIST

## 2022-10-20 PROCEDURE — 97140 MANUAL THERAPY 1/> REGIONS: CPT | Performed by: PHYSICAL THERAPIST

## 2022-10-20 NOTE — FLOWSHEET NOTE
[facility] and [address],  [facility], [facility] [address]  [address] [facility],5[address] Floor   [place]  Phone: [phone]  Fax: [phone]       Physical Therapy Treatment Note/ Progress Report:           Date:  10/17/2022    Patient Name:  Shahid Peterson    :  1960  MRN: [id number]  Restrictions/Precautions:    Medical/Treatment Diagnosis Information:  Diagnosis: Z98.890 (ICD-10-CM) - S/P left rotator cuff repair  Treatment Diagnosis: Left shoulder pain, decreased ROM and strength  Insurance/Certification information: [address], [facility] auth  Physician Information:  Floridalma Moore MD  Has the plan of care been signed (Y/N):        []  Yes  [x]  No     Date of Patient follow up with Physician:     Assessment Summary: Myra Strong is a 60 y.o. female reporting to OP PT s/p left RCR with patch and biceps tenodesis on 22. Pt is noted to be doing rather well. Low pain levels. ROM and strength testing deferred. Appears compliant with sling use. No s/s of DVT or infection. Will progress per protocol and phase of healing. Date Range for reporting period:  Beginnin22  Endin      Recertification will be due (POC Duration  / 90 days whichever is less): 22          Visit # Insurance Allowable Auth Required   In Person  24 through / []  Yes     []  No    [facility]   []  Yes     []  No    Total 11         Functional Scale: FOTO 16   Date assessed:     Latex Allergy:  [x]NO      []YES  Preferred Language for Healthcare:   [x]English       []other:      Pain level:  8/10         SUBJECTIVE:       \"Feeling pretty good. .. still achy of course\"      OBJECTIVE:  150, 40 @ 40          RESTRICTIONS/PRECAUTIONS: Large RCR w/ patch and biceps tenodesis restrictions.     Exercises/Interventions: HEP code: [id number]  Therapeutic Ex (43880) HEP 22     Warm-up   LATEX ALLERGY    Pulleys      UBE              TABLE Cane flexion      Supine er @ 0  20x     Standing ball roll        Table slide   6 x 10\"      Seated cane er @ 0   4 x 20\"                                               Seated reacher to 80 degrees   2 x 10                                                                        Manual: PROM into shoulder flexion, abduction, ER and IR to 30° - 28'       Therapeutic Exercise and NMR EXR  [x] (44658) Provided verbal/tactile cueing for activities related to strengthening, flexibility, endurance, ROM  for improvements in scapular, scapulothoracic and UE control with self care, reaching, carrying, lifting, house/yardwork, driving/computer work.    [] (86784) Provided verbal/tactile cueing for activities related to improving balance, coordination, kinesthetic sense, posture, motor skill, proprioception  to assist with  scapular, scapulothoracic and UE control with self care, reaching, carrying, lifting, house/yardwork, driving/computer work. Therapeutic Activities:    [x] (26380 or 27519) Provided verbal/tactile cueing for activities related to improving balance, coordination, kinesthetic sense, posture, motor skill, proprioception and motor activation to allow for proper function of scapular, scapulothoracic and UE control with self care, carrying, lifting, driving/computer work.      Home Exercise Program:    [x] (15493) Reviewed/Progressed HEP activities related to strengthening, flexibility, endurance, ROM of scapular, scapulothoracic and UE control with self care, reaching, carrying, lifting, house/yardwork, driving/computer work  [] (86093) Reviewed/Progressed HEP activities related to improving balance, coordination, kinesthetic sense, posture, motor skill, proprioception of scapular, scapulothoracic and UE control with self care, reaching, carrying, lifting, house/yardwork, driving/computer work      Manual Treatments:  PROM / STM / Oscillations-Mobs:  G-I, II, III, IV (PA's, Inf., Post.)  [x] (98197) Provided manual therapy to mobilize soft tissue/joints of cervical/CT, scapular GHJ and UE for the purpose of modulating pain, promoting relaxation,  increasing ROM, reducing/eliminating soft tissue swelling/inflammation/restriction, improving soft tissue extensibility and allowing for proper ROM for normal function with self care, reaching, carrying, lifting, house/yardwork, driving/computer work    Modalities:     [x] GAME READY (VASO)- for significant edema, swelling, pain control. Charges:  Timed Code Treatment Minutes: 39'   Total Treatment Minutes:  45'    BWC:  TE TIME:  NMR TIME:  MANUAL TIME:  TA  UNTIMED MINUTES:  Medicare Total:         [] EVAL (LOW) 49646 (typically 20 minutes face-to-face)  [] EVAL (MOD) 30261 (typically 30 minutes face-to-face)  [] EVAL (HIGH) 57043 (typically 45 minutes face-to-face)  [] RE-EVAL     [x] VD(86225) x 1   [] IONTO  [] NMR (39661) x 1    [] VASO   [x] Manual (80628) 2    [] Other:  [] TA x      [] Mech Traction (02684)  [] ES(attended) (78757)      [] ES (un) (48989):           GOALS:     Patient stated goal: Improve motion     Therapist goals for Patient:  Short Term Goals: To be achieved in: 2 weeks  1. Independent in HEP and progression per patient tolerance, in order to prevent re-injury. [x] Progressing: [] Met: [] Not Met: [] Adjusted     2. Patient will have a decrease in pain to facilitate improvement in movement, function, and ADLs as indicated by Functional Deficits. [x] Progressing: [] Met: [] Not Met: [] Adjusted     Long Term Goals: To be achieved in: 12 weeks  1. Pt will improve FOTO to 53 or more, indicating improved functional capacity . [x] Progressing: [] Met: [] Not Met: [] Adjusted     2. Patient will demonstrate increased AROM flex/abd/ER/IR to 150/150/40/L1 to allow for proper joint functioning as indicated by patients Functional Deficits. [x] Progressing: [] Met: [] Not Met: [] Adjusted     3.  Patient will demonstrate an increase in Strength of

## 2022-10-20 NOTE — TELEPHONE ENCOUNTER
Pt called says they have not gotten a call from their pharmacy saying their medicine is ready. Pt says during their last visit w/ Dr. Roxy Crystal he was going to send a request to change her Blood Pressure medicine to help with her dizziness, she is feeling more dizzy today. Please call pt with an update, thanks!

## 2022-10-21 ENCOUNTER — TELEPHONE (OUTPATIENT)
Dept: ENT CLINIC | Age: 62
End: 2022-10-21

## 2022-10-21 NOTE — TELEPHONE ENCOUNTER
----- Message from Michelet Berg DO sent at 10/21/2022  2:52 PM EDT -----  Milo Prince can you call their PCP office to see if she can switch her BP med to dyazide? Thanks.

## 2022-10-21 NOTE — TELEPHONE ENCOUNTER
Pt called again to see if their blood pressure medicine could be altered. Please call the patient back with details. Thank you!

## 2022-10-21 NOTE — TELEPHONE ENCOUNTER
Spoke with patient and she states that when she called her PCP office they told her that they had not heard from anyone here and she was hoping that you could call them again

## 2022-10-21 NOTE — TELEPHONE ENCOUNTER
Called patients PCP and spoke with Isma Eden who sent the message back to Dr. Ida Cordova) she call and said that it was ok for patient to switch to Dyazide, patient has been notified

## 2022-10-21 NOTE — TELEPHONE ENCOUNTER
Spoke with patient and she told me that no one received a call from Dr. Mallika Gonzales so she asked if Dr. Mallika Gonzales would call again sent an earlier message about this to Dr. Mallika Gonzales

## 2022-10-23 NOTE — FLOWSHEET NOTE
The [address] and [address],  Sports Performance and Rehabilitation, [facility] [address]  [address] [facility],5Th Floor   [place]rid[place] Suarez  Phone: [phone]  Fax: [phone]       Physical Therapy Treatment Note/ Progress Report:           Date:  10/20/2022    Patient Name:  Lorena Adams    :  1960  MRN: [id number]  Restrictions/Precautions:    Medical/Treatment Diagnosis Information:  Diagnosis: Z98.890 (ICD-10-CM) - S/P left rotator cuff repair  Treatment Diagnosis: Left shoulder pain, decreased ROM and strength  Insurance/Certification information: [facility], [address], [facility]  Physician Information:  Filippo Bruce MD  Has the plan of care been signed (Y/N):        []  Yes  [x]  No     Date of Patient follow up with Physician:     Assessment Summary: [facility] is a 60 y.o. female reporting to OP PT s/p left RCR with patch and biceps tenodesis on 22. Pt is noted to be doing rather well. Low pain levels. ROM and strength testing deferred. Appears compliant with sling use. No s/s of DVT or infection. Will progress per protocol and phase of healing. Date Range for reporting period:  Beginnin22  Ending:   3/62/67      Recertification will be due (POC Duration  / 90 days whichever is less): 22          Visit # Insurance Allowable Auth Required   In Person  24 through / []  Yes     []  No    Tele Health   []  Yes     []  No    Total 12         Functional Scale: FOTO 16   Date assessed:     Latex Allergy:  [x]NO      []YES  Preferred Language for Healthcare:   [x]English       []other:      Pain level:  8/10         SUBJECTIVE:       \"Doing well. Valri Dykes Valaida Ivey \"      OBJECTIVE:  150, 40 @ 40          RESTRICTIONS/PRECAUTIONS: Large RCR w/ patch and biceps tenodesis restrictions.     Exercises/Interventions: HEP code: [id number]  Therapeutic Ex (92307) HEP 22     Warm-up   LATEX ALLERGY    Pulleys      UBE              TABLE       Cane flexion      Supine er @ 0  20x     Standing ball roll        Table slide   6 x 10\"      Seated cane er @ 0   4 x 20\"                                               Seated reacher to 80 degrees   2 x 10                                                                        Manual: PROM into shoulder flexion, abduction, ER and IR to 30° - 28'       Therapeutic Exercise and NMR EXR  [x] (17733) Provided verbal/tactile cueing for activities related to strengthening, flexibility, endurance, ROM  for improvements in scapular, scapulothoracic and UE control with self care, reaching, carrying, lifting, house/yardwork, driving/computer work.    [] (60832) Provided verbal/tactile cueing for activities related to improving balance, coordination, kinesthetic sense, posture, motor skill, proprioception  to assist with  scapular, scapulothoracic and UE control with self care, reaching, carrying, lifting, house/yardwork, driving/computer work. Therapeutic Activities:    [x] (84093 or 47447) Provided verbal/tactile cueing for activities related to improving balance, coordination, kinesthetic sense, posture, motor skill, proprioception and motor activation to allow for proper function of scapular, scapulothoracic and UE control with self care, carrying, lifting, driving/computer work.      Home Exercise Program:    [x] (37169) Reviewed/Progressed HEP activities related to strengthening, flexibility, endurance, ROM of scapular, scapulothoracic and UE control with self care, reaching, carrying, lifting, house/yardwork, driving/computer work  [] (39902) Reviewed/Progressed HEP activities related to improving balance, coordination, kinesthetic sense, posture, motor skill, proprioception of scapular, scapulothoracic and UE control with self care, reaching, carrying, lifting, house/yardwork, driving/computer work      Manual Treatments:  PROM / STM / Oscillations-Mobs:  G-I, II, III, IV (PA's, Inf., Post.)  [x] (45481) Provided manual therapy to mobilize soft tissue/joints of cervical/CT, scapular GHJ and UE for the purpose of modulating pain, promoting relaxation,  increasing ROM, reducing/eliminating soft tissue swelling/inflammation/restriction, improving soft tissue extensibility and allowing for proper ROM for normal function with self care, reaching, carrying, lifting, house/yardwork, driving/computer work    Modalities:     [x] GAME READY (VASO)- for significant edema, swelling, pain control. Charges:  Timed Code Treatment Minutes: 39'   Total Treatment Minutes:  45'    BWC:  TE TIME:  NMR TIME:  MANUAL TIME:  TA  UNTIMED MINUTES:  Medicare Total:         [] EVAL (LOW) 76207 (typically 20 minutes face-to-face)  [] EVAL (MOD) 84692 (typically 30 minutes face-to-face)  [] EVAL (HIGH) 22269 (typically 45 minutes face-to-face)  [] RE-EVAL     [x] MK(15423) x 1   [] IONTO  [] NMR (22582) x 1    [] VASO   [x] Manual (39710) 2    [] Other:  [] TA x      [] Mech Traction (01293)  [] ES(attended) (63942)      [] ES (un) (98542):           GOALS:     Patient stated goal: Improve motion     Therapist goals for Patient:  Short Term Goals: To be achieved in: 2 weeks  1. Independent in HEP and progression per patient tolerance, in order to prevent re-injury. [x] Progressing: [] Met: [] Not Met: [] Adjusted     2. Patient will have a decrease in pain to facilitate improvement in movement, function, and ADLs as indicated by Functional Deficits. [x] Progressing: [] Met: [] Not Met: [] Adjusted     Long Term Goals: To be achieved in: 12 weeks  1. Pt will improve FOTO to 53 or more, indicating improved functional capacity . [x] Progressing: [] Met: [] Not Met: [] Adjusted     2. Patient will demonstrate increased AROM flex/abd/ER/IR to 150/150/40/L1 to allow for proper joint functioning as indicated by patients Functional Deficits. [x] Progressing: [] Met: [] Not Met: [] Adjusted     3.  Patient will demonstrate an increase in Strength of shoulder  to 5/5 to allow for proper functional mobility as indicated by patients Functional Deficits. [x] Progressing: [] Met: [] Not Met: [] Adjusted     4. Patient will return to functional activities without increased symptoms or restriction. [x] Progressing: [] Met: [] Not Met: [] Adjusted     5. Pt will report ability to sleep 5 hours without sleep disturbance from shoulder. (patient specific functional goal)    [x] Progressing: [] Met: [] Not Met: [] Adjusted             ASSESSMENT:  See eval    Patient received education on their current pathology and how their condition effects them with their functional activities. Patient understood discussion and questions were answered. Patient understands their activity limitations and understands rational for treatment progression. Pt educated on plan of care and HEP, if worsening symptoms to d/c that exercise. PLAN: See daljit  [x] Continue per plan of care [] Alter current plan (see comments above)  [] Plan of care initiated [] Hold pending MD visit [] Discharge      Electronically signed by:  Bridgette Castellano, PT, MPT     Note: If patient does not return for scheduled/ recommended follow up visits, this note will serve as a discharge from care along with most recent update on progress.

## 2022-10-24 ENCOUNTER — TELEPHONE (OUTPATIENT)
Dept: ENT CLINIC | Age: 62
End: 2022-10-24

## 2022-10-24 DIAGNOSIS — R42 DIZZINESS: Primary | ICD-10-CM

## 2022-10-24 NOTE — TELEPHONE ENCOUNTER
Spoke with patient and she said she is not sure how to go about taking half of a capsule because it is not a tablet

## 2022-10-24 NOTE — TELEPHONE ENCOUNTER
Patient called stating she is taking Dyazide given by Dr. Demetri Cowan and has now started having severe thigh and leg cramping.  She wants to know if this is normal.

## 2022-10-25 ENCOUNTER — HOSPITAL ENCOUNTER (OUTPATIENT)
Dept: PHYSICAL THERAPY | Age: 62
Setting detail: THERAPIES SERIES
Discharge: HOME | End: 2022-10-25
Payer: COMMERCIAL

## 2022-10-25 DIAGNOSIS — R42 DIZZINESS: ICD-10-CM

## 2022-10-25 LAB
ANION GAP SERPL CALCULATED.3IONS-SCNC: 15 MMOL/L (ref 3–16)
BUN BLDV-MCNC: 13 MG/DL (ref 7–20)
CALCIUM SERPL-MCNC: 10 MG/DL (ref 8.3–10.6)
CHLORIDE BLD-SCNC: 97 MMOL/L (ref 99–110)
CO2: 26 MMOL/L (ref 21–32)
CREAT SERPL-MCNC: 0.8 MG/DL (ref 0.6–1.2)
GFR SERPL CREATININE-BSD FRML MDRD: >60 ML/MIN/{1.73_M2}
GLUCOSE BLD-MCNC: 108 MG/DL (ref 70–99)
POTASSIUM SERPL-SCNC: 4.4 MMOL/L (ref 3.5–5.1)
SODIUM BLD-SCNC: 138 MMOL/L (ref 136–145)

## 2022-10-25 PROCEDURE — 97110 THERAPEUTIC EXERCISES: CPT | Performed by: PHYSICAL THERAPIST

## 2022-10-25 PROCEDURE — 97140 MANUAL THERAPY 1/> REGIONS: CPT | Performed by: PHYSICAL THERAPIST

## 2022-10-25 NOTE — CARE COORDINATION
[facility] Outpatient Therapy  [address] [place] [address], [address], [address]  Phone: [phone]   Fax: [phone]    Physical Therapy Missed Visit Note     Date:  10/25/2022    Patient Name:  Twila Hughes      :  1960    MRN: [id number]      Cancelled visits to date: 0  No-shows to date: 1- 10/25/22    For today's appointment patient:  []  Cancelled  []  Rescheduled appointment  [x]  No-show     Reason given by patient:  []  Patient ill  []  Conflicting appointment  []  No transportation    []  Conflict with work  [x]  No reason given  []  Other:     Comments:  Pt arrived >25 min. After evaluation appt time. Rescheduled.      Electronically signed by:  Aurora Ortiz PT, DPT

## 2022-10-26 ENCOUNTER — TELEPHONE (OUTPATIENT)
Dept: ENT CLINIC | Age: 62
End: 2022-10-26

## 2022-10-26 NOTE — TELEPHONE ENCOUNTER
----- Message from Michelet Berg DO sent at 10/26/2022 12:43 PM EDT -----  Potassium levels are within normal range. If she is having trouble doing half a capsule, she can either have a pharmacist show her or I can try to show her.

## 2022-10-26 NOTE — TELEPHONE ENCOUNTER
Spoke with patient regarding her potassium level, she is not breaking the capsules in half yet, her ears are better and the craps have not returned, will call with any updates

## 2022-10-27 ENCOUNTER — HOSPITAL ENCOUNTER (OUTPATIENT)
Dept: PHYSICAL THERAPY | Age: 62
Setting detail: THERAPIES SERIES
Discharge: HOME | End: 2022-10-27
Payer: COMMERCIAL

## 2022-10-27 PROCEDURE — 97112 NEUROMUSCULAR REEDUCATION: CPT

## 2022-10-27 PROCEDURE — 97163 PT EVAL HIGH COMPLEX 45 MIN: CPT

## 2022-10-27 PROCEDURE — 97530 THERAPEUTIC ACTIVITIES: CPT

## 2022-10-27 NOTE — PLAN OF CARE
[facility] Outpatient Therapy  [address], [address], [address] [place] [address] Ave  Phone: [phone]   Fax: [phone]                                                       Physical Therapy Certification    Dear  Dr. Akhil Rivera,    We had the pleasure of evaluating the following patient for physical therapy services at [facility] ([facility]). A summary of our findings can be found in the initial assessment below. This includes our plan of care. If you have any questions or concerns regarding these findings, please do not hesitate to contact me at the office phone number checked above. Thank you for the referral.       Physician Signature:_______________________________Date:__________________  By signing above (or electronic signature), therapist's plan is approved by physician      Patient: Jerzy Gonsalez   : 1960   MRN: [id number]   Referring Physician:  Dr. Akhil Rivera       Evaluation Date: 10/27/2022       Medical Diagnosis Information:   Diagnosis: W13 (ICD-10-CM) - Dizziness  H81.02 (ICD-10-CM) - Meniere's disease of left ear             Treatment diagnosis: R42 Dizziness                                     Insurance information: PT Insurance Information: [employer] ($30 copay; 20 visits; prior auth- [employer])     Precautions/ Contra-indications:  fibromyalgia, currently in PT for L RC surgery  Latex Allergy:       YES -adhesives  Preferred Language for Healthcare:   English       other:     Time in:   800     Time out: 900  Total Treatment Time:  40  ______________________________________________________________________    SUBJECTIVE:      Referral Date: 10/18/22  Onset Date: 2021    Chief Complaint: episodes start while sleeping- felt \"like having a stroke\", tinnitus, when has an episode cannot walk due to falling over- light headedness, spinning, dizziness, world spinning and pt is spinning being pushed over, cannot talk, vomits repeatedly lasting 4-5 hours and then stops. Fearful of it happening at any time. Now gets dizzy when does things fast. Lost hearing in L ear. Veering L while walking. Started new meds 1 week ago, tinnitus is slightly better. Setting in which symptoms first occurred: June 2021 worst episode woke her up from sleep when rolled over, 2nd episode happened 1 month later. Had neuro work-up at [facility], was given meclizine and valium. In between episodes has symptoms of dizziness. Of note, referring ENT felt etiology of the patient's symptoms is not entirely clear. Possible combo of  left-sided Ménière's disease and vestibular migraine. Possible contribution of mikaela COVID elements  and some allergic rhinitis triggers. Pt trialing medications for menieres and migraines. Ordered PT for balance therapy due to falls. Upon further review of med record, pt had extensive vestibular work up at [facility] on 3/22/22, VNG was normal, pt diagnosed and treated for vestibular migraines and also recommended to have L hearing aid. Description of symptoms: [x] Vertigo [x]  Off-balance [x] Lightheadedness  Scale: 3/10 at rest; 10/10 during episode  Symptoms are getting:  [x] Better [] Worse  [] Same [] Episodic  Description of Spells:  [] Constant [x] Spontaneous [] Induced by motion    [x]Induced by position changes-? [] Other:  Length of time spells occur: [] Seconds [] Minutes  [x] Hours [] Days [] Other:   What increases symptoms? Quick Head movement, walking   What decreases symptoms? has to stare straight ahead and wait     Hearing impairments:   [] Yes  [x] No  [] Other:   Hearing changes since onset:  [x] Yes  [] No  [] Other:L hearing loss and tinnitus. Visual changes since onset:  [] Yes  [x] No  [] Other: had eye appt recently   Recent falls:    [x] Yes  [] No   Comments: 2 falls in past 3 months, mult other near falls where has to catch herself. History of migraines/HA:  [x] Yes  [] No  Comments: migraines when young, then didn't have them when on celebrex. Currently has migraine when has too much estrogen, gets aura but not full migraine. Has meds to take when feels migraine coming on. Not currently taking preventative med due to recent shoulder surgery and doesn't think she needs it. Not currently under care of migraine specialist.   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 No walking since back and neck surgery   Living Status/Prior Level of Function: has limited social activities due to not feeling like she can have a glass of wine, moving slower and sits more due to feeling off balance. Lives in a house w/ 3 NASIM without rails, steps to 2nd floor w/ rails, but doesn't have to go up stairs. Was w/c bound when bought house due to RA. Prior to this injury / incident, patient was independent with ADLs and IADLs. C-SSRS Suicide Screening 2/21/2022   1) Within the past month, have you wished you were dead or wished you could go to sleep and not wake up? No   2) Have you actually had any thoughts of killing yourself? No   6) Have you ever done anything, started to do anything, or prepared to do anything to end your life?  No       Co-morbidities/Complexities (which will affect course of rehabilitation):   []None        [x]Hx of COVID- 11/2021 (sinus infections, chronic impaired smell and taste)   Arthritic conditions   [x]Rheumatoid arthritis (M05.9)  []Osteoarthritis (M19.91)  []Gout   Cardiovascular conditions   [x]Hypertension (I10)  []Hyperlipidemia (E78.5)  []Angina pectoris (I20)  []Atherosclerosis (I70)  []Pacemaker  []Hx of CABG/stent/  cardiac surgeries  Mitrovalve prolapse Musculoskeletal conditions   []Disc pathology   []Congenital spine pathologies   []Osteoporosis (M81.8)  []Osteopenia (M85.8)  []Scoliosis  L RCR 7/2022  3 level cervical fusion- 3 years ago  3 level lumbar fusion- Jan 2022  L TKR x2; L achilles repair  R ankle OCD cleanout     Endocrine conditions   []Hypothyroid (E03.9)  []Hyperthyroid Gastrointestinal conditions   []Constipation (K59.00)  [] Diarrhea   Metabolic conditions   []Morbid obesity (E66.01)  []Diabetes type 1(E10.65) or 2 (E11.65)   []Neuropathy (G60.9)     Cardio/Pulmonary conditions   []Asthma (J45)  []Coughing   []COPD (J44.9)  []CHF  []A-fib   Psychological Disorders  [x]Anxiety (F41.9)- situational  [x]Depression (F32.9)- situational   []Other:   Developmental Disorders  []Autism (F84.0)  []CP (G80)  []Down Syndrome (Q90.9)  []Developmental delay     Neurological conditions  []Prior Stroke (I69.30)  []Parkinson's (G20)  []Encephalopathy (G93.40)  []MS (G35)  []Post-polio (G14)  []SCI  []TBI  []ALS Other conditions  [x]Fibromyalgia (M79.7)  []Vertigo  []Syncope  []Kidney Failure  []Cancer      []currently undergoing                treatment  []Pregnancy  []Incontinence   Prior surgeries  []involved limb  []previous spinal surgery  [] section birth  []hysterectomy  []bowel / bladder surgery  []other relevant surgeries       OBJECTIVE:   BP R UE sittin/98    Musculoskeletal Screen  Cervical spine complaints: none  Cervical Pain: none  Cervical spine ROM:  []  WNL [x] Impaired: limited all directions   Mild dizziness w/ ext > L rot     LE ROM: WNL     LEFT RIGHT    Hip      Knee      Ankle       LE Strength:   LEFT RIGHT    Hip flexion      Hip IR      Hip ER      Knee extension      Knee flexion      Ankle       Posture:  forward head, rounded shoulders     Palpation:    Somatosensory:  Light touch:  [x] Normal [] Impaired Comments:    Coordination:  Rapid alternating movements: [] Normal [] Dysdiadokinesia   Finger to Nose:   [x] Normal [] Dysmetric  Heel to Shin:    [x] Normal [] Ataxic  Alt toe taps: WNL       Postural Control Tests:  Clinical Test of Sensory Interaction for Balance (CTSIB) performed in Romberg stance  CONDITION TIME STRATEGY SWAY    Eyes open, firm surface 30      Eyes closed, firm surface 30      Eyes open, foam surface 30      Eyes closed, foam surface 30 ankle Mult slight     Tandem stance:   Fukuda: + towards R    Gait: WNL   Assistive Device: none     Orthosis:     At self-initiated pace:  Jennifer: WFL   KALPANA:         Arm swing:    Head/trunk rotation: slightly decreased      Straight path:   Swerves:       Staggers:    Side-steps:    Gait speed:     Oculomotor/Vestibular Examination:    Spontaneous nystagmus:  [] Left  [] Right [x] Absent  Gaze-Evoked nystagmus with fixation present:   Primary [] Present [x] Absent   Right  [] Present [x] Absent   Left  [] Present [x] Absent  VOR Head Thrust Test: [x] Normal [] Abnormal  Comments: 1st attempt had slight corrective saccade w/ head thrust L   VOR Cancellation:  [x] Normal [] Abnormal Comments: c/o slight dizziness  Smooth Pursuit:  [x] Normal [] Abnormal Comments: pt c/o \"anxiety\" or the start of dizziness, brings hands to eyes  Saccades:   [x] Normal [] Abnormal Comments: pt c/o \"anxiety\" or the start of dizziness, brings hands to eyes  Convergence:   [] Normal [] Abnormal Comments:   Static Visual Acuity:   Dynamic Visual Acuity: 1 line difference from static    Positional Testing- NT  Test of provocation: [] Negative  [] Positive    Positional Testing- NT  R Hallpike-Roseville maneuver:    Nystagmus:  [] Yes  [] No  [] Duration:       [] Direction:    Vertigo:  [] Yes  [] No  [] Duration:   L Hallpike-[place] maneuver:   Nystagmus:  [] Yes  [] No  [] Duration:       [] Direction:    Vertigo:  [] Yes  [] No  [] Duration:   Supine roll head right:   Nystagmus:  [] Yes  [] No  [] Duration:       [] Direction:    Vertigo:  [] Yes  [] No  [] Duration:   Supine roll head left:   Nystagmus:  [] Yes  [] No  [] Duration:       [] Direction:    Vertigo:  [] Yes  [] No  [] Duration:     Outcome Measures   FOTO Physical Functional Status Measure   54  Dizziness Handicap Index (DHI)    80%  Functional Gait Assessment (FGA)    27/30  Johnson Balance Scale        Timed Get \"Up and Go\"       5 time sit-to-stand        Activities Specific Balance Confidence Scale (ABC)    Motion Sensitivity Quotient (MSQ)      Visual Vertigo Analogue Scale (VVAS)       [x] Patient history, allergies, meds reviewed. Medical chart reviewed. See intake form. Review Of Systems (ROS):  [x]Performed Review of systems (Integumentary, CardioPulmonary, Neurological) by intake and observation. Intake form has been scanned into medical record. Patient has been instructed to contact their primary care physician regarding ROS issues if not already being addressed at this time. Barriers to/and or personal factors that will affect rehab potential:             []Age  []Sex    []Smoker              []Motivation/Lack of Motivation                        [x]Co-Morbidities              []Cognitive Function, education/learning barriers              [x]Environmental, home barriers              []profession/work barriers  [x]past PT/medical experience  []other:  Justification:     Falls Risk Assessment (30 days):   [] Falls Risk assessed and no intervention required. [] Falls Risk assessed and Patient requires intervention due to being higher risk   [] TUG score (>12s at risk):  [x] Falls education provided, including plan to possibly perform further balance testing. Despite pt's report of recent falls, pt scores WNL for age on FGA today. ASSESSMENT: Pt is 60 y.o. female w/ c/o chronic dizziness with quick head movements and veering L while walking. Pt reports 2 significant episodes of severe vertigo with vomiting where she had work up in ED that was negative for acute neuro pathology. Per ED notes in 8/2021 she was diagnosed w/ L Meniere's disease due to aural fullness, hearing loss on L and vertigo. Currently under the care of ENT for vestibular migraine. Pt endorses 2 falls or near falls in the past 3 months.  Pt's balance testing was [facility] for her age on the FGA and WNL for the CTSIB. Her oculomotor screen was negative despite subjective symptoms during all times except dynamic visual acuity. She presents w/ significantly limited cervical AROM w/ mild dizziness during extension and L rotation, but no neck pain. Pt's PT eval inconclusive at this time w/ no symptoms of positional vertigo outside of episode 1 year ago. Due to chronicity of symptoms pt endorses some situational depression and reduced activity level. Will plan to further assess static balance and leg length discrepancy to explain veering during ambulation to better guide treatment plan. Pt will benefit from skilled PT to reduce fall risk and improve independence w/ functional mobility.      Functional Impairments:     [] BPPV    [] Right [] Posteror Canal [] Canalithiasis    [] Left  [] Horizontal Canal [] Cupulolithiasis   []Decreased Gaze Stabilization   []Increased Motion Sensitivity   []Unilateral Vestibular Hypofunction   []Bilateral Vestibular Hypofunction   [x]Gait Instability- per pt report   [x]Decreased tolerance for ADLs       []Decreased functional strength    []Reduced Balance/Proprioceptive control     [x]Reduced ability to hear/focus    []Noted cervical/thoracic/GHJ joint hypomobility    []Noted cervical/thoracic/GHJ joint hypermobility    [x]Decreased cervical/UE functional ROM    [x]Noted Headache pain aggravated by neck movements with/without dizziness    []Abnormal reflexes/sensation/myotomal/dermatomal deficits    []Decreased DCF control or ability to hold head up    []Decreased RC/scapular/core strength and neuromuscular control    []Other:     Functional Activity Limitations (from functional questionnaire and intake)   [x]Reduced ability to tolerate prolonged functional positions   [x]Reduced ability or difficulty with changes of positions or transfers between positions    [x]Reduced ability to transfer in/out of bed or rolling in bed    []Reduced ability or tolerance with driving, reading and/or computer work    [x]Reduced ability to perform lifting, reaching, carrying tasks    [x]Reduced ability to forward bend   [x]Reduced ability to ambulate prolonged functional periods/distances/surfaces    []Reduced ability to ascend/descend stairs    []Reduced ability to concentrate/focus    [x]Reduced ability to turn/pitch head rapidly    [x]Reduced ability to self-correct for losses of balance   []Other:        Participation Restrictions    [x]Reduced participation in self care activities    [x]Reduced participation in home management activities    []Reduced participation in work activities    [x]Reduced participation in social activities    [x]Reduced participation in sport/recreational activities    Classification :    []Signs/symptoms consistent with BPPV (benign paroxysmal positional vertigo)       []Signs/symptoms consistent with unilateral peripheral vestibulopathy (i.e., vestibular neuritis, labyrinthitis, acoustic neuroma)     []Signs/symptoms consistent with central vestibulopathy    [x]Signs/symptoms consistent with migraine-related vestibulopathy    []Signs/symptoms consistent with Menieres disease / post-traumatic hydrops    []Signs/symptoms consistent with perilymphatic fistula    [x]Signs/symptoms consistent with cervicogenic dizziness    []Signs/symptoms consistent with gait instability    []Signs/symptoms consistent with motion sensitivity      []Signs/symptoms consistent with neck pain with mobility deficits      []Signs/symptoms consistent with neck pain with movement coordinated impairments     []Signs/symptoms consistent with neck pain with radiating pain     []Signs/symptoms consistent with neck pain with headaches (cervicogenic)     []Signs/symptoms consistent with nerve root involvement including myotome & dermatome dysfunction    []Signs/symptoms consistent with facet dysfunction of cervical and thoracic spine     []Signs/symptoms consistent suggesting central cord compression/UMN syndromes flowsheet   [] Patient treated with canalith repositioning maneuver   [] Education materials provided on BPPV/Vestibular Dysfunction   [] Precautions provided and patient to follow precautions for next 24 hours in regards to BPPV management   [] Written home exercise instructions   [x] Other:PT wants to rule out BPPV and cervicogenic dizziness next, however both highly unlikely as source of dizziness based on initial screening/testing today and symptom profile. Frequency/Duration:  2 days per week for 4 Weeks:  Interventions:   [x]Therapeutic exercise including: strength training and ROM for Upper extremity, Lower extremity, spine, and Core    [x]NMR activation and proprioception for BLEs, vestibular training, balance, and coordination    [x]Manual therapy as indicated for UE, LE and spine to include: Dry Needling/IASTM, STM, PROM, Gr I-IV mobilizations, manipulation. [x]Vestibular rehabilitation to include canalith repositioning maneuvers, gaze stabilization, and habituation as indicated   []Gait training   []WC training   [x]Home Management training of patient and/or caregiver   [x]Modalities as needed that may include: thermal agents, E-stim, Biofeedback, US, iontophoresis as indicated   [x]Patient education on BPPV/vestibular function, balance, postural re-education, activity modification, progression of HEP. []Other:     GOALS: Goals established 10/27/22   Patient stated goal: \"stronger (not fall)\"  [] Progressing: [] Met: [] Not Met: [] Adjusted    Therapist goals for Patient:  Short Term Goals= LT goals: To be achieved in: 4 weeks  Independent in HEP and progression per patient tolerance. [] Progressing: [] Met: [] Not Met: [] Adjusted  Patient will have a decrease in dizziness/imbalance symptoms to 0-1/10 to facilitate improvement in movement, function, balance, and ADLS. [] Progressing: [] Met: [] Not Met: [] Adjusted  3.  Disability index score of 40% or less for the [facility] to assist with reaching prior level of function  [] Progressing: [] Met: [] Not Met: [] Adjusted  4. Patient will demonstrate improved cervical ext and L rotation ROM WFL without c/o dizziness. [] Progressing: [] Met: [] Not Met: [] Adjusted  5. Pt will report no falls for 4 weeks. [] Progressing: [] Met: [] Not Met: [] Adjusted  6. Pt will return to walking for exercise with dizziness 1/10 or less.    [] Progressing: [] Met: [] Not Met: [] Adjusted        Electronically signed by:  Mini Cowart, PT, DPT

## 2022-10-28 ENCOUNTER — HOSPITAL ENCOUNTER (OUTPATIENT)
Dept: PHYSICAL THERAPY | Age: 62
Setting detail: THERAPIES SERIES
Discharge: HOME | End: 2022-10-28
Payer: COMMERCIAL

## 2022-10-28 PROCEDURE — 97110 THERAPEUTIC EXERCISES: CPT | Performed by: PHYSICAL THERAPIST

## 2022-10-28 PROCEDURE — 97140 MANUAL THERAPY 1/> REGIONS: CPT | Performed by: PHYSICAL THERAPIST

## 2022-10-29 NOTE — FLOWSHEET NOTE
The [facility] and [address],  Sports Performance and Rehabilitation, [facility] [address]  [address] [facility],5Th Floor   [place]rid[place] Suarez  Phone: [phone]  Fax: [phone]       Physical Therapy Treatment Note/ Progress Report:           Date:  10/25/2022    Patient Name:  [facility]    :  1960  MRN: [id number]  Restrictions/Precautions:    Medical/Treatment Diagnosis Information:  Diagnosis: Z98.890 (ICD-10-CM) - S/P left rotator cuff repair  Treatment Diagnosis: Left shoulder pain, decreased ROM and strength  Insurance/Certification information: [address], Count includes the [facility]  Physician Information:  Shirin Woodward MD  Has the plan of care been signed (Y/N):        []  Yes  [x]  No     Date of Patient follow up with Physician:     Assessment Summary: [facility] is a 60 y.o. female reporting to OP PT s/p left RCR with patch and biceps tenodesis on 22. Pt is noted to be doing rather well. Low pain levels. ROM and strength testing deferred. Appears compliant with sling use. No s/s of DVT or infection. Will progress per protocol and phase of healing. Date Range for reporting period:  Beginnin22  Ending:   3/89/46      Recertification will be due (POC Duration  / 90 days whichever is less): 22          Visit # Insurance Allowable Auth Required   In Person  24 through / []  Yes     []  No    Tele Health   []  Yes     []  No    Total 13         Functional Scale: FOTO 16   Date assessed:     Latex Allergy:  [x]NO      []YES  Preferred Language for Healthcare:   [x]English       []other:      Pain level:  5/10         SUBJECTIVE:       \"Feeling a bit better\"      OBJECTIVE:  150, 40 @ 40          RESTRICTIONS/PRECAUTIONS: Large RCR w/ patch and biceps tenodesis restrictions.     Exercises/Interventions: HEP code: [id number]  Therapeutic Ex () HEP 22     Warm-up   LATEX ALLERGY    Pulleys      UBE              TABLE       Cane flexion Supine er @ 0  20x     Standing ball roll        Table slide   6 x 10\"      Seated cane er @ 0   4 x 20\"     Supine press (2 hands)  20x                                         Seated reacher to 80 degrees   2 x 10                                                                        Manual: PROM into shoulder flexion, abduction, ER and IR to 30° - 28'       Therapeutic Exercise and NMR EXR  [x] (22028) Provided verbal/tactile cueing for activities related to strengthening, flexibility, endurance, ROM  for improvements in scapular, scapulothoracic and UE control with self care, reaching, carrying, lifting, house/yardwork, driving/computer work.    [] (96463) Provided verbal/tactile cueing for activities related to improving balance, coordination, kinesthetic sense, posture, motor skill, proprioception  to assist with  scapular, scapulothoracic and UE control with self care, reaching, carrying, lifting, house/yardwork, driving/computer work. Therapeutic Activities:    [x] (67847 or 64535) Provided verbal/tactile cueing for activities related to improving balance, coordination, kinesthetic sense, posture, motor skill, proprioception and motor activation to allow for proper function of scapular, scapulothoracic and UE control with self care, carrying, lifting, driving/computer work.      Home Exercise Program:    [x] (91089) Reviewed/Progressed HEP activities related to strengthening, flexibility, endurance, ROM of scapular, scapulothoracic and UE control with self care, reaching, carrying, lifting, house/yardwork, driving/computer work  [] (07545) Reviewed/Progressed HEP activities related to improving balance, coordination, kinesthetic sense, posture, motor skill, proprioception of scapular, scapulothoracic and UE control with self care, reaching, carrying, lifting, house/yardwork, driving/computer work      Manual Treatments:  PROM / STM / Oscillations-Mobs:  G-I, II, III, IV (PA's, Inf., Post.)  [x] (96580) Provided manual therapy to mobilize soft tissue/joints of cervical/CT, scapular GHJ and UE for the purpose of modulating pain, promoting relaxation,  increasing ROM, reducing/eliminating soft tissue swelling/inflammation/restriction, improving soft tissue extensibility and allowing for proper ROM for normal function with self care, reaching, carrying, lifting, house/yardwork, driving/computer work    Modalities:     [x] GAME READY (VASO)- for significant edema, swelling, pain control. Charges:  Timed Code Treatment Minutes: 39'   Total Treatment Minutes:  45'    BWC:  TE TIME:  NMR TIME:  MANUAL TIME:  TA  UNTIMED MINUTES:  Medicare Total:         [] EVAL (LOW) 11131 (typically 20 minutes face-to-face)  [] EVAL (MOD) 83900 (typically 30 minutes face-to-face)  [] EVAL (HIGH) 96846 (typically 45 minutes face-to-face)  [] RE-EVAL     [x] ZN(57859) x 1   [] IONTO  [] NMR (60538) x 1    [] VASO   [x] Manual (31177) 2    [] Other:  [] TA x      [] Mech Traction (03807)  [] ES(attended) (16592)      [] ES (un) (03168):           GOALS:     Patient stated goal: Improve motion     Therapist goals for Patient:  Short Term Goals: To be achieved in: 2 weeks  1. Independent in HEP and progression per patient tolerance, in order to prevent re-injury. [x] Progressing: [] Met: [] Not Met: [] Adjusted     2. Patient will have a decrease in pain to facilitate improvement in movement, function, and ADLs as indicated by Functional Deficits. [x] Progressing: [] Met: [] Not Met: [] Adjusted     Long Term Goals: To be achieved in: 12 weeks  1. Pt will improve FOTO to 53 or more, indicating improved functional capacity . [x] Progressing: [] Met: [] Not Met: [] Adjusted     2. Patient will demonstrate increased AROM flex/abd/ER/IR to 150/150/40/L1 to allow for proper joint functioning as indicated by patients Functional Deficits. [x] Progressing: [] Met: [] Not Met: [] Adjusted     3.  Patient will demonstrate an increase in Strength of shoulder  to 5/5 to allow for proper functional mobility as indicated by patients Functional Deficits. [x] Progressing: [] Met: [] Not Met: [] Adjusted     4. Patient will return to functional activities without increased symptoms or restriction. [x] Progressing: [] Met: [] Not Met: [] Adjusted     5. Pt will report ability to sleep 5 hours without sleep disturbance from shoulder. (patient specific functional goal)    [x] Progressing: [] Met: [] Not Met: [] Adjusted             ASSESSMENT:  See eval    Patient received education on their current pathology and how their condition effects them with their functional activities. Patient understood discussion and questions were answered. Patient understands their activity limitations and understands rational for treatment progression. Pt educated on plan of care and HEP, if worsening symptoms to d/c that exercise. PLAN: See daljit  [x] Continue per plan of care [] Alter current plan (see comments above)  [] Plan of care initiated [] Hold pending MD visit [] Discharge      Electronically signed by:  Davide Marx, PT, MPT     Note: If patient does not return for scheduled/ recommended follow up visits, this note will serve as a discharge from care along with most recent update on progress.

## 2022-10-31 NOTE — FLOWSHEET NOTE
The [facility] and [address],  Sports Performance and Rehabilitation, [facility] [address]199 [address]  [address] [facility],5[address]   [place] Suarez  Phone: [phone]  Fax: [phone]       Physical Therapy Treatment Note/ Progress Report:           Date:  10/28/2022    Patient Name:  Sean Ghotra    :  1960  MRN: [id number]  Restrictions/Precautions:    Medical/Treatment Diagnosis Information:  Diagnosis: Z98.890 (ICD-10-CM) - S/P left rotator cuff repair  Treatment Diagnosis: Left shoulder pain, decreased ROM and strength  Insurance/Certification information: [facility], [address] [place] [address], [facility] auth  Physician Information:  Loree Guevara MD  Has the plan of care been signed (Y/N):        []  Yes  [x]  No     Date of Patient follow up with Physician:     Assessment Summary: [facility] is a 60 y.o. female reporting to OP PT s/p left RCR with patch and biceps tenodesis on 22. Pt is noted to be doing rather well. Low pain levels. ROM and strength testing deferred. Appears compliant with sling use. No s/s of DVT or infection. Will progress per protocol and phase of healing. Date Range for reporting period:  Beginnin22  Endin      Recertification will be due (POC Duration  / 90 days whichever is less): 22          Visit # Insurance Allowable Auth Required   In Person  24 through / []  Yes     []  No    Tele Health   []  Yes     []  No    Total 14         Functional Scale: FOTO 16   Date assessed:     Latex Allergy:  [x]NO      []YES  Preferred Language for Healthcare:   [x]English       []other:      Pain level:  5/10         SUBJECTIVE:       \"Feeling a bit better\"      OBJECTIVE:  150, 40 @ 40          RESTRICTIONS/PRECAUTIONS: Large RCR w/ patch and biceps tenodesis restrictions.     Exercises/Interventions: HEP code: [id number]  Therapeutic Ex (07118) HEP 22     Warm-up   LATEX ALLERGY    Pulleys      UBE              TABLE       Cane flexion Supine er @ 0  20x     Standing ball roll        Table slide   6 x 10\"      Seated cane er @ 0   4 x 20\"     Supine press (2 hands)  20x                                         Seated reacher to 80 degrees   2 x 10                                                                        Manual: PROM into shoulder flexion, abduction, ER and IR to 30° - 28'       Therapeutic Exercise and NMR EXR  [x] (03102) Provided verbal/tactile cueing for activities related to strengthening, flexibility, endurance, ROM  for improvements in scapular, scapulothoracic and UE control with self care, reaching, carrying, lifting, house/yardwork, driving/computer work.    [] (34436) Provided verbal/tactile cueing for activities related to improving balance, coordination, kinesthetic sense, posture, motor skill, proprioception  to assist with  scapular, scapulothoracic and UE control with self care, reaching, carrying, lifting, house/yardwork, driving/computer work. Therapeutic Activities:    [x] (51355 or 95985) Provided verbal/tactile cueing for activities related to improving balance, coordination, kinesthetic sense, posture, motor skill, proprioception and motor activation to allow for proper function of scapular, scapulothoracic and UE control with self care, carrying, lifting, driving/computer work.      Home Exercise Program:    [x] (54075) Reviewed/Progressed HEP activities related to strengthening, flexibility, endurance, ROM of scapular, scapulothoracic and UE control with self care, reaching, carrying, lifting, house/yardwork, driving/computer work  [] (31346) Reviewed/Progressed HEP activities related to improving balance, coordination, kinesthetic sense, posture, motor skill, proprioception of scapular, scapulothoracic and UE control with self care, reaching, carrying, lifting, house/yardwork, driving/computer work      Manual Treatments:  PROM / STM / Oscillations-Mobs:  G-I, II, III, IV (PA's, Inf., Post.)  [x] (45491) Provided manual therapy to mobilize soft tissue/joints of cervical/CT, scapular GHJ and UE for the purpose of modulating pain, promoting relaxation,  increasing ROM, reducing/eliminating soft tissue swelling/inflammation/restriction, improving soft tissue extensibility and allowing for proper ROM for normal function with self care, reaching, carrying, lifting, house/yardwork, driving/computer work    Modalities:     [x] GAME READY (VASO)- for significant edema, swelling, pain control. Charges:  Timed Code Treatment Minutes: 39'   Total Treatment Minutes:  45'    BWC:  TE TIME:  NMR TIME:  MANUAL TIME:  TA  UNTIMED MINUTES:  Medicare Total:         [] EVAL (LOW) 74602 (typically 20 minutes face-to-face)  [] EVAL (MOD) 55139 (typically 30 minutes face-to-face)  [] EVAL (HIGH) 55890 (typically 45 minutes face-to-face)  [] RE-EVAL     [x] XF(07314) x 1   [] IONTO  [] NMR (31186) x 1    [] VASO   [x] Manual (52342) 2    [] Other:  [] TA x      [] Mech Traction (92492)  [] ES(attended) (38638)      [] ES (un) (58779):           GOALS:     Patient stated goal: Improve motion     Therapist goals for Patient:  Short Term Goals: To be achieved in: 2 weeks  1. Independent in HEP and progression per patient tolerance, in order to prevent re-injury. [x] Progressing: [] Met: [] Not Met: [] Adjusted     2. Patient will have a decrease in pain to facilitate improvement in movement, function, and ADLs as indicated by Functional Deficits. [x] Progressing: [] Met: [] Not Met: [] Adjusted     Long Term Goals: To be achieved in: 12 weeks  1. Pt will improve FOTO to 53 or more, indicating improved functional capacity . [x] Progressing: [] Met: [] Not Met: [] Adjusted     2. Patient will demonstrate increased AROM flex/abd/ER/IR to 150/150/40/L1 to allow for proper joint functioning as indicated by patients Functional Deficits. [x] Progressing: [] Met: [] Not Met: [] Adjusted     3.  Patient will demonstrate an increase in Strength of shoulder  to 5/5 to allow for proper functional mobility as indicated by patients Functional Deficits. [x] Progressing: [] Met: [] Not Met: [] Adjusted     4. Patient will return to functional activities without increased symptoms or restriction. [x] Progressing: [] Met: [] Not Met: [] Adjusted     5. Pt will report ability to sleep 5 hours without sleep disturbance from shoulder. (patient specific functional goal)    [x] Progressing: [] Met: [] Not Met: [] Adjusted             ASSESSMENT:  See eval    Patient received education on their current pathology and how their condition effects them with their functional activities. Patient understood discussion and questions were answered. Patient understands their activity limitations and understands rational for treatment progression. Pt educated on plan of care and HEP, if worsening symptoms to d/c that exercise. PLAN: See daljit  [x] Continue per plan of care [] Alter current plan (see comments above)  [] Plan of care initiated [] Hold pending MD visit [] Discharge      Electronically signed by:  Bridgette Castellano, PT, MPT     Note: If patient does not return for scheduled/ recommended follow up visits, this note will serve as a discharge from care along with most recent update on progress.

## 2022-11-02 ENCOUNTER — HOSPITAL ENCOUNTER (OUTPATIENT)
Dept: PHYSICAL THERAPY | Age: 62
Setting detail: THERAPIES SERIES
Discharge: HOME | End: 2022-11-02
Payer: COMMERCIAL

## 2022-11-02 PROCEDURE — 97140 MANUAL THERAPY 1/> REGIONS: CPT | Performed by: PHYSICAL THERAPIST

## 2022-11-02 PROCEDURE — 97110 THERAPEUTIC EXERCISES: CPT | Performed by: PHYSICAL THERAPIST

## 2022-11-03 NOTE — FLOWSHEET NOTE
The [address] [facility] and [address],  Sports Performance and Rehabilitation, [facility] [address]  [address] [facility],5[address]   [place], [address]  Phone: [phone]  Fax: [phone]       Physical Therapy Treatment Note/ Progress Report:           Date:  2022    Patient Name:  Yo Mejia    :  1960  MRN: [id number]  Restrictions/Precautions:    Medical/Treatment Diagnosis Information:  Diagnosis: Z98.890 (ICD-10-CM) - S/P left rotator cuff repair  Treatment Diagnosis: Left shoulder pain, decreased ROM and strength  Insurance/Certification information: [address], [facility]  Physician Information:  Mariel Light MD  Has the plan of care been signed (Y/N):        []  Yes  [x]  No     Date of Patient follow up with Physician:     Assessment Summary: Candis Lao is a 60 y.o. female reporting to OP PT s/p left RCR with patch and biceps tenodesis on 22. Pt is noted to be doing rather well. Low pain levels. ROM and strength testing deferred. Appears compliant with sling use. No s/s of DVT or infection. Will progress per protocol and phase of healing. Date Range for reporting period:  Beginnin22  Endin      Recertification will be due (POC Duration  / 90 days whichever is less): 22          Visit # Insurance Allowable Auth Required   In Person  24 through / []  Yes     []  No    Tele Health   []  Yes     []  No    Total 15         Functional Scale: FOTO 16   Date assessed:     Latex Allergy:  [x]NO      []YES  Preferred Language for Healthcare:   [x]English       []other:      Pain level:  5/10         SUBJECTIVE:       \"Feeling ok. Rashad Patton \"      OBJECTIVE:  160, 50 @ 40          RESTRICTIONS/PRECAUTIONS: Large RCR w/ patch and biceps tenodesis restrictions.     Exercises/Interventions: HEP code: [id number]  Therapeutic Ex (14066) HEP 22     Warm-up   LATEX ALLERGY    Pulleys  3'      UBE              TABLE       Cane flexion      Supine er @ 0  20x     Standing ball roll        Table slide   6 x 10\"      Seated cane er @ 0   4 x 20\"     Supine press (2 hands)  20x      Reacher                                    Seated reacher to 80 degrees   2 x 10                                                                        Manual: PROM into shoulder flexion, abduction, ER and IR to 30° - 28'       Therapeutic Exercise and NMR EXR  [x] (28728) Provided verbal/tactile cueing for activities related to strengthening, flexibility, endurance, ROM  for improvements in scapular, scapulothoracic and UE control with self care, reaching, carrying, lifting, house/yardwork, driving/computer work.    [] (33178) Provided verbal/tactile cueing for activities related to improving balance, coordination, kinesthetic sense, posture, motor skill, proprioception  to assist with  scapular, scapulothoracic and UE control with self care, reaching, carrying, lifting, house/yardwork, driving/computer work. Therapeutic Activities:    [x] (22869 or 66917) Provided verbal/tactile cueing for activities related to improving balance, coordination, kinesthetic sense, posture, motor skill, proprioception and motor activation to allow for proper function of scapular, scapulothoracic and UE control with self care, carrying, lifting, driving/computer work.      Home Exercise Program:    [x] (04384) Reviewed/Progressed HEP activities related to strengthening, flexibility, endurance, ROM of scapular, scapulothoracic and UE control with self care, reaching, carrying, lifting, house/yardwork, driving/computer work  [] (98083) Reviewed/Progressed HEP activities related to improving balance, coordination, kinesthetic sense, posture, motor skill, proprioception of scapular, scapulothoracic and UE control with self care, reaching, carrying, lifting, house/yardwork, driving/computer work      Manual Treatments:  PROM / STM / Oscillations-Mobs:  G-I, II, III, IV (PA's, Inf., Post.)  [x] (36193) Provided manual therapy to mobilize soft tissue/joints of cervical/CT, scapular GHJ and UE for the purpose of modulating pain, promoting relaxation,  increasing ROM, reducing/eliminating soft tissue swelling/inflammation/restriction, improving soft tissue extensibility and allowing for proper ROM for normal function with self care, reaching, carrying, lifting, house/yardwork, driving/computer work    Modalities:     [x] GAME READY (VASO)- for significant edema, swelling, pain control. Charges:  Timed Code Treatment Minutes: 39'   Total Treatment Minutes:  45'    BWC:  TE TIME:  NMR TIME:  MANUAL TIME:  TA  UNTIMED MINUTES:  Medicare Total:         [] EVAL (LOW) 02044 (typically 20 minutes face-to-face)  [] EVAL (MOD) 75847 (typically 30 minutes face-to-face)  [] EVAL (HIGH) 83651 (typically 45 minutes face-to-face)  [] RE-EVAL     [x] YZ(44326) x 1   [] IONTO  [] NMR (86394) x 1    [] VASO   [x] Manual (47697) 2    [] Other:  [] TA x      [] Mech Traction (13275)  [] ES(attended) (21067)      [] ES (un) (24385):           GOALS:     Patient stated goal: Improve motion     Therapist goals for Patient:  Short Term Goals: To be achieved in: 2 weeks  1. Independent in HEP and progression per patient tolerance, in order to prevent re-injury. [x] Progressing: [] Met: [] Not Met: [] Adjusted     2. Patient will have a decrease in pain to facilitate improvement in movement, function, and ADLs as indicated by Functional Deficits. [x] Progressing: [] Met: [] Not Met: [] Adjusted     Long Term Goals: To be achieved in: 12 weeks  1. Pt will improve FOTO to 53 or more, indicating improved functional capacity . [x] Progressing: [] Met: [] Not Met: [] Adjusted     2. Patient will demonstrate increased AROM flex/abd/ER/IR to 150/150/40/L1 to allow for proper joint functioning as indicated by patients Functional Deficits. [x] Progressing: [] Met: [] Not Met: [] Adjusted     3.  Patient will demonstrate an increase in Strength of shoulder  to 5/5 to allow for proper functional mobility as indicated by patients Functional Deficits. [x] Progressing: [] Met: [] Not Met: [] Adjusted     4. Patient will return to functional activities without increased symptoms or restriction. [x] Progressing: [] Met: [] Not Met: [] Adjusted     5. Pt will report ability to sleep 5 hours without sleep disturbance from shoulder. (patient specific functional goal)    [x] Progressing: [] Met: [] Not Met: [] Adjusted             ASSESSMENT:  See eval    Patient received education on their current pathology and how their condition effects them with their functional activities. Patient understood discussion and questions were answered. Patient understands their activity limitations and understands rational for treatment progression. Pt educated on plan of care and HEP, if worsening symptoms to d/c that exercise. PLAN: See daljit  [x] Continue per plan of care [] Alter current plan (see comments above)  [] Plan of care initiated [] Hold pending MD visit [] Discharge      Electronically signed by:  Rosalia Hooper, PT, MPT     Note: If patient does not return for scheduled/ recommended follow up visits, this note will serve as a discharge from care along with most recent update on progress.

## 2022-11-04 ENCOUNTER — HOSPITAL ENCOUNTER (OUTPATIENT)
Dept: PHYSICAL THERAPY | Age: 62
Setting detail: THERAPIES SERIES
Discharge: HOME | End: 2022-11-04
Payer: COMMERCIAL

## 2022-11-04 PROCEDURE — 97110 THERAPEUTIC EXERCISES: CPT | Performed by: PHYSICAL THERAPIST

## 2022-11-04 PROCEDURE — 97140 MANUAL THERAPY 1/> REGIONS: CPT | Performed by: PHYSICAL THERAPIST

## 2022-11-04 NOTE — FLOWSHEET NOTE
[facility] Outpatient Therapy  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    R42 Dizziness                                     Insurance/Certification information:  PT Insurance Information: [employer] ($30 copay; 20 visits; prior auth- [employer])  Physician Information:  Domenic Severin, DO   Plan of care signed:    [] Yes  [x] No    Date of Patient follow up with Physician:      Progress Report: []  Yes  [x]  No     Date Range for reporting period:  Beginning:  10/27/22  Ending:      Progress report due (10 Rx/or 30 days whichever is less):      Recertification due (POC duration/ or 90 days whichever is less): 23     Visit # Insurance Allowable Auth Needed    10 visits by 22 [x]Yes   []No     RESTRICTIONS/PRECAUTIONS: fibromyalgia, RA, currently in PT for L RC surgery  Latex Allergy:  []NO      [x]YES- adhesives  Preferred Language for Healthcare:   [x]English       []other:  Functional Scale: FOTO 54; [address] 80%; FGA   Date assessed:10/27/22    Dizziness level:  3/10 at rest; 10/10 during previous 2 episodes of vertigo     SUBJECTIVE:  See eval    OBJECTIVE: See eval  Observation:   Test measurements:    Johnson, leg length discrepancy, cervicogenic dizziness testing      Exercises/Interventions: Exercises in bold performed in department today. Items not bolded are carried forward from prior visits for continuity of the record.     Exercise/Equipment Resistance/Repetitions HEP Other comments   Nustep/stationary bike Initiate cardio []      []    Cervical stretching  Next  []      []    Habituation if needed   []      []      []      []      []      [] []      []      []      []      []      []      []      []      Home Exercise Program: TBD      Therapeutic Exercise:   [] (61379) Provided verbal/tactile cueing for activities related to strengthening, flexibility, endurance, ROM for improvements in LE, proximal hip, and core control with self-care, mobility, lifting, ambulation. NMR:  [x] (22786) Provided verbal/tactile cueing for activities related to improving balance, coordination, kinesthetic sense, posture, motor skill, proprioception to assist with LE, proximal hip, and core control in self-care, mobility, lifting, ambulation and eccentric single leg control. Therapeutic Activities:    [x] (57936) Provided verbal/tactile cueing for activities related to improving balance, coordination, kinesthetic sense, posture, motor skill, proprioception and motor activation to allow for proper function of core, proximal hip and LE with self-care and ADLs and functional mobility. Gait Training:    [] (33415) Provided training and instruction to the patient for proper LE, core and proximal hip recruitment and positioning and eccentric body weight control with ambulation re-education including up and down stairs     Manual Treatments:  PROM / STM / Oscillations-Mobs:  G-I, II, III, IV (PA's, Inf., Post.)  [] (51255) Provided manual therapy to mobilize LE, proximal hip and/or LS spine soft tissue/joints for the purpose of modulating pain, promoting relaxation,  increasing ROM, reducing/eliminating soft tissue swelling/inflammation/restriction, improving soft tissue extensibility and allowing for proper ROM for normal function with self-care, mobility, lifting and ambulation.      Home Exercise Program:    [] (38262) Reviewed/Progressed HEP activities related to strengthening, flexibility, endurance, ROM of core, proximal hip and LE for functional self-care, mobility, lifting and ambulation/stair navigation   [] (07835) Reviewed/Progressed HEP activities related to improving balance, coordination, kinesthetic sense, posture, motor skill, proprioception of core, proximal hip and LE for self-care, mobility, lifting, and ambulation/stair navigation      Modalities:    [] Electric Stimulation:   [] Ultrasound:   [] Other:       Charges:  Timed Code Treatment Minutes: NM 15; TA 25   Total Treatment Minutes: 40      [] EVAL (LOW) 25421 (typically 20 minutes face-to-face)  [] EVAL (MOD) 58881 (typically 30 minutes face-to-face)  [x] EVAL (HIGH) 22020 (typically 45 minutes face-to-face)  [] RE-EVAL     [] DX(94275) x       [x] NMR (46468) x   1    [] Manual (20163) x       [x] TA (96483) x   2    [] Gait Training (96125) x       [] ES(attended) (54532)  [] ES (un) (72442)   [] DRY NEEDLE 1 OR 2 MUSCLES  [] DRY NEEDLE 3+ MUSCLES  [] Mech Traction (66763)  [] Ultrasound (84676)  [] Other:      GOALS: Goals established 10/27/22   Patient stated goal: \"stronger (not fall)\"  [] Progressing: [] Met: [] Not Met: [] Adjusted    Therapist goals for Patient:  Short Term Goals= LT goals: To be achieved in: 4 weeks  Independent in HEP and progression per patient tolerance. [] Progressing: [] Met: [] Not Met: [] Adjusted  Patient will have a decrease in dizziness/imbalance symptoms to 0-1/10 to facilitate improvement in movement, function, balance, and ADLS. [] Progressing: [] Met: [] Not Met: [] Adjusted  3. Disability index score of 40% or less for the [address] [place] [address] to assist with reaching prior level of function  [] Progressing: [] Met: [] Not Met: [] Adjusted  4. Patient will demonstrate improved cervical ext and L rotation ROM WFL without c/o dizziness. [] Progressing: [] Met: [] Not Met: [] Adjusted  5. Pt will report no falls for 4 weeks. [] Progressing: [] Met: [] Not Met: [] Adjusted  6. Pt will return to walking for exercise with dizziness 1/10 or less.    [] Progressing: [] Met: [] Not Met: [] Adjusted    ASSESSMENT:  See eval      Treatment/Activity Tolerance:  [x] Patient tolerated treatment well [] Patient limited by fatique  [] Patient limited by pain  [] Patient limited by other medical complications  [] Other:     Overall Progression Towards Functional goals/ Treatment Progress Update:  [] Patient is progressing as expected towards functional goals listed. [] Progression is slowed due to complexities/Impairments listed. [] Progression has been slowed due to co-morbidities. [x] Plan just implemented, too soon to assess goals progression <30days   [] Goals require adjustment due to lack of progress  [] Patient is not progressing as expected and requires additional follow up with physician  [] Other    Prognosis for POC: [x] Good [] Fair  [] Poor    Patient requires continued skilled intervention: [x] Yes  [] No        PLAN: 2x/week for 4 weeks  [] Continue per plan of care [] Alter current plan (see comments)  [x] Plan of care initiated [] Hold pending MD visit [] Discharge    Electronically signed by: Poli Mccarthy, PT, DPT    Note: If patient does not return for scheduled/recommended follow up visits, this note will serve as a discharge from care along with the most recent update on progress.

## 2022-11-09 ENCOUNTER — HOSPITAL ENCOUNTER (OUTPATIENT)
Dept: PHYSICAL THERAPY | Age: 62
Setting detail: THERAPIES SERIES
Discharge: HOME | End: 2022-11-09
Payer: COMMERCIAL

## 2022-11-09 ENCOUNTER — OFFICE VISIT (OUTPATIENT)
Dept: ORTHOPEDIC SURGERY | Age: 62
End: 2022-11-09

## 2022-11-09 VITALS — WEIGHT: 194 LBS | BODY MASS INDEX: 33.12 KG/M2 | HEIGHT: 64 IN

## 2022-11-09 DIAGNOSIS — Z98.890 S/P ROTATOR CUFF REPAIR: Primary | ICD-10-CM

## 2022-11-09 PROCEDURE — 97140 MANUAL THERAPY 1/> REGIONS: CPT | Performed by: PHYSICAL THERAPIST

## 2022-11-09 PROCEDURE — 99024 POSTOP FOLLOW-UP VISIT: CPT | Performed by: ORTHOPAEDIC SURGERY

## 2022-11-09 PROCEDURE — 97110 THERAPEUTIC EXERCISES: CPT | Performed by: PHYSICAL THERAPIST

## 2022-11-09 NOTE — LETTER
Physical Therapy Rehabilitation Referral    Patient Name:  [facility]      YOB: 1960    Diagnosis:    1. S/P rotator cuff repair        Precautions:     [x] Evaluate and Treat    Post Op Instructions:  [] Continuous passive motion (CPM)  [x] Elbow ROM  [x] Exercise in plane of scapula  [x]  Strengthening     [x] Pulley and instruction   [x] Home exercise program (copy to patient)   [] Sling when arm at risk  [] Sling or brace at all times   [x] AROM: Forward elevation to Full            [x] AROM: External rotation  To  Full   [] Isometric external rotator strengthening [x] AROM: internal rotation: up the back  [x] Isometric abductor strengthening  [x] AROM: Internal abduction   [] Isometric internal rotator strengthening [x] AROM: cross-body adduction             Stretching:     Strengthening:  [x] Four quadrant (FE, ER, IR, CBA)  [x] Rotator cuff (ER, IR, Abd)  [x] Forward Elevation    [] External Rotators     [x] External Rotation    [] Internal Rotators  [x] Internal Rotation: up/back   [] Abductors     [x] Internal Rotation: supine in abduction  [x] Sleeper Stretch    [] Flexors  [x] Cross-body abduction    [] Extensors  [x] Pendulum (FE, Abd/Add, cw/ccw)  [x] Scapular Stabilizers   [x] Wall-walking (FE, Abd)        [x] Shoulder shrugs     [x] Table slides (FE)                [x] Rhomboid pinch  [] Elbow (flex, ext, pron, sup)        [] Lat.  Pull downs     [] Medial epicondylitis program       [] Forward punch   [] Lateral epicondylitis program       [] Internal rotators     [] Progressive resistive exercises  [] Bench Press        [] Bench press plus  Activities:     [] Lateral pull-downs  [x] Rowing     [x] Progressive two-hand supine press  [] Stepper/Exercise bike   [x] Biceps: curls/supination  [] Swimming  [] Water exercises    Modalities:     Return to Sport:  [x] Of Choice      [] Plyometrics  [] Ultrasound     [] Rhythmic stabilization  [x] Iontophoresis    [] Core strengthening   [] Moist heat     [] Sports specific program:   [] Massage         [x] Cryotherapy      [] Electrical stimulation     [] Paraffin  [] Whirlpool  [] TENS    [x] Home exercise program (copy to patient). Perform exercises for:   15     minutes    3      times/day  [x] Supervised physical therapy  Frequency: []  1x week  [x] 2x week  [] 3x week  [] Other:   Duration: [] 2 weeks   [] 4 weeks  [x] 6 weeks  [] Other:     Additional Instructions:       Dash Carroll MD, PhD

## 2022-11-09 NOTE — PROGRESS NOTES
Chief Complaint    Shoulder Pain (F/U LEFT SHOULDER)      History of Present Illness:  Yo Mejia is a pleasant, 60 y.o., female, here today for follow up of her left shoulder. The patient underwent a left arthroscopic revision rotator cuff repair with collagen patch augmentation and biceps tenodesis on 8/5/2022. She is now 14 weeks postop. She was previously struggling with postoperative stiffness. She has continued in physical therapy with Osiris Jean-Baptiste at the [facility] office. Her therapist stopped by today and reports she is progressing well and ready to begin strengthening. She reports no new injuries or setbacks. Pain Assessment  Location of Pain: Shoulder  Location Modifiers: Left  Severity of Pain: 3  Quality of Pain: Throbbing, Sharp, Dull, Aching  Duration of Pain: Persistent  Frequency of Pain: Constant  Aggravating Factors: Other (Comment), Exercise, Straightening, Stretching  Limiting Behavior: Some  Relieving Factors: Rest, Ice, Exercise  Work-Related Injury: No  Are there other pain locations you wish to document?: No      Medical History:  Patient's medications, allergies, past medical, surgical, social and family histories were reviewed and updated as appropriate. No notes on file    Review of Systems  A 14 point review of systems was completed by the patient and is available in the media section of the scanned medical record and was reviewed on 11/9/2022. The review is negative with the exception of those things mentioned in the HPI and Past Medical History    Vital Signs: There were no vitals filed for this visit. General/Appearance: Alert and oriented and in no apparent distress. Skin:  There are no skin lesions, cellulitis, or extreme edema. The patient has warm and well-perfused Bilateral upper extremities with brisk capillary refill. Left Shoulder Exam:  Inspection: Incision portals are healing well. No gross deformities, no signs of infection.     Palpation: No crepitus    Active Range of Motion: Forward Elevation 130, External Rotation 30 vs 20 on the right, Internal Rotation L3 bilaterally    Passive Range of Motion: Forward Elevation 140    Strength: IR 4/5, ER 4/5, Champagne Toast 4/5 vs 4+/5 on the right    Special Tests:  No Pierre muscle deformity. Neurovascular: Sensation to light touch is intact, no motor deficits, palpable radial pulses 2+      Radiology:     No new XR obtained today. Assessment :  Ms. Angeles Pickard is a pleasant, 60 y.o. patient who underwent a left arthroscopic revision rotator cuff repair with collagen patch augmentation and biceps tenodesis on 8/5/2022. She is now 14 weeks postop. Impression:  Encounter Diagnosis   Name Primary? S/P rotator cuff repair Yes       Office Procedures:  Orders Placed This Encounter   Procedures    [address] Jarred Marquez (Ortho [facility] performance)- [facility]     Referral Priority:   Routine     Referral Type:   Eval and Treat     Referral Reason:   Specialty Services Required     Requested Specialty:   Physical Therapist     Number of Visits Requested:   1         Treatment Plan:  Angeles Pickard is back on track in her recovery. We recommend this patient continue in physical therapy. A new physical therapy letter was documented in [facility] today. We will go ahead and clear her for strengthening phase of rehab. She has developed some mild rotator cuff tendonitis. She may take an anti-inflammatory for this if she would like. She may also benefit from topical Voltaren gel. We will see Army Few back in 4 weeks and/or as needed. All questions were answered to patient's satisfaction and She was encouraged to call with any further questions or concerns. [place] [employer] is in agreement with this plan.     11/9/2022  2:54 PM    Beatrice Anderson ATC  [employer] [facility]    During this examination, Tami ARRIAGA, functioned as a scribe for Dr. Shemar Martin. The history taking and physical examination were performed by Dr. Nicci Mathews. All counseling during the appointment was performed between the patient and Dr. Nicci Mathews. 11/9/22  ______________  I, Dr. Shemar Martin, personally performed the services described in this documentation as described by Clay Rodrigez ATC in my presence, and it is both accurate and complete. Sameleonarda Mathews MD, PhD  11/9/2022

## 2022-11-11 ENCOUNTER — HOSPITAL ENCOUNTER (OUTPATIENT)
Dept: PHYSICAL THERAPY | Age: 62
Setting detail: THERAPIES SERIES
Discharge: HOME | End: 2022-11-11
Payer: COMMERCIAL

## 2022-11-11 PROCEDURE — 97140 MANUAL THERAPY 1/> REGIONS: CPT | Performed by: PHYSICAL THERAPIST

## 2022-11-11 PROCEDURE — 97110 THERAPEUTIC EXERCISES: CPT | Performed by: PHYSICAL THERAPIST

## 2022-11-13 NOTE — FLOWSHEET NOTE
[facility] and [address],  Sports Performance and Rehabilitation, Formerly [facility], [facility] [address]  [address] [facility]   [place]  Phone: [phone]  Fax: [phone]       Physical Therapy Treatment Note/ Progress Report:           Date:  2022    Patient Name:  Cesar Castro    :  1960  MRN: [id number]  Restrictions/Precautions:    Medical/Treatment Diagnosis Information:  Diagnosis: Z98.890 (ICD-10-CM) - S/P left rotator cuff repair  Treatment Diagnosis: Left shoulder pain, decreased ROM and strength  Insurance/Certification information: [facility], [address] [place] [address], [facility]  Physician Information:  Fina Willis MD  Has the plan of care been signed (Y/N):        []  Yes  [x]  No     Date of Patient follow up with Physician:     Assessment Summary: Suhail Kirby is a 60 y.o. female reporting to OP PT s/p left RCR with patch and biceps tenodesis on 22. Pt is noted to be doing rather well. Low pain levels. ROM and strength testing deferred. Appears compliant with sling use. No s/s of DVT or infection. Will progress per protocol and phase of healing. Date Range for reporting period:  Beginnin22  Endin      Recertification will be due (POC Duration  / 90 days whichever is less): 22          Visit # Insurance Allowable Auth Required   In Person  24 through / []  Yes     []  No    [facility]   []  Yes     []  No    Total 17         Functional Scale: FOTO 16   Date assessed:     Latex Allergy:  [x]NO      []YES  Preferred Language for Healthcare:   [x]English       []other:      Pain level:  5/10         SUBJECTIVE:       \"Not too bad\"      OBJECTIVE:  160, 50 @ 40          RESTRICTIONS/PRECAUTIONS: Large RCR w/ patch and biceps tenodesis restrictions.     Exercises/Interventions: HEP code: [id number]  Therapeutic Ex (23814) HEP 22     Warm-up   LATEX ALLERGY    Pulleys  3'      UBE              TABLE       Cane flexion      Supine er @ 0  20x     Standing ball roll        Table slide   6 x 10\"      Seated cane er @ 0   4 x 20\"     Supine press (2 hands)  20x      Reacher   20x     Supine er   20x                          Seated reacher to 80 degrees   2 x 10                                                                        Manual: PROM into shoulder flexion, abduction, ER and IR to 30° - 28'       Therapeutic Exercise and NMR EXR  [x] (90421) Provided verbal/tactile cueing for activities related to strengthening, flexibility, endurance, ROM  for improvements in scapular, scapulothoracic and UE control with self care, reaching, carrying, lifting, house/yardwork, driving/computer work.    [] (63523) Provided verbal/tactile cueing for activities related to improving balance, coordination, kinesthetic sense, posture, motor skill, proprioception  to assist with  scapular, scapulothoracic and UE control with self care, reaching, carrying, lifting, house/yardwork, driving/computer work. Therapeutic Activities:    [x] (33436 or 04777) Provided verbal/tactile cueing for activities related to improving balance, coordination, kinesthetic sense, posture, motor skill, proprioception and motor activation to allow for proper function of scapular, scapulothoracic and UE control with self care, carrying, lifting, driving/computer work.      Home Exercise Program:    [x] (70088) Reviewed/Progressed HEP activities related to strengthening, flexibility, endurance, ROM of scapular, scapulothoracic and UE control with self care, reaching, carrying, lifting, house/yardwork, driving/computer work  [] (70964) Reviewed/Progressed HEP activities related to improving balance, coordination, kinesthetic sense, posture, motor skill, proprioception of scapular, scapulothoracic and UE control with self care, reaching, carrying, lifting, house/yardwork, driving/computer work      Manual Treatments:  PROM / STM / Oscillations-Mobs:  G-I, II, III, IV (PA's, Inf., Post.)  [x] (10181) Provided manual therapy to mobilize soft tissue/joints of cervical/CT, scapular GHJ and UE for the purpose of modulating pain, promoting relaxation,  increasing ROM, reducing/eliminating soft tissue swelling/inflammation/restriction, improving soft tissue extensibility and allowing for proper ROM for normal function with self care, reaching, carrying, lifting, house/yardwork, driving/computer work    Modalities:     [x] GAME READY (VASO)- for significant edema, swelling, pain control. Charges:  Timed Code Treatment Minutes: 39'   Total Treatment Minutes:  45'    BWC:  TE TIME:  NMR TIME:  MANUAL TIME:  TA  UNTIMED MINUTES:  Medicare Total:         [] EVAL (LOW) 91093 (typically 20 minutes face-to-face)  [] EVAL (MOD) 23868 (typically 30 minutes face-to-face)  [] EVAL (HIGH) 08054 (typically 45 minutes face-to-face)  [] RE-EVAL     [x] GQ(46233) x 1   [] IONTO  [] NMR (17166) x 1    [] VASO   [x] Manual (82825) 2    [] Other:  [] TA x      [] Mech Traction (23853)  [] ES(attended) (26895)      [] ES (un) (78216):           GOALS:     Patient stated goal: Improve motion     Therapist goals for Patient:  Short Term Goals: To be achieved in: 2 weeks  1. Independent in HEP and progression per patient tolerance, in order to prevent re-injury. [x] Progressing: [] Met: [] Not Met: [] Adjusted     2. Patient will have a decrease in pain to facilitate improvement in movement, function, and ADLs as indicated by Functional Deficits. [x] Progressing: [] Met: [] Not Met: [] Adjusted     Long Term Goals: To be achieved in: 12 weeks  1. Pt will improve FOTO to 53 or more, indicating improved functional capacity . [x] Progressing: [] Met: [] Not Met: [] Adjusted     2. Patient will demonstrate increased AROM flex/abd/ER/IR to 150/150/40/L1 to allow for proper joint functioning as indicated by patients Functional Deficits. [x] Progressing: [] Met: [] Not Met: [] Adjusted     3.  Patient will demonstrate an increase in Strength of shoulder  to 5/5 to allow for proper functional mobility as indicated by patients Functional Deficits. [x] Progressing: [] Met: [] Not Met: [] Adjusted     4. Patient will return to functional activities without increased symptoms or restriction. [x] Progressing: [] Met: [] Not Met: [] Adjusted     5. Pt will report ability to sleep 5 hours without sleep disturbance from shoulder. (patient specific functional goal)    [x] Progressing: [] Met: [] Not Met: [] Adjusted             ASSESSMENT:  See eval    Patient received education on their current pathology and how their condition effects them with their functional activities. Patient understood discussion and questions were answered. Patient understands their activity limitations and understands rational for treatment progression. Pt educated on plan of care and HEP, if worsening symptoms to d/c that exercise. PLAN: See daljit  [x] Continue per plan of care [] Alter current plan (see comments above)  [] Plan of care initiated [] Hold pending MD visit [] Discharge      Electronically signed by:  Peewee Cadena, PT, MPT     Note: If patient does not return for scheduled/ recommended follow up visits, this note will serve as a discharge from care along with most recent update on progress.

## 2022-11-14 NOTE — FLOWSHEET NOTE
er @ 0  20x     Standing ball roll        Table slide   6 x 10\"      Seated cane er @ 0   4 x 20\"     Supine press (2 hands)  20x      Reacher   20x     Supine er   20x                          Seated reacher to 80 degrees   2 x 10                                                                        Manual: PROM into shoulder flexion, abduction, ER and IR to 30° - 28'       Therapeutic Exercise and NMR EXR  [x] (85896) Provided verbal/tactile cueing for activities related to strengthening, flexibility, endurance, ROM  for improvements in scapular, scapulothoracic and UE control with self care, reaching, carrying, lifting, house/yardwork, driving/computer work.    [] (83173) Provided verbal/tactile cueing for activities related to improving balance, coordination, kinesthetic sense, posture, motor skill, proprioception  to assist with  scapular, scapulothoracic and UE control with self care, reaching, carrying, lifting, house/yardwork, driving/computer work. Therapeutic Activities:    [x] (71982 or 20492) Provided verbal/tactile cueing for activities related to improving balance, coordination, kinesthetic sense, posture, motor skill, proprioception and motor activation to allow for proper function of scapular, scapulothoracic and UE control with self care, carrying, lifting, driving/computer work.      Home Exercise Program:    [x] (88510) Reviewed/Progressed HEP activities related to strengthening, flexibility, endurance, ROM of scapular, scapulothoracic and UE control with self care, reaching, carrying, lifting, house/yardwork, driving/computer work  [] (45266) Reviewed/Progressed HEP activities related to improving balance, coordination, kinesthetic sense, posture, motor skill, proprioception of scapular, scapulothoracic and UE control with self care, reaching, carrying, lifting, house/yardwork, driving/computer work      Manual Treatments:  PROM / STM / Oscillations-Mobs:  G-I, II, III, IV (Jolynn, Inf., Post.)  [x] (67546) Provided manual therapy to mobilize soft tissue/joints of cervical/CT, scapular GHJ and UE for the purpose of modulating pain, promoting relaxation,  increasing ROM, reducing/eliminating soft tissue swelling/inflammation/restriction, improving soft tissue extensibility and allowing for proper ROM for normal function with self care, reaching, carrying, lifting, house/yardwork, driving/computer work    Modalities:     [x] GAME READY (VASO)- for significant edema, swelling, pain control. Charges:  Timed Code Treatment Minutes: 39'   Total Treatment Minutes:  45'    BWC:  TE TIME:  NMR TIME:  MANUAL TIME:  TA  UNTIMED MINUTES:  Medicare Total:         [] EVAL (LOW) 25953 (typically 20 minutes face-to-face)  [] EVAL (MOD) 84967 (typically 30 minutes face-to-face)  [] EVAL (HIGH) 95194 (typically 45 minutes face-to-face)  [] RE-EVAL     [x] CU(98131) x 1   [] IONTO  [] NMR (43803) x 1    [] VASO   [x] Manual (05107) 2    [] Other:  [] TA x      [] Mech Traction (03954)  [] ES(attended) (83564)      [] ES (un) (35743):           GOALS:     Patient stated goal: Improve motion     Therapist goals for Patient:  Short Term Goals: To be achieved in: 2 weeks  1. Independent in HEP and progression per patient tolerance, in order to prevent re-injury. [x] Progressing: [] Met: [] Not Met: [] Adjusted     2. Patient will have a decrease in pain to facilitate improvement in movement, function, and ADLs as indicated by Functional Deficits. [x] Progressing: [] Met: [] Not Met: [] Adjusted     Long Term Goals: To be achieved in: 12 weeks  1. Pt will improve FOTO to 53 or more, indicating improved functional capacity . [x] Progressing: [] Met: [] Not Met: [] Adjusted     2. Patient will demonstrate increased AROM flex/abd/ER/IR to 150/150/40/L1 to allow for proper joint functioning as indicated by patients Functional Deficits. [x] Progressing: [] Met: [] Not Met: [] Adjusted     3.  Patient will demonstrate an increase in Strength of shoulder  to 5/5 to allow for proper functional mobility as indicated by patients Functional Deficits. [x] Progressing: [] Met: [] Not Met: [] Adjusted     4. Patient will return to functional activities without increased symptoms or restriction. [x] Progressing: [] Met: [] Not Met: [] Adjusted     5. Pt will report ability to sleep 5 hours without sleep disturbance from shoulder. (patient specific functional goal)    [x] Progressing: [] Met: [] Not Met: [] Adjusted             ASSESSMENT:  See eval    Patient received education on their current pathology and how their condition effects them with their functional activities. Patient understood discussion and questions were answered. Patient understands their activity limitations and understands rational for treatment progression. Pt educated on plan of care and HEP, if worsening symptoms to d/c that exercise. PLAN: See daljit  [x] Continue per plan of care [] Alter current plan (see comments above)  [] Plan of care initiated [] Hold pending MD visit [] Discharge      Electronically signed by:  Rosalia Hooper, PT, MPT     Note: If patient does not return for scheduled/ recommended follow up visits, this note will serve as a discharge from care along with most recent update on progress.

## 2022-11-15 DIAGNOSIS — M25.512 LEFT SHOULDER PAIN, UNSPECIFIED CHRONICITY: Primary | ICD-10-CM

## 2022-11-15 RX ORDER — TRAMADOL HYDROCHLORIDE 50 MG/1
50 TABLET ORAL EVERY 6 HOURS PRN
Qty: 28 TABLET | Refills: 0 | Status: SHIPPED | OUTPATIENT
Start: 2022-11-15 | End: 2022-11-22

## 2022-11-17 ENCOUNTER — APPOINTMENT (OUTPATIENT)
Dept: PHYSICAL THERAPY | Age: 62
End: 2022-11-17
Payer: COMMERCIAL

## 2022-11-21 ENCOUNTER — HOSPITAL ENCOUNTER (OUTPATIENT)
Dept: PHYSICAL THERAPY | Age: 62
Setting detail: THERAPIES SERIES
Discharge: HOME | End: 2022-11-21
Payer: COMMERCIAL

## 2022-11-21 PROCEDURE — 97110 THERAPEUTIC EXERCISES: CPT | Performed by: PHYSICAL THERAPIST

## 2022-11-21 PROCEDURE — 97140 MANUAL THERAPY 1/> REGIONS: CPT | Performed by: PHYSICAL THERAPIST

## 2022-11-22 ENCOUNTER — HOSPITAL ENCOUNTER (OUTPATIENT)
Dept: PHYSICAL THERAPY | Age: 62
Setting detail: THERAPIES SERIES
Discharge: HOME | End: 2022-11-22
Payer: COMMERCIAL

## 2022-11-22 ENCOUNTER — OFFICE VISIT (OUTPATIENT)
Dept: ENT CLINIC | Age: 62
End: 2022-11-22
Payer: COMMERCIAL

## 2022-11-22 ENCOUNTER — PROCEDURE VISIT (OUTPATIENT)
Dept: AUDIOLOGY | Age: 62
End: 2022-11-22

## 2022-11-22 VITALS
DIASTOLIC BLOOD PRESSURE: 77 MMHG | WEIGHT: 194 LBS | SYSTOLIC BLOOD PRESSURE: 142 MMHG | HEART RATE: 79 BPM | HEIGHT: 64 IN | BODY MASS INDEX: 33.12 KG/M2

## 2022-11-22 DIAGNOSIS — G43.809 VESTIBULAR MIGRAINE: ICD-10-CM

## 2022-11-22 DIAGNOSIS — R42 DIZZINESS AND GIDDINESS: ICD-10-CM

## 2022-11-22 DIAGNOSIS — H81.02 MENIERE'S DISEASE OF LEFT EAR: Primary | ICD-10-CM

## 2022-11-22 DIAGNOSIS — H90.3 SENSORINEURAL HEARING LOSS, BILATERAL: Primary | ICD-10-CM

## 2022-11-22 DIAGNOSIS — R42 DIZZINESS: ICD-10-CM

## 2022-11-22 DIAGNOSIS — H90.3 SENSORINEURAL HEARING LOSS (SNHL) OF BOTH EARS: ICD-10-CM

## 2022-11-22 DIAGNOSIS — J30.81 ALLERGIC RHINITIS DUE TO ANIMAL HAIR AND DANDER: ICD-10-CM

## 2022-11-22 DIAGNOSIS — H93.13 TINNITUS, BILATERAL: ICD-10-CM

## 2022-11-22 PROCEDURE — 97112 NEUROMUSCULAR REEDUCATION: CPT

## 2022-11-22 PROCEDURE — 97530 THERAPEUTIC ACTIVITIES: CPT

## 2022-11-22 PROCEDURE — 99214 OFFICE O/P EST MOD 30 MIN: CPT | Performed by: OTOLARYNGOLOGY

## 2022-11-22 PROCEDURE — 97140 MANUAL THERAPY 1/> REGIONS: CPT

## 2022-11-22 RX ORDER — TRIAMTERENE AND HYDROCHLOROTHIAZIDE 37.5; 25 MG/1; MG/1
1 CAPSULE ORAL DAILY
Qty: 90 CAPSULE | Refills: 3 | Status: SHIPPED | OUTPATIENT
Start: 2022-11-22

## 2022-11-22 RX ORDER — EPINEPHRINE 0.3 MG/.3ML
0.3 INJECTION SUBCUTANEOUS ONCE
Qty: 2 EACH | Refills: 1 | Status: SHIPPED | OUTPATIENT
Start: 2022-11-22 | End: 2022-11-22

## 2022-11-22 ASSESSMENT — ENCOUNTER SYMPTOMS
PHOTOPHOBIA: 0
SHORTNESS OF BREATH: 0
COLOR CHANGE: 0
COUGH: 0
SORE THROAT: 0
RHINORRHEA: 0
EYE PAIN: 0
SINUS PAIN: 0
EYE ITCHING: 0
DIARRHEA: 0
NAUSEA: 0
VOICE CHANGE: 0
FACIAL SWELLING: 0
SINUS PRESSURE: 0
TROUBLE SWALLOWING: 0
CHOKING: 0
EYE REDNESS: 0
STRIDOR: 0

## 2022-11-22 NOTE — Clinical Note
Dr. Konstantin Sanford,  Please see note from this patient's audiogram.  Please let me know if there is anything further you need.    Jaun Dose [address] Audiologist

## 2022-11-22 NOTE — PROGRESS NOTES
[facility]  [address], [address] [facility] [address], [address] [place] [address] Alicia  P: [phone]  F: [phone]       Patient     Fely Aden  1960    ChiefComplaint     Chief Complaint   Patient presents with    Follow-up    Dizziness         History of Present Illness     Fely Aden is a pleasant 60 y.o. female here for 1 month follow-up for suspected left-sided Ménière's, possible atypical migraine. Since beginning Dyazide, no more fullness or tinnitus. No vertigo. She is getting some occasional migraine aura, feels it is linked to estrogen or stress. Has been attending balance therapy with some improvement of symptoms. There is concern for possible cervical neck muscle tightness as a trigger. Repeat audiogram performed today reveals resolution of low-frequency left-sided sensorineural hearing loss. No side effect from the Dyazide.   Overall very satisfied with symptom improvement    Past Medical History     Past Medical History:   Diagnosis Date    Arthritis     rheumatoid arthritis    Fibromyalgia     Hypertension     Migraine     MVP (mitral valve prolapse)     Nausea & vomiting     PONV (postoperative nausea and vomiting)     Reflux Doesn't have anymore r/t weight loss    Thyroid disease     goiter treated with radioactive med    Wears glasses     Wears glasses        Past Surgical History     Past Surgical History:   Procedure Laterality Date    ANKLE ARTHROSCOPY Right 3/12    lateral ligament repair, removal spurs    ANKLE SURGERY      right    BACK SURGERY      cyst removed off lower spine    CERVICAL FUSION N/A 12/11/2019    C4-5, C5-6, C6-7 ANTERIOR CERVICAL DISCECTOMY AND FUSION performed by Mercedes Gustafson MD at [address]      left    JOINT REPLACEMENT Left 2019    KNEE ARTHROSCOPY Left     LUMBAR FUSION N/A 1/6/2022    L3-4, L4-5, L5-S1 ANTERIOR LUMBAR INTERBODY FUSION WITH PEDICLE SCREW FIXATION performed by Mercedes Gustafson MD at [address] REVISION TOTAL KNEE ARTHROPLASTY Left 8/28/2020    LEFT TOTAL KNEE REVISION 2 COMPONENT performed by Juliet Kessler MD at [address] [place] [address] Left 1/26/2021    LEFT KNEE PATELLA REVISON WITH ANTERIOR SYNOVECTOMY performed by Juliet Kessler MD at [facility] ARTHROSCOPY Left 8/5/2022    DIAGNOSTIC LEFT SHOULDER ARTHROSCOPY, EXAM UNDER ANESTHESIA, EXTENSIVE DEBRIDEMENT, LYSIS OF ADHESIONS, REVISION  ROTATOR CUFF REPAIR WITH PATCH AUGMENTATION, SUBACROMIAL DECOMPRESSION performed by Eris Fung MD at [address] [place] [address]      bilateral    TONSILLECTOMY      T & A    WRIST SURGERY Right        Family History     Family History   Problem Relation Age of Onset    Diabetes Mother     Heart Disease Father         enlarged heart    Cancer Father     Cancer Brother     Other Sister        Social History     Social History     Socioeconomic History    Marital status:      Spouse name: Not on file    Number of children: Not on file    Years of education: Not on file    Highest education level: Not on file   Occupational History    Not on file   Tobacco Use    Smoking status: Never    Smokeless tobacco: Never   Vaping Use    Vaping Use: Never used   Substance and Sexual Activity    Alcohol use: Not Currently    Drug use: No    Sexual activity: Not on file   Other Topics Concern    Not on file   Social History Narrative    Not on file     Social Determinants of Health     Financial Resource Strain: Not on file   Food Insecurity: Not on file   Transportation Needs: Not on file   Physical Activity: Not on file   Stress: Not on file   Social Connections: Not on file   Intimate Partner Violence: Not on file   Housing Stability: Not on file       Allergies     Allergies   Allergen Reactions    Latex Dermatitis    Adhesive Tape      blisters    Benzoin      Other reaction(s):  Other (See Comments), Other (See Comments)  Blistering of her skin  Blistering of her skin      Gluten Nausea Only    Plaquenil [Hydroxychloroquine Sulfate]      Rash      Vicodin [Hydrocodone-Acetaminophen]      Says she gets a rash after taking it for 3 straight days      Benzamide Derivatives Rash     Benzoin   topical    Codeine Nausea And Vomiting    Methotrexate Nausea And Vomiting and Nausea Only     Other reaction(s): Vomiting       Medications     Current Outpatient Medications   Medication Sig Dispense Refill    EPINEPHrine (EPIPEN 2-RODOLFO) 0.3 MG/0.3ML SOAJ injection Inject 0.3 mLs into the muscle once for 1 dose Use as directed for allergic reaction 2 each 1    triamterene-hydroCHLOROthiazide (DYAZIDE) 37.5-25 MG per capsule Take 1 capsule by mouth daily 90 capsule 3    traMADol (ULTRAM) 50 MG tablet Take 1 tablet by mouth every 6 hours as needed for Pain for up to 7 days. Intended supply: 7 days. Take lowest dose possible to manage pain 28 tablet 0    famotidine (PEPCID) 40 MG tablet Take 1 tablet by mouth every evening 30 tablet 3    celecoxib (CELEBREX) 200 MG capsule Take 1 capsule by mouth daily 60 capsule 2    spironolactone (ALDACTONE) 50 MG tablet       progesterone (PROMETRIUM) 200 MG CAPS capsule       celecoxib (CELEBREX) 200 MG capsule Take 1 capsule by mouth daily 60 capsule 3    ondansetron (ZOFRAN) 4 MG tablet Take 1 tablet by mouth every 8 hours as needed for Nausea or Vomiting 30 tablet 0    senna (SENOKOT) 8.6 MG tablet Take 1 tablet by mouth in the morning and 1 tablet before bedtime.  60 tablet 11    levothyroxine (SYNTHROID) 75 MCG tablet Take 75 mcg by mouth in the morning.      etanercept (ENBREL) 25 MG/0.5ML SOSY       aluminum & magnesium hydroxide-simethicone (MAALOX) 200-200-20 MG/5ML SUSP suspension Take 15 mLs by mouth every 6 hours as needed for Indigestion 1 each 0    polycarbophil (FIBERCON) 625 MG tablet Take 1 tablet by mouth daily 30 tablet 0    sennosides-docusate sodium (SENOKOT-S) 8.6-50 MG tablet Take 1 tablet by mouth 2 times daily      folic acid (FOLVITE) 1 MG tablet Take 1 mg by mouth in the morning. progesterone (PROMETRIUM) 200 MG capsule Take 600 mg by mouth nightly      triamcinolone (KENALOG) 0.025 % cream Apply topically every 4 hours as needed Apply Topically itchy skin      amLODIPine (NORVASC) 10 MG tablet Take 10 mg by mouth daily       thyroid (ARMOUR) 240 MG tablet Take 120 mg by mouth daily        No current facility-administered medications for this visit. Review of Systems     Review of Systems   Constitutional:  Negative for chills, fatigue and fever. HENT:  Negative for congestion, ear discharge, ear pain, facial swelling, hearing loss, nosebleeds, postnasal drip, rhinorrhea, sinus pressure, sinus pain, sneezing, sore throat, tinnitus, trouble swallowing and voice change. Eyes:  Negative for photophobia, pain, redness, itching and visual disturbance. Respiratory:  Negative for cough, choking, shortness of breath and stridor. Gastrointestinal:  Negative for diarrhea and nausea. Musculoskeletal:  Negative for neck pain and neck stiffness. Skin:  Negative for color change and rash. Neurological:  Negative for dizziness, facial asymmetry and light-headedness. Hematological:  Negative for adenopathy. Psychiatric/Behavioral:  Negative for agitation and confusion. PhysicalExam     Vitals:    11/22/22 1000   BP: (!) 142/77   Pulse: 79       Physical Exam  Constitutional:       Appearance: She is well-developed. HENT:      Head: Normocephalic and atraumatic. Jaw: No trismus. Right Ear: Tympanic membrane, ear canal and external ear normal. No drainage. No middle ear effusion. Tympanic membrane is not perforated. Left Ear: Tympanic membrane, ear canal and external ear normal. No drainage. No middle ear effusion. Tympanic membrane is not perforated. Nose: No septal deviation, mucosal edema or rhinorrhea. Mouth/Throat:      Dentition: Normal dentition. Pharynx: Uvula midline. No oropharyngeal exudate.    Eyes: General: No scleral icterus. Right eye: No discharge. Left eye: No discharge. Pupils: Pupils are equal, round, and reactive to light. Neck:      Thyroid: No thyromegaly. Trachea: Phonation normal. No tracheal deviation. Pulmonary:      Effort: Pulmonary effort is normal. No respiratory distress. Breath sounds: No stridor. Musculoskeletal:      Cervical back: Neck supple. Lymphadenopathy:      Cervical: No cervical adenopathy. Skin:     General: Skin is warm and dry. Neurological:      Mental Status: She is alert and oriented to person, place, and time. Cranial Nerves: No cranial nerve deficit. Psychiatric:         Behavior: Behavior normal.         Procedure           Assessment and Plan     1. Meniere's disease of left ear  Patient Ménière's disease improved with Dyazide. I will repeat audiogram reveals resolution of low-frequency hearing loss. Hearing loss and tinnitus resolved. No dizzy episodes. No side effects from Dyazide. Continue medication. - triamterene-hydroCHLOROthiazide (DYAZIDE) 37.5-25 MG per capsule; Take 1 capsule by mouth daily  Dispense: 90 capsule; Refill: 3    2. Vestibular migraine      3. Dizziness      4. Sensorineural hearing loss (SNHL) of both ears      5. Allergic rhinitis due to animal hair and dander  Patient does endorse anaphylactic reaction history to dogs. Has not had any issues recently but would like a refill on EpiPen. Proper administration risk medication discussed. EpiPen refill provided. - EPINEPHrine (EPIPEN 2-RODOLFO) 0.3 MG/0.3ML SOAJ injection; Inject 0.3 mLs into the muscle once for 1 dose Use as directed for allergic reaction  Dispense: 2 each; Refill: 1    Return in about 6 months (around 5/22/2023). Portions of this note were dictated using Dragon.  There may be linguistic errors secondary to the use of this program.

## 2022-11-22 NOTE — PROGRESS NOTES
Sean Ghotra   1960, 60 y.o. female   [id number]       Referring Provider: Sherren Blender, DO  Referral Type: In an order in [address] [place] [address]    Reason for Visit: Evaluation of suspected change in hearing, tinnitus, or balance. ADULT AUDIOLOGIC EVALUATION      Sean Ghotra is a 60 y.o. female seen today, 11/22/2022, for a recheck audiologic evaluation. This is her first hearing test in this office, however, she does have a previous outside audiogram from 2021 in the media tab. AUDIOLOGIC AND OTHER PERTINENT MEDICAL HISTORY:        Sean Ghotra noted history of dizziness, had two severe attacks, most recently 2 years ago; tinnitus bilaterally, noted it seems improved compared to her last appointment with Dr. Opal Finch; some popping in ears; some noise exposure from , wears HPDs now; many in family on father's side with hearing loss and noise exposure; as a child had many ear infections that resulted in tonsils and adenoids being removed and lanced ear drums. Glen Tejeda denied otalgia and otorrhea. IMPRESSIONS:       Today's results are consistent with bilateral high frequency sensorineural hearing loss, LE>RE, with excellent word recogntiion for soft conversational speech in both ears; right ear with hypermobile TM and left ear with normal middle ear function. Hearing loss is significant enough to result in difficulty understanding speech in at least some  listening environments, such as those with background noise. Discussed tinnitus management strategies and safe listenin    ASSESSMENT AND FINDINGS:       Otoscopy revealed: Clear ear canals bilaterally      RIGHT EAR:  Hearing Sensitivity: Within normal limits to mild sensorineural hearing loss. Speech Recognition Threshold: 15 dBHL  Word Recognition: Excellent (96%), based on NU-6 25-word list at 45 dBHL using recorded speech stimuli.       Tympanometry: Normal peak pressure with high compliance, Type Ad tympanogram, consistent with hypermobile tympanic membrane. LEFT EAR:  Hearing Sensitivity: Within normal limits through 3000 Hz sloping to moderate sensorineural hearing loss. Speech Recognition Threshold: 15 dBHL  Word Recognition: Excellent (100%), based on NU-6 25-word list at 45 dBHL using recorded speech stimuli. Tympanometry: Normal peak pressure and compliance, Type A tympanogram, consistent with normal middle ear function. NOTE: An asymmetry of 15 dBHL is present at 4000 Hz with left ear worse than right ear. Reliability: Good  Transducer: Inserts    See scanned audiogram dated 11/22/2022 for results. PATIENT EDUCATION:       The following items were discussed with the patient:   - Good Communication Strategies  - Tinnitus Management Strategies    - Noise-Induced Hearing Loss and use of Hearing Protection Devices (HPDs)   - Dizziness    Educational information was shared in the After Visit Summary. RECOMMENDATIONS:                                                                                                                                                                                                                                                                      The following items are recommended based on patient report and results from today's appointment:  - Continue medical follow-up with Sridhar Cobb DO.  - Retest hearing as medically indicated and/or sooner if a change in hearing is noted. - Utilize \"Good Communication Strategies\" as discussed to assist in speech understanding with communication partners. - Maintain a sound enriched environment to assist in the management of tinnitus symptoms.  - Use hearing protection devices (HPDs), such as protective ear muffs and ear plugs, when exposed to dangerous sound levels. - If medically indicated, consider vestibular evaluation to further investigate symptoms of dizziness.          Yeny Narvaez [address], AuD  Audiologist       Chart CC'd to:  Leticia Marianna, DO      Degree of   Hearing Sensitivity dB Range   Within Normal Limits (WNL) 0 - 20   Mild 20 - 40   Moderate 40 - 55   Moderately-Severe 55 - 70   Severe 70 - 90   Profound 90 +

## 2022-11-22 NOTE — PROGRESS NOTES
[facility]  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 (ICD-10-CM) - Meniere's disease of left ear    R42 Dizziness                                     Insurance/Certification information:  PT Insurance Information: [employer] ($30 copay; 20 visits; prior auth- [employer])  Physician Information:  Yadira Crowder DO   Plan of care signed:    [x] Yes  [] No    Date of Patient follow up with Physician: Dr Deidre Ahumada today      Progress Report: [x]  Yes  []  No     Date Range for reporting period:  Beginning:  10/27/22  Endin22    Progress report due (10 Rx/or 30 days whichever is less):      Recertification due (POC duration/ or 90 days whichever is less): 23     Visit # Insurance Allowable Auth Needed    10 visits by 22 [x]Yes   []No     RESTRICTIONS/PRECAUTIONS: fibromyalgia, RA, currently in PT for L RC surgery; cervical fusion (3 level)  Latex Allergy:  []NO      [x]YES- adhesives  Preferred Language for Healthcare:   [x]English       [x]other:  Functional Scale: FOTO 54; [address] [place] [address] 80%; FGA   Date assessed:10/27/22    Functional Scale: [address] [place] [address] 44% Date assessed: 22      Dizziness level:  2-3/10 at rest; 10/10 during previous 2 episodes of vertigo       SUBJECTIVE:  Pt reports she is taking amlodipine at night; water pill and the new BP med from Dr. Deidre Ahumada in the morning. Pt reports her her ears are no longer ringing and she has had no further episodes of vertigo. Denies headache today. But does get aura, feels it is linked to estrogen or stress. Pt c/o some veering when walking, but no significant LOB. Of note, pt has put her father in hospice recently.     OBJECTIVE: Observation: Pt amb WFL    Test measurements:    Cervical AROM: 40* R/L rotation, 30* flex/ext, 20* R/L side bend      leg length discrepancy: not significant, L LE <1/2 cm shorter than R    Neck differentiation testing: dizziness 1/10 baseline, no change w/ body to R, increase to 2/10 w/ body to L  Neck differentiation w/ smooth pursuits: WNL      Exercises/Interventions: Exercises in bold performed in department today. Items not bolded are carried forward from prior visits for continuity of the record. Exercise/Equipment Resistance/Repetitions HEP Other comments   Nustep/stationary bike Initiate cardio next  []      []    Cervical stretching    []    Sidebend R/L 20 sec x 3 each  [] Seated w/ UE same si   Rotation R/L  20 sec x 3 each  [] Seated, VC for cervical retraction to neutral prior to initiating rotation   Postural education Educ pt on maintaining cervical neutral especially prior to cervical ROM or UE reaching to reduce stress and pain in neck  []      []      []      []      []    Habituation if needed   []      []      []      []      []      []      []      []    TA: see assessments above. Educated pt to perform cervical HEP within pain-free ROM, do not force past first point of resistance and discontinuing if has any increased pain, tingling in UE, etc. Pt verbalized understanding. Home Exercise Program:   Access Code: [email or web address]  URL: [email or web address]  Date: 11/22/2022  Prepared by: Kat Floyd  Exercises  Seated Cervical Sidebending AROM - 1 x daily - 7 x weekly - 1 sets - 3 reps - 20-30 hold  Seated Cervical Rotation AROM - 1 x daily - 7 x weekly - 1 sets - 3 reps - 20-30 hold  Seated Cervical Retraction - 1 x daily - 7 x weekly - 1 sets - 3 reps - 20-30 hold      Therapeutic Exercise:   [] (20471) Provided verbal/tactile cueing for activities related to strengthening, flexibility, endurance, ROM for improvements in LE, proximal hip, and core control with self-care, mobility, lifting, ambulation. NMR:  [x] (00957) Provided verbal/tactile cueing for activities related to improving balance, coordination, kinesthetic sense, posture, motor skill, proprioception to assist with LE, proximal hip, and core control in self-care, mobility, lifting, ambulation and eccentric single leg control. Therapeutic Activities:    [x] (88016) Provided verbal/tactile cueing for activities related to improving balance, coordination, kinesthetic sense, posture, motor skill, proprioception and motor activation to allow for proper function of core, proximal hip and LE with self-care and ADLs and functional mobility. Gait Training:    [] (84942) Provided training and instruction to the patient for proper LE, core and proximal hip recruitment and positioning and eccentric body weight control with ambulation re-education including up and down stairs     Manual Treatments:  PROM / STM / Oscillations-Mobs:  G-I, II, III, IV (PA's, Inf., Post.)  [x] (15713) Provided manual therapy to mobilize LE, proximal hip and/or LS spine soft tissue/joints for the purpose of modulating pain, promoting relaxation,  increasing ROM, reducing/eliminating soft tissue swelling/inflammation/restriction, improving soft tissue extensibility and allowing for proper ROM for normal function with self-care, mobility, lifting and ambulation. Pt positioned in supine, STM to B upper trap, most time spent on L due to pt w/ c/o more tension on L. Trigger point release to more medial portion of L upper trap, gentle STM to L paraspinals. Assessed cervical rotation and sidenbending PROM, L rotation slightly more restricted than R. No dizziness w/ all brief PROM assessment. Placed kinesiotape test strip on L upper trap to determine pt tolerance in presence of adhesive allergy. Pt may benefit from use of kinesiotape for L upper trap inhibition due to severely increased resting muscle tension.  Pt given written instructions for c/o dizziness. R rotation more symptomatic today  [x] Progressing: [] Met: [] Not Met: [] Adjusted  5. Pt will report no falls for 4 weeks. No falls for past 3 weeks   [x] Progressing: [] Met: [] Not Met: [] Adjusted  6. Pt will return to walking for exercise with dizziness 1/10 or less. Has not attempted due to other family  issues. [x] Progressing: [] Met: [] Not Met: [] Adjusted      ASSESSMENT:  Pt met 0/6 LT goals. Pt presents w/ significant improvement in dizziness and tinnitus following medication changes made by ENT to address possible Meniere's. Pt has had not balance difficulties or further episodes of vertigo. Pt w/ ongoing mild dizziness, only reproduced w/ R cervical rotation AROM. Pt presents w/ significant muscle tone throughout B upper traps, L > R, which also the side of recent L RC repair. Pt w/ restricted cervical AROM all directions symmetrically likely due to remote cervical fusion. Pt will benefit from STM to reduce tension and possibly dizziness related to L upper trap tightness. Treatment/Activity Tolerance:  [x] Patient tolerated treatment well [] Patient limited by fatique  [] Patient limited by pain  [] Patient limited by other medical complications  [] Other:     Overall Progression Towards Functional goals/ Treatment Progress Update:  [x] Patient is progressing as expected towards functional goals listed. - pt not seen for past 3 weeks due to awaiting prior auth and scheduling issues. [] Progression is slowed due to complexities/Impairments listed. [] Progression has been slowed due to co-morbidities.   [] Plan just implemented, too soon to assess goals progression <30days   [] Goals require adjustment due to lack of progress  [] Patient is not progressing as expected and requires additional follow up with physician  [] Other    Prognosis for POC: [x] Good [] Fair  [] Poor    Patient requires continued skilled intervention: [x] Yes  [] No        PLAN: 2x/week for 4 weeks  [x] Continue per plan of care [] Alter current plan (see comments)  [] Plan of care initiated [] Hold pending MD visit [] Discharge    Electronically signed by: Doyle Pierre PT, DPT    Note: If patient does not return for scheduled/recommended follow up visits, this note will serve as a discharge from care along with the most recent update on progress.

## 2022-11-22 NOTE — PATIENT INSTRUCTIONS
Good Communication Strategies    Communication can be challenging for anyone, but can be especially difficult for those with some degree of hearing loss. While we may not be able to control every factor that may lead to difficulty with communication, there are Good Communication Strategies that we can all use in our day-to-day lives. Communication takes both parties working together for it to be successful. Tips as a Listener:   Control your environment. It is important to limit the amount of background noise in the room when possible. You should also consider having a good light source in the room to best see the other person. Ask for clarification. Instead of saying \"What?\", you can use parts of what you heard to make a new question. For example, if you heard the word \"Thursday\" but not the rest of the week, you may ask \"What was that about Thursday? \" or \"What did you want to do Thursday? \". This shows the person talking that you are listening and will help them better explain what they are saying. Be an advocate for yourself. If you are hearing but not understanding, tell the other person \"I can hear you, but I need you to slow down when you speak. \"  Or if someone is facing the other direction, say \"I cannot hear you when you are not looking at me when we talk. \"       Tips as a Talker:   - Sit or stand 3 to 6 feet away to maximize audibility         -- It is unrealistic to believe someone else will fully hear your message if you are speaking from across the room or in a different room in the house   - Stay at eye level to help with visual cues   - Make sure you have the persons attention before speaking   - Use facial expressions and gestures to accentuate your message   - Raise your voice slightly (do not scream)   - Speak slowly and distinctly   - Use short, simple sentences   - Rephrase your words if the person is having a hard time understanding you    - To avoid distortion, dont speak directly into a persons ear      Some additional items that may be helpful:   - Remain patient - this is important for both parties   - Write down items that still cannot be heard/understood. You may write with pen/paper or consider typing/texting on a cell phone or smart device. - If background noise is unavoidable, try to keep yourself in a good position in the room. By sitting at a casanova on the side of the restaurant (preferably a corner), it will be easier to communicate than if you were sitting at a table in the middle with background noise surrounding you. Keep yourself positioned away from music speakers or heavy foot traffic.   - If you have difficulty with the television, consider these options:      -- Use closed-captioning, which is a setting you can turn on that displays the spoken words in a written form on the screen. There may be a slight delay, but this can help fill in missing information. This can be especially helpful when watching programs with accented speech. -- Consider use of a sound bar or speakers that come from the front of the TV. With modern flat screen TVs, many of them have speakers that come out of the back of the device, which makes sound bounce off the wall behind it, then go into the room. Sound bars can allow the sound to go straight in your direction and can improve sound quality. -- Consider ear level devices to help improve the volume and/or sound quality of the program.  There are devices that work like headphones that you can adjust the volume for your ears while others can have the volume at a more comfortable level, such as \"TV Ears\". Most hearing aids have devices that allow them to connect directly to the TV and improve sound quality. Tinnitus: Overview and Management Strategies          Many people have some ringing sounds in their ears once in a while. You may hear a roar, a hiss, a tinkle, or a buzz. The sound usually lasts only a few minutes.  If it goes on all the time, you may have tinnitus. Tinnitus is usually caused by long-term exposure to loud noise. This damages the nerves in the inner ear. It can occur with all types of hearing loss. It may be a symptom of almost any ear problem. Tinnitus may be caused by a buildup of earwax. Or, it may be caused by ear infections or certain medicines (especially antibiotics or large amounts of aspirin). You can also hear noises in your ears because of an injury to the ears, drinking too much alcohol or caffeine, or a medical condition. Other conditions may also contribute to tinnitus, including: head and neck trauma, temporomandibular joint disorder (TMJ), sinus pressure and barometric trauma, traumatic brain injury, metabolic disorders, autoimmune disorders, stress, and high blood pressure. You may need tests to evaluate your hearing and to find causes of long-lasting tinnitus. Your doctor may suggest one or more treatments to help you cope with the tinnitus. You can also do things at home to help reduce symptoms. Follow-up care is a key part of your treatment and safety. Be sure to make and go to all appointments, and call your doctor if you are having problems. It's also a good idea to know your test results and keep a list of the medicines you take. How can you care for yourself at home? Limit or cut out alcohol, caffeine, and sodium. They can make your symptoms worse. Do not smoke or use other tobacco products. Nicotine reduces blood flow to the ear and makes tinnitus worse. If you need help quitting, talk to your doctor about stop-smoking programs and medicines. These can increase your chances of quitting for good. Talk to your doctor about whether to stop taking aspirin and similar products such as ibuprofen or naproxen. Get exercise often. It can help improve blood flow to the ear. Ways to manage/cope with tinnitus  Some tinnitus may last a long time.  To manage your tinnitus, try to:  Avoid noises that you think caused your tinnitus. If you can't avoid loud noises, wear earplugs or earmuffs. Ignore the sound by paying attention to other things. Keeping your brain busy with other tasks or background noise can help your brain not focus on the tinnitus. Try to not give the tinnitus an emotional reaction. Do your best to ignore the sound and not let it bother you. Relax using biofeedback, meditation, or yoga. Feeling stressed and being tired can make tinnitus worse. Play music or white noise to help you sleep. Background noise may cover up the noise that you hear in your ears. You can buy a tabletop machine or a device that sits under your pillow to play soothing sounds, like ocean waves. Smart phones have free apps, such as Whist, Relax Melodies, ReSound Relief, and [employer]. These apps have different types of sounds/noise, some of which you can blend together to find sounds that are most soothing to you. Hearing aid technology, especially when there is some hearing loss, may help reduce tinnitus symptoms by giving your brain better access to the sounds it is missing. There are some hearing aids with built-in noise generator programs, which may help when amplification alone is not enough. Additional resources may be found through the [employer] Tinn[employer] Association at www.elvin.org    When should you call for help? Call 911 anytime you think you may need emergency care. For example, call if:    You have symptoms of a stroke. These may include:  Sudden numbness, tingling, weakness, or loss of movement in your face, arm, or leg, especially on only one side of your body. Sudden vision changes. Sudden trouble speaking. Sudden confusion or trouble understanding simple statements. Sudden problems with walking or balance. A sudden, severe headache that is different from past headaches. Call your doctor now or seek immediate medical care if:    You develop other symptoms. These may include hearing loss (or worse hearing loss), balance problems, dizziness, nausea, or vomiting. Watch closely for changes in your health, and be sure to contact your doctor if:    Your tinnitus moves from both ears to one ear. Your hearing loss gets worse within 1 day after an ear injury. Your tinnitus or hearing loss does not get better within 1 week after an ear injury. Your tinnitus bothers you enough that you want to take medicines to help you cope with it. If you notice changes in your tinnitus and/or your hearing, it is recommended that you have your hearing tested by your audiologist and to follow-up with your physician that manages your hearing loss (such as your ENT or Primary Care doctor). Noise-Induced Hearing Loss  What it is, and what you can do to prevent it    Exposure to loud sounds, in an occupational setting or recreational, can cause permanent hearing loss. Sound is measured in decibels (dB). Noise-induced hearing loss is the ONLY type of preventable hearing loss. Hearing loss related to noise exposure can occur at any age. There are small sensory cells, called inner and outer hair cells, within the inner ear (cochlea). These cells process the loudness (intensity) and pitch (frequency) of sound and send the signal to the brain via our auditory nerve (vestibulocochlear nerve, cranial nerve VIII). When these cells are damaged, they can result in permanent hearing loss and/or tinnitus. The hair cells responsible for high frequency sounds, like birds chirping, are most likely to be damaged due to loud sounds. The high frequency sounds are also very important for our clarity and understanding of speech. OCCUPATIONAL NOISE EXPOSURE RECREATIONAL NOISE EXPOSURE   Some jobs may have exposure to loud sounds in the workplace.   These jobs may include but are not limited to:  [employer] settings  Manufacturing  Construction  Welding  Landscaping  Hairdressing/hairstyling  [facility]   . .. And more! Many activities outside of work may cause permanent hearing loss. These activities may include but are not limited to:  Lawnmowers, leaf blowers  Farming equipment and animals (such as pigs squealing)  Chainsaws and other power tools  Playing musical instruments and/or singing  Listening to music too loudly - at concerts, through stereo, through ear buds or headphones  Attending sporting events  Attending fireworks shows or using fireworks at home  Use of firearms  . .. And more! REDUCE OR PROTECT YOUR EARS FROM NOISE EXPOSURE    To do your best to avoid noise-induced hearing loss, here are some tips:  Limit exposure to loud sounds. 85 dB (decibels) is safe for 8 hours. As sounds are louder, the length of time the sound is safe lessens. These numbers are cumulative across a 24-hour period. (NIOSH and CDC, 2002)  85 dB is safe for 8 hours  88 dB is safe for 4 hours  91 dB is safe for 2 hours  94 dB is safe for 1 hour  97 dB is safe for 30 minutes  100 dB is safe for 15 minutes  103 dB is safe for 7.5 minutes  106 dB is safe for 3.75 minutes  109 dB is safe for LESS THAN 2 minutes  112 dB is safe for LESS THAN 1 minute  115 dB is safe for ~ 30 seconds  130 dB can cause IMMEDIATE hearing loss  If you are unsure if a sound is too loud, consider checking the sound level with a \"sound level meter\". There are apps on smart devices, such as \"Decibel X\", that can measure the loudness of the sound. They are not as accurate as expensive equipment used by scientists, but it will give you a guesstimate of how loud the sound is, and if it may be damaging to your hearing. If you cannot avoid loud sounds, here are ways to reduce your exposure:  1. Wear hearing protection  Ear plugs and protective ear muffs can be used to reduce the intensity of the sound.   The higher the NRR (noise reduction rating), the better reduction of the intensity of the sound   2. Turn the volume down  When listening to music, turn the volume down, especially when wearing ear buds or headphones. A good rule of thumb is to not go beyond the middle setting on your device. If you can't hear someone talking to you from arm's length away, your music may be at a level that it can cause damage. If someone else can hear your music from 3 feet away, it may also be at a level that it can cause damage. 3. Walk away from the sound  If you do not have the ability to wear hearing protection or turn down the volume of the sound, you should do your best to move away from the source of the sound. Sound decreases in intensity as we move further from the source. The sound will decrease by 6 dB for every doubling of distance from the sound source. TYPES OF HEARING PROTECTION    The most common types of hearing protection are protective ear muffs and ear plugs. Protective ear muffs are commonly found at home improvement or sporting good stores, they can be worn time and time again and are great if you need to take your hearing protection off frequently. Ear plugs are often made of foam or soft silicone. The foam ones are designed for one-time use, while silicone ear plugs may be used multiple times. There are also \"filtered\" ear plugs that help provide even attenuation of the sound across all frequencies. These are great for listening to music or going to concerts, and allow for better understanding of speech in louder environments. They can be purchased at music stores or online retailers (search \"Ety Plugs\" or \"filtered ear plugs\"), or custom earmolds can be made with an audiologist.    There are \"custom\" hearing protection devices that you can further discuss with your audiologist based on your specific needs, if desired. Exposure to these sounds may cause permanent damage to your hearing.   If you suspect your hearing has changed, it is recommended that you have your hearing tested by your audiologist.       Dizziness: Care Instructions  Your Care Instructions  Dizziness is the feeling of unsteadiness or fuzziness in your head. It is different than having vertigo, which is a feeling that the room is spinning or that you are moving or falling. It is also different from lightheadedness, which is the feeling that you are about to faint. It can be hard to know what causes dizziness. Some people feel dizzy when they have migraine headaches. Sometimes bouts of flu can make you feel dizzy. Some medical conditions, such as heart problems or high blood pressure, can make you feel dizzy. Many medicines can cause dizziness, including medicines for high blood pressure, pain, or anxiety. If a medicine causes your symptoms, your doctor may recommend that you stop or change the medicine. If it is a problem with your heart, you may need medicine to help your heart work better. If there is no clear reason for your symptoms, your doctor may suggest watching and waiting for a while to see if the dizziness goes away on its own. Follow-up care is a key part of your treatment and safety. Be sure to make and go to all appointments, and call your doctor if you are having problems. It's also a good idea to know your test results and keep a list of the medicines you take. How can you care for yourself at home? If your doctor recommends or prescribes medicine, take it exactly as directed. Call your doctor if you think you are having a problem with your medicine. Do not drive while you feel dizzy. Try to prevent falls. Steps you can take include:  Using nonskid mats, adding grab bars near the tub, and using night-lights. Clearing your home so that walkways are free of anything you might trip on. Letting family and friends know that you have been feeling dizzy. This will help them know how to help you.     When should you call for help?  Call 911 anytime you think you may need emergency care. For example, call if:    You passed out (lost consciousness). You have dizziness along with symptoms of a heart attack. These may include:  Chest pain or pressure, or a strange feeling in the chest.  Sweating. Shortness of breath. Nausea or vomiting. Pain, pressure, or a strange feeling in the back, neck, jaw, or upper belly or in one or both shoulders or arms. Lightheadedness or sudden weakness. A fast or irregular heartbeat. You have symptoms of a stroke. These may include:  Sudden numbness, tingling, weakness, or loss of movement in your face, arm, or leg, especially on only one side of your body. Sudden vision changes. Sudden trouble speaking. Sudden confusion or trouble understanding simple statements. Sudden problems with walking or balance. A sudden, severe headache that is different from past headaches. Call your doctor now or seek immediate medical care if:    You feel dizzy and have a fever, headache, or ringing in your ears. You have new or increased nausea and vomiting. Your dizziness does not go away or comes back. Watch closely for changes in your health, and be sure to contact your doctor if:    You do not get better as expected. Where can you learn more? Go to https://CueThink.eMindful. org and sign in to your [employer] account. Enter F734 in the emploi.us box to learn more about \"Dizziness: Care Instructions. \"     If you do not have an account, please click on the \"Sign Up Now\" link. Current as of: September 23, 2018  Content Version: 11.9  © 5261-5502 [employer]. Care instructions adapted under license by [facility] ([facility]). If you have questions about a medical condition or this instruction, always ask your healthcare professional. [place] [address] any warranty or liability for your use of this information.

## 2022-11-23 ENCOUNTER — HOSPITAL ENCOUNTER (OUTPATIENT)
Dept: PHYSICAL THERAPY | Age: 62
Setting detail: THERAPIES SERIES
Discharge: HOME | End: 2022-11-23
Payer: COMMERCIAL

## 2022-11-23 PROCEDURE — 97110 THERAPEUTIC EXERCISES: CPT | Performed by: PHYSICAL THERAPIST

## 2022-11-23 PROCEDURE — 97140 MANUAL THERAPY 1/> REGIONS: CPT | Performed by: PHYSICAL THERAPIST

## 2022-11-24 NOTE — FLOWSHEET NOTE
The [facility] and [address],  Sports Performance and Rehabilitation, Formerly [facility] [address]  793 [facility],5Th Floor   [place] Su[place]  Phone: [phone]  Fax: [phone]       Physical Therapy Treatment Note/ Progress Report:           Date:  2022    Patient Name:  Anitra Devi    :  1960  MRN: [id number]  Restrictions/Precautions:    Medical/Treatment Diagnosis Information:  Diagnosis: Z98.890 (ICD-10-CM) - S/P left rotator cuff repair  Treatment Diagnosis: Left shoulder pain, decreased ROM and strength  Insurance/Certification information: [address], [facility]  Physician Information:  Roberto Temple MD  Has the plan of care been signed (Y/N):        []  Yes  [x]  No     Date of Patient follow up with Physician:     Assessment Summary: Date Range for reporting period:  Beginnin22  Endin40      Recertification will be due (POC Duration  / 90 days whichever is less): 22          Visit # Insurance Allowable Auth Required   In Person  24 through / []  Yes     []  No    Tele Health   []  Yes     []  No    Total 19         Functional Scale: FOTO 16   Date assessed:     Latex Allergy:  [x]NO      []YES  Preferred Language for Healthcare:   [x]English       []other:      Pain level:  5/10         SUBJECTIVE:       \"Still achy\"      OBJECTIVE:  20% cueing with hep           RESTRICTIONS/PRECAUTIONS: Large RCR w/ patch and biceps tenodesis restrictions.     Exercises/Interventions: HEP code: [id number]  Therapeutic Ex (91882) HEP 22     Warm-up   LATEX ALLERGY    Pulleys  3'      UBE              TABLE       Cane flexion      Supine er @ 0  30x     Standing ball roll        Table slide        Seated cane er @ 0        Supine press (2 hands)  20x      Reacher   20x     Prone: rows, ext's   25x     T band pinches   25x green     Wall walk   5 x 10\" Manual: PROM into shoulder flexion, abduction, ER and IR to 30° - 28'       Therapeutic Exercise and NMR EXR  [x] (02363) Provided verbal/tactile cueing for activities related to strengthening, flexibility, endurance, ROM  for improvements in scapular, scapulothoracic and UE control with self care, reaching, carrying, lifting, house/yardwork, driving/computer work.    [] (32536) Provided verbal/tactile cueing for activities related to improving balance, coordination, kinesthetic sense, posture, motor skill, proprioception  to assist with  scapular, scapulothoracic and UE control with self care, reaching, carrying, lifting, house/yardwork, driving/computer work. Therapeutic Activities:    [x] (58046 or 10158) Provided verbal/tactile cueing for activities related to improving balance, coordination, kinesthetic sense, posture, motor skill, proprioception and motor activation to allow for proper function of scapular, scapulothoracic and UE control with self care, carrying, lifting, driving/computer work.      Home Exercise Program:    [x] (28194) Reviewed/Progressed HEP activities related to strengthening, flexibility, endurance, ROM of scapular, scapulothoracic and UE control with self care, reaching, carrying, lifting, house/yardwork, driving/computer work  [] (64423) Reviewed/Progressed HEP activities related to improving balance, coordination, kinesthetic sense, posture, motor skill, proprioception of scapular, scapulothoracic and UE control with self care, reaching, carrying, lifting, house/yardwork, driving/computer work      Manual Treatments:  PROM / STM / Oscillations-Mobs:  G-I, II, III, IV (PA's, Inf., Post.)  [x] (36908) Provided manual therapy to mobilize soft tissue/joints of cervical/CT, scapular GHJ and UE for the purpose of modulating pain, promoting relaxation,  increasing ROM, reducing/eliminating soft tissue swelling/inflammation/restriction, improving soft tissue extensibility and allowing for proper ROM for normal function with self care, reaching, carrying, lifting, house/yardwork, driving/computer work    Modalities:     [x] GAME READY (VASO)- for significant edema, swelling, pain control. Charges:  Timed Code Treatment Minutes: 39'   Total Treatment Minutes:  45'    BWC:  TE TIME:  NMR TIME:  MANUAL TIME:  TA  UNTIMED MINUTES:  Medicare Total:         [] EVAL (LOW) 73767 (typically 20 minutes face-to-face)  [] EVAL (MOD) 23531 (typically 30 minutes face-to-face)  [] EVAL (HIGH) 18597 (typically 45 minutes face-to-face)  [] RE-EVAL     [x] ME(16456) x 1   [] IONTO  [] NMR (70685) x 1    [] VASO   [x] Manual (48646) 2    [] Other:  [] TA x      [] Mech Traction (41207)  [] ES(attended) (23777)      [] ES (un) (97499):           GOALS:     Patient stated goal: Improve motion     Therapist goals for Patient:  Short Term Goals: To be achieved in: 2 weeks  1. Independent in HEP and progression per patient tolerance, in order to prevent re-injury. [x] Progressing: [] Met: [] Not Met: [] Adjusted     2. Patient will have a decrease in pain to facilitate improvement in movement, function, and ADLs as indicated by Functional Deficits. [x] Progressing: [] Met: [] Not Met: [] Adjusted     Long Term Goals: To be achieved in: 12 weeks  1. Pt will improve FOTO to 53 or more, indicating improved functional capacity . [x] Progressing: [] Met: [] Not Met: [] Adjusted     2. Patient will demonstrate increased AROM flex/abd/ER/IR to 150/150/40/L1 to allow for proper joint functioning as indicated by patients Functional Deficits. [x] Progressing: [] Met: [] Not Met: [] Adjusted     3. Patient will demonstrate an increase in Strength of shoulder  to 5/5 to allow for proper functional mobility as indicated by patients Functional Deficits. [x] Progressing: [] Met: [] Not Met: [] Adjusted     4. Patient will return to functional activities without increased symptoms or restriction.   [x] Progressing: [] Met: [] Not Met: [] Adjusted     5. Pt will report ability to sleep 5 hours without sleep disturbance from shoulder. (patient specific functional goal)    [x] Progressing: [] Met: [] Not Met: [] Adjusted             ASSESSMENT:  See eval    Patient received education on their current pathology and how their condition effects them with their functional activities. Patient understood discussion and questions were answered. Patient understands their activity limitations and understands rational for treatment progression. Pt educated on plan of care and HEP, if worsening symptoms to d/c that exercise. PLAN: See daljit  [x] Continue per plan of care [] Alter current plan (see comments above)  [] Plan of care initiated [] Hold pending MD visit [] Discharge      Electronically signed by:  Christianne Jarrell, PT, MPT     Note: If patient does not return for scheduled/ recommended follow up visits, this note will serve as a discharge from care along with most recent update on progress.

## 2022-11-26 NOTE — FLOWSHEET NOTE
The [facility] and [address],  Sports Performance and Rehabilitation, Formerly [facility] [address]  793 [facility],5Th Floor   [place]  Phone: [phone]  Fax: [phone]       Physical Therapy Treatment Note/ Progress Report:           Date:  2022    Patient Name:  Malik Mathis    :  1960  MRN: [id number]  Restrictions/Precautions:    Medical/Treatment Diagnosis Information:  Diagnosis: Z98.890 (ICD-10-CM) - S/P left rotator cuff repair  Treatment Diagnosis: Left shoulder pain, decreased ROM and strength  Insurance/Certification information: [address], [facility]  Physician Information:  Jean Medina MD  Has the plan of care been signed (Y/N):        []  Yes  [x]  No     Date of Patient follow up with Physician:     Assessment Summary: Date Range for reporting period:  Beginnin22  Endin      Recertification will be due (POC Duration  / 90 days whichever is less): 22          Visit # Insurance Allowable Auth Required   In Person  24 through  []  Yes     []  No    Tele Health   []  Yes     []  No    Total 20         Functional Scale: FOTO 16   Date assessed:     Latex Allergy:  [x]NO      []YES  Preferred Language for Healthcare:   [x]English       []other:      Pain level:  5/10         SUBJECTIVE:       \"About the same\"      OBJECTIVE:  20% cueing with hep           RESTRICTIONS/PRECAUTIONS: Large RCR w/ patch and biceps tenodesis restrictions.     Exercises/Interventions: HEP code: [id number]  Therapeutic Ex (51909) HEP 22     Warm-up   LATEX ALLERGY    Pulleys  3'      UBE              TABLE       Cane flexion      Supine er @ 0  30x     Standing ball roll        Table slide        Seated cane er @ 0        Supine press (2 hands)  20x      Reacher   20x     Prone: rows, ext's   25x     T band pinches   25x green     Wall walk   5 x 10\" Manual: PROM into shoulder flexion, abduction, ER and IR to 30° - 28'       Therapeutic Exercise and NMR EXR  [x] (26088) Provided verbal/tactile cueing for activities related to strengthening, flexibility, endurance, ROM  for improvements in scapular, scapulothoracic and UE control with self care, reaching, carrying, lifting, house/yardwork, driving/computer work.    [] (36799) Provided verbal/tactile cueing for activities related to improving balance, coordination, kinesthetic sense, posture, motor skill, proprioception  to assist with  scapular, scapulothoracic and UE control with self care, reaching, carrying, lifting, house/yardwork, driving/computer work. Therapeutic Activities:    [x] (40239 or 67022) Provided verbal/tactile cueing for activities related to improving balance, coordination, kinesthetic sense, posture, motor skill, proprioception and motor activation to allow for proper function of scapular, scapulothoracic and UE control with self care, carrying, lifting, driving/computer work.      Home Exercise Program:    [x] (99506) Reviewed/Progressed HEP activities related to strengthening, flexibility, endurance, ROM of scapular, scapulothoracic and UE control with self care, reaching, carrying, lifting, house/yardwork, driving/computer work  [] (86337) Reviewed/Progressed HEP activities related to improving balance, coordination, kinesthetic sense, posture, motor skill, proprioception of scapular, scapulothoracic and UE control with self care, reaching, carrying, lifting, house/yardwork, driving/computer work      Manual Treatments:  PROM / STM / Oscillations-Mobs:  G-I, II, III, IV (PA's, Inf., Post.)  [x] (53669) Provided manual therapy to mobilize soft tissue/joints of cervical/CT, scapular GHJ and UE for the purpose of modulating pain, promoting relaxation,  increasing ROM, reducing/eliminating soft tissue swelling/inflammation/restriction, improving soft tissue extensibility and allowing for proper ROM for normal function with self care, reaching, carrying, lifting, house/yardwork, driving/computer work    Modalities:     [x] GAME READY (VASO)- for significant edema, swelling, pain control. Charges:  Timed Code Treatment Minutes: 39'   Total Treatment Minutes:  45'    BWC:  TE TIME:  NMR TIME:  MANUAL TIME:  TA  UNTIMED MINUTES:  Medicare Total:         [] EVAL (LOW) 54828 (typically 20 minutes face-to-face)  [] EVAL (MOD) 30776 (typically 30 minutes face-to-face)  [] EVAL (HIGH) 92494 (typically 45 minutes face-to-face)  [] RE-EVAL     [x] JW(36461) x 1   [] IONTO  [] NMR (38771) x 1    [] VASO   [x] Manual (56329) 2    [] Other:  [] TA x      [] Mech Traction (15132)  [] ES(attended) (09969)      [] ES (un) (11500):           GOALS:     Patient stated goal: Improve motion     Therapist goals for Patient:  Short Term Goals: To be achieved in: 2 weeks  1. Independent in HEP and progression per patient tolerance, in order to prevent re-injury. [x] Progressing: [] Met: [] Not Met: [] Adjusted     2. Patient will have a decrease in pain to facilitate improvement in movement, function, and ADLs as indicated by Functional Deficits. [x] Progressing: [] Met: [] Not Met: [] Adjusted     Long Term Goals: To be achieved in: 12 weeks  1. Pt will improve FOTO to 53 or more, indicating improved functional capacity . [x] Progressing: [] Met: [] Not Met: [] Adjusted     2. Patient will demonstrate increased AROM flex/abd/ER/IR to 150/150/40/L1 to allow for proper joint functioning as indicated by patients Functional Deficits. [x] Progressing: [] Met: [] Not Met: [] Adjusted     3. Patient will demonstrate an increase in Strength of shoulder  to 5/5 to allow for proper functional mobility as indicated by patients Functional Deficits. [x] Progressing: [] Met: [] Not Met: [] Adjusted     4. Patient will return to functional activities without increased symptoms or restriction.   [x] Progressing: [] Met: [] Not Met: [] Adjusted     5. Pt will report ability to sleep 5 hours without sleep disturbance from shoulder. (patient specific functional goal)    [x] Progressing: [] Met: [] Not Met: [] Adjusted             ASSESSMENT:  See eval    Patient received education on their current pathology and how their condition effects them with their functional activities. Patient understood discussion and questions were answered. Patient understands their activity limitations and understands rational for treatment progression. Pt educated on plan of care and HEP, if worsening symptoms to d/c that exercise. PLAN: See daljit  [x] Continue per plan of care [] Alter current plan (see comments above)  [] Plan of care initiated [] Hold pending MD visit [] Discharge      Electronically signed by:  Cristine Valenzuela, PT, MPT     Note: If patient does not return for scheduled/ recommended follow up visits, this note will serve as a discharge from care along with most recent update on progress.

## 2022-11-28 ENCOUNTER — HOSPITAL ENCOUNTER (OUTPATIENT)
Dept: PHYSICAL THERAPY | Age: 62
Setting detail: THERAPIES SERIES
Discharge: HOME | End: 2022-11-28
Payer: COMMERCIAL

## 2022-11-28 NOTE — CARE COORDINATION
[facility] Outpatient Therapy  [address]   [facility], [address] [place] [address]  Phone: [phone]   Fax: [phone]    Physical Therapy Missed Visit Note     Date:  2022    Patient Name:  Aissatou Medellin      :  1960    MRN: [id number]      Cancelled visits to date: 0  No-shows to date: 1-22    For today's appointment patient:  []  Cancelled  []  Rescheduled appointment  [x]  No-show     Reason given by patient:  []  Patient ill  []  Conflicting appointment  []  No transportation    []  Conflict with work  []  No reason given  []  Other:     Comments:      Electronically signed by:  Kenya Amador PT, DPT

## 2022-12-02 ENCOUNTER — HOSPITAL ENCOUNTER (OUTPATIENT)
Dept: PHYSICAL THERAPY | Age: 62
Setting detail: THERAPIES SERIES
Discharge: HOME | End: 2022-12-02
Payer: COMMERCIAL

## 2022-12-02 ENCOUNTER — TELEPHONE (OUTPATIENT)
Dept: ORTHOPEDIC SURGERY | Age: 62
End: 2022-12-02

## 2022-12-02 PROCEDURE — 97140 MANUAL THERAPY 1/> REGIONS: CPT | Performed by: PHYSICAL THERAPIST

## 2022-12-02 PROCEDURE — 97110 THERAPEUTIC EXERCISES: CPT | Performed by: PHYSICAL THERAPIST

## 2022-12-02 PROCEDURE — G0283 ELEC STIM OTHER THAN WOUND: HCPCS | Performed by: PHYSICAL THERAPIST

## 2022-12-02 NOTE — TELEPHONE ENCOUNTER
[facility] does not carry the dexamethasone injection as prescribed but they do have a slightly different dose of this with lidocaine.       Please call and advise:  [phone]

## 2022-12-05 ENCOUNTER — HOSPITAL ENCOUNTER (OUTPATIENT)
Dept: PHYSICAL THERAPY | Age: 62
Setting detail: THERAPIES SERIES
Discharge: HOME | End: 2022-12-05
Payer: COMMERCIAL

## 2022-12-05 NOTE — CARE COORDINATION
[facility] Outpatient Therapy  [address]  [facility] [place] [address] [address], [address]  Phone: [phone]   Fax: [phone]    Physical Therapy Missed Visit Note     Date:  2022    Patient Name:  Arron Ashby      :  1960    MRN: [id number]      Cancelled visits to date: -22  No-shows to date: -22    For today's appointment patient:  [x]  Cancelled  []  Rescheduled appointment  []  No-show     Reason given by patient:  [x]  Patient ill  []  Conflicting appointment  []  No transportation    []  Conflict with work  []  No reason given  []  Other:     Comments:      Electronically signed by:  Angela Franco, PT, DPT

## 2022-12-05 NOTE — FLOWSHEET NOTE
The [facility] and [address],  Sports Performance and Rehabilitation, [facility] [address]  793 [facility],5[address] Floor   [place]  Phone: [phone]  Fax: [phone]       Physical Therapy Treatment Note/ Progress Report:           Date:  2022    Patient Name:  Jerzy Gonsalez    :  1960  MRN: [id number]  Restrictions/Precautions:    Medical/Treatment Diagnosis Information:  Diagnosis: Z98.890 (ICD-10-CM) - S/P left rotator cuff repair  Treatment Diagnosis: Left shoulder pain, decreased ROM and strength  Insurance/Certification information: [address], [facility]  Physician Information:  Ishan Salgado MD  Has the plan of care been signed (Y/N):        []  Yes  [x]  No     Date of Patient follow up with Physician:     Assessment Summary: Date Range for reporting period:  Beginnin22  Endin60      Recertification will be due (POC Duration  / 90 days whichever is less): 22          Visit # Insurance Allowable Auth Required   In Person  34 through  []  Yes     []  No    Tele Health   []  Yes     []  No    Total 21         Functional Scale: FOTO 16   Date assessed:     Latex Allergy:  [x]NO      []YES  Preferred Language for Healthcare:   [x]English       []other:      Pain level:  5/10         SUBJECTIVE:       \"Feeling about the same\"      OBJECTIVE:  175 flexion,           RESTRICTIONS/PRECAUTIONS: Large RCR w/ patch and biceps tenodesis restrictions.     Exercises/Interventions: HEP code: [id number]  Therapeutic Ex (34618) HEP 22     Warm-up   LATEX ALLERGY    Pulleys  3'      UBE              TABLE       Cane flexion      Supine er @ 0  30x     Standing ball roll        Table slide        Seated cane er @ 0        Supine press (2 hands)  20x      Reacher   20x     Prone: rows, ext's   25x     T band pinches   25x green     Wall walk   5 x 10\" Manual: PROM into shoulder flexion, abduction, ER and IR to 30° - 28'       Therapeutic Exercise and NMR EXR  [x] (13596) Provided verbal/tactile cueing for activities related to strengthening, flexibility, endurance, ROM  for improvements in scapular, scapulothoracic and UE control with self care, reaching, carrying, lifting, house/yardwork, driving/computer work.    [] (36055) Provided verbal/tactile cueing for activities related to improving balance, coordination, kinesthetic sense, posture, motor skill, proprioception  to assist with  scapular, scapulothoracic and UE control with self care, reaching, carrying, lifting, house/yardwork, driving/computer work. Therapeutic Activities:    [x] (33458 or 12758) Provided verbal/tactile cueing for activities related to improving balance, coordination, kinesthetic sense, posture, motor skill, proprioception and motor activation to allow for proper function of scapular, scapulothoracic and UE control with self care, carrying, lifting, driving/computer work.      Home Exercise Program:    [x] (43282) Reviewed/Progressed HEP activities related to strengthening, flexibility, endurance, ROM of scapular, scapulothoracic and UE control with self care, reaching, carrying, lifting, house/yardwork, driving/computer work  [] (53928) Reviewed/Progressed HEP activities related to improving balance, coordination, kinesthetic sense, posture, motor skill, proprioception of scapular, scapulothoracic and UE control with self care, reaching, carrying, lifting, house/yardwork, driving/computer work      Manual Treatments:  PROM / STM / Oscillations-Mobs:  G-I, II, III, IV (PA's, Inf., Post.)  [x] (57833) Provided manual therapy to mobilize soft tissue/joints of cervical/CT, scapular GHJ and UE for the purpose of modulating pain, promoting relaxation,  increasing ROM, reducing/eliminating soft tissue swelling/inflammation/restriction, improving soft tissue extensibility and allowing for proper ROM for normal function with self care, reaching, carrying, lifting, house/yardwork, driving/computer work    Modalities:     [x] GAME READY (VASO)- for significant edema, swelling, pain control. Charges:  Timed Code Treatment Minutes: 39'   Total Treatment Minutes:  72'   [address] [facility] [address]:  TE TIME:  NMR TIME:  MANUAL TIME:  TA  UNTIMED MINUTES:  Medicare Total:         [] EVAL (LOW) 82849 (typically 20 minutes face-to-face)  [] EVAL (MOD) 28105 (typically 30 minutes face-to-face)  [] EVAL (HIGH) 35314 (typically 45 minutes face-to-face)  [] RE-EVAL     [x] QN(28139) x 1   [] IONTO  [] NMR (64782) x 1    [] VASO   [x] Manual (06923) 2    [] Other:  [] TA x      [] Mech Traction (46750)  [] ES(attended) (48837)      [x] ES (un) (00016):           GOALS:     Patient stated goal: Improve motion     Therapist goals for Patient:  Short Term Goals: To be achieved in: 2 weeks  1. Independent in HEP and progression per patient tolerance, in order to prevent re-injury. [x] Progressing: [] Met: [] Not Met: [] Adjusted     2. Patient will have a decrease in pain to facilitate improvement in movement, function, and ADLs as indicated by Functional Deficits. [x] Progressing: [] Met: [] Not Met: [] Adjusted     Long Term Goals: To be achieved in: 12 weeks  1. Pt will improve FOTO to 53 or more, indicating improved functional capacity . [x] Progressing: [] Met: [] Not Met: [] Adjusted     2. Patient will demonstrate increased AROM flex/abd/ER/IR to 150/150/40/L1 to allow for proper joint functioning as indicated by patients Functional Deficits. [x] Progressing: [] Met: [] Not Met: [] Adjusted     3. Patient will demonstrate an increase in Strength of shoulder  to 5/5 to allow for proper functional mobility as indicated by patients Functional Deficits. [x] Progressing: [] Met: [] Not Met: [] Adjusted     4. Patient will return to functional activities without increased symptoms or restriction.   [x] Progressing: [] Met: [] Not Met: [] Adjusted     5. Pt will report ability to sleep 5 hours without sleep disturbance from shoulder. (patient specific functional goal)    [x] Progressing: [] Met: [] Not Met: [] Adjusted             ASSESSMENT:  See eval    Patient received education on their current pathology and how their condition effects them with their functional activities. Patient understood discussion and questions were answered. Patient understands their activity limitations and understands rational for treatment progression. Pt educated on plan of care and HEP, if worsening symptoms to d/c that exercise. PLAN: See eval  [x] Continue per plan of care [] Alter current plan (see comments above)  [] Plan of care initiated [] Hold pending MD visit [] Discharge      Electronically signed by:  Vivien William, PT, MPT     Note: If patient does not return for scheduled/ recommended follow up visits, this note will serve as a discharge from care along with most recent update on progress.

## 2022-12-07 ENCOUNTER — HOSPITAL ENCOUNTER (OUTPATIENT)
Dept: PHYSICAL THERAPY | Age: 62
Setting detail: THERAPIES SERIES
Discharge: HOME | End: 2022-12-07
Payer: COMMERCIAL

## 2022-12-07 ENCOUNTER — OFFICE VISIT (OUTPATIENT)
Dept: ORTHOPEDIC SURGERY | Age: 62
End: 2022-12-07

## 2022-12-07 VITALS — WEIGHT: 194 LBS | BODY MASS INDEX: 33.12 KG/M2 | HEIGHT: 64 IN

## 2022-12-07 DIAGNOSIS — Z98.890 S/P ROTATOR CUFF REPAIR: Primary | ICD-10-CM

## 2022-12-07 PROCEDURE — 97110 THERAPEUTIC EXERCISES: CPT | Performed by: PHYSICAL THERAPIST

## 2022-12-07 PROCEDURE — G0283 ELEC STIM OTHER THAN WOUND: HCPCS | Performed by: PHYSICAL THERAPIST

## 2022-12-07 PROCEDURE — 97140 MANUAL THERAPY 1/> REGIONS: CPT | Performed by: PHYSICAL THERAPIST

## 2022-12-07 PROCEDURE — 97112 NEUROMUSCULAR REEDUCATION: CPT

## 2022-12-07 PROCEDURE — 97140 MANUAL THERAPY 1/> REGIONS: CPT

## 2022-12-07 RX ORDER — METHYLPREDNISOLONE ACETATE 40 MG/ML
80 INJECTION, SUSPENSION INTRA-ARTICULAR; INTRALESIONAL; INTRAMUSCULAR; SOFT TISSUE ONCE
Status: COMPLETED | OUTPATIENT
Start: 2022-12-07 | End: 2022-12-07

## 2022-12-07 RX ADMIN — METHYLPREDNISOLONE ACETATE 80 MG: 40 INJECTION, SUSPENSION INTRA-ARTICULAR; INTRALESIONAL; INTRAMUSCULAR; SOFT TISSUE at 14:44

## 2022-12-07 NOTE — LETTER
Physical Therapy Rehabilitation Referral    Patient Name:  Moe Rodas      YOB: 1960    Diagnosis:    1. S/P rotator cuff repair        Precautions:     [x] Evaluate and Treat    Post Op Instructions:  [] Continuous passive motion (CPM) [] Elbow ROM  [x] Exercise in plane of scapula  []  Strengthening     [] Pulley and instruction   [x] Home exercise program (copy to patient)   [] Sling when arm at risk  [] Sling or brace at all times   [] AAROM: Forward elevation to  140            [] AAROM: External rotation  To  40    [] Isometric external rotator strengthening [] AAROM: internal rotation: up the back  [x] Isometric abductor strengthening  [] AAROM: Internal abduction   [] Isometric internal rotator strengthening [] AAROM: cross-body adduction             Stretching:     Strengthening:  [x] Four quadrant (FE, ER, IR, CBA)  [x] Rotator cuff (ER, IR, Abd)  [x] Forward Elevation    [x] External Rotators     [x] External Rotation    [] Internal Rotators  [x] Internal Rotation: up/back   [] Abductors     [x] Internal Rotation: supine in abduction  [x] Sleeper Stretch    [] Flexors  [x] Cross-body abduction    [] Extensors  [x] Pendulum (FE, Abd/Add, cw/ccw)  [x] Scapular Stabilizers   [x] Wall-walking (FE, Abd)        [x] Shoulder shrugs     [x] Table slides (FE)                [x] Rhomboid pinch  [] Elbow (flex, ext, pron, sup)        [] Lat.  Pull downs     [] Medial epicondylitis program       [] Forward punch   [] Lateral epicondylitis program       [] Internal rotators     [] Progressive resistive exercises  [] Bench Press        [] Bench press plus  Activities:     [] Lateral pull-downs  [] Rowing     [x] Progressive two-hand supine press  [] Stepper/Exercise bike   [x] Biceps: curls/supination  [] Swimming  [] Water exercises    Modalities:     Return to Sport:  [x] Of Choice      [x] Plyometrics  [] Ultrasound     [x] Rhythmic stabilization  [] Iontophoresis    [] Core strengthening   [] Moist heat     [] Sports specific program:   [x] Massage         [x] Cryotherapy      [] Electrical stimulation     [] Paraffin  [] Whirlpool  [] TENS    [x] Home exercise program (copy to patient). Perform exercises for:   15     minutes    3      times/day  [x] Supervised physical therapy  Frequency: []  1x week  [x] 2x week  [] 3x week  [] Other:   Duration: [] 2 weeks   [] 4 weeks  [x] 6 weeks  [] Other:     Additional Instructions: Evaluate patient for dry needling. Dash Gilmore MD, PhD

## 2022-12-07 NOTE — FLOWSHEET NOTE
[facility]  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 (ICD-10-CM) - Meniere's disease of left ear    R42 Dizziness                                     Insurance/Certification information:  PT Insurance Information: [employer] ($30 copay; 20 visits; prior auth- [employer])  Physician Information:  Melani Dc DO   Plan of care signed:    [x] Yes  [] No    Date of Patient follow up with Physician: Dr Alexy Hoang today      Progress Report: []  Yes  [x]  No     Date Range for reporting period:  Beginning:  10/27/22  Endin22    Progress report due (10 Rx/or 30 days whichever is less):      Recertification due (POC duration/ or 90 days whichever is less): 23     Visit # Insurance Allowable Auth Needed   3/8 10 visits by 22 [x]Yes   []No     RESTRICTIONS/PRECAUTIONS: fibromyalgia, RA, currently in PT for L RC surgery; cervical fusion (3 level)  Latex Allergy:  []NO      [x]YES- adhesives  Preferred Language for Healthcare:   [x]English       [x]other:  Functional Scale: FOTO 54; [address] [place] [address] 80%; FGA   Date assessed:10/27/22    Functional Scale: [address] [place] [address] 44% Date assessed: 22      Dizziness level:  3/10 at rest (light headed);  3/10 headache related to sinus pressure        SUBJECTIVE:  Pt reports she has had a sinus infection and been on antibiotics for past 3 days. She has had a little dizziness and been a little off balance. She endorses strong fear of vertigo returning. Pt reports a \"catch\" when doing L cervical rotation, points to medial upper trap. Of note, she has been caring for her father while dying,  was yesterday.        OBJECTIVE:   Observation: Pt amb WFL    Test measurements:    Cervical AROM: 40* R/L rotation, 30* flex/ext, 20* R/L side bend      leg length discrepancy: not significant, L LE <1/2 cm shorter than R    Neck differentiation testing: dizziness 1/10 baseline, no change w/ body to R, increase to 2/10 w/ body to L  Neck differentiation w/ smooth pursuits: WNL      Exercises/Interventions: Exercises in bold performed in department today. Items not bolded are carried forward from prior visits for continuity of the record. Exercise/Equipment Resistance/Repetitions HEP Other comments   Nustep/stationary bike Initiate cardio next  []      []    Cervical stretching    []    Sidebend R/L 20 sec x 3 each  [] Seated w/ UE same si   Rotation R/L  20 sec x 3 each  [] Seated, VC for cervical retraction to neutral prior to initiating rotation   Postural education Educ pt on maintaining cervical neutral especially prior to cervical ROM or UE reaching to reduce stress and pain in neck  []    Enrique posture exercise  30 sec x 3 against wall  30 sec x 1 away from wall [] Post pelvic tilt, scap retraction, chin tuck, shoulder ext rotation    Cat-camel for thoracic mobility 5x [] Quadriped, monitored for shoulder pain, maintained slightly posterior weight shift to prevent shoulder pain      []      []    Habituation if needed   []      []    Dynamic balance   []      []    Neck eye coordination 10x horizontal [x]    Cervical proprioception  4x R/L/up/down each  [] Slightly impaired to L but improved w/ practice      []      []    TA: discussed use of guided meditation for improved muscle relaxation. Trialed self thoracic mobility for reduced pain in midback w/ all shoulder and cervical activity. Home Exercise Program:   Access Code: [email or web address]  URL: [email or web address]  Date: 11/22/2022  Prepared by: Carrie Shirley  Exercises  Seated Cervical Sidebending AROM - 1 x daily - 7 x weekly - 1 sets - 3 reps - 20-30 hold  Seated Cervical Rotation AROM - 1 x daily - 7 x weekly - 1 sets - 3 reps - 20-30 hold  Seated Cervical Retraction - 1 x daily - 7 x weekly - 1 sets - 3 reps - 20-30 hold    Access Code: [email or web address]  URL: [email or web address]  Date: 12/07/2022  Prepared by: Wing Score  Exercises  Standing with Back Flat Against Wall - 2-3 x daily - 7 x weekly - 5 reps - 30 hold      Therapeutic Exercise:   [] (05067) Provided verbal/tactile cueing for activities related to strengthening, flexibility, endurance, ROM for improvements in LE, proximal hip, and core control with self-care, mobility, lifting, ambulation. NMR:  [x] (32140) Provided verbal/tactile cueing for activities related to improving balance, coordination, kinesthetic sense, posture, motor skill, proprioception to assist with LE, proximal hip, and core control in self-care, mobility, lifting, ambulation and eccentric single leg control. Therapeutic Activities:    [] (39468) Provided verbal/tactile cueing for activities related to improving balance, coordination, kinesthetic sense, posture, motor skill, proprioception and motor activation to allow for proper function of core, proximal hip and LE with self-care and ADLs and functional mobility. Gait Training:    [] (95419) Provided training and instruction to the patient for proper LE, core and proximal hip recruitment and positioning and eccentric body weight control with ambulation re-education including up and down stairs     Manual Treatments:  PROM / STM / Oscillations-Mobs:  G-I, II, III, IV (PA's, Inf., Post.)  [x] (09645) Provided manual therapy to mobilize LE, proximal hip and/or LS spine soft tissue/joints for the purpose of modulating pain, promoting relaxation,  increasing ROM, reducing/eliminating soft tissue swelling/inflammation/restriction, improving soft tissue extensibility and allowing for proper ROM for normal function with self-care, mobility, lifting and ambulation.    Pt positioned in supine, STM to B upper trap, most time spent on L due to pt w/ c/o more tension on L. Trigger point release to more medial portion of L upper trap, gentle STM to L paraspinals. Assessed cervical rotation and sidenbending PROM, L rotation slightly more restricted than R. No dizziness w/ all brief PROM assessment. Placed kinesiotape test strip on L upper trap to determine pt tolerance in presence of adhesive allergy. Pt may benefit from use of kinesiotape for L upper trap inhibition due to severely increased resting muscle tension. Pt given written instructions for monitoring and removal.       Home Exercise Program:    [] (77750) Reviewed/Progressed HEP activities related to strengthening, flexibility, endurance, ROM of core, proximal hip and LE for functional self-care, mobility, lifting and ambulation/stair navigation   [] (64797) Reviewed/Progressed HEP activities related to improving balance, coordination, kinesthetic sense, posture, motor skill, proprioception of core, proximal hip and LE for self-care, mobility, lifting, and ambulation/stair navigation      Modalities:    [] Electric Stimulation:   [] Ultrasound:   [] Other:       Charges:  Timed Code Treatment Minutes: NM 30;  MT 24   Total Treatment Minutes: 54      [] EVAL (LOW) 30356 (typically 20 minutes face-to-face)  [] EVAL (MOD) 30270 (typically 30 minutes face-to-face)  [] EVAL (HIGH) 03495 (typically 45 minutes face-to-face)  [] RE-EVAL     [] UJ(20526) x       [x] NMR (62391) x   2    [x] Manual (15303) x   2    [] TA (60008) x       [] Gait Training [phone] x       [] ES(attended) (42820)  [] ES (un) (98152)   [] DRY NEEDLE 1 OR 2 MUSCLES  [] DRY NEEDLE 3+ MUSCLES  [] Mech Traction (38817)  [] Ultrasound (69817)  [] Other:      GOALS: Goals established 10/27/22   Patient stated goal: \"stronger (not fall)\"  [] Progressing: [] Met: [] Not Met: [] Adjusted    Therapist goals for Patient:  Short Term Goals= LT goals:  To be achieved in: 4 weeks  Independent in HEP and progression per patient tolerance. [x] Progressing: [] Met: [] Not Met: [] Adjusted  Patient will have a decrease in dizziness/imbalance symptoms to 0-1/10 to facilitate improvement in movement, function, balance, and ADLS. Improved to 1-2/10  [x] Progressing: [] Met: [] Not Met: [] Adjusted  3. Disability index score of 40% or less for the [address] [place] [address] to assist with reaching prior level of functionImproved to 44%  [x] Progressing: [] Met: [] Not Met: [] Adjusted  4. Patient will demonstrate improved cervical ext and L rotation ROM WFL without c/o dizziness. R rotation more symptomatic today  [x] Progressing: [] Met: [] Not Met: [] Adjusted  5. Pt will report no falls for 4 weeks. No falls for past 3 weeks   [x] Progressing: [] Met: [] Not Met: [] Adjusted  6. Pt will return to walking for exercise with dizziness 1/10 or less. Has not attempted due to other family  issues. [x] Progressing: [] Met: [] Not Met: [] Adjusted      ASSESSMENT:  Pt demos good tolerance of STM to L upper trap, self stretching, thoracic mobility and postural training. Pt reports improved stiffness and headache by end of session. Added cervical eye coordination exercise to HEP to aid in reducing dizziness. Coordinated use of dry needling w/ PT addressing L RC POC to target L upper trap tightness. Pt will continue to benefit from skilled PT to reduce fall risk and improve independence w/ functional mobility. Treatment/Activity Tolerance:  [x] Patient tolerated treatment well [] Patient limited by fatique  [] Patient limited by pain  [] Patient limited by other medical complications  [] Other:     Overall Progression Towards Functional goals/ Treatment Progress Update:  [] Patient is progressing as expected towards functional goals listed. [x] Progression is slowed due to complexities/Impairments listed. - - pt not seen for first 3 weeks due to awaiting prior auth and scheduling issues than past 2 weeks due to pt's family conflicts/illness.    [] Progression has been slowed due to co-morbidities. [] Plan just implemented, too soon to assess goals progression <30days   [] Goals require adjustment due to lack of progress  [] Patient is not progressing as expected and requires additional follow up with physician  [] Other    Prognosis for POC: [x] Good [] Fair  [] Poor    Patient requires continued skilled intervention: [x] Yes  [] No        PLAN: 2x/week for 4 weeks  [x] Continue per plan of care [] Alter current plan (see comments)  [] Plan of care initiated [] Hold pending MD visit [] Discharge    Electronically signed by: Vicci Closs, PT, DPT    Note: If patient does not return for scheduled/recommended follow up visits, this note will serve as a discharge from care along with the most recent update on progress.

## 2022-12-07 NOTE — PROGRESS NOTES
[facility]  History and Physical  Shoulder Pain    Date:  2022    Name:  Rachel Arzola  Address:  [address]    :  1960      Age:   60 y.o.    SSN:  [id number]      Medical Record Number:  [id number]    Reason for Visit:    Shoulder Pain (F/U LEFT SHOULDER)      HPI:   Rachel Arzola is a 60 y.o. female who presents to our office today for follow up of the left shoulder pain. The patient underwent a left arthroscopic revision rotator cuff repair with collagen patch augmentation and biceps tenodesis on 2022. Patient continues to have a fair amount of discomfort at 4 months out. Patient reports that she feels that her strength is improving and so is the movement. She reports most of the pain to be over the anterior shoulder. She denies any new injuries. She continues to work with [employer] [facility] at [facility] office and formal physical therapy. Pain Assessment  Location of Pain: Shoulder  Location Modifiers: Left  Severity of Pain: 4  Quality of Pain: Sharp, Dull  Duration of Pain: Persistent  Frequency of Pain: Intermittent  Aggravating Factors:  (reaching overhead, sleeping)  Limiting Behavior: Yes  Relieving Factors: Rest, Ice  Work-Related Injury: No  Are there other pain locations you wish to document?: No    Review of Systems:  A 14 point review of systems available in the scanned medical record as documented by the patient and reviewed on 2022. The review is negative with the exception of those things mentioned in the History of Present Illness and Past Medical History. Past Medical History:  Patient's medications, allergies, past medical, surgical, social and family histories were reviewed and updated as appropriate. Allergies: Allergies   Allergen Reactions    Latex Dermatitis    Adhesive Tape      blisters    Benzoin      Other reaction(s):  Other (See Comments), Other (See Comments)  Blistering of her skin  Blistering of her skin      Gluten Nausea Only    Plaquenil [Hydroxychloroquine Sulfate]      Rash      Vicodin [Hydrocodone-Acetaminophen]      Says she gets a rash after taking it for 3 straight days      Benzamide Derivatives Rash     Benzoin   topical    Codeine Nausea And Vomiting    Methotrexate Nausea And Vomiting and Nausea Only     Other reaction(s): Vomiting       Physical Exam:  There were no vitals filed for this visit. General: Jerzy Gonsalez is a healthy and well appearing 60 y.o. female who is sitting comfortably in our office in acute distress. Skin:  There are no skin lesions, cellulitis, or extreme edema. The patient has warm and well-perfused Bilateral upper extremities with brisk capillary refill. Eyes: Extra-ocular muscles intact  Mouth: Oral mucosa moist. No perioral lesions  Pulm: Respirations unlabored and regular. Neuro: Alert and oriented x3    right Shoulder Exam:  Inspection: incision is fully healed. No gross deformities, no signs of infection. Palpation:  There is clunking without crepitus. Tenderness to palpation over the rotator cuff and bicipital groove. Non-tender to palpation over the ac joint and posterior joint lie. Active Range of Motion: Forward elevation of 150, abduction of 150, external rotation with elbow at the side 40, internal rotation to the back is L4    Passive Range of Motion: Passively internal rotation can be further increased to L1. Strength: External rotation with resistance with elbow at the side +4/5, internal rotation with resistance with elbow at the side 5/5, Champagne toast testing 4/5, Jobes test 4/5    Special Tests:   No Pierre muscle deformity. Neurovascular: Sensation to light touch is intact, no motor deficits, palpable radial pulses 2+    Additional Examinations:    Examination of the contralateral extremity does not show any tenderness, deformity or injury. Range of motion is unremarkable.  There is no gross instability. There are no rashes, ulcerations or lesions. Strength and tone are normal.      Radiographic:  X-ray not obtained today. Assessment:  Nguyen Alvarado is a 60 y.o. female who underwent a left revision rotator cuff repair with collagen patch augmentation and biceps tenodesis on 8/5/2022. Impression:  Encounter Diagnosis   Name Primary? S/P rotator cuff repair Yes       Office Procedures:  Orders Placed This Encounter   Procedures    [facility]     Referral Priority:   Routine     Referral Type:   Eval and Treat     Referral Reason:   Specialty Services Required     Requested Specialty:   Physical Therapist     Number of Visits Requested:   1    20610 - ME DRAIN/INJECT LARGE JOINT/BURSA     After discussing the risks and benefits of a corticosteroid injection, Severiano Africa did state an understanding and gave verbal consent to proceed. After cleansing the injection site with Chlora-prep and using aseptic techniques,  2 CC of Depo Medrol 40mg/ml and 8 CC of 1% lidocaine were injected in the left glenohumeral and subacromial space. She  tolerated the procedure well with no immediate adverse sequelae after the injection. A bandage was placed over the injection site. Appropriate post injections instructions were given to the patient. Plan: We feel that Severiano Africa A Trester's left shoulder is no longer stiff. She is having some chronic inflammation. We recommend doing a corticosteroid injection which might help with some of the discomfort. We proceeded to do that today. We will have her continue to work with Kimberlee Pratt in formal physical therapy. She can be considered for dry needling. Nguyen Alvarado will follow up in 4 weeks and/or as needed. She was in agreement with this plan and all questions were answered to the patient's satisfaction. She was encouraged to call with any questions.      12/7/2022  2:28 PM      India Rahman, [id number] [facility] Sports Medicine Physician Assistant    During this examination, Alecia Farr PA-C, functioned as a scribe for Dr. Ron Osgood. This dictation was performed with a verbal recognition program (DRAGON) and it was checked for errors. It is possible that there are still dictated errors within this office note. If so, please bring any errors to my attention for an addendum. All efforts were made to ensure that this office note is accurate.  ________________  I, Dr. Ron Osgood, personally performed the services described in this documentation as described by Vincent Morales PA-C in my presence, and it is both accurate and complete. Dash Portillo MD, PhD  12/7/2022

## 2022-12-12 ENCOUNTER — HOSPITAL ENCOUNTER (OUTPATIENT)
Dept: PHYSICAL THERAPY | Age: 62
Setting detail: THERAPIES SERIES
Discharge: HOME | End: 2022-12-12
Payer: COMMERCIAL

## 2022-12-12 PROCEDURE — 97140 MANUAL THERAPY 1/> REGIONS: CPT

## 2022-12-12 PROCEDURE — 97112 NEUROMUSCULAR REEDUCATION: CPT

## 2022-12-12 PROCEDURE — 97110 THERAPEUTIC EXERCISES: CPT | Performed by: PHYSICAL THERAPIST

## 2022-12-12 PROCEDURE — 97140 MANUAL THERAPY 1/> REGIONS: CPT | Performed by: PHYSICAL THERAPIST

## 2022-12-12 PROCEDURE — G0283 ELEC STIM OTHER THAN WOUND: HCPCS | Performed by: PHYSICAL THERAPIST

## 2022-12-12 NOTE — FLOWSHEET NOTE
The [facility] and [address],  [facility], Formerly [facility] [address]  [address] [facility],5Th Floor   [place] Suarez  Phone: [phone]  Fax: [phone]       Physical Therapy Treatment Note/ Progress Report:           Date:  2022    Patient Name:  Christiano Joseph    :  1960  MRN: [id number]  Restrictions/Precautions:    Medical/Treatment Diagnosis Information:  Diagnosis: Z98.890 (ICD-10-CM) - S/P left rotator cuff repair  Treatment Diagnosis: Left shoulder pain, decreased ROM and strength  Insurance/Certification information: [address], [facility]  Physician Information:  Harman Carrel, MD  Has the plan of care been signed (Y/N):        []  Yes  [x]  No     Date of Patient follow up with Physician:     Assessment Summary: Date Range for reporting period:  Beginnin22  Endin      Recertification will be due (POC Duration  / 90 days whichever is less): 22          Visit # Insurance Allowable Auth Required   In Person  34 through  []  Yes     []  No    Tele Health   []  Yes     []  No    Total 22         Functional Scale: FOTO 16   Date assessed:     Latex Allergy:  [x]NO      []YES  Preferred Language for Healthcare:   [x]English       []other:      Pain level:  5/10         SUBJECTIVE:       \"Doing ok\"      OBJECTIVE:  175 flexion,           RESTRICTIONS/PRECAUTIONS: Large RCR w/ patch and biceps tenodesis restrictions.     Exercises/Interventions: HEP code: [id number]  Therapeutic Ex (15227) HEP 22     Warm-up   LATEX ALLERGY    Pulleys      UBE            TABLE      Cane flexion      Supine er @ 0      Standing ball roll       Table slide       Seated cane er @ 0       Supine press (2 hands)      Reacher       Prone: rows, ext's       T band pinches       Wall walk                                                                                       Manual: PROM into shoulder flexion, abduction, ER and IR to 30° - 28'       Therapeutic Exercise and NMR EXR  [x] (37820) Provided verbal/tactile cueing for activities related to strengthening, flexibility, endurance, ROM  for improvements in scapular, scapulothoracic and UE control with self care, reaching, carrying, lifting, house/yardwork, driving/computer work.    [] (88965) Provided verbal/tactile cueing for activities related to improving balance, coordination, kinesthetic sense, posture, motor skill, proprioception  to assist with  scapular, scapulothoracic and UE control with self care, reaching, carrying, lifting, house/yardwork, driving/computer work. Therapeutic Activities:    [x] (35244 or 86136) Provided verbal/tactile cueing for activities related to improving balance, coordination, kinesthetic sense, posture, motor skill, proprioception and motor activation to allow for proper function of scapular, scapulothoracic and UE control with self care, carrying, lifting, driving/computer work.      Home Exercise Program:    [x] (20190) Reviewed/Progressed HEP activities related to strengthening, flexibility, endurance, ROM of scapular, scapulothoracic and UE control with self care, reaching, carrying, lifting, house/yardwork, driving/computer work  [] (96977) Reviewed/Progressed HEP activities related to improving balance, coordination, kinesthetic sense, posture, motor skill, proprioception of scapular, scapulothoracic and UE control with self care, reaching, carrying, lifting, house/yardwork, driving/computer work      Manual Treatments:  PROM / STM / Oscillations-Mobs:  G-I, II, III, IV (PA's, Inf., Post.)  [x] (18300) Provided manual therapy to mobilize soft tissue/joints of cervical/CT, scapular GHJ and UE for the purpose of modulating pain, promoting relaxation,  increasing ROM, reducing/eliminating soft tissue swelling/inflammation/restriction, improving soft tissue extensibility and allowing for proper ROM for normal function with self care, reaching, carrying, lifting, house/yardwork, driving/computer work    Modalities:     [x] GAME READY (VASO)- for significant edema, swelling, pain control. Charges:  Timed Code Treatment Minutes: 25'   Total Treatment Minutes:  45'   BWC:  TE TIME:  NMR TIME:  MANUAL TIME:  TA  UNTIMED MINUTES:  Medicare Total:         [] EVAL (LOW) 92449 (typically 20 minutes face-to-face)  [] EVAL (MOD) 28433 (typically 30 minutes face-to-face)  [] EVAL (HIGH) 81089 (typically 45 minutes face-to-face)  [] RE-EVAL     [] MG(92337) x 1   [] IONTO  [] NMR (17286) x 1    [] VASO   [x] Manual (69190) 2    [] Other:  [] TA x      [] Mech Traction (37682)  [] ES(attended) (31908)      [x] ES (un) (99277):           GOALS:     Patient stated goal: Improve motion     Therapist goals for Patient:  Short Term Goals: To be achieved in: 2 weeks  1. Independent in HEP and progression per patient tolerance, in order to prevent re-injury. [x] Progressing: [] Met: [] Not Met: [] Adjusted     2. Patient will have a decrease in pain to facilitate improvement in movement, function, and ADLs as indicated by Functional Deficits. [x] Progressing: [] Met: [] Not Met: [] Adjusted     Long Term Goals: To be achieved in: 12 weeks  1. Pt will improve FOTO to 53 or more, indicating improved functional capacity . [x] Progressing: [] Met: [] Not Met: [] Adjusted     2. Patient will demonstrate increased AROM flex/abd/ER/IR to 150/150/40/L1 to allow for proper joint functioning as indicated by patients Functional Deficits. [x] Progressing: [] Met: [] Not Met: [] Adjusted     3. Patient will demonstrate an increase in Strength of shoulder  to 5/5 to allow for proper functional mobility as indicated by patients Functional Deficits. [x] Progressing: [] Met: [] Not Met: [] Adjusted     4. Patient will return to functional activities without increased symptoms or restriction. [x] Progressing: [] Met: [] Not Met: [] Adjusted     5.  Pt will report ability to sleep 5 hours without sleep disturbance from shoulder. (patient specific functional goal)    [x] Progressing: [] Met: [] Not Met: [] Adjusted             ASSESSMENT:  See eval    Patient received education on their current pathology and how their condition effects them with their functional activities. Patient understood discussion and questions were answered. Patient understands their activity limitations and understands rational for treatment progression. Pt educated on plan of care and HEP, if worsening symptoms to d/c that exercise. PLAN: See eval  [x] Continue per plan of care [] Alter current plan (see comments above)  [] Plan of care initiated [] Hold pending MD visit [] Discharge      Electronically signed by:  Davide Marx, PT, MPT     Note: If patient does not return for scheduled/ recommended follow up visits, this note will serve as a discharge from care along with most recent update on progress.

## 2022-12-12 NOTE — FLOWSHEET NOTE
The [facility] Outpatient Therapy  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    R42 Dizziness                                     Insurance/Certification information:  PT Insurance Information: [employer] ($30 copay; 20 visits; prior auth- [employer])  Physician Information:  Walter Miramontes DO   Plan of care signed:    [x] Yes  [] No    Date of Patient follow up with Physician:       Progress Report: []  Yes  [x]  No     Date Range for reporting period:  Beginning:  10/27/22  Endin22    Progress report due (10 Rx/or 30 days whichever is less):      Recertification due (POC duration/ or 90 days whichever is less): 23     Visit # Insurance Allowable Auth Needed    10 visits by 22 [x]Yes   []No     RESTRICTIONS/PRECAUTIONS: fibromyalgia, RA, currently in PT for L RC surgery; cervical fusion (3 level)  Latex Allergy:  []NO      [x]YES- adhesives  Preferred Language for Healthcare:   [x]English       [x]other:  Functional Scale: FOTO 54; [facility] 80%; FGA   Date assessed:10/27/22    Functional Scale: [facility] 44% Date assessed: 22      Dizziness level:  3/10 at rest (light headed);  3/10 headache related to sinus pressure        SUBJECTIVE:  Pt reports she is having slight sinus headaches at night, better than previously. She finished the antibiotic for the sinus infection. She endorses nausea associated w/ L shoulder crepitus with internal rotation and with taking antibiotic. OBJECTIVE:   Observation: Pt amb WFL    Test measurements:        Exercises/Interventions: Exercises in bold performed in department today.   Items not bolded are carried forward from prior visits for continuity of the record. Exercise/Equipment Resistance/Repetitions HEP Other comments   Nustep/stationary bike Initiate cardio next  []      []    Cervical stretching    []    Sidebend R/L 20 sec x 3 each  [] Seated w/ UE same si   Rotation R/L  20 sec x 3 each  [] Seated, VC for cervical retraction to neutral prior to initiating rotation   Postural education Educ pt on maintaining cervical neutral especially prior to cervical ROM or UE reaching to reduce stress and pain in neck  []    Enrique posture exercise  30 sec x 3 against wall  30 sec x 1 away from wall [] Post pelvic tilt, scap retraction, chin tuck, shoulder ext rotation    Cat-camel for thoracic mobility 5x [] Quadriped, monitored for shoulder pain, maintained slightly posterior weight shift to prevent shoulder pain    Pec stretch 60 sec x 2 each  [] Supine w/ UE horizontally abducted partially off edge of table     []    Habituation if needed   []      []      []      []    Neck eye coordination 30 sec x 2 each horizontal/vertical [x] No obvious limitations, c/o retro eye pressure/fatigue   Cervical proprioception  5x R/L/up/down each   5x R/L [x] Slightly impaired to L but improved w/ practice      []      []          Dynamic balance     Amb with head turns/ nods 40' x 4 each   85' x 2 turns only  Reduced L cervical rotation, pt c/ slight dizziness w/ L head turn    Amb with 180* turns      Amb with quick stop/starts     Sidestepping 35' x 1 each w/ UE abd No LOB except for quick stop at end    Retrowalking 40' w/ cues for B arm swing     Tandem gait 25' x 3    Amb w/ eyes closed 30'x  1 Slightly decreased jonathan without veering until final 5' then had sudden veer R   Amb w/ high knees  85' x 1 No LOB                              Home Exercise Program:   Access Code: [email or web address]  URL: [email or web address]  Date: 11/22/2022  Prepared by: Mark Sykes  Exercises  Seated Cervical Sidebending AROM - 1 x daily - 7 x weekly - 1 sets - 3 reps - 20-30 hold  Seated Cervical Rotation AROM - 1 x daily - 7 x weekly - 1 sets - 3 reps - 20-30 hold  Seated Cervical Retraction - 1 x daily - 7 x weekly - 1 sets - 3 reps - 20-30 hold    Access Code: [email or web address]  URL: [email or web address]  Date: 12/07/2022  Prepared by: Kanika Grigsby  Exercises  Standing with Back Flat Against Wall - 2-3 x daily - 7 x weekly - 5 reps - 30 hold      Therapeutic Exercise:   [] (82408) Provided verbal/tactile cueing for activities related to strengthening, flexibility, endurance, ROM for improvements in LE, proximal hip, and core control with self-care, mobility, lifting, ambulation. NMR:  [x] (12662) Provided verbal/tactile cueing for activities related to improving balance, coordination, kinesthetic sense, posture, motor skill, proprioception to assist with LE, proximal hip, and core control in self-care, mobility, lifting, ambulation and eccentric single leg control. Therapeutic Activities:    [] (43644) Provided verbal/tactile cueing for activities related to improving balance, coordination, kinesthetic sense, posture, motor skill, proprioception and motor activation to allow for proper function of core, proximal hip and LE with self-care and ADLs and functional mobility.      Gait Training:    [] (65516) Provided training and instruction to the patient for proper LE, core and proximal hip recruitment and positioning and eccentric body weight control with ambulation re-education including up and down stairs     Manual Treatments:  PROM / STM / Oscillations-Mobs:  G-I, II, III, IV (PA's, Inf., Post.)  [x] (92514) Provided manual therapy to mobilize LE, proximal hip and/or LS spine soft tissue/joints for the purpose of modulating pain, promoting relaxation,  increasing ROM, reducing/eliminating soft tissue swelling/inflammation/restriction, improving soft tissue extensibility and allowing for proper ROM for normal function with self-care, mobility, lifting and ambulation. Pt positioned in supine, STM to B upper trap, most time spent on L due to pt w/ c/o more tension on L. Significantly less muscle tension compared to last week (pt received dry needling treatment to L upper trap). Trigger point release to more medial portion of L upper trap, gentle STM to L paraspinals. Assessed cervical rotation and sidenbending PROM, L rotation slightly more restricted than R. No dizziness w/ all brief PROM assessment. Placed kinesiotape, 2 \"I strips for L upper trap inhibition following good response to test strip per pt report.  Pt has written instructions for monitoring and removal.       Home Exercise Program:    [] (16806) Reviewed/Progressed HEP activities related to strengthening, flexibility, endurance, ROM of core, proximal hip and LE for functional self-care, mobility, lifting and ambulation/stair navigation   [x] (31105) Reviewed/Progressed HEP activities related to improving balance, coordination, kinesthetic sense, posture, motor skill, proprioception of core, proximal hip and LE for self-care, mobility, lifting, and ambulation/stair navigation      Modalities:    [] Electric Stimulation:   [] Ultrasound:   [] Other:       Charges:  Timed Code Treatment Minutes: NM37;  MT 20   Total Treatment Minutes: 57      [] EVAL (LOW) 35309 (typically 20 minutes face-to-face)  [] EVAL (MOD) 83911 (typically 30 minutes face-to-face)  [] EVAL (HIGH) 22738 (typically 45 minutes face-to-face)  [] RE-EVAL     [] RF(47080) x       [x] NMR (27349) x   3    [x] Manual (19720) x   1    [] TA (21165) x       [] Gait Training [phone]) x       [] ES(attended) (72932)  [] ES (un) (15427)   [] DRY NEEDLE 1 OR 2 MUSCLES  [] DRY NEEDLE 3+ MUSCLES  [] Mech Traction (22596)  [] Ultrasound (76725)  [] Other:      GOALS: Goals established 10/27/22   Patient stated goal: \"stronger (not fall)\"  [] Progressing: [] Met: [] Not Met: [] Adjusted    Therapist goals for Patient:  Short Term Goals= LT goals: To be achieved in: 4 weeks  Independent in HEP and progression per patient tolerance. [x] Progressing: [] Met: [] Not Met: [] Adjusted  Patient will have a decrease in dizziness/imbalance symptoms to 0-1/10 to facilitate improvement in movement, function, balance, and ADLS. Improved to 1-2/10  [x] Progressing: [] Met: [] Not Met: [] Adjusted  3. Disability index score of 40% or less for the [address] [place] [address] to assist with reaching prior level of functionImproved to 44%  [x] Progressing: [] Met: [] Not Met: [] Adjusted  4. Patient will demonstrate improved cervical ext and L rotation ROM WFL without c/o dizziness. R rotation more symptomatic today  [x] Progressing: [] Met: [] Not Met: [] Adjusted  5. Pt will report no falls for 4 weeks. No falls for past 3 weeks   [x] Progressing: [] Met: [] Not Met: [] Adjusted  6. Pt will return to walking for exercise with dizziness 1/10 or less. Has not attempted due to other family  issues. [x] Progressing: [] Met: [] Not Met: [] Adjusted      ASSESSMENT:  Pt w./ improved L upper trap muscle tension. Continued w/ STM and stretching to improve L upper trap/neck pain and cervical ROM. Further postural education completed and exercises to address. Pt initiated dynamic balance training w/ minimal deficit only w/ amb w/ head turning. Pt will continue to benefit from skilled PT to reduce fall risk and improve independence w/ functional mobility. Treatment/Activity Tolerance:  [x] Patient tolerated treatment well [] Patient limited by fatique  [] Patient limited by pain  [] Patient limited by other medical complications  [] Other:     Overall Progression Towards Functional goals/ Treatment Progress Update:  [] Patient is progressing as expected towards functional goals listed. [x] Progression is slowed due to complexities/Impairments listed. - - pt not seen for first 3 weeks due to awaiting prior auth and scheduling issues than past 2 weeks due to pt's family conflicts/illness. [] Progression has been slowed due to co-morbidities. [] Plan just implemented, too soon to assess goals progression <30days   [] Goals require adjustment due to lack of progress  [] Patient is not progressing as expected and requires additional follow up with physician  [] Other    Prognosis for POC: [x] Good [] Fair  [] Poor    Patient requires continued skilled intervention: [x] Yes  [] No        PLAN: 2x/week for 4 weeks  [x] Continue per plan of care [] Alter current plan (see comments)  [] Plan of care initiated [] Hold pending MD visit [] Discharge    Electronically signed by: Alejandro Stahl PT, DPT    Note: If patient does not return for scheduled/recommended follow up visits, this note will serve as a discharge from care along with the most recent update on progress.

## 2022-12-13 NOTE — FLOWSHEET NOTE
The [address] and [address],  Sports Performance and Rehabilitation, [facility] [address]99 1246 [address]  793 [facility],5Th Floor   [place], [place]  Phone: [phone]  Fax: [phone]       Physical Therapy Treatment Note/ Progress Report:           Date:  2022    Patient Name:  Lorena Adams    :  1960  MRN: [id number]  Restrictions/Precautions:    Medical/Treatment Diagnosis Information:  Diagnosis: Z98.890 (ICD-10-CM) - S/P left rotator cuff repair  Treatment Diagnosis: Left shoulder pain, decreased ROM and strength  Insurance/Certification information: [address], On license of [facility]  Physician Information:  Filippo Bruce MD  Has the plan of care been signed (Y/N):        []  Yes  [x]  No     Date of Patient follow up with Physician:     Assessment Summary: Date Range for reporting period:  Beginnin22  Endin      Recertification will be due (POC Duration  / 90 days whichever is less): 22          Visit # Insurance Allowable Auth Required   In Person  34 through  []  Yes     []  No    Tele Health   []  Yes     []  No    Total 23         Functional Scale: FOTO 16   Date assessed:     Latex Allergy:  [x]NO      []YES  Preferred Language for Healthcare:   [x]English       []other:      Pain level:  5/10         SUBJECTIVE:       \"Doing ok\"      OBJECTIVE:  175 flexion,           RESTRICTIONS/PRECAUTIONS: Large RCR w/ patch and biceps tenodesis restrictions.     Exercises/Interventions: HEP code: [id number]  Therapeutic Ex (28413) HEP 22     Warm-up   LATEX ALLERGY    Pulleys  3'      UBE            TABLE      Cane flexion  2x10     Supine er @ 0  30x     Standing ball roll       Table slide       Seated cane er @ 0       Supine press (2 hands)  20x      Reacher   20x     Prone: rows, ext's     Supine reaches   25x    20x      T band pinches   25x green     Wall walk   5 x 10\" Manual: PROM into shoulder flexion, abduction, ER and IR to 30° - 28'       Therapeutic Exercise and NMR EXR  [x] (49960) Provided verbal/tactile cueing for activities related to strengthening, flexibility, endurance, ROM  for improvements in scapular, scapulothoracic and UE control with self care, reaching, carrying, lifting, house/yardwork, driving/computer work.    [] (47990) Provided verbal/tactile cueing for activities related to improving balance, coordination, kinesthetic sense, posture, motor skill, proprioception  to assist with  scapular, scapulothoracic and UE control with self care, reaching, carrying, lifting, house/yardwork, driving/computer work. Therapeutic Activities:    [x] (49620 or 57485) Provided verbal/tactile cueing for activities related to improving balance, coordination, kinesthetic sense, posture, motor skill, proprioception and motor activation to allow for proper function of scapular, scapulothoracic and UE control with self care, carrying, lifting, driving/computer work.      Home Exercise Program:    [x] (64219) Reviewed/Progressed HEP activities related to strengthening, flexibility, endurance, ROM of scapular, scapulothoracic and UE control with self care, reaching, carrying, lifting, house/yardwork, driving/computer work  [] (41789) Reviewed/Progressed HEP activities related to improving balance, coordination, kinesthetic sense, posture, motor skill, proprioception of scapular, scapulothoracic and UE control with self care, reaching, carrying, lifting, house/yardwork, driving/computer work      Manual Treatments:  PROM / STM / Oscillations-Mobs:  G-I, II, III, IV (PA's, Inf., Post.)  [x] (91793) Provided manual therapy to mobilize soft tissue/joints of cervical/CT, scapular GHJ and UE for the purpose of modulating pain, promoting relaxation,  increasing ROM, reducing/eliminating soft tissue swelling/inflammation/restriction, improving soft tissue extensibility and allowing for proper ROM for normal function with self care, reaching, carrying, lifting, house/yardwork, driving/computer work    Modalities:     [x] GAME READY (VASO)- for significant edema, swelling, pain control. Charges:  Timed Code Treatment Minutes: 40'   Total Treatment Minutes:  60'    BWC:  TE TIME:  NMR TIME:  MANUAL TIME:  TA  UNTIMED MINUTES:  Medicare Total:         [] EVAL (LOW) 61369 (typically 20 minutes face-to-face)  [] EVAL (MOD) 10889 (typically 30 minutes face-to-face)  [] EVAL (HIGH) 87396 (typically 45 minutes face-to-face)  [] RE-EVAL     [x] EE(80053) x 1   [] IONTO  [] NMR (38679) x 1    [] VASO   [x] Manual (40248) 2    [] Other:  [] TA x      [] Mech Traction (79934)  [] ES(attended) (31529)      [x] ES (un) (79183):           GOALS:     Patient stated goal: Improve motion     Therapist goals for Patient:  Short Term Goals: To be achieved in: 2 weeks  1. Independent in HEP and progression per patient tolerance, in order to prevent re-injury. [x] Progressing: [] Met: [] Not Met: [] Adjusted     2. Patient will have a decrease in pain to facilitate improvement in movement, function, and ADLs as indicated by Functional Deficits. [x] Progressing: [] Met: [] Not Met: [] Adjusted     Long Term Goals: To be achieved in: 12 weeks  1. Pt will improve FOTO to 53 or more, indicating improved functional capacity . [x] Progressing: [] Met: [] Not Met: [] Adjusted     2. Patient will demonstrate increased AROM flex/abd/ER/IR to 150/150/40/L1 to allow for proper joint functioning as indicated by patients Functional Deficits. [x] Progressing: [] Met: [] Not Met: [] Adjusted     3. Patient will demonstrate an increase in Strength of shoulder  to 5/5 to allow for proper functional mobility as indicated by patients Functional Deficits. [x] Progressing: [] Met: [] Not Met: [] Adjusted     4. Patient will return to functional activities without increased symptoms or restriction.   [x] Progressing: [] Met: [] Not Met: [] Adjusted     5. Pt will report ability to sleep 5 hours without sleep disturbance from shoulder. (patient specific functional goal)    [x] Progressing: [] Met: [] Not Met: [] Adjusted             ASSESSMENT:  See eval    Patient received education on their current pathology and how their condition effects them with their functional activities. Patient understood discussion and questions were answered. Patient understands their activity limitations and understands rational for treatment progression. Pt educated on plan of care and HEP, if worsening symptoms to d/c that exercise. PLAN: See eval  [x] Continue per plan of care [] Alter current plan (see comments above)  [] Plan of care initiated [] Hold pending MD visit [] Discharge      Electronically signed by:  Andrew Fisher, PT, MPT     Note: If patient does not return for scheduled/ recommended follow up visits, this note will serve as a discharge from care along with most recent update on progress.

## 2022-12-14 ENCOUNTER — HOSPITAL ENCOUNTER (OUTPATIENT)
Dept: PHYSICAL THERAPY | Age: 62
Setting detail: THERAPIES SERIES
Discharge: HOME | End: 2022-12-14
Payer: COMMERCIAL

## 2022-12-14 PROCEDURE — 97140 MANUAL THERAPY 1/> REGIONS: CPT

## 2022-12-14 PROCEDURE — 97112 NEUROMUSCULAR REEDUCATION: CPT

## 2022-12-14 PROCEDURE — 97110 THERAPEUTIC EXERCISES: CPT | Performed by: PHYSICAL THERAPIST

## 2022-12-14 PROCEDURE — 97140 MANUAL THERAPY 1/> REGIONS: CPT | Performed by: PHYSICAL THERAPIST

## 2022-12-14 PROCEDURE — 97116 GAIT TRAINING THERAPY: CPT

## 2022-12-14 NOTE — FLOWSHEET NOTE
HEP Other comments     []      []    Cervical stretching    []    Sidebend R/L 20 sec x 3 each  [] Seated w/ UE same si   Rotation R/L  20 sec x 3 each  [] Seated, VC for cervical retraction to neutral prior to initiating rotation   Postural education Educ pt on maintaining cervical neutral especially prior to cervical ROM or UE reaching to reduce stress and pain in neck  []    Enrique posture exercise  30 sec x 3 against wall  30 sec x 1 away from wall [] Post pelvic tilt, scap retraction, chin tuck, shoulder ext rotation    Cat-camel for thoracic mobility 5x [] Quadriped, monitored for shoulder pain, maintained slightly posterior weight shift to prevent shoulder pain    Pec stretch 60 sec x 2 each  [] Supine w/ UE horizontally abducted partially off edge of table     []    Habituation if needed   []      []      []      []    Neck eye coordination 30 sec x 2 each horizontal/vertical [x] No obvious limitations, c/o retro eye pressure/fatigue   Cervical proprioception  5x R/L/up/down each   5x R/L [x] Slightly impaired to L but improved w/ practice      []      []      Static balance               Narrow KALPANA + head turns/nods 5x each    tandem 10 sec each foot posterior         tandem + head turns 5x each Increased sway slightly, but no LOB        Narrow KALPANA on foam 20 sec         Narrow KALPANA on foam + head turns 5x sec each         SLS 30 sec L   5-7 sec R            Dynamic balance     Amb with head turns/ nods 40' x 4 each   85' x 2 turns only  Reduced L cervical rotation, pt c/ slight dizziness w/ L head turn    Amb with 180* turns  8x  No limitation   Amb with quick stop/starts     Sidestepping 35' x 1 each w/ UE abd No LOB except for quick stop at end    Retrowalking 40' w/ cues for B arm swing     Tandem gait 25' x 3    Amb w/ eyes closed 30'x  1 Slightly decreased jonathan without veering until final 5' then had sudden veer R   Amb w/ high knees  85' x 1 No LOB    Amb w/ big posture, arm swing step length 40' x 4                   Gait: trialed to determine if pt safe to return to walking for cardio. Pt requests assist \"learning how to walk\". Biodex TM, 8 min + 2 min cool down, 2.0mph, HR 90-110bpm.  VC to use visual feedback for increased step length which she was able to do and maintain well for most of time. Pt demos somewhat forceful B heel strike before an after transition to longer step length. Home Exercise Program:   Access Code: [email or web address]  URL: [email or web address]  Date: 11/22/2022  Prepared by: Omega Ahumada  Exercises  Seated Cervical Sidebending AROM - 1 x daily - 7 x weekly - 1 sets - 3 reps - 20-30 hold  Seated Cervical Rotation AROM - 1 x daily - 7 x weekly - 1 sets - 3 reps - 20-30 hold  Seated Cervical Retraction - 1 x daily - 7 x weekly - 1 sets - 3 reps - 20-30 hold    Access Code: [email or web address]  URL: [email or web address]  Date: 12/07/2022  Prepared by: Omega Ahumada  Exercises  Standing with Back Flat Against Wall - 2-3 x daily - 7 x weekly - 5 reps - 30 hold      Therapeutic Exercise:   [] (25914) Provided verbal/tactile cueing for activities related to strengthening, flexibility, endurance, ROM for improvements in LE, proximal hip, and core control with self-care, mobility, lifting, ambulation. NMR:  [x] (43954) Provided verbal/tactile cueing for activities related to improving balance, coordination, kinesthetic sense, posture, motor skill, proprioception to assist with LE, proximal hip, and core control in self-care, mobility, lifting, ambulation and eccentric single leg control. Therapeutic Activities:    [] (48728) Provided verbal/tactile cueing for activities related to improving balance, coordination, kinesthetic sense, posture, motor skill, proprioception and motor activation to allow for proper function of core, proximal hip and LE with self-care and ADLs and functional mobility.      Gait Training:    [x] (18141) Provided training and instruction to the patient for proper LE, core and proximal hip recruitment and positioning and eccentric body weight control with ambulation re-education including up and down stairs     Manual Treatments:  PROM / STM / Oscillations-Mobs:  G-I, II, III, IV (PA's, Inf., Post.)  [x] (15044) Provided manual therapy to mobilize LE, proximal hip and/or LS spine soft tissue/joints for the purpose of modulating pain, promoting relaxation,  increasing ROM, reducing/eliminating soft tissue swelling/inflammation/restriction, improving soft tissue extensibility and allowing for proper ROM for normal function with self-care, mobility, lifting and ambulation. Pt positioned in supine, STM to B upper trap, most time spent on L due to pt w/ c/o more tension on L. Similar reduced  muscle tension to last session. Trigger point release to more medial portion of L upper trap, Assessed cervical rotation and sidenbending PROM, L rotation slightly more restricted than R. No dizziness w/ all brief PROM assessment. Removed last kinesiotape application, skin WNL no signs of irritation due pt did not remove prior to next PT session as instructed. Placed kinesiotape, 2 \"I strips for L upper trap inhibition following good response to test strip per pt report.  Pt has written instructions for monitoring and removal.       Home Exercise Program:    [] (22189) Reviewed/Progressed HEP activities related to strengthening, flexibility, endurance, ROM of core, proximal hip and LE for functional self-care, mobility, lifting and ambulation/stair navigation   [x] (49021) Reviewed/Progressed HEP activities related to improving balance, coordination, kinesthetic sense, posture, motor skill, proprioception of core, proximal hip and LE for self-care, mobility, lifting, and ambulation/stair navigation      Modalities:    [] Electric Stimulation:   [] Ultrasound:   [] Other:       Charges:  Timed Code Treatment Minutes: NM18;  MT 18; GT 12   Total Treatment Minutes: 48      [] EVAL (LOW) 22808 (typically 20 minutes face-to-face)  [] EVAL (MOD) 36437 (typically 30 minutes face-to-face)  [] EVAL (HIGH) 32033 (typically 45 minutes face-to-face)  [] RE-EVAL     [] UQ(28367) x       [x] NMR (13648) x   1    [x] Manual (13131) x   1    [] TA (13137) x       [x] Gait Training [phone]) x  1     [] ES(attended) (40769)  [] ES (un) (37649)   [] DRY NEEDLE 1 OR 2 MUSCLES  [] DRY NEEDLE 3+ MUSCLES  [] Mech Traction (74019)  [] Ultrasound (85397)  [] Other:      GOALS: Goals established 10/27/22   Patient stated goal: \"stronger (not fall)\"  [] Progressing: [] Met: [] Not Met: [] Adjusted    Therapist goals for Patient:  Short Term Goals= LT goals: To be achieved in: 4 weeks  Independent in HEP and progression per patient tolerance. [x] Progressing: [] Met: [] Not Met: [] Adjusted  Patient will have a decrease in dizziness/imbalance symptoms to 0-1/10 to facilitate improvement in movement, function, balance, and ADLS. Improved to 1-2/10  [x] Progressing: [] Met: [] Not Met: [] Adjusted  3. Disability index score of 40% or less for the [facility] to assist with reaching prior level of function. Improved to 44%  [x] Progressing: [] Met: [] Not Met: [] Adjusted  4. Patient will demonstrate improved cervical ext and L rotation ROM WFL without c/o dizziness. R rotation more symptomatic today  [x] Progressing: [] Met: [] Not Met: [] Adjusted  5. Pt will report no falls for 4 weeks. No falls for past 3 weeks   [x] Progressing: [x] Met: [] Not Met: [] Adjusted  6. Pt will return to walking for exercise with dizziness 1/10 or less. Has not attempted due to other family  issues. [x] Progressing: [] Met: [] Not Met: [] Adjusted      ASSESSMENT:  Pt maintains previous improvements in L upper trap w/ STM and kinesiotape. Pt w/ consistent dizziness rating despite further life stress of another death in the family.  Pt does c/o brief episode of dizziness to L when initiating sitting up at night, but based on pt description may be related to stress. Pt initiated dynamic balance training w/ minimal deficit only w/ amb w/ head turning. Pt will continue to benefit from skilled PT to reduce fall risk and improve independence w/ functional mobility. Treatment/Activity Tolerance:  [x] Patient tolerated treatment well [] Patient limited by fatique  [] Patient limited by pain  [] Patient limited by other medical complications  [] Other:     Overall Progression Towards Functional goals/ Treatment Progress Update:  [] Patient is progressing as expected towards functional goals listed. [x] Progression is slowed due to complexities/Impairments listed. - - pt not seen for first 3 weeks due to awaiting prior auth and scheduling issues than past 2 weeks due to pt's family conflicts/illness. [] Progression has been slowed due to co-morbidities. [] Plan just implemented, too soon to assess goals progression <30days   [] Goals require adjustment due to lack of progress  [] Patient is not progressing as expected and requires additional follow up with physician  [] Other    Prognosis for POC: [x] Good [] Fair  [] Poor    Patient requires continued skilled intervention: [x] Yes  [] No        PLAN: 2x/week for 4 weeks  [x] Continue per plan of care [] Alter current plan (see comments)  [] Plan of care initiated [] Hold pending MD visit [] Discharge    Electronically signed by: Fátima Medel, PT, DPT    Note: If patient does not return for scheduled/recommended follow up visits, this note will serve as a discharge from care along with the most recent update on progress.

## 2022-12-17 NOTE — FLOWSHEET NOTE
The [facility] and [address],  Sports Performance and Rehabilitation, [facility] 6199 1246 [address]  793 [facility],5Th Floor   [place]  Phone: [phone]  Fax: [phone]       Physical Therapy Treatment Note/ Progress Report:           Date:  2022    Patient Name:  Moe Rodas    :  1960  MRN: [id number]  Restrictions/Precautions:    Medical/Treatment Diagnosis Information:  Diagnosis: Z98.890 (ICD-10-CM) - S/P left rotator cuff repair  Treatment Diagnosis: Left shoulder pain, decreased ROM and strength  Insurance/Certification information: [address], [facility]  Physician Information:  Suyapa Alvarado MD  Has the plan of care been signed (Y/N):        []  Yes  [x]  No     Date of Patient follow up with Physician:     Assessment Summary: Date Range for reporting period:  Beginnin22  Endin      Recertification will be due (POC Duration  / 90 days whichever is less): 22          Visit # Insurance Allowable Auth Required   In Person  34 through  []  Yes     []  No    Tele Health   []  Yes     []  No    Total 24         Functional Scale: FOTO 16   Date assessed:     Latex Allergy:  [x]NO      []YES  Preferred Language for Healthcare:   [x]English       []other:      Pain level:  5/10         SUBJECTIVE:       \"Doing ok\"      OBJECTIVE:  175 flexion,           RESTRICTIONS/PRECAUTIONS: Large RCR w/ patch and biceps tenodesis restrictions.     Exercises/Interventions: HEP code: [id number]  Therapeutic Ex (40939) HEP 22     Warm-up   LATEX ALLERGY    Pulleys  3'      UBE            TABLE      Cane flexion  2x10     Supine er @ 0  30x     Standing ball roll       Table slide       Seated cane er @ 0       Supine press (2 hands)  20x      Reacher   20x     Prone: rows, ext's     Supine reaches   25x    20x      T band pinches   25x green     Wall walk   5 x 10\" Manual: PROM into shoulder flexion, abduction, ER and IR to 30° - 28'       Therapeutic Exercise and NMR EXR  [x] (36716) Provided verbal/tactile cueing for activities related to strengthening, flexibility, endurance, ROM  for improvements in scapular, scapulothoracic and UE control with self care, reaching, carrying, lifting, house/yardwork, driving/computer work.    [] (81696) Provided verbal/tactile cueing for activities related to improving balance, coordination, kinesthetic sense, posture, motor skill, proprioception  to assist with  scapular, scapulothoracic and UE control with self care, reaching, carrying, lifting, house/yardwork, driving/computer work. Therapeutic Activities:    [x] (16496 or 27488) Provided verbal/tactile cueing for activities related to improving balance, coordination, kinesthetic sense, posture, motor skill, proprioception and motor activation to allow for proper function of scapular, scapulothoracic and UE control with self care, carrying, lifting, driving/computer work.      Home Exercise Program:    [x] (96750) Reviewed/Progressed HEP activities related to strengthening, flexibility, endurance, ROM of scapular, scapulothoracic and UE control with self care, reaching, carrying, lifting, house/yardwork, driving/computer work  [] (74041) Reviewed/Progressed HEP activities related to improving balance, coordination, kinesthetic sense, posture, motor skill, proprioception of scapular, scapulothoracic and UE control with self care, reaching, carrying, lifting, house/yardwork, driving/computer work      Manual Treatments:  PROM / STM / Oscillations-Mobs:  G-I, II, III, IV (PA's, Inf., Post.)  [x] (22531) Provided manual therapy to mobilize soft tissue/joints of cervical/CT, scapular GHJ and UE for the purpose of modulating pain, promoting relaxation,  increasing ROM, reducing/eliminating soft tissue swelling/inflammation/restriction, improving soft tissue extensibility and allowing for proper ROM for normal function with self care, reaching, carrying, lifting, house/yardwork, driving/computer work    Modalities:     [x] GAME READY (VASO)- for significant edema, swelling, pain control. Charges:  Timed Code Treatment Minutes: 40'   Total Treatment Minutes:  60'    BWC:  TE TIME:  NMR TIME:  MANUAL TIME:  TA  UNTIMED MINUTES:  Medicare Total:         [] EVAL (LOW) 57363 (typically 20 minutes face-to-face)  [] EVAL (MOD) 00366 (typically 30 minutes face-to-face)  [] EVAL (HIGH) 87519 (typically 45 minutes face-to-face)  [] RE-EVAL     [x] HZ(85902) x 1   [] IONTO  [] NMR (31853) x 1    [] VASO   [x] Manual (12540) 2    [] Other:  [] TA x      [] Mech Traction (76507)  [] ES(attended) (26726)      [] ES (un) (53315):           GOALS:     Patient stated goal: Improve motion     Therapist goals for Patient:  Short Term Goals: To be achieved in: 2 weeks  1. Independent in HEP and progression per patient tolerance, in order to prevent re-injury. [x] Progressing: [] Met: [] Not Met: [] Adjusted     2. Patient will have a decrease in pain to facilitate improvement in movement, function, and ADLs as indicated by Functional Deficits. [x] Progressing: [] Met: [] Not Met: [] Adjusted     Long Term Goals: To be achieved in: 12 weeks  1. Pt will improve FOTO to 53 or more, indicating improved functional capacity . [x] Progressing: [] Met: [] Not Met: [] Adjusted     2. Patient will demonstrate increased AROM flex/abd/ER/IR to 150/150/40/L1 to allow for proper joint functioning as indicated by patients Functional Deficits. [x] Progressing: [] Met: [] Not Met: [] Adjusted     3. Patient will demonstrate an increase in Strength of shoulder  to 5/5 to allow for proper functional mobility as indicated by patients Functional Deficits. [x] Progressing: [] Met: [] Not Met: [] Adjusted     4. Patient will return to functional activities without increased symptoms or restriction.   [x] Progressing: [] Met: [] Not Met: [] Adjusted     5. Pt will report ability to sleep 5 hours without sleep disturbance from shoulder. (patient specific functional goal)    [x] Progressing: [] Met: [] Not Met: [] Adjusted             ASSESSMENT:  See eval    Patient received education on their current pathology and how their condition effects them with their functional activities. Patient understood discussion and questions were answered. Patient understands their activity limitations and understands rational for treatment progression. Pt educated on plan of care and HEP, if worsening symptoms to d/c that exercise. PLAN: See eval  [x] Continue per plan of care [] Alter current plan (see comments above)  [] Plan of care initiated [] Hold pending MD visit [] Discharge      Electronically signed by:  King Schmid, PT, MPT     Note: If patient does not return for scheduled/ recommended follow up visits, this note will serve as a discharge from care along with most recent update on progress.

## 2022-12-19 ENCOUNTER — HOSPITAL ENCOUNTER (OUTPATIENT)
Dept: PHYSICAL THERAPY | Age: 62
Setting detail: THERAPIES SERIES
Discharge: HOME | End: 2022-12-19
Payer: COMMERCIAL

## 2022-12-19 PROCEDURE — 97110 THERAPEUTIC EXERCISES: CPT | Performed by: PHYSICAL THERAPIST

## 2022-12-19 PROCEDURE — 97140 MANUAL THERAPY 1/> REGIONS: CPT | Performed by: PHYSICAL THERAPIST

## 2022-12-19 PROCEDURE — 20560 NDL INSJ W/O NJX 1 OR 2 MUSC: CPT | Performed by: PHYSICAL THERAPIST

## 2022-12-21 ENCOUNTER — HOSPITAL ENCOUNTER (OUTPATIENT)
Dept: PHYSICAL THERAPY | Age: 62
Setting detail: THERAPIES SERIES
Discharge: HOME | End: 2022-12-21
Payer: COMMERCIAL

## 2022-12-21 PROCEDURE — 97140 MANUAL THERAPY 1/> REGIONS: CPT

## 2022-12-21 PROCEDURE — 97112 NEUROMUSCULAR REEDUCATION: CPT

## 2022-12-21 PROCEDURE — 97530 THERAPEUTIC ACTIVITIES: CPT

## 2022-12-21 NOTE — PROGRESS NOTES
[facility] Outpatient Therapy  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 (ICD-10-CM) - Meniere's disease of left ear    R42 Dizziness                                     Insurance/Certification information:  PT Insurance Information: [employer] ($30 copay; 20 visits; prior auth- [employer])  Physician Information:  Lizzy Jarrett DO   Plan of care signed:    [x] Yes  [] No    Date of Patient follow up with Physician:       Progress Report: [x]  Yes  []  No     Date Range for reporting period:  Beginnin22  Endin22  Progress report due (10 Rx/or 30 days whichever is less): 23     Recertification due (POC duration/ or 90 days whichever is less): 23     Visit # Insurance Allowable Auth Needed    10 visits by 22 [x]Yes   []No     RESTRICTIONS/PRECAUTIONS: fibromyalgia, RA, currently in PT for L RC surgery; cervical fusion (3 level)  Latex Allergy:  []NO      [x]YES- adhesives   Preferred Language for Healthcare:   [x]English       [x]other:  Functional Scale: FOTO 54; [address] [place] [address] 80%; FGA   Date assessed:10/27/22    Functional Scale: [address] [place] [address] 44% Date assessed: 22    Functional Scale: DHI 34% Date assessed: 22    Dizziness level:  4/10 at rest (light headed);  3/10 headache related to sinus pressure        SUBJECTIVE:  Pt reports a little more dizziness this week, tired. Family funerals causing stress. Pt had 1 migraine this week- aura, dizziness got worse for a little bit. Medicine she tried for 1st time helped (tylenol + aleve). Pt reports she returned to walking this week, did a lot.        OBJECTIVE:   Observation: Pt amb WFL    Test measurements: Exercises/Interventions: Exercises in bold performed in department today. Items not bolded are carried forward from prior visits for continuity of the record.     Exercise/Equipment Resistance/Repetitions HEP Other comments     []      []    Cervical stretching    []    Sidebend R/L 20 sec x 3 each  [] Seated w/ UE same si   Rotation R/L  20 sec x 3 each  [] Seated, VC for cervical retraction to neutral prior to initiating rotation   Postural education Educ pt on maintaining cervical neutral especially prior to cervical ROM or UE reaching to reduce stress and pain in neck  []    Enrique posture exercise  30 sec x 3 against wall  30 sec x 1 away from wall [] Post pelvic tilt, scap retraction, chin tuck, shoulder ext rotation    Cat-camel for thoracic mobility 5x [] Quadriped, monitored for shoulder pain, maintained slightly posterior weight shift to prevent shoulder pain    Pec stretch 60 sec x 2 each  [] Supine w/ UE horizontally abducted partially off edge of table     []    Habituation if needed   []      []      []      []    Neck eye coordination 30 sec x 2 each horizontal/vertical [x] No obvious limitations, c/o retro eye pressure/fatigue   Cervical proprioception  5x R/L up/down each   5x R/L side/side [x] Slightly impaired to L but improved w/ practice and cueing for strategy      []      []      Static balance               Narrow KALPANA + head turns/nods 5x each    tandem 10 sec each foot posterior         tandem + head turns 5x each Increased sway slightly, but no LOB        Narrow KALPANA on foam 20 sec         Narrow KALPANA on foam + head turns 5x sec each         SLS 30 sec L   5-7 sec R            Dynamic balance     Amb with head turns/ nods 40' x 4 each   85' x 2 turns only  Reduced L cervical rotation, pt c/ slight dizziness w/ L head turn    Amb with 180* turns  8x  No limitation   Amb with quick stop/starts     Sidestepping 35' x 1 each w/ UE abd No LOB except for quick stop at end    Retrowalking 40' w/ cues for B arm swing     Tandem gait 25' x 3    Amb w/ eyes closed 30'x  1 Slightly decreased jonathan without veering until final 5' then had sudden veer R   Amb w/ high knees  85' x 1 No LOB    Amb w/ big posture, arm swing step length 40' x 4                   TA: Reviewed progress to date and goals. Answered pt's question and educ on balance system due to continuing to notice some trouble ambulating in the dark. Home Exercise Program:   Access Code: [email or web address]  URL: [email or web address]  Date: 11/22/2022  Prepared by: Julious Nakita  Exercises  Seated Cervical Sidebending AROM - 1 x daily - 7 x weekly - 1 sets - 3 reps - 20-30 hold  Seated Cervical Rotation AROM - 1 x daily - 7 x weekly - 1 sets - 3 reps - 20-30 hold  Seated Cervical Retraction - 1 x daily - 7 x weekly - 1 sets - 3 reps - 20-30 hold    Access Code: [email or web address]  URL: [email or web address]  Date: 12/07/2022  Prepared by: Julious Nakita  Exercises  Standing with Back Flat Against Wall - 2-3 x daily - 7 x weekly - 5 reps - 30 hold      Therapeutic Exercise:   [] (78028) Provided verbal/tactile cueing for activities related to strengthening, flexibility, endurance, ROM for improvements in LE, proximal hip, and core control with self-care, mobility, lifting, ambulation. NMR:  [x] (09160) Provided verbal/tactile cueing for activities related to improving balance, coordination, kinesthetic sense, posture, motor skill, proprioception to assist with LE, proximal hip, and core control in self-care, mobility, lifting, ambulation and eccentric single leg control. Therapeutic Activities:    [] (93460) Provided verbal/tactile cueing for activities related to improving balance, coordination, kinesthetic sense, posture, motor skill, proprioception and motor activation to allow for proper function of core, proximal hip and LE with self-care and ADLs and functional mobility.      Gait Training:    [x] (44617) Provided training and instruction to the patient for proper LE, core and proximal hip recruitment and positioning and eccentric body weight control with ambulation re-education including up and down stairs     Manual Treatments:  PROM / STM / Oscillations-Mobs:  G-I, II, III, IV (PA's, Inf., Post.)  [x] (08978) Provided manual therapy to mobilize LE, proximal hip and/or LS spine soft tissue/joints for the purpose of modulating pain, promoting relaxation,  increasing ROM, reducing/eliminating soft tissue swelling/inflammation/restriction, improving soft tissue extensibility and allowing for proper ROM for normal function with self-care, mobility, lifting and ambulation. Pt positioned in supine, STM to B upper trap, most time spent on L due to pt w/ c/o more tension on L. Similar reduced  muscle tension to last session. Trigger point release to more medial portion of L upper trap, Assessed cervical rotation and sidenbending PROM, L rotation slightly more restricted than R. No dizziness w/ all brief PROM assessment. Removed last kinesiotape application, skin WNL no signs of irritation due pt did not remove prior to next PT session as instructed. Placed kinesiotape, 2 \"I strips for L upper trap inhibition following good response to test strip per pt report.  Pt has written instructions for monitoring and removal.       Home Exercise Program:    [] (69099) Reviewed/Progressed HEP activities related to strengthening, flexibility, endurance, ROM of core, proximal hip and LE for functional self-care, mobility, lifting and ambulation/stair navigation   [x] (44889) Reviewed/Progressed HEP activities related to improving balance, coordination, kinesthetic sense, posture, motor skill, proprioception of core, proximal hip and LE for self-care, mobility, lifting, and ambulation/stair navigation      Modalities:    [] Electric Stimulation:   [] Ultrasound:   [] Other:       Charges:  Timed Code Treatment Minutes: NM18;  MT 18; GT 12   Total Treatment Minutes: 48      [] EVAL (LOW) 23739 (typically 20 minutes face-to-face)  [] EVAL (MOD) 65191 (typically 30 minutes face-to-face)  [] EVAL (HIGH) 57240 (typically 45 minutes face-to-face)  [] RE-EVAL     [] BC(49806) x       [x] NMR (13257) x   1    [x] Manual (02591) x   1    [] TA (68430) x       [x] Gait Training [phone]) x  1     [] ES(attended) (96956)  [] ES (un) (78063)   [] DRY NEEDLE 1 OR 2 MUSCLES  [] DRY NEEDLE 3+ MUSCLES  [] Mech Traction (99723)  [] Ultrasound (43317)  [] Other:      GOALS: Goals established 10/27/22   Patient stated goal: \"stronger (not fall)\"  [] Progressing: [] Met: [] Not Met: [] Adjusted    Therapist goals for Patient:  Short Term Goals= LT goals: To be achieved in: 4 weeks  Independent in HEP and progression per patient tolerance. [] Progressing: [x] Met: [] Not Met: [] Adjusted  Patient will have a decrease in dizziness/imbalance symptoms to 0-1/10 to facilitate improvement in movement, function, balance, and ADLS. Regressed to 3/10 this week   [x] Progressing: [] Met: [] Not Met: [] Adjusted  3. Disability index score of 40% or less for the [address] [place] [address] to assist with reaching prior level of function. Improved to 34%  [] Progressing: [x] Met: [] Not Met: [] Adjusted  4. Patient will demonstrate improved cervical ext and L rotation ROM WFL without c/o dizziness. ROM symmetrical, no dizziness, but does have pain down L side of upper back   [x] Progressing: [] Met: [] Not Met: [] Adjusted  5. Pt will report no falls for 4 weeks. No falls, but has veered L into wall a couple of times. [x] Progressing: [] Met: [] Not Met: [] Adjusted  6. Pt will return to walking for exercise with dizziness 1/10 or less. [] Progressing: [x] Met: [] Not Met: [] Adjusted      ASSESSMENT:  Pt maintains previous improvements in L upper trap w/ STM and kinesiotape. Pt w/ consistent dizziness rating despite further life stress of another death in the family.  Pt does c/o brief episode of dizziness to L when initiating sitting up at night, but based on pt description may be related to stress. Pt initiated dynamic balance training w/ minimal deficit only w/ amb w/ head turning. Pt will continue to benefit from skilled PT to reduce fall risk and improve independence w/ functional mobility. Treatment/Activity Tolerance:  [x] Patient tolerated treatment well [] Patient limited by fatique  [] Patient limited by pain  [] Patient limited by other medical complications  [] Other:     Overall Progression Towards Functional goals/ Treatment Progress Update:  [] Patient is progressing as expected towards functional goals listed. [x] Progression is slowed due to complexities/Impairments listed. - - pt not seen for first 3 weeks due to awaiting prior auth and scheduling issues than past 2 weeks due to pt's family conflicts/illness. [] Progression has been slowed due to co-morbidities. [] Plan just implemented, too soon to assess goals progression <30days   [] Goals require adjustment due to lack of progress  [] Patient is not progressing as expected and requires additional follow up with physician  [] Other    Prognosis for POC: [x] Good [] Fair  [] Poor    Patient requires continued skilled intervention: [x] Yes  [] No        PLAN: 2x/week for 4 weeks  [] Continue per plan of care [x] Alter current plan (see comments)- extended date for insurance auth to cover final 2 visits. [] Plan of care initiated [] Hold pending MD visit [] Discharge    Electronically signed by: Josep Velez, PT, DPT    Note: If patient does not return for scheduled/recommended follow up visits, this note will serve as a discharge from care along with the most recent update on progress.

## 2022-12-22 ENCOUNTER — HOSPITAL ENCOUNTER (OUTPATIENT)
Dept: PHYSICAL THERAPY | Age: 62
Setting detail: THERAPIES SERIES
Discharge: HOME | End: 2022-12-22
Payer: COMMERCIAL

## 2022-12-22 PROCEDURE — 97110 THERAPEUTIC EXERCISES: CPT | Performed by: PHYSICAL THERAPIST

## 2022-12-22 PROCEDURE — 97140 MANUAL THERAPY 1/> REGIONS: CPT | Performed by: PHYSICAL THERAPIST

## 2022-12-22 PROCEDURE — 97033 APP MDLTY 1+IONTPHRSIS EA 15: CPT | Performed by: PHYSICAL THERAPIST

## 2022-12-22 NOTE — FLOWSHEET NOTE
[facility] and [address],  [facility], Pending sale to [facility] [address]  [address] [facility],[address]   [place] Suarez  Phone: [phone]  Fax: [phone]       Physical Therapy Treatment Note/ Progress Report:           Date:  2022    Patient Name:  Sean Ghotra    :  1960  MRN: [id number]  Restrictions/Precautions:    Medical/Treatment Diagnosis Information:  Diagnosis: Z98.890 (ICD-10-CM) - S/P left rotator cuff repair  Treatment Diagnosis: Left shoulder pain, decreased ROM and strength  Insurance/Certification information: [employer], [facility]  Physician Information:  Loree Guevara MD  Has the plan of care been signed (Y/N):        []  Yes  [x]  No     Date of Patient follow up with Physician:     Assessment Summary: Date Range for reporting period:  Beginnin22  Endin54      Recertification will be due (POC Duration  / 90 days whichever is less): 22          Visit # Insurance Allowable Auth Required   In Person  34 through  []  Yes     []  No    Tele Health   []  Yes     []  No    Total 25         Functional Scale: FOTO 16   Date assessed:     Latex Allergy:  [x]NO      []YES  Preferred Language for Healthcare:   [x]English       []other:      Pain level:  5/10         SUBJECTIVE:       \"About the same. .maybe a bit looser\"      OBJECTIVE:  175 flexion,           RESTRICTIONS/PRECAUTIONS: Large RCR w/ patch and biceps tenodesis restrictions.     Exercises/Interventions: HEP code: [id number]  Therapeutic Ex (75518) HEP 22     Warm-up   LATEX ALLERGY    Pulleys  3'      UBE            TABLE      Cane flexion  2x10     Supine er @ 0  30x     Standing ball roll   30x mr     Table slide       Seated cane er @ 0       Supine press (2 hands)      Reacher   20x (2#)     Prone: rows, ext's     Supine reaches     Standing reaches     Quadruped rocks   25x    30x     30 x    15x     T band pinches   25x green     Wall walk   5 x 10\"     Wall weight shifts   10x      Bicep curls   25x - 2#     Supine tricep 'crushers'  25x                                                                 Manual: PROM into shoulder flexion, abduction, ER and IR to 30° - 28'   Dry needling manual therapy: consisted on the placement of microfilament needles in the following muscles:  upper trap, biceps. A 30 mm needle was inserted, piston, rotated, and coned to produce intramuscular mobilization. These techniques were used to restore functional range of motion, reduce muscle spasm and induce healing in the corresponding musculature. (82429)  Clean Technique was utilized today while applying Dry needling treatment. The treatment sites where cleaned with 70% solution of  isopropyl alcohol . The PT washed their hands and utilized treatment gloves along with hand  prior to inserting the needles. All needles where removed and discarded in the appropriate sharps container. Therapeutic Exercise and NMR EXR  [x] (32429) Provided verbal/tactile cueing for activities related to strengthening, flexibility, endurance, ROM  for improvements in scapular, scapulothoracic and UE control with self care, reaching, carrying, lifting, house/yardwork, driving/computer work.    [] (33995) Provided verbal/tactile cueing for activities related to improving balance, coordination, kinesthetic sense, posture, motor skill, proprioception  to assist with  scapular, scapulothoracic and UE control with self care, reaching, carrying, lifting, house/yardwork, driving/computer work. Therapeutic Activities:    [x] (99857 or 64733) Provided verbal/tactile cueing for activities related to improving balance, coordination, kinesthetic sense, posture, motor skill, proprioception and motor activation to allow for proper function of scapular, scapulothoracic and UE control with self care, carrying, lifting, driving/computer work.      Home Exercise Program:    [x] (65033) Reviewed/Progressed HEP activities related to strengthening, flexibility, endurance, ROM of scapular, scapulothoracic and UE control with self care, reaching, carrying, lifting, house/yardwork, driving/computer work  [] (14249) Reviewed/Progressed HEP activities related to improving balance, coordination, kinesthetic sense, posture, motor skill, proprioception of scapular, scapulothoracic and UE control with self care, reaching, carrying, lifting, house/yardwork, driving/computer work      Manual Treatments:  PROM / STM / Oscillations-Mobs:  G-I, II, III, IV (PA's, Inf., Post.)  [x] (97337) Provided manual therapy to mobilize soft tissue/joints of cervical/CT, scapular GHJ and UE for the purpose of modulating pain, promoting relaxation,  increasing ROM, reducing/eliminating soft tissue swelling/inflammation/restriction, improving soft tissue extensibility and allowing for proper ROM for normal function with self care, reaching, carrying, lifting, house/yardwork, driving/computer work    Modalities:     [x] GAME READY (VASO)- for significant edema, swelling, pain control. Charges:  Timed Code Treatment Minutes: 43'   Total Treatment Minutes:  60'    BWC:  TE TIME:  NMR TIME:  MANUAL TIME:  TA  UNTIMED MINUTES:  Medicare Total:         [] EVAL (LOW) 49715 (typically 20 minutes face-to-face)  [] EVAL (MOD) 34933 (typically 30 minutes face-to-face)  [] EVAL (HIGH) 03288 (typically 45 minutes face-to-face)  [] RE-EVAL     [x] TP(07871) x 2   [] IONTO  [] NMR (82803) x 1    [] VASO   [x] Manual (27132) 1    [x] Other:  DN  [] TA x      [] Mech Traction (86686)  [] ES(attended) (98003)      [] ES (un) (44280):           GOALS:     Patient stated goal: Improve motion     Therapist goals for Patient:  Short Term Goals: To be achieved in: 2 weeks  1. Independent in HEP and progression per patient tolerance, in order to prevent re-injury. [x] Progressing: [] Met: [] Not Met: [] Adjusted     2. Patient will have a decrease in pain to facilitate improvement in movement, function, and ADLs as indicated by Functional Deficits. [x] Progressing: [] Met: [] Not Met: [] Adjusted     Long Term Goals: To be achieved in: 12 weeks  1. Pt will improve FOTO to 53 or more, indicating improved functional capacity . [x] Progressing: [] Met: [] Not Met: [] Adjusted     2. Patient will demonstrate increased AROM flex/abd/ER/IR to 150/150/40/L1 to allow for proper joint functioning as indicated by patients Functional Deficits. [x] Progressing: [] Met: [] Not Met: [] Adjusted     3. Patient will demonstrate an increase in Strength of shoulder  to 5/5 to allow for proper functional mobility as indicated by patients Functional Deficits. [x] Progressing: [] Met: [] Not Met: [] Adjusted     4. Patient will return to functional activities without increased symptoms or restriction. [x] Progressing: [] Met: [] Not Met: [] Adjusted     5. Pt will report ability to sleep 5 hours without sleep disturbance from shoulder. (patient specific functional goal)    [x] Progressing: [] Met: [] Not Met: [] Adjusted             ASSESSMENT:  See eval    Patient received education on their current pathology and how their condition effects them with their functional activities. Patient understood discussion and questions were answered. Patient understands their activity limitations and understands rational for treatment progression. Pt educated on plan of care and HEP, if worsening symptoms to d/c that exercise. PLAN: See eval  [x] Continue per plan of care [] Alter current plan (see comments above)  [] Plan of care initiated [] Hold pending MD visit [] Discharge      Electronically signed by:  Neisha Rodriguez, PT, MPT     Note: If patient does not return for scheduled/ recommended follow up visits, this note will serve as a discharge from care along with most recent update on progress.

## 2022-12-23 ENCOUNTER — APPOINTMENT (OUTPATIENT)
Dept: PHYSICAL THERAPY | Age: 62
End: 2022-12-23
Payer: COMMERCIAL

## 2022-12-24 NOTE — FLOWSHEET NOTE
The [address] and [address],  Sports Performance and Rehabilitation, [facility] 8599 [address]  793 [facility],5Th Floor   [place], Bridgett Vargas  Phone: [phone]  Fax: [phone]       Physical Therapy Treatment Note/ Progress Report:           Date:  2022    Patient Name:  Angeles Pickard    :  1960  MRN: [id number]  Restrictions/Precautions:    Medical/Treatment Diagnosis Information:  Diagnosis: Z98.890 (ICD-10-CM) - S/P left rotator cuff repair  Treatment Diagnosis: Left shoulder pain, decreased ROM and strength  Insurance/Certification information: [address], [facility]  Physician Information:  Nicci Packer MD  Has the plan of care been signed (Y/N):        []  Yes  [x]  No     Date of Patient follow up with Physician:     Assessment Summary: Date Range for reporting period:  Beginnin22  Endin      Recertification will be due (POC Duration  / 90 days whichever is less): 22          Visit # Insurance Allowable Auth Required   In Person  34 through  []  Yes     []  No    Tele Health   []  Yes     []  No    Total 26         Functional Scale: FOTO 16   Date assessed:     Latex Allergy:  [x]NO      []YES  Preferred Language for Healthcare:   [x]English       []other:      Pain level:  5/10         SUBJECTIVE:       \"About the same. .maybe a bit looser\"      OBJECTIVE:  175 flexion,           RESTRICTIONS/PRECAUTIONS: Large RCR w/ patch and biceps tenodesis restrictions.     Exercises/Interventions: HEP code: [id number]  Therapeutic Ex (71545) HEP 22     Warm-up   LATEX ALLERGY    Pulleys  3'      UBE            TABLE      Cane flexion  2x10     Supine er @ 0  30x     Standing ball roll   30x mr     Table slide       Seated cane er @ 0       Supine press (2 hands)      Reacher   20x (2#)     Prone: rows, ext's     Supine reaches     Standing reaches     Quadruped rocks   25x    30x     30 x    15x     T band pinches   25x green     Wall walk   5 x 10\"     Wall weight shifts   10x      Bicep curls   25x - 2#     Supine tricep 'crushers'  25x                                                                 Manual: PROM into shoulder flexion, abduction, ER and IR to 30° - 28'   Dry needling manual therapy: consisted on the placement of microfilament needles in the following muscles:  upper trap, biceps. A 30 mm needle was inserted, piston, rotated, and coned to produce intramuscular mobilization. These techniques were used to restore functional range of motion, reduce muscle spasm and induce healing in the corresponding musculature. (76429)  Clean Technique was utilized today while applying Dry needling treatment. The treatment sites where cleaned with 70% solution of  isopropyl alcohol . The PT washed their hands and utilized treatment gloves along with hand  prior to inserting the needles. All needles where removed and discarded in the appropriate sharps container. Therapeutic Exercise and NMR EXR  [x] (00334) Provided verbal/tactile cueing for activities related to strengthening, flexibility, endurance, ROM  for improvements in scapular, scapulothoracic and UE control with self care, reaching, carrying, lifting, house/yardwork, driving/computer work.    [] (40728) Provided verbal/tactile cueing for activities related to improving balance, coordination, kinesthetic sense, posture, motor skill, proprioception  to assist with  scapular, scapulothoracic and UE control with self care, reaching, carrying, lifting, house/yardwork, driving/computer work. Therapeutic Activities:    [x] (28917 or 00659) Provided verbal/tactile cueing for activities related to improving balance, coordination, kinesthetic sense, posture, motor skill, proprioception and motor activation to allow for proper function of scapular, scapulothoracic and UE control with self care, carrying, lifting, driving/computer work.      Home Exercise Program:    [x] (68106) Reviewed/Progressed HEP activities related to strengthening, flexibility, endurance, ROM of scapular, scapulothoracic and UE control with self care, reaching, carrying, lifting, house/yardwork, driving/computer work  [] (36493) Reviewed/Progressed HEP activities related to improving balance, coordination, kinesthetic sense, posture, motor skill, proprioception of scapular, scapulothoracic and UE control with self care, reaching, carrying, lifting, house/yardwork, driving/computer work      Manual Treatments:  PROM / STM / Oscillations-Mobs:  G-I, II, III, IV (PA's, Inf., Post.)  [x] (31128) Provided manual therapy to mobilize soft tissue/joints of cervical/CT, scapular GHJ and UE for the purpose of modulating pain, promoting relaxation,  increasing ROM, reducing/eliminating soft tissue swelling/inflammation/restriction, improving soft tissue extensibility and allowing for proper ROM for normal function with self care, reaching, carrying, lifting, house/yardwork, driving/computer work    Modalities:     [x] GAME READY (VASO)- for significant edema, swelling, pain control. Charges:  Timed Code Treatment Minutes: 43'   Total Treatment Minutes:  60'    BWC:  TE TIME:  NMR TIME:  MANUAL TIME:  TA  UNTIMED MINUTES:  Medicare Total:         [] EVAL (LOW) 63627 (typically 20 minutes face-to-face)  [] EVAL (MOD) 35047 (typically 30 minutes face-to-face)  [] EVAL (HIGH) 22293 (typically 45 minutes face-to-face)  [] RE-EVAL     [x] YP(75098) x 2   [] IONTO  [] NMR (87838) x 1    [] VASO   [x] Manual (94459) 1    [x] Other:  ionto (4.0mA - bicep tendon)  [] TA x      [] Mech Traction (12736)  [] ES(attended) (48041)      [] ES (un) (90507):           GOALS:     Patient stated goal: Improve motion     Therapist goals for Patient:  Short Term Goals: To be achieved in: 2 weeks  1. Independent in HEP and progression per patient tolerance, in order to prevent re-injury.   [x] Progressing: [] Met: [] Not Met: [] Adjusted     2. Patient will have a decrease in pain to facilitate improvement in movement, function, and ADLs as indicated by Functional Deficits. [x] Progressing: [] Met: [] Not Met: [] Adjusted     Long Term Goals: To be achieved in: 12 weeks  1. Pt will improve FOTO to 53 or more, indicating improved functional capacity . [x] Progressing: [] Met: [] Not Met: [] Adjusted     2. Patient will demonstrate increased AROM flex/abd/ER/IR to 150/150/40/L1 to allow for proper joint functioning as indicated by patients Functional Deficits. [x] Progressing: [] Met: [] Not Met: [] Adjusted     3. Patient will demonstrate an increase in Strength of shoulder  to 5/5 to allow for proper functional mobility as indicated by patients Functional Deficits. [x] Progressing: [] Met: [] Not Met: [] Adjusted     4. Patient will return to functional activities without increased symptoms or restriction. [x] Progressing: [] Met: [] Not Met: [] Adjusted     5. Pt will report ability to sleep 5 hours without sleep disturbance from shoulder. (patient specific functional goal)    [x] Progressing: [] Met: [] Not Met: [] Adjusted             ASSESSMENT:  See eval    Patient received education on their current pathology and how their condition effects them with their functional activities. Patient understood discussion and questions were answered. Patient understands their activity limitations and understands rational for treatment progression. Pt educated on plan of care and HEP, if worsening symptoms to d/c that exercise. PLAN: See eval  [x] Continue per plan of care [] Alter current plan (see comments above)  [] Plan of care initiated [] Hold pending MD visit [] Discharge      Electronically signed by:  Lisa Almanza, PT, MPT     Note: If patient does not return for scheduled/ recommended follow up visits, this note will serve as a discharge from care along with most recent update on progress.

## 2022-12-27 ENCOUNTER — APPOINTMENT (OUTPATIENT)
Dept: PHYSICAL THERAPY | Age: 62
End: 2022-12-27
Payer: COMMERCIAL

## 2022-12-29 ENCOUNTER — HOSPITAL ENCOUNTER (OUTPATIENT)
Dept: CT IMAGING | Age: 62
Discharge: HOME | End: 2022-12-29
Payer: COMMERCIAL

## 2022-12-29 ENCOUNTER — HOSPITAL ENCOUNTER (OUTPATIENT)
Dept: PHYSICAL THERAPY | Age: 62
Setting detail: THERAPIES SERIES
Discharge: HOME | End: 2022-12-29
Payer: COMMERCIAL

## 2022-12-29 DIAGNOSIS — Z13.6 SCREENING FOR HEART DISEASE: ICD-10-CM

## 2022-12-29 PROCEDURE — 75571 CT HRT W/O DYE W/CA TEST: CPT

## 2022-12-29 PROCEDURE — 97110 THERAPEUTIC EXERCISES: CPT | Performed by: PHYSICAL THERAPIST

## 2022-12-29 PROCEDURE — 97033 APP MDLTY 1+IONTPHRSIS EA 15: CPT | Performed by: PHYSICAL THERAPIST

## 2022-12-29 PROCEDURE — 20560 NDL INSJ W/O NJX 1 OR 2 MUSC: CPT | Performed by: PHYSICAL THERAPIST

## 2022-12-29 PROCEDURE — 97140 MANUAL THERAPY 1/> REGIONS: CPT | Performed by: PHYSICAL THERAPIST

## 2022-12-30 ENCOUNTER — HOSPITAL ENCOUNTER (OUTPATIENT)
Dept: PHYSICAL THERAPY | Age: 62
Setting detail: THERAPIES SERIES
Discharge: HOME | End: 2022-12-30
Payer: COMMERCIAL

## 2022-12-30 PROCEDURE — 97140 MANUAL THERAPY 1/> REGIONS: CPT | Performed by: PHYSICAL THERAPIST

## 2022-12-30 PROCEDURE — 20560 NDL INSJ W/O NJX 1 OR 2 MUSC: CPT | Performed by: PHYSICAL THERAPIST

## 2022-12-30 PROCEDURE — 97033 APP MDLTY 1+IONTPHRSIS EA 15: CPT | Performed by: PHYSICAL THERAPIST

## 2022-12-30 PROCEDURE — 97110 THERAPEUTIC EXERCISES: CPT | Performed by: PHYSICAL THERAPIST

## 2022-12-31 NOTE — FLOWSHEET NOTE
The [facility] and [address],  Sports Performance and Rehabilitation, [facility] 6199 [address]  793 [facility],5Th Floor   [place] Suarez  Phone: [phone]  Fax: [phone]       Physical Therapy Treatment Note/ Progress Report:           Date:  2022  Patient Name:  Moe Rodas    :  1960  MRN: [id number]  Restrictions/Precautions:    Medical/Treatment Diagnosis Information:  Diagnosis: Z98.890 (ICD-10-CM) - S/P left rotator cuff repair  Treatment Diagnosis: Left shoulder pain, decreased ROM and strength  Insurance/Certification information: [address], [facility]  Physician Information:  Suyapa Alvarado MD  Has the plan of care been signed (Y/N):        []  Yes  [x]  No     Date of Patient follow up with Physician:     Assessment Summary: Date Range for reporting period:  Beginnin22  Ending:   3/39/30      Recertification will be due (POC Duration  / 90 days whichever is less): 22          Visit # Insurance Allowable Auth Required   In Person  34 through  []  Yes     []  No    Tele Health   []  Yes     []  No    Total 27         Functional Scale: FOTO 16   Date assessed:     Latex Allergy:  [x]NO      []YES  Preferred Language for Healthcare:   [x]English       []other:      Pain level:  5/10         SUBJECTIVE:       \"Doing ok. Lore[facility] Press Loreatha Press Loreatha Press \"      OBJECTIVE:  175 flexion,           RESTRICTIONS/PRECAUTIONS: Large RCR w/ patch and biceps tenodesis restrictions.     Exercises/Interventions: HEP code: [id number]  Therapeutic Ex (28476) HEP 22     Warm-up   LATEX ALLERGY    Pulleys  3'      UBE            TABLE      Cane flexion  2x10     Supine er @ 0  30x     Standing ball roll   30x mr     Table slide       Seated cane er @ 0       Supine press (2 hands)      Reacher   20x (2#)     Prone: rows, ext's     Supine reaches     Standing reaches     Quadruped rocks   25x    30x     30 x    15x     T band pinches   25x green     Wall walk   5 x 10\"     Wall weight shifts   10x      Bicep curls   25x - 2#     Supine tricep 'crushers'  25x                                                                 Manual: PROM into shoulder flexion, abduction, ER and IR to 30° - 28'   Therapeutic Exercise and NMR EXR  [x] (70060) Provided verbal/tactile cueing for activities related to strengthening, flexibility, endurance, ROM  for improvements in scapular, scapulothoracic and UE control with self care, reaching, carrying, lifting, house/yardwork, driving/computer work.    [] (54890) Provided verbal/tactile cueing for activities related to improving balance, coordination, kinesthetic sense, posture, motor skill, proprioception  to assist with  scapular, scapulothoracic and UE control with self care, reaching, carrying, lifting, house/yardwork, driving/computer work. Therapeutic Activities:    [x] (36744 or 94369) Provided verbal/tactile cueing for activities related to improving balance, coordination, kinesthetic sense, posture, motor skill, proprioception and motor activation to allow for proper function of scapular, scapulothoracic and UE control with self care, carrying, lifting, driving/computer work.      Home Exercise Program:    [x] (37257) Reviewed/Progressed HEP activities related to strengthening, flexibility, endurance, ROM of scapular, scapulothoracic and UE control with self care, reaching, carrying, lifting, house/yardwork, driving/computer work  [] (74237) Reviewed/Progressed HEP activities related to improving balance, coordination, kinesthetic sense, posture, motor skill, proprioception of scapular, scapulothoracic and UE control with self care, reaching, carrying, lifting, house/yardwork, driving/computer work      Manual Treatments:  PROM / STM / Oscillations-Mobs:  G-I, II, III, IV (PA's, Inf., Post.)  [x] (27649) Provided manual therapy to mobilize soft tissue/joints of cervical/CT, scapular GHJ and UE for the purpose of modulating pain, promoting relaxation,  increasing ROM, reducing/eliminating soft tissue swelling/inflammation/restriction, improving soft tissue extensibility and allowing for proper ROM for normal function with self care, reaching, carrying, lifting, house/yardwork, driving/computer work    Modalities:     [x] GAME READY (VASO)- for significant edema, swelling, pain control. Charges:  Timed Code Treatment Minutes: 39'   Total Treatment Minutes:  54'   150 [facility]:  [facility] TIME:  MANUAL TIME:  TA  UNTIMED MINUTES:  Medicare Total:         [] EVAL (LOW) 64906 (typically 20 minutes face-to-face)  [] EVAL (MOD) 80999 (typically 30 minutes face-to-face)  [] EVAL (HIGH) 43424 (typically 45 minutes face-to-face)  [] RE-EVAL     [x] RW(46235) x 1  [] IONTO  [] NMR (78191)    [] VASO   [x] Manual (40436) 2   [] Other:  ionto (4.0mA - bicep tendon)  [] TA x      [] Mech Traction (20826)  [] ES(attended) (22813)      [] ES (un) (60716):           GOALS:     Patient stated goal: Improve motion     Therapist goals for Patient:  Short Term Goals: To be achieved in: 2 weeks  1. Independent in HEP and progression per patient tolerance, in order to prevent re-injury. [x] Progressing: [] Met: [] Not Met: [] Adjusted     2. Patient will have a decrease in pain to facilitate improvement in movement, function, and ADLs as indicated by Functional Deficits. [x] Progressing: [] Met: [] Not Met: [] Adjusted     Long Term Goals: To be achieved in: 12 weeks  1. Pt will improve FOTO to 53 or more, indicating improved functional capacity . [x] Progressing: [] Met: [] Not Met: [] Adjusted     2. Patient will demonstrate increased AROM flex/abd/ER/IR to 150/150/40/L1 to allow for proper joint functioning as indicated by patients Functional Deficits. [x] Progressing: [] Met: [] Not Met: [] Adjusted     3.  Patient will demonstrate an increase in Strength of shoulder  to 5/5 to allow for proper functional mobility as indicated by patients Functional Deficits. [x] Progressing: [] Met: [] Not Met: [] Adjusted     4. Patient will return to functional activities without increased symptoms or restriction. [x] Progressing: [] Met: [] Not Met: [] Adjusted     5. Pt will report ability to sleep 5 hours without sleep disturbance from shoulder. (patient specific functional goal)    [x] Progressing: [] Met: [] Not Met: [] Adjusted             ASSESSMENT:  See eval    Patient received education on their current pathology and how their condition effects them with their functional activities. Patient understood discussion and questions were answered. Patient understands their activity limitations and understands rational for treatment progression. Pt educated on plan of care and HEP, if worsening symptoms to d/c that exercise. PLAN: See eval  [x] Continue per plan of care [] Alter current plan (see comments above)  [] Plan of care initiated [] Hold pending MD visit [] Discharge      Electronically signed by:  Shanell Jones, PT, MPT     Note: If patient does not return for scheduled/ recommended follow up visits, this note will serve as a discharge from care along with most recent update on progress.

## 2023-01-01 NOTE — FLOWSHEET NOTE
The [facility] and [address],  Sports Performance and Rehabilitation, [facility] 6199 [address]  793 [facility],5Th Floor   [place] Suarez  Phone: [phone]  Fax: [phone]       Physical Therapy Treatment Note/ Progress Report:           Date:  2022  Patient Name:  Carissa Renteria    :  1960  MRN: [id number]  Restrictions/Precautions:    Medical/Treatment Diagnosis Information:  Diagnosis: Z98.890 (ICD-10-CM) - S/P left rotator cuff repair  Treatment Diagnosis: Left shoulder pain, decreased ROM and strength  Insurance/Certification information: [address], [facility]  Physician Information:  Quiana Ochoa MD  Has the plan of care been signed (Y/N):        []  Yes  [x]  No     Date of Patient follow up with Physician:     Assessment Summary: Date Range for reporting period:  Beginnin22  Ending:   3/88/18      Recertification will be due (POC Duration  / 90 days whichever is less): 22          Visit # Insurance Allowable Auth Required   In Person  34 through  []  Yes     []  No    Tele Health   []  Yes     []  No    Total 27         Functional Scale: FOTO 16   Date assessed:     Latex Allergy:  [x]NO      []YES  Preferred Language for Healthcare:   [x]English       []other:      Pain level:  5/10         SUBJECTIVE:       \"Doing ok. [facility] \"      OBJECTIVE:  175 flexion,           RESTRICTIONS/PRECAUTIONS: Large RCR w/ patch and biceps tenodesis restrictions.     Exercises/Interventions: HEP code: [id number]  Therapeutic Ex (00715) HEP 22     Warm-up   LATEX ALLERGY    Pulleys  3'      UBE            TABLE      Cane flexion  2x10     Supine er @ 0  30x     Standing ball roll   30x mr     Table slide       Seated cane er @ 0       Supine press (2 hands)      Reacher   20x (2#)     Prone: rows, ext's     Supine reaches     Standing reaches     Quadruped rocks   25x    30x     30 x    15x     T band pinches   25x green     Wall walk   5 x 10\"     Wall weight shifts   10x      Bicep curls   25x - 2#     Supine tricep 'crushers'  25x                                                                 Manual: PROM into shoulder flexion, abduction, ER and IR to 30° - 15'   Therapeutic Exercise and NMR EXR  [x] (42285) Provided verbal/tactile cueing for activities related to strengthening, flexibility, endurance, ROM  for improvements in scapular, scapulothoracic and UE control with self care, reaching, carrying, lifting, house/yardwork, driving/computer work.    [] (61971) Provided verbal/tactile cueing for activities related to improving balance, coordination, kinesthetic sense, posture, motor skill, proprioception  to assist with  scapular, scapulothoracic and UE control with self care, reaching, carrying, lifting, house/yardwork, driving/computer work. Therapeutic Activities:    [x] (62859 or 10300) Provided verbal/tactile cueing for activities related to improving balance, coordination, kinesthetic sense, posture, motor skill, proprioception and motor activation to allow for proper function of scapular, scapulothoracic and UE control with self care, carrying, lifting, driving/computer work.      Home Exercise Program:    [x] (30679) Reviewed/Progressed HEP activities related to strengthening, flexibility, endurance, ROM of scapular, scapulothoracic and UE control with self care, reaching, carrying, lifting, house/yardwork, driving/computer work  [] (79921) Reviewed/Progressed HEP activities related to improving balance, coordination, kinesthetic sense, posture, motor skill, proprioception of scapular, scapulothoracic and UE control with self care, reaching, carrying, lifting, house/yardwork, driving/computer work      Manual Treatments:  PROM / STM / Oscillations-Mobs:  G-I, II, III, IV (PA's, Inf., Post.)  [x] (10372) Provided manual therapy to mobilize soft tissue/joints of cervical/CT, scapular GHJ and UE for the purpose of modulating pain, promoting relaxation,  increasing ROM, reducing/eliminating soft tissue swelling/inflammation/restriction, improving soft tissue extensibility and allowing for proper ROM for normal function with self care, reaching, carrying, lifting, house/yardwork, driving/computer work    Modalities:     [x] GAME READY (VASO)- for significant edema, swelling, pain control. Charges:  Timed Code Treatment Minutes: 36''   Total Treatment Minutes:  55'   [address]:  TE TIME:  NMR TIME:  MANUAL TIME:  TA  UNTIMED MINUTES:  Medicare Total:         [] EVAL (LOW) 62723 (typically 20 minutes face-to-face)  [] EVAL (MOD) 87434 (typically 30 minutes face-to-face)  [] EVAL (HIGH) 61866 (typically 45 minutes face-to-face)  [] RE-EVAL     [x] OH(39693) x 1  [] IONTO  [] NMR (83816)    [] VASO   [x] Manual (25285) x 1  [x] Other:  ionto (4.0mA - bicep tendon)  [] TA x      [] Mech Traction (99962)  [] ES(attended) (97743)      [] ES (un) (81011):  DN x 1         GOALS:     Patient stated goal: Improve motion     Therapist goals for Patient:  Short Term Goals: To be achieved in: 2 weeks  1. Independent in HEP and progression per patient tolerance, in order to prevent re-injury. [x] Progressing: [] Met: [] Not Met: [] Adjusted     2. Patient will have a decrease in pain to facilitate improvement in movement, function, and ADLs as indicated by Functional Deficits. [x] Progressing: [] Met: [] Not Met: [] Adjusted     Long Term Goals: To be achieved in: 12 weeks  1. Pt will improve FOTO to 53 or more, indicating improved functional capacity . [x] Progressing: [] Met: [] Not Met: [] Adjusted     2. Patient will demonstrate increased AROM flex/abd/ER/IR to 150/150/40/L1 to allow for proper joint functioning as indicated by patients Functional Deficits. [x] Progressing: [] Met: [] Not Met: [] Adjusted     3.  Patient will demonstrate an increase in Strength of shoulder  to 5/5 to allow for proper functional mobility as indicated by patients Functional Deficits. [x] Progressing: [] Met: [] Not Met: [] Adjusted     4. Patient will return to functional activities without increased symptoms or restriction. [x] Progressing: [] Met: [] Not Met: [] Adjusted     5. Pt will report ability to sleep 5 hours without sleep disturbance from shoulder. (patient specific functional goal)    [x] Progressing: [] Met: [] Not Met: [] Adjusted             ASSESSMENT:  See eval    Patient received education on their current pathology and how their condition effects them with their functional activities. Patient understood discussion and questions were answered. Patient understands their activity limitations and understands rational for treatment progression. Pt educated on plan of care and HEP, if worsening symptoms to d/c that exercise. PLAN: See eval  [x] Continue per plan of care [] Alter current plan (see comments above)  [] Plan of care initiated [] Hold pending MD visit [] Discharge      Electronically signed by:  Jame Torres, PT, MPT     Note: If patient does not return for scheduled/ recommended follow up visits, this note will serve as a discharge from care along with most recent update on progress.

## 2023-01-04 ENCOUNTER — HOSPITAL ENCOUNTER (OUTPATIENT)
Dept: PHYSICAL THERAPY | Age: 63
Setting detail: THERAPIES SERIES
Discharge: HOME | End: 2023-01-04
Payer: COMMERCIAL

## 2023-01-04 PROCEDURE — 97112 NEUROMUSCULAR REEDUCATION: CPT

## 2023-01-04 PROCEDURE — 97140 MANUAL THERAPY 1/> REGIONS: CPT

## 2023-01-04 PROCEDURE — 97110 THERAPEUTIC EXERCISES: CPT | Performed by: PHYSICAL THERAPIST

## 2023-01-04 PROCEDURE — 97530 THERAPEUTIC ACTIVITIES: CPT

## 2023-01-04 PROCEDURE — 97140 MANUAL THERAPY 1/> REGIONS: CPT | Performed by: PHYSICAL THERAPIST

## 2023-01-04 NOTE — PROGRESS NOTES
The [facility] Outpatient Therapy  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    R42 Dizziness                                     Insurance/Certification information:  PT Insurance Information: [employer] ($30 copay; 20 visits; prior auth- [employer])  Physician Information:  Natalio Buckley DO   Plan of care signed:    [x] Yes  [] No    Date of Patient follow up with Physician:       Progress Report: [x]  Yes  []  No     Date Range for reporting period:  Beginnin22  Endin23    Progress report due (10 Rx/or 30 days whichever is less):  3/6/72    Recertification due (POC duration/ or 90 days whichever is less): 23     Visit # Insurance Allowable Auth Needed    10 visits by 22, extension to 23 [x]Yes   []No     RESTRICTIONS/PRECAUTIONS: fibromyalgia, RA, currently in PT for L RC surgery; cervical fusion (3 level)  Latex Allergy:  []NO      [x]YES- adhesives  Preferred Language for Healthcare:   [x]English       [x]other:  Functional Scale: FOTO 54; [address] [place] [address] 80%; FGA   Date assessed:10/27/22    Functional Scale: [address] [place] [address] 44% Date assessed: 22    Functional Scale: DHI 34% Date assessed: 22    Functional Scale: DHI 58% Date assessed: 23    Dizziness level:  0-7/10 at rest (light headed); off and on very briefly. Maybe occurred after migraine  2-3/10 headache       SUBJECTIVE:  Pt reports having 2 migraines in the past week- headache pain behind eyes and forehead + aura that caused her to not be able to see. Lasting about 2 hours each. Pt used ice and the new medication tylenol and advil.  Didn't take prescription headache medicine due to side effects make her not able to sleep. Pt sees headache specialist at [facility] in 2 weeks. OBJECTIVE:   Observation: Pt amb WFL    Test measurements:      Exercises/Interventions: Exercises in bold performed in department today. Items not bolded are carried forward from prior visits for continuity of the record.     Exercise/Equipment Resistance/Repetitions HEP Other comments     []      []    Cervical stretching    []    Sidebend R/L 20 sec x 3 each  [] Seated w/ UE same si   Rotation R/L  20 sec x 3 each  [] Seated, VC for cervical retraction to neutral prior to initiating rotation   Postural education Educ pt on maintaining cervical neutral especially prior to cervical ROM or UE reaching to reduce stress and pain in neck  [] Pt verb that she has been self correct excessive extension often   Enrique posture exercise  30 sec x 3 against wall  30 sec x 1 away from wall [] Post pelvic tilt, scap retraction, chin tuck, shoulder ext rotation    Cat-camel for thoracic mobility 5x [] Quadriped, monitored for shoulder pain, maintained slightly posterior weight shift to prevent shoulder pain    Pec stretch 60 sec x 2 each  [] Supine w/ UE horizontally abducted partially off edge of table     []    Habituation  []    R cervical rotation  5 reps, dizziness 3/10, <2 sec duration   []    L cervical rotation  5 reps, dizziness 1/10, <2 sec duration []    Head to R knee 5 reps, dizziness 2-3/10, < 3 sec duration  []    Head to L knee  5 reps, dizziness 2-3/10, < 3 sec duration , < 2 sec by final rep     180* turn to R 5 reps, dizziness 3/10, < 3 sec duration , < 2 sec by final rep x    180* turn to L 5 reps, dizziness 3-5/10,  3-5 sec duration ,  2 sec by final rep x    Neck eye coordination 30 sec x 2 each horizontal/vertical [x] No obvious limitations, c/o retro eye pressure/fatigue   Cervical proprioception  5x R/L/up/down each   5x R/L [x] Slightly impaired to L but improved w/ practice      []      []      Static balance Narrow KALPANA + head turns/nods 5x each    tandem 10 sec each foot posterior         tandem + head turns 5x each Increased sway slightly, but no LOB        Narrow KALPANA on foam 20 sec         Narrow KALPANA on foam + head turns 5x sec each         SLS 30 sec L   5-7 sec R            Dynamic balance     Amb with head turns/ nods 40' x 4 each   85' x 2 turns only  Reduced L cervical rotation, pt c/ slight dizziness w/ L head turn    Amb with 180* turns  8x  No limitation   Amb with quick stop/starts     Sidestepping 35' x 1 each w/ UE abd No LOB except for quick stop at end    Retrowalking 40' w/ cues for B arm swing     Tandem gait 25' x 3    Amb w/ eyes closed 30'x  1 Slightly decreased jonathan without veering until final 5' then had sudden veer R   Amb w/ high knees  85' x 1 No LOB    Amb w/ big posture, arm swing step length 40' x 4                   Gait: trialed to determine if pt safe to return to walking for cardio. Pt requests assist \"learning how to walk\". Biodex TM, 8 min + 2 min cool down, 2.0mph, HR 90-110bpm.  VC to use visual feedback for increased step length which she was able to do and maintain well for most of time. Pt demos somewhat forceful B heel strike before an after transition to longer step length. Home Exercise Program:   Access Code: [email or web address]  URL: [email or web address]  Date: 11/22/2022  Prepared by: Desmond Wilcox  Exercises  Seated Cervical Sidebending AROM - 1 x daily - 7 x weekly - 1 sets - 3 reps - 20-30 hold  Seated Cervical Rotation AROM - 1 x daily - 7 x weekly - 1 sets - 3 reps - 20-30 hold  Seated Cervical Retraction - 1 x daily - 7 x weekly - 1 sets - 3 reps - 20-30 hold    Access Code: [email or web address]  URL: [email or web address]  Date: 12/07/2022  Prepared by: Desmond Wilcox  Exercises  Standing with Back Flat Against Wall - 2-3 x daily - 7 x weekly - 5 reps - 30 hold    Access Code: [email or web address]  URL: [email or web address]  Date: 01/04/2023  Prepared by: Lisa Merobertse  Exercises  Standing Horizontal Head Rotation Vestibular Habituation - 3 x daily - 7 x weekly - 3 sets - 5 reps    Therapeutic Exercise:   [] (54981) Provided verbal/tactile cueing for activities related to strengthening, flexibility, endurance, ROM for improvements in LE, proximal hip, and core control with self-care, mobility, lifting, ambulation. NMR:  [x] (30221) Provided verbal/tactile cueing for activities related to improving balance, coordination, kinesthetic sense, posture, motor skill, proprioception to assist with LE, proximal hip, and core control in self-care, mobility, lifting, ambulation and eccentric single leg control. Therapeutic Activities:    [x] (19964) Provided verbal/tactile cueing for activities related to improving balance, coordination, kinesthetic sense, posture, motor skill, proprioception and motor activation to allow for proper function of core, proximal hip and LE with self-care and ADLs and functional mobility. Gait Training:    [] (19665) Provided training and instruction to the patient for proper LE, core and proximal hip recruitment and positioning and eccentric body weight control with ambulation re-education including up and down stairs     Manual Treatments:  PROM / STM / Oscillations-Mobs:  G-I, II, III, IV (PA's, Inf., Post.)  [x] (84817) Provided manual therapy to mobilize LE, proximal hip and/or LS spine soft tissue/joints for the purpose of modulating pain, promoting relaxation,  increasing ROM, reducing/eliminating soft tissue swelling/inflammation/restriction, improving soft tissue extensibility and allowing for proper ROM for normal function with self-care, mobility, lifting and ambulation. Pt positioned in supine, STM to B upper trap, most time spent on L due to pt w/ c/o more tension on L. Similar reduced  muscle tension to last session.  Trigger point release to more medial portion of L upper trap, Assessed cervical rotation and sidenbending PROM, L rotation slightly more restricted than R. No dizziness w/ all brief PROM assessment. Removed last kinesiotape application, skin WNL no signs of irritation due pt did not remove prior to next PT session as instructed. Placed kinesiotape, 2 \"I strips for L upper trap inhibition following good response to test strip per pt report. Pt has written instructions for monitoring and removal.       Home Exercise Program:    [] (81054) Reviewed/Progressed HEP activities related to strengthening, flexibility, endurance, ROM of core, proximal hip and LE for functional self-care, mobility, lifting and ambulation/stair navigation   [x] (40241) Reviewed/Progressed HEP activities related to improving balance, coordination, kinesthetic sense, posture, motor skill, proprioception of core, proximal hip and LE for self-care, mobility, lifting, and ambulation/stair navigation      Modalities:    [] Electric Stimulation:   [] Ultrasound:   [] Other:       Charges:  Timed Code Treatment Minutes: NM18;  MT 18; GT 12   Total Treatment Minutes: 48      [] EVAL (LOW) 39816 (typically 20 minutes face-to-face)  [] EVAL (MOD) 96797 (typically 30 minutes face-to-face)  [] EVAL (HIGH) 99374 (typically 45 minutes face-to-face)  [] RE-EVAL     [] UR(89088) x       [x] NMR (33193) x   1    [x] Manual (27194) x   1    [] TA (52544) x       [x] Gait Training [phone]) x  1     [] ES(attended) (72609)  [] ES (un) (33690)   [] DRY NEEDLE 1 OR 2 MUSCLES  [] DRY NEEDLE 3+ MUSCLES  [] Mech Traction (16792)  [] Ultrasound (12535)  [] Other:      GOALS: Goals established 10/27/22   Patient stated goal: \"stronger (not fall)\"  [] Progressing: [] Met: [] Not Met: [] Adjusted    Therapist goals for Patient:  Short Term Goals= LT goals: To be achieved in: 4 weeks  Independent in HEP and progression per patient tolerance.    [x] Progressing: [] Met: [] Not Met: [] Adjusted  Patient will have a decrease in dizziness/imbalance symptoms to 0-1/10 to facilitate improvement in movement, function, balance, and ADLS. Improved to 1-2/10  [x] Progressing: [] Met: [] Not Met: [] Adjusted  3. Disability index score of 40% or less for the [facility] to assist with reaching prior level of function. Regressed over the holidays  [] Progressing: [x] Met: [] Not Met: [] Adjusted  4. Patient will demonstrate improved cervical ext and L rotation ROM WFL without c/o dizziness. Only if moves fast. , L > R rotation only. None  w/ extension. [x] Progressing: [] Met: [] Not Met: [] Adjusted  5. Pt will report no falls for 4 weeks. Regressed- Near falls occurred when became off balance to L after stepping down 1 step, feels like she veers to the L when she exits bedroom and walks down hallway. [] Progressing: [x] Met: [] Not Met: [] Adjusted  6. Pt will return to walking for exercise with dizziness 1/10 or less. Pt is walking for exercise, attentive to maintain head in neutral instead of upper cspine extension. No dizziness. [] Progressing: [x] Met: [] Not Met: [] Adjusted      ASSESSMENT:  Pt continues w/fluctuations in dizziness, but seems to be isolated to family stress. Pt w/ reduced neck pain following manual therapy. Pt likely D/C next visit due to no further benefit based on inconsistent dizziness related to family stress. Pt will continue to benefit from skilled PT to reduce fall risk and improve independence w/ functional mobility. Treatment/Activity Tolerance:  [x] Patient tolerated treatment well [] Patient limited by fatique  [] Patient limited by pain  [] Patient limited by other medical complications  [] Other:     Overall Progression Towards Functional goals/ Treatment Progress Update:  [] Patient is progressing as expected towards functional goals listed. [x] Progression is slowed due to complexities/Impairments listed. -  ongoing family stress, pt scheduling   [] Progression has been slowed due to co-morbidities.   [] Plan just implemented, too soon to assess goals progression <30days   [] Goals require adjustment due to lack of progress  [] Patient is not progressing as expected and requires additional follow up with physician  [] Other    Prognosis for POC: [x] Good [] Fair  [] Poor    Patient requires continued skilled intervention: [x] Yes  [] No        PLAN: 2x/week for 4 weeks  [] Continue per plan of care [x] Alter current plan (see comments)- extended date for insurance auth to cover final 2 visits. [] Plan of care initiated [] Hold pending MD visit [] Discharge    Electronically signed by: Neill Curling, PT, DPT    Note: If patient does not return for scheduled/recommended follow up visits, this note will serve as a discharge from care along with the most recent update on progress.

## 2023-01-06 ENCOUNTER — HOSPITAL ENCOUNTER (OUTPATIENT)
Dept: PHYSICAL THERAPY | Age: 63
Setting detail: THERAPIES SERIES
Discharge: HOME | End: 2023-01-06
Payer: COMMERCIAL

## 2023-01-06 PROCEDURE — 97033 APP MDLTY 1+IONTPHRSIS EA 15: CPT | Performed by: PHYSICAL THERAPIST

## 2023-01-06 PROCEDURE — 97140 MANUAL THERAPY 1/> REGIONS: CPT | Performed by: PHYSICAL THERAPIST

## 2023-01-06 PROCEDURE — 97110 THERAPEUTIC EXERCISES: CPT | Performed by: PHYSICAL THERAPIST

## 2023-01-07 NOTE — FLOWSHEET NOTE
The [facility] and [address],  Sports Performance and Rehabilitation, [facility] [address]  [address] [facility],[address]   [place]  Phone: [phone]  Fax: [phone]       Physical Therapy Treatment Note/ Progress Report:           Date:  2023  Patient Name:  Jing Craft    :  1960  MRN: [id number]  Restrictions/Precautions:    Medical/Treatment Diagnosis Information:  Diagnosis: Z98.890 (ICD-10-CM) - S/P left rotator cuff repair  Treatment Diagnosis: Left shoulder pain, decreased ROM and strength  Insurance/Certification information: [employer], [facility]  Physician Information:  Chelsie Alaniz MD  Has the plan of care been signed (Y/N):        []  Yes  [x]  No     Date of Patient follow up with Physician:     Assessment Summary: Date Range for reporting period:  Beginnin22  Endin76      Recertification will be due (POC Duration  / 90 days whichever is less): 22          Visit # Insurance Allowable Auth Required   In Person  34 through  []  Yes     []  No    Tele Health   []  Yes     []  No    Total          Functional Scale: FOTO 16   Date assessed:     Latex Allergy:  [x]NO      []YES  Preferred Language for Healthcare:   [x]English       []other:      Pain level:  5/10         SUBJECTIVE:       \"Doing ok. ...i'm using it a lot. ..still some soreness and some weakness\"    OBJECTIVE:  4/5 safe zone, 4-/5 frontal plane (unable to perform overhead adls, unable to drive for extended periods, or sleep comfortably through the night)          RESTRICTIONS/PRECAUTIONS: Large RCR w/ patch and biceps tenodesis restrictions.     Exercises/Interventions: HEP code: [id number]  Therapeutic Ex (75120) HEP 22     Warm-up   LATEX ALLERGY    Pulleys  3'      UBE            TABLE      Cane flexion  2x10     Supine er @ 0  30x     Standing ball roll   30x mr     Table slide       Seated cane er @ 0       Supine press (2 hands) Reacher   20x (2#)     Prone: rows, ext's     Supine reaches     Standing reaches     Quadruped rocks   25x    30x     30 x    15x     T band pinches   25x green     Wall walk   5 x 10\"     Wall weight shifts   10x      Bicep curls   25x - 2#     Supine tricep 'crushers'  25x                                                                 Manual: PROM into shoulder flexion, abduction, ER and IR to 30° - 15'   Therapeutic Exercise and NMR EXR  [x] (49166) Provided verbal/tactile cueing for activities related to strengthening, flexibility, endurance, ROM  for improvements in scapular, scapulothoracic and UE control with self care, reaching, carrying, lifting, house/yardwork, driving/computer work.    [] (39200) Provided verbal/tactile cueing for activities related to improving balance, coordination, kinesthetic sense, posture, motor skill, proprioception  to assist with  scapular, scapulothoracic and UE control with self care, reaching, carrying, lifting, house/yardwork, driving/computer work. Therapeutic Activities:    [x] (58905 or 43763) Provided verbal/tactile cueing for activities related to improving balance, coordination, kinesthetic sense, posture, motor skill, proprioception and motor activation to allow for proper function of scapular, scapulothoracic and UE control with self care, carrying, lifting, driving/computer work.      Home Exercise Program:    [x] (65430) Reviewed/Progressed HEP activities related to strengthening, flexibility, endurance, ROM of scapular, scapulothoracic and UE control with self care, reaching, carrying, lifting, house/yardwork, driving/computer work  [] (23048) Reviewed/Progressed HEP activities related to improving balance, coordination, kinesthetic sense, posture, motor skill, proprioception of scapular, scapulothoracic and UE control with self care, reaching, carrying, lifting, house/yardwork, driving/computer work      Manual Treatments:  PROM / STM / Oscillations-Mobs: G-I, II, III, IV (Jolynn, Inf., Post.)  [x] (08339) Provided manual therapy to mobilize soft tissue/joints of cervical/CT, scapular GHJ and UE for the purpose of modulating pain, promoting relaxation,  increasing ROM, reducing/eliminating soft tissue swelling/inflammation/restriction, improving soft tissue extensibility and allowing for proper ROM for normal function with self care, reaching, carrying, lifting, house/yardwork, driving/computer work    Modalities:     [x] GAME READY (VASO)- for significant edema, swelling, pain control. Charges:  Timed Code Treatment Minutes: 35'   Total Treatment Minutes:  35'   [address] [facility]:  TE TIME:  NMR TIME:  MANUAL TIME:  TA  UNTIMED MINUTES:  Medicare Total:         [] EVAL (LOW) 12508 (typically 20 minutes face-to-face)  [] EVAL (MOD) 38497 (typically 30 minutes face-to-face)  [] EVAL (HIGH) 73149 (typically 45 minutes face-to-face)  [] RE-EVAL     [x] FY(57293) x 1  [] IONTO  [] NMR (28277)    [] VASO   [x] Manual (10191) x 1  [] Other:  ionto (4.0mA - bicep tendon)  [] TA x      [] Mech Traction (21877)  [] ES(attended) (60388)      [] ES (un) (15081):  DN x 1         GOALS:     Patient stated goal: Improve motion     Therapist goals for Patient:  Short Term Goals: To be achieved in: 2 weeks  1. Independent in HEP and progression per patient tolerance, in order to prevent re-injury. [x] Progressing: [] Met: [] Not Met: [] Adjusted     2. Patient will have a decrease in pain to facilitate improvement in movement, function, and ADLs as indicated by Functional Deficits. [x] Progressing: [] Met: [] Not Met: [] Adjusted     Long Term Goals: To be achieved in: 12 weeks  1. Pt will improve FOTO to 53 or more, indicating improved functional capacity . [x] Progressing: [] Met: [] Not Met: [] Adjusted     2. Patient will demonstrate increased AROM flex/abd/ER/IR to 150/150/40/L1 to allow for proper joint functioning as indicated by patients Functional Deficits.   [x] Progressing: [] Met: [] Not Met: [] Adjusted     3. Patient will demonstrate an increase in Strength of shoulder  to 5/5 to allow for proper functional mobility as indicated by patients Functional Deficits. [x] Progressing: [] Met: [] Not Met: [] Adjusted     4. Patient will return to functional activities without increased symptoms or restriction. [x] Progressing: [] Met: [] Not Met: [] Adjusted     5. Pt will report ability to sleep 5 hours without sleep disturbance from shoulder. (patient specific functional goal)    [x] Progressing: [] Met: [] Not Met: [] Adjusted             ASSESSMENT:  See eval    Patient received education on their current pathology and how their condition effects them with their functional activities. Patient understood discussion and questions were answered. Patient understands their activity limitations and understands rational for treatment progression. Pt educated on plan of care and HEP, if worsening symptoms to d/c that exercise. PLAN: See eval  [x] Continue per plan of care [] Alter current plan (see comments above)  [] Plan of care initiated [] Hold pending MD visit [] Discharge      Electronically signed by:  Harvinder Kerr, PT, MPT     Note: If patient does not return for scheduled/ recommended follow up visits, this note will serve as a discharge from care along with most recent update on progress.

## 2023-01-09 ENCOUNTER — HOSPITAL ENCOUNTER (OUTPATIENT)
Dept: PHYSICAL THERAPY | Age: 63
Setting detail: THERAPIES SERIES
Discharge: HOME | End: 2023-01-09
Payer: COMMERCIAL

## 2023-01-09 PROCEDURE — 97033 APP MDLTY 1+IONTPHRSIS EA 15: CPT | Performed by: PHYSICAL THERAPIST

## 2023-01-09 PROCEDURE — 97110 THERAPEUTIC EXERCISES: CPT | Performed by: PHYSICAL THERAPIST

## 2023-01-09 PROCEDURE — 97140 MANUAL THERAPY 1/> REGIONS: CPT | Performed by: PHYSICAL THERAPIST

## 2023-01-10 NOTE — FLOWSHEET NOTE
[facility] and [address],  Sports Performance and Rehabilitation, [facility] [address] 12[address]  [address] [facility],[address]   [place] Suarez  Phone: [phone]  Fax: [phone]       Physical Therapy Treatment Note/ Progress Report:           Date:  2023  Patient Name:  Shweta Christopher    :  1960  MRN: [id number]  Restrictions/Precautions:    Medical/Treatment Diagnosis Information:  Diagnosis: Z98.890 (ICD-10-CM) - S/P left rotator cuff repair  Treatment Diagnosis: Left shoulder pain, decreased ROM and strength  Ins[facility]Certification [facility], [facility]  Physician Information:  Eliezer Escoto MD  Has the plan of care been signed (Y/N):        []  Yes  [x]  No     Date of Patient follow up with Physician:     Assessment Summary: Date Range for reporting period:  Beginnin22  Endin      Recertification will be due (POC Duration  / 90 days whichever is less): 22          Visit # Insurance Allowable Auth Required   In Person  34 through  []  Yes     []  No    Tele Health   []  Yes     []  No    Total          Functional Scale: FOTO 16   Date assessed:     Latex Allergy:  [x]NO      []YES  Preferred Language for Healthcare:   [x]English       []other:      Pain level:  5/10         SUBJECTIVE:       \"Doing ok. ...i'm using it a lot. \"    OBJECTIVE:  full prom          RESTRICTIONS/PRECAUTIONS: Large RCR w/ patch and biceps tenodesis restrictions.     Exercises/Interventions: HEP code: [id number]  Therapeutic Ex (55347) HEP 22     Warm-up   LATEX ALLERGY    Pulleys  3'      UBE            TABLE      Cane flexion  2x10     Supine er @ 0  30x     Standing ball roll   30x mr     Table slide       Seated cane er @ 0       Supine press (2 hands)      Reacher   20x (2#)     Prone: rows, ext's     Supine reaches     Standing reaches     Quadruped rocks   25x    30x     30 x    15x     T band pinches   25x green     Wall walk   5 x 10\"     Wall weight shifts   10x      Bicep curls   25x - 2#     Supine tricep 'crushers'  25x                                                                 Manual: PROM into shoulder flexion, abduction, ER and IR to 30° - 15'   Therapeutic Exercise and NMR EXR  [x] (19599) Provided verbal/tactile cueing for activities related to strengthening, flexibility, endurance, ROM  for improvements in scapular, scapulothoracic and UE control with self care, reaching, carrying, lifting, house/yardwork, driving/computer work.    [] (82921) Provided verbal/tactile cueing for activities related to improving balance, coordination, kinesthetic sense, posture, motor skill, proprioception  to assist with  scapular, scapulothoracic and UE control with self care, reaching, carrying, lifting, house/yardwork, driving/computer work. Therapeutic Activities:    [x] (42576 or 75252) Provided verbal/tactile cueing for activities related to improving balance, coordination, kinesthetic sense, posture, motor skill, proprioception and motor activation to allow for proper function of scapular, scapulothoracic and UE control with self care, carrying, lifting, driving/computer work.      Home Exercise Program:    [x] (42844) Reviewed/Progressed HEP activities related to strengthening, flexibility, endurance, ROM of scapular, scapulothoracic and UE control with self care, reaching, carrying, lifting, house/yardwork, driving/computer work  [] (40319) Reviewed/Progressed HEP activities related to improving balance, coordination, kinesthetic sense, posture, motor skill, proprioception of scapular, scapulothoracic and UE control with self care, reaching, carrying, lifting, house/yardwork, driving/computer work      Manual Treatments:  PROM / STM / Oscillations-Mobs:  G-I, II, III, IV (PA's, Inf., Post.)  [x] (49447) Provided manual therapy to mobilize soft tissue/joints of cervical/CT, scapular GHJ and UE for the purpose of modulating pain, promoting relaxation,  increasing ROM, reducing/eliminating soft tissue swelling/inflammation/restriction, improving soft tissue extensibility and allowing for proper ROM for normal function with self care, reaching, carrying, lifting, house/yardwork, driving/computer work    Modalities:     [x] GAME READY (VASO)- for significant edema, swelling, pain control. Charges:  Timed Code Treatment Minutes: 35'   Total Treatment Minutes:  35'   [facility]:  TE TIME:  NMR TIME:  MANUAL TIME:  TA  UNTIMED MINUTES:  Medicare Total:         [] EVAL (LOW) 00157 (typically 20 minutes face-to-face)  [] EVAL (MOD) 39666 (typically 30 minutes face-to-face)  [] EVAL (HIGH) 20996 (typically 45 minutes face-to-face)  [] RE-EVAL     [x] TD(04637) x 1  [x] IONTO  [] NMR (47306)    [] VASO   [x] Manual (88263) x 1  [] Other:  ionto (4.0mA - bicep tendon)  [] TA x      [] Mech Traction (55756)  [] ES(attended) (13726)      [] ES (un) (09870):  DN x 1         GOALS:     Patient stated goal: Improve motion     Therapist goals for Patient:  Short Term Goals: To be achieved in: 2 weeks  1. Independent in HEP and progression per patient tolerance, in order to prevent re-injury. [x] Progressing: [] Met: [] Not Met: [] Adjusted     2. Patient will have a decrease in pain to facilitate improvement in movement, function, and ADLs as indicated by Functional Deficits. [x] Progressing: [] Met: [] Not Met: [] Adjusted     Long Term Goals: To be achieved in: 12 weeks  1. Pt will improve FOTO to 53 or more, indicating improved functional capacity . [x] Progressing: [] Met: [] Not Met: [] Adjusted     2. Patient will demonstrate increased AROM flex/abd/ER/IR to 150/150/40/L1 to allow for proper joint functioning as indicated by patients Functional Deficits. [x] Progressing: [] Met: [] Not Met: [] Adjusted     3.  Patient will demonstrate an increase in Strength of shoulder  to 5/5 to allow for proper functional mobility as indicated by patients Functional Deficits. [x] Progressing: [] Met: [] Not Met: [] Adjusted     4. Patient will return to functional activities without increased symptoms or restriction. [x] Progressing: [] Met: [] Not Met: [] Adjusted     5. Pt will report ability to sleep 5 hours without sleep disturbance from shoulder. (patient specific functional goal)    [x] Progressing: [] Met: [] Not Met: [] Adjusted             ASSESSMENT:  See eval    Patient received education on their current pathology and how their condition effects them with their functional activities. Patient understood discussion and questions were answered. Patient understands their activity limitations and understands rational for treatment progression. Pt educated on plan of care and HEP, if worsening symptoms to d/c that exercise. PLAN: See eval  [x] Continue per plan of care [] Alter current plan (see comments above)  [] Plan of care initiated [] Hold pending MD visit [] Discharge      Electronically signed by:  Charleen Guerrero, PT, MPT     Note: If patient does not return for scheduled/ recommended follow up visits, this note will serve as a discharge from care along with most recent update on progress.

## 2023-01-11 ENCOUNTER — HOSPITAL ENCOUNTER (OUTPATIENT)
Dept: PHYSICAL THERAPY | Age: 63
Setting detail: THERAPIES SERIES
Discharge: HOME | End: 2023-01-11
Payer: COMMERCIAL

## 2023-01-11 ENCOUNTER — OFFICE VISIT (OUTPATIENT)
Dept: ORTHOPEDIC SURGERY | Age: 63
End: 2023-01-11
Payer: COMMERCIAL

## 2023-01-11 VITALS — BODY MASS INDEX: 33.12 KG/M2 | WEIGHT: 194 LBS | HEIGHT: 64 IN

## 2023-01-11 DIAGNOSIS — Z98.890 S/P ROTATOR CUFF REPAIR: Primary | ICD-10-CM

## 2023-01-11 PROCEDURE — 97110 THERAPEUTIC EXERCISES: CPT | Performed by: PHYSICAL THERAPIST

## 2023-01-11 PROCEDURE — 97140 MANUAL THERAPY 1/> REGIONS: CPT | Performed by: PHYSICAL THERAPIST

## 2023-01-11 PROCEDURE — 99213 OFFICE O/P EST LOW 20 MIN: CPT | Performed by: ORTHOPAEDIC SURGERY

## 2023-01-11 PROCEDURE — 97112 NEUROMUSCULAR REEDUCATION: CPT

## 2023-01-11 PROCEDURE — 97530 THERAPEUTIC ACTIVITIES: CPT

## 2023-01-11 PROCEDURE — 97033 APP MDLTY 1+IONTPHRSIS EA 15: CPT | Performed by: PHYSICAL THERAPIST

## 2023-01-11 PROCEDURE — 97140 MANUAL THERAPY 1/> REGIONS: CPT

## 2023-01-11 RX ORDER — DIAZEPAM 5 MG/1
TABLET ORAL
Qty: 2 TABLET | Refills: 0 | Status: SHIPPED | OUTPATIENT
Start: 2023-01-11 | End: 2023-01-11

## 2023-01-11 NOTE — DISCHARGE SUMMARY
[facility] Outpatient Therapy  [address] [place] [address], [address], [address]  Phone: [phone]   Fax: [phone]    Physical Therapy Treatment Note/ Progress Report:     Date:  2023    Patient Name:  Sean Ghotra    :  1960  MRN: [id number]    Medical/Treatment Diagnosis Information:  Diagnosis: R42 (ICD-10-CM) - Dizziness  H81.02 (ICD-10-CM) - Meniere's disease of left ear    R42 Dizziness                                     Insurance/Certification information:  PT Insurance Information: [employer] ($30 copay; 20 visits; prior auth- [employer])  Physician Information:  Jennifer Phan DO   Plan of care signed:    [x] Yes  [] No    Date of Patient follow up with Physician:       Progress Report: []  Yes  [x]  No     Date Range for reporting period:  Beginnin22  Endin23    Progress report due (10 Rx/or 30 days whichever is less):  42    Recertification due (POC duration/ or 90 days whichever is less): 23     Visit # Insurance Allowable Auth Needed    10 visits by 22, extension to 23 [x]Yes   []No     RESTRICTIONS/PRECAUTIONS: fibromyalgia, RA, currently in PT for L RC surgery; cervical fusion (3 level)  Latex Allergy:  []NO      [x]YES- adhesives  Preferred Language for Healthcare:   [x]English       [x]other:  Functional Scale: FOTO 54; [address] 80%; FGA   Date assessed:10/27/22    Functional Scale: DHI 44% Date assessed: 22    Functional Scale: DHI 34% Date assessed: 22    Functional Scale: DHI 58% Date assessed: 23    Functional Scale: DHI 48% Date assessed: 23        Dizziness level:  3/10 at rest  2-3/10 headache       SUBJECTIVE:  Pt reports being a little more dizzy today, having more stressed due to bad news from a friend. Pt saw headache specialist, prescribed nortrptyline to help w/ sleep.         OBJECTIVE:   Observation: Pt amb WFL    Test measurements:      Exercises/Interventions: Exercises in bold performed in department today. Items not bolded are carried forward from prior visits for continuity of the record.     Exercise/Equipment Resistance/Repetitions HEP Other comments     []      []    Cervical stretching    []    Sidebend R/L 20 sec x 3 each  [] Seated w/ UE same si   Rotation R/L  20 sec x 3 each  [] Seated, VC for cervical retraction to neutral prior to initiating rotation   Postural education Educ pt on maintaining cervical neutral especially prior to cervical ROM or UE reaching to reduce stress and pain in neck  [] Pt verb that she has been self correct excessive extension often   Enrique posture exercise  30 sec x 3 against wall  30 sec x 1 away from wall [] Post pelvic tilt, scap retraction, chin tuck, shoulder ext rotation    Cat-camel for thoracic mobility 5x [] Quadriped, monitored for shoulder pain, maintained slightly posterior weight shift to prevent shoulder pain    Pec stretch 60 sec x 2 each  [] Supine w/ UE horizontally abducted partially off edge of table     []    Habituation  []    R cervical rotation  5 reps, dizziness 3/10, <2 sec duration   []    L cervical rotation  5 reps, dizziness 1/10, <2 sec duration []    Head to R knee 5 reps, dizziness 2-3/10, < 3 sec duration  []    Head to L knee  5 reps, dizziness 2-3/10, < 3 sec duration , < 2 sec by final rep     180* turn to R 5 reps, dizziness 3/10, < 3 sec duration , < 2 sec by final rep x    180* turn to L 5 reps, dizziness 3-5/10,  3-5 sec duratio,  2 sec by final rep x    Neck eye coordination 30 sec x 2 each horizontal/vertical [x] No obvious limitations, c/o retro eye pressure/fatigue   Cervical proprioception  5x R/L/up/down each   5x R/L [x] Slightly impaired to L but improved w/ practice      []      []      Static balance               Narrow KALPANA + head turns/nods 5x each    tandem 10 sec each foot posterior         tandem + head turns 5x each Increased sway slightly, but no LOB        Narrow KALPANA on foam 20 sec Narrow KALPANA on foam + head turns 5x sec each         SLS 30 sec L   5-7 sec R            Dynamic balance     Amb with head turns/ nods 40' x 4 each   85' x 2 turns only  Reduced L cervical rotation, pt c/ slight dizziness w/ L head turn    Amb with 180* turns  8x  No limitation   Amb with quick stop/starts     Sidestepping 35' x 1 each w/ UE abd No LOB except for quick stop at end    Retrowalking 40' w/ cues for B arm swing     Tandem gait 25' x 3    Amb w/ eyes closed 30'x  1 Slightly decreased jonathan without veering until final 5' then had sudden veer R   Amb w/ high knees  85' x 1 No LOB    Amb w/ big posture, arm swing step length 40' x 4                   Gait: trialed to determine if pt safe to return to walking for cardio. Pt requests assist \"learning how to walk\". Biodex TM, 8 min + 2 min cool down, 2.0mph, HR 90-110bpm.  VC to use visual feedback for increased step length which she was able to do and maintain well for most of time. Pt demos somewhat forceful B heel strike before an after transition to longer step length. Home Exercise Program:   Access Code: [email or web address]  URL: [email or web address]  Date: 11/22/2022  Prepared by: Vicci Closs  Exercises  Seated Cervical Sidebending AROM - 1 x daily - 7 x weekly - 1 sets - 3 reps - 20-30 hold  Seated Cervical Rotation AROM - 1 x daily - 7 x weekly - 1 sets - 3 reps - 20-30 hold  Seated Cervical Retraction - 1 x daily - 7 x weekly - 1 sets - 3 reps - 20-30 hold    Access Code: [email or web address]  URL: [email or web address]  Date: 12/07/2022  Prepared by: Vicci Closs  Exercises  Standing with Back Flat Against Wall - 2-3 x daily - 7 x weekly - 5 reps - 30 hold    Access Code: [email or web address]  URL: [email or web address]  Date: 01/04/2023  Prepared by: Vicci Closs  Exercises  Standing Horizontal Head Rotation Vestibular Habituation - 3 x daily - 7 x weekly - 3 sets - 5 reps    Therapeutic Exercise:   [] (47755) Provided verbal/tactile cueing for activities related to strengthening, flexibility, endurance, ROM for improvements in LE, proximal hip, and core control with self-care, mobility, lifting, ambulation. NMR:  [x] (27305) Provided verbal/tactile cueing for activities related to improving balance, coordination, kinesthetic sense, posture, motor skill, proprioception to assist with LE, proximal hip, and core control in self-care, mobility, lifting, ambulation and eccentric single leg control. Therapeutic Activities:    [x] (06253) Provided verbal/tactile cueing for activities related to improving balance, coordination, kinesthetic sense, posture, motor skill, proprioception and motor activation to allow for proper function of core, proximal hip and LE with self-care and ADLs and functional mobility. Gait Training:    [] (07222) Provided training and instruction to the patient for proper LE, core and proximal hip recruitment and positioning and eccentric body weight control with ambulation re-education including up and down stairs     Manual Treatments:  PROM / STM / Oscillations-Mobs:  G-I, II, III, IV (PA's, Inf., Post.)  [x] (73459) Provided manual therapy to mobilize LE, proximal hip and/or LS spine soft tissue/joints for the purpose of modulating pain, promoting relaxation,  increasing ROM, reducing/eliminating soft tissue swelling/inflammation/restriction, improving soft tissue extensibility and allowing for proper ROM for normal function with self-care, mobility, lifting and ambulation. Pt positioned in supine, STM to B upper trap, most time spent on L due to pt w/ c/o more tension on L. Similar reduced  muscle tension to last session. Trigger point release to more medial portion of L upper trap, Assessed cervical rotation and sidenbending PROM, L rotation slightly more restricted than R. No dizziness w/ all brief PROM assessment.  Removed last kinesiotape application, skin WNL no signs of irritation due pt did not remove prior to next PT session as instructed. Placed kinesiotape, 2 \"I strips for L upper trap inhibition following good response to test strip per pt report. Pt has written instructions for monitoring and removal.       Home Exercise Program:    [] (18334) Reviewed/Progressed HEP activities related to strengthening, flexibility, endurance, ROM of core, proximal hip and LE for functional self-care, mobility, lifting and ambulation/stair navigation   [x] (20074) Reviewed/Progressed HEP activities related to improving balance, coordination, kinesthetic sense, posture, motor skill, proprioception of core, proximal hip and LE for self-care, mobility, lifting, and ambulation/stair navigation      Modalities:    [] Electric Stimulation:   [] Ultrasound:   [] Other:       Charges:  Timed Code Treatment Minutes: NM10;  MT 18; TA 25   Total Treatment Minutes: 53      [] EVAL (LOW) 43156 (typically 20 minutes face-to-face)  [] EVAL (MOD) 61272 (typically 30 minutes face-to-face)  [] EVAL (HIGH) 06735 (typically 45 minutes face-to-face)  [] RE-EVAL     [] YK(70294) x       [x] NMR (52092) x   1    [x] Manual (23705) x   1    [x] TA (68195) x   2    [] Gait Training (49461) x       [] ES(attended) (51899)  [] ES (un) (53785)   [] DRY NEEDLE 1 OR 2 MUSCLES  [] DRY NEEDLE 3+ MUSCLES  [] Mech Traction (48616)  [] Ultrasound (88411)  [] Other:      GOALS: Goals established 10/27/22   Patient stated goal: \"stronger (not fall)\"  [] Progressing: [] Met: [] Not Met: [] Adjusted    Therapist goals for Patient:  Short Term Goals= LT goals: To be achieved in: 4 weeks  Independent in HEP and progression per patient tolerance. [] Progressing: [x] Met: [] Not Met: [] Adjusted  Patient will have a decrease in dizziness/imbalance symptoms to 0-1/10 to facilitate improvement in movement, function, balance, and ADLS. Improved to 1-2/10, but continues to fluctuate 0-3/10  [] Progressing: [] Met: [x] Not Met: [] Adjusted  3.  Disability index score of 40% or less for the [facility] to assist with reaching prior level of function. Improved to 48%  [] Progressing: [] Met: [x] Not Met: [] Adjusted  4. Patient will demonstrate improved cervical ext and L rotation ROM WFL without c/o dizziness. Only if moves fast. , L > R rotation only, slightly w/ extension, flexion. [] Progressing: [] Met: [x] Not Met: [] Adjusted  5. Pt will report no falls for 4 weeks. Slight veering to L intermittently, no falls. [] Progressing: [x] Met: [] Not Met: [] Adjusted  6. Pt will return to walking for exercise with dizziness 1/10 or less. Pt is walking for exercise, attentive to maintain head in neutral instead of upper cspine extension. No dizziness. [] Progressing: [x] Met: [] Not Met: [] Adjusted      ASSESSMENT:  Pt met /6 goals. ***    Pt continues w/fluctuations in dizziness, but seems to be isolated to family stress most recently. Pt w/ reduced neck pain following manual therapy. Pt in agreement to D/C. No further progress expected due to chronicity and fluctuating nature of migraines and meniere's. Pt overall having less neck paind and ***Pt will continue to benefit from skilled PT to reduce fall risk and improve independence w/ functional mobility. Treatment/Activity Tolerance:  [x] Patient tolerated treatment well [] Patient limited by fatique  [] Patient limited by pain  [] Patient limited by other medical complications  [] Other:     Overall Progression Towards Functional goals/ Treatment Progress Update:  [] Patient is progressing as expected towards functional goals listed. [x] Progression is slowed due to complexities/Impairments listed. -  ongoing family stress, pt scheduling   [] Progression has been slowed due to co-morbidities.   [] Plan just implemented, too soon to assess goals progression <30days   [] Goals require adjustment due to lack of progress  [] Patient is not progressing as expected and requires additional follow up with physician  [] Other    Prognosis for POC: [x] Good [] Fair  [] Poor    Patient requires continued skilled intervention: [x] Yes  [] No        PLAN: 2x/week for 4 weeks  [] Continue per plan of care [] Alter current plan (see comments)- extended date for insurance auth to cover final 2 visits. [] Plan of care initiated [] Hold pending MD visit [x] Discharge    Electronically signed by: Naa Yepez, PT, DPT    Note: If patient does not return for scheduled/recommended follow up visits, this note will serve as a discharge from care along with the most recent update on progress.

## 2023-01-11 NOTE — PROGRESS NOTES
Chief Complaint    Follow-up (Left Shoulder)      History of Present Illness:  Marly Tejeda is a pleasant, 62 y.o., female, here today for follow up of her left shoulder. The patient underwent a left arthroscopic revision rotator cuff repair with collagen patch augmentation and biceps tenodesis on 8/5/2022. This patient is now 5 months postop. She continues to struggle with pain and limited motion. We did give her an intra-articular corticosteroid injection to combat the chronic inflammation, however she says this did not help her pain. She is having pain at night which disrupts her sleep. Her pain at rest is minimal. She also complains of subjective popping in the shoulder. She has continued in physical therapy at the [place] office working with Marc. Her therapist has been doing dry needling, iontophoresis and hands on modalities. She feels she has not made recent progress despite these modalities. She reports no new injuries or setbacks.     Medical History:  Patient's medications, allergies, past medical, surgical, social and family histories were reviewed and updated as appropriate.      Review of Systems  A 14 point review of systems was completed by the patient and is available in the media section of the scanned medical record and was reviewed on 1/11/2023.  The review is negative with the exception of those things mentioned in the HPI and Past Medical History    Vital Signs:  There were no vitals filed for this visit.    General/Appearance: Alert and oriented and in no apparent distress.    Skin:  There are no skin lesions, cellulitis, or extreme edema. The patient has warm and well-perfused Bilateral upper extremities with brisk capillary refill.      Left Shoulder Exam:  Inspection: Incision portals are healing well. No gross deformities, no signs of infection.    Palpation: Glenohumeral crepitus, Tenderness over the rotator cuff, biceps is non-tender    Active Range of Motion:  Forward Elevation 120,  Abduction 120, External Rotation 45, Internal Rotation L1    Passive Range of Motion: Forward Elevation 140    Strength:  External Rotation 4/5, Internal Rotation 4/5, Champagne Toast 4/5    Special Tests: No Pierre muscle deformity. Neurovascular: Sensation to light touch is intact, no motor deficits, palpable radial pulses 2+      Radiology:     No new XR obtained at this time. Assessment :  Ms. Sj Cardoza is a pleasant, 60 y.o. patient who underwent a left arthroscopic revision rotator cuff repair with collagen patch augmentation and biceps tenodesis on 8/5/2022. This patient is now 5 months postop. She continues to struggle with pain and lack of overall function. Impression:  Encounter Diagnosis   Name Primary? S/P rotator cuff repair Yes       Office Procedures:  Orders Placed This Encounter   Procedures    MRI SHOULDER LEFT WO CONTRAST     Standing Status:   Future     Standing Expiration Date:   1/11/2024     Order Specific Question:   Reason for exam:     Answer:   Amy Cardoso         Treatment Plan: At this point we would have expected her to be further along in her recovery. We do feel the next reasonable step is an MRI of the left shoulder to evaluate re-tear vs failure to heal. We will have her obtain the MRI and follow up in clinic to review those results. We will call also call in Valium to help with her anxiety during the MRI. She is agreeable to this plan. We will see Kisha Cunningham back in 2 weeks and/or as needed. All questions were answered to patient's satisfaction and She was encouraged to call with any further questions or concerns. Cherry Madrid is in agreement with this plan. 1/11/2023  3:13 PM    Deedee De Los Santos, MARSHA  [employer] [address]    During this examination, I, Ivanna Velasquez, functioned as a scribe for Dr. Yahir Iyer. The history taking and physical examination were performed by Dr. Monico Rios. All counseling during the appointment was performed between the patient and Dr. Bernice Mei. 1/11/23  ______________  I, Dr. Jasmyn Sanches, personally performed the services described in this documentation as described by Alisa Holbrook ATC in my presence, and it is both accurate and complete. Dash Mei MD, PhD  1/11/2023

## 2023-01-12 NOTE — FLOWSHEET NOTE
The [facility] and [address],  Sports Performance and Rehabilitation, On license of [facility] 6199 1246 [address]  793 [facility],5Th Floor   [place] Suarez  Phone: [phone]  Fax: [phone]       Physical Therapy Treatment Note/ Progress Report:           Date:  2023  Patient Name:  Leo Lopez    :  1960  MRN: [id number]  Restrictions/Precautions:    Medical/Treatment Diagnosis Information:  Diagnosis: Z98.890 (ICD-10-CM) - S/P left rotator cuff repair  Treatment Diagnosis: Left shoulder pain, decreased ROM and strength  Insurance/Certification information: [address], [facility]  Physician Information:  Alea Hutchinson MD  Has the plan of care been signed (Y/N):        []  Yes  [x]  No     Date of Patient follow up with Physician:     Assessment Summary: Date Range for reporting period:  Beginnin22  Endin      Recertification will be due (POC Duration  / 90 days whichever is less): 22          Visit # Insurance Allowable Auth Required   In Person  34 through  []  Yes     []  No    Tele Health   []  Yes     []  No    Total          Functional Scale: FOTO 16   Date assessed:     Latex Allergy:  [x]NO      []YES  Preferred Language for Healthcare:   [x]English       []other:      Pain level:  5/10         SUBJECTIVE:       \"Doing ok. ...i'm using it a lot. \"    OBJECTIVE:  full prom          RESTRICTIONS/PRECAUTIONS: Large RCR w/ patch and biceps tenodesis restrictions.     Exercises/Interventions: HEP code: [id number]  Therapeutic Ex (83511) HEP 22     Warm-up   LATEX ALLERGY    Pulleys  3'      UBE            TABLE      Cane flexion  2x10     Supine er @ 0  30x     Standing ball roll   30x mr     Table slide       Seated cane er @ 0       Supine press (2 hands)      Reacher   20x (2#)     Prone: rows, ext's     Supine reaches     Standing reaches     Quadruped rocks   25x    30x     30 x    15x     T band pinches   25x green     Wall walk   5 x 10\"     Wall weight shifts   10x      Bicep curls   25x - 2#     Supine tricep 'crushers'  25x                                                                 Manual: PROM into shoulder flexion, abduction, ER and IR to 30° - 15'   Therapeutic Exercise and NMR EXR  [x] (12264) Provided verbal/tactile cueing for activities related to strengthening, flexibility, endurance, ROM  for improvements in scapular, scapulothoracic and UE control with self care, reaching, carrying, lifting, house/yardwork, driving/computer work.    [] (22487) Provided verbal/tactile cueing for activities related to improving balance, coordination, kinesthetic sense, posture, motor skill, proprioception  to assist with  scapular, scapulothoracic and UE control with self care, reaching, carrying, lifting, house/yardwork, driving/computer work. Therapeutic Activities:    [x] (55952 or 34135) Provided verbal/tactile cueing for activities related to improving balance, coordination, kinesthetic sense, posture, motor skill, proprioception and motor activation to allow for proper function of scapular, scapulothoracic and UE control with self care, carrying, lifting, driving/computer work.      Home Exercise Program:    [x] (40345) Reviewed/Progressed HEP activities related to strengthening, flexibility, endurance, ROM of scapular, scapulothoracic and UE control with self care, reaching, carrying, lifting, house/yardwork, driving/computer work  [] (59326) Reviewed/Progressed HEP activities related to improving balance, coordination, kinesthetic sense, posture, motor skill, proprioception of scapular, scapulothoracic and UE control with self care, reaching, carrying, lifting, house/yardwork, driving/computer work      Manual Treatments:  PROM / STM / Oscillations-Mobs:  G-I, II, III, IV (PA's, Inf., Post.)  [x] (31929) Provided manual therapy to mobilize soft tissue/joints of cervical/CT, scapular GHJ and UE for the purpose of modulating pain, promoting relaxation,  increasing ROM, reducing/eliminating soft tissue swelling/inflammation/restriction, improving soft tissue extensibility and allowing for proper ROM for normal function with self care, reaching, carrying, lifting, house/yardwork, driving/computer work    Modalities:     [x] GAME READY (VASO)- for significant edema, swelling, pain control. Charges:  Timed Code Treatment Minutes: 35'   Total Treatment Minutes:  35'   [address] [facility]:  TE TIME:  NMR TIME:  MANUAL TIME:  TA  UNTIMED MINUTES:  Medicare Total:         [] EVAL (LOW) 03350 (typically 20 minutes face-to-face)  [] EVAL (MOD) 59362 (typically 30 minutes face-to-face)  [] EVAL (HIGH) 37666 (typically 45 minutes face-to-face)  [] RE-EVAL     [x] FD(19765) x 1  [x] IONTO  [] NMR (34988)    [] VASO   [x] Manual (33295) x 1  [] Other:  ionto (4.0mA - bicep tendon)  [] TA x      [] Mech Traction (92228)  [] ES(attended) (88549)      [] ES (un) (71187):  DN x 1         GOALS:     Patient stated goal: Improve motion     Therapist goals for Patient:  Short Term Goals: To be achieved in: 2 weeks  1. Independent in HEP and progression per patient tolerance, in order to prevent re-injury. [x] Progressing: [] Met: [] Not Met: [] Adjusted     2. Patient will have a decrease in pain to facilitate improvement in movement, function, and ADLs as indicated by Functional Deficits. [x] Progressing: [] Met: [] Not Met: [] Adjusted     Long Term Goals: To be achieved in: 12 weeks  1. Pt will improve FOTO to 53 or more, indicating improved functional capacity . [x] Progressing: [] Met: [] Not Met: [] Adjusted     2. Patient will demonstrate increased AROM flex/abd/ER/IR to 150/150/40/L1 to allow for proper joint functioning as indicated by patients Functional Deficits. [x] Progressing: [] Met: [] Not Met: [] Adjusted     3.  Patient will demonstrate an increase in Strength of shoulder  to 5/5 to allow for proper functional mobility as indicated by patients Functional Deficits. [x] Progressing: [] Met: [] Not Met: [] Adjusted     4. Patient will return to functional activities without increased symptoms or restriction. [x] Progressing: [] Met: [] Not Met: [] Adjusted     5. Pt will report ability to sleep 5 hours without sleep disturbance from shoulder. (patient specific functional goal)    [x] Progressing: [] Met: [] Not Met: [] Adjusted             ASSESSMENT:  See eval    Patient received education on their current pathology and how their condition effects them with their functional activities. Patient understood discussion and questions were answered. Patient understands their activity limitations and understands rational for treatment progression. Pt educated on plan of care and HEP, if worsening symptoms to d/c that exercise. PLAN: See eval  [x] Continue per plan of care [] Alter current plan (see comments above)  [] Plan of care initiated [] Hold pending MD visit [] Discharge      Electronically signed by:  Leslee Simons, PT, MPT     Note: If patient does not return for scheduled/ recommended follow up visits, this note will serve as a discharge from care along with most recent update on progress.

## 2023-01-12 NOTE — FLOWSHEET NOTE
The [facility] and [address],  Sports Performance and Rehabilitation, [facility] [address]199 1246 [address]  793 [facility],5Th Floor   [place] Su[place]  Phone: [phone]  Fax: [phone]       Physical Therapy Treatment Note/ Progress Report:           Date:  2023  Patient Name:  Jerzy Gonsalez    :  1960  MRN: [id number]  Restrictions/Precautions:    Medical/Treatment Diagnosis Information:  Diagnosis: Z98.890 (ICD-10-CM) - S/P left rotator cuff repair  Treatment Diagnosis: Left shoulder pain, decreased ROM and strength  Insurance/Certification information: [employer], [facility]  Physician Information:  Ishan Salgado MD  Has the plan of care been signed (Y/N):        []  Yes  [x]  No     Date of Patient follow up with Physician:     Assessment Summary: Date Range for reporting period:  Beginnin22  Endin      Recertification will be due (POC Duration  / 90 days whichever is less): 22          Visit # Insurance Allowable Auth Required   In Person  34 through  []  Yes     []  No    Tele Health   []  Yes     []  No    Total          Functional Scale: FOTO 16   Date assessed:     Latex Allergy:  [x]NO      []YES  Preferred Language for Healthcare:   [x]English       []other:      Pain level:  5/10         SUBJECTIVE:       \"Doing ok. ...i'm using it a lot. \"    OBJECTIVE:  full prom          RESTRICTIONS/PRECAUTIONS: Large RCR w/ patch and biceps tenodesis restrictions.     Exercises/Interventions: HEP code: [id number]  Therapeutic Ex (13846) HEP 22     Warm-up   LATEX ALLERGY    Pulleys  3'      UBE            TABLE      Cane flexion  2x10     Supine er @ 0  30x     Standing ball roll   30x mr     Table slide       Seated cane er @ 0       Supine press (2 hands)      Reacher   20x (2#)     Prone: rows, ext's     Supine reaches     Standing reaches     Quadruped rocks   25x    30x     30 x    15x     T band pinches   25x green     Wall walk   5 x 10\"     Wall weight shifts   10x      Bicep curls   25x - 2#     Supine tricep 'crushers'  25x                                                                 Manual: PROM into shoulder flexion, abduction, ER and IR to 30° - 15'   Therapeutic Exercise and NMR EXR  [x] (27256) Provided verbal/tactile cueing for activities related to strengthening, flexibility, endurance, ROM  for improvements in scapular, scapulothoracic and UE control with self care, reaching, carrying, lifting, house/yardwork, driving/computer work.    [] (35127) Provided verbal/tactile cueing for activities related to improving balance, coordination, kinesthetic sense, posture, motor skill, proprioception  to assist with  scapular, scapulothoracic and UE control with self care, reaching, carrying, lifting, house/yardwork, driving/computer work. Therapeutic Activities:    [x] (19354 or 88586) Provided verbal/tactile cueing for activities related to improving balance, coordination, kinesthetic sense, posture, motor skill, proprioception and motor activation to allow for proper function of scapular, scapulothoracic and UE control with self care, carrying, lifting, driving/computer work.      Home Exercise Program:    [x] (89260) Reviewed/Progressed HEP activities related to strengthening, flexibility, endurance, ROM of scapular, scapulothoracic and UE control with self care, reaching, carrying, lifting, house/yardwork, driving/computer work  [] (53840) Reviewed/Progressed HEP activities related to improving balance, coordination, kinesthetic sense, posture, motor skill, proprioception of scapular, scapulothoracic and UE control with self care, reaching, carrying, lifting, house/yardwork, driving/computer work      Manual Treatments:  PROM / STM / Oscillations-Mobs:  G-I, II, III, IV (PA's, Inf., Post.)  [x] (01405) Provided manual therapy to mobilize soft tissue/joints of cervical/CT, scapular GHJ and UE for the purpose of modulating pain, promoting relaxation,  increasing ROM, reducing/eliminating soft tissue swelling/inflammation/restriction, improving soft tissue extensibility and allowing for proper ROM for normal function with self care, reaching, carrying, lifting, house/yardwork, driving/computer work    Modalities:     [x] GAME READY (VASO)- for significant edema, swelling, pain control. Charges:  Timed Code Treatment Minutes: 35'   Total Treatment Minutes:  35'   [address] [facility]:  TE TIME:  NMR TIME:  MANUAL TIME:  TA  UNTIMED MINUTES:  Medicare Total:         [] EVAL (LOW) 77882 (typically 20 minutes face-to-face)  [] EVAL (MOD) 17015 (typically 30 minutes face-to-face)  [] EVAL (HIGH) 91659 (typically 45 minutes face-to-face)  [] RE-EVAL     [x] JU(11565) x 1  [x] IONTO  [] NMR (34301)    [] VASO   [x] Manual (62679) x 1  [] Other:  ionto (4.0mA - bicep tendon)  [] TA x      [] Mech Traction (75547)  [] ES(attended) (15388)      [] ES (un) (27378):  DN x 1         GOALS:     Patient stated goal: Improve motion     Therapist goals for Patient:  Short Term Goals: To be achieved in: 2 weeks  1. Independent in HEP and progression per patient tolerance, in order to prevent re-injury. [x] Progressing: [] Met: [] Not Met: [] Adjusted     2. Patient will have a decrease in pain to facilitate improvement in movement, function, and ADLs as indicated by Functional Deficits. [x] Progressing: [] Met: [] Not Met: [] Adjusted     Long Term Goals: To be achieved in: 12 weeks  1. Pt will improve FOTO to 53 or more, indicating improved functional capacity . [x] Progressing: [] Met: [] Not Met: [] Adjusted     2. Patient will demonstrate increased AROM flex/abd/ER/IR to 150/150/40/L1 to allow for proper joint functioning as indicated by patients Functional Deficits. [x] Progressing: [] Met: [] Not Met: [] Adjusted     3.  Patient will demonstrate an increase in Strength of shoulder  to 5/5 to allow for proper functional mobility as indicated by patients Functional Deficits. [x] Progressing: [] Met: [] Not Met: [] Adjusted     4. Patient will return to functional activities without increased symptoms or restriction. [x] Progressing: [] Met: [] Not Met: [] Adjusted     5. Pt will report ability to sleep 5 hours without sleep disturbance from shoulder. (patient specific functional goal)    [x] Progressing: [] Met: [] Not Met: [] Adjusted             ASSESSMENT:  See eval    Patient received education on their current pathology and how their condition effects them with their functional activities. Patient understood discussion and questions were answered. Patient understands their activity limitations and understands rational for treatment progression. Pt educated on plan of care and HEP, if worsening symptoms to d/c that exercise. PLAN: See eval  [x] Continue per plan of care [] Alter current plan (see comments above)  [] Plan of care initiated [] Hold pending MD visit [] Discharge      Electronically signed by:  Nataly Woodson, PT, MPT     Note: If patient does not return for scheduled/ recommended follow up visits, this note will serve as a discharge from care along with most recent update on progress.

## 2023-01-15 NOTE — FLOWSHEET NOTE
The [facility] and [address],  Sports Performance and Rehabilitation, [facility] [address]199 12[address]  793 [facility],5Th Floor   [place]  Phone: [phone]  Fax: [phone]       Physical Therapy Treatment Note/ Progress Report:           Date:  2023  Patient Name:  Fely Aden    :  1960  MRN: [id number]  Restrictions/Precautions:    Medical/Treatment Diagnosis Information:  Diagnosis: Z98.890 (ICD-10-CM) - S/P left rotator cuff repair  Treatment Diagnosis: Left shoulder pain, decreased ROM and strength  Insurance/Certification information: [address], [facility]  Physician Information:  Devaughn Rodriguez MD  Has the plan of care been signed (Y/N):        []  Yes  [x]  No     Date of Patient follow up with Physician:     Assessment Summary: Date Range for reporting period:  Beginnin22  Endin      Recertification will be due (POC Duration  / 90 days whichever is less): 22          Visit # Insurance Allowable Auth Required   In Person  34 through  []  Yes     []  No    Tele Health   []  Yes     []  No    Total          Functional Scale: FOTO 16   Date assessed:     Latex Allergy:  [x]NO      []YES  Preferred Language for Healthcare:   [x]English       []other:      Pain level:  5/10         SUBJECTIVE:       \"Feels a bit sore at times\"    OBJECTIVE:  4-/5 global strength           RESTRICTIONS/PRECAUTIONS: Large RCR w/ patch and biceps tenodesis restrictions.     Exercises/Interventions: HEP code: [id number]  Therapeutic Ex (63102) HEP 22     Warm-up   LATEX ALLERGY    Pulleys  3'      UBE            TABLE      Cane flexion  2x10     Supine er @ 0  30x     Standing ball roll   30x mr     Table slide       Seated cane er @ 0       Supine press (2 hands)      Reacher   20x (2#)     Prone: rows, ext's     Supine reaches     Standing reaches     Quadruped rocks   25x    30x     30 x    15x     T band pinches   25x green   Wall walk   5 x 10\"     Wall weight shifts   10x      Bicep curls   25x - 2#     Supine tricep 'crushers'  25x                                                                 Manual: PROM into shoulder flexion, abduction, ER and IR to 30° - 15'   Therapeutic Exercise and NMR EXR  [x] (97812) Provided verbal/tactile cueing for activities related to strengthening, flexibility, endurance, ROM  for improvements in scapular, scapulothoracic and UE control with self care, reaching, carrying, lifting, house/yardwork, driving/computer work.    [] (18486) Provided verbal/tactile cueing for activities related to improving balance, coordination, kinesthetic sense, posture, motor skill, proprioception  to assist with  scapular, scapulothoracic and UE control with self care, reaching, carrying, lifting, house/yardwork, driving/computer work.    Therapeutic Activities:    [x] (97366 or 83354) Provided verbal/tactile cueing for activities related to improving balance, coordination, kinesthetic sense, posture, motor skill, proprioception and motor activation to allow for proper function of scapular, scapulothoracic and UE control with self care, carrying, lifting, driving/computer work.     Home Exercise Program:    [x] (66882) Reviewed/Progressed HEP activities related to strengthening, flexibility, endurance, ROM of scapular, scapulothoracic and UE control with self care, reaching, carrying, lifting, house/yardwork, driving/computer work  [] (15830) Reviewed/Progressed HEP activities related to improving balance, coordination, kinesthetic sense, posture, motor skill, proprioception of scapular, scapulothoracic and UE control with self care, reaching, carrying, lifting, house/yardwork, driving/computer work      Manual Treatments:  PROM / STM / Oscillations-Mobs:  G-I, II, III, IV (PA's, Inf., Post.)  [x] (03737) Provided manual therapy to mobilize soft tissue/joints of cervical/CT, scapular GHJ and UE for the purpose of  modulating pain, promoting relaxation,  increasing ROM, reducing/eliminating soft tissue swelling/inflammation/restriction, improving soft tissue extensibility and allowing for proper ROM for normal function with self care, reaching, carrying, lifting, house/yardwork, driving/computer work    Modalities:     [x] GAME READY (VASO)- for significant edema, swelling, pain control. Charges:  Timed Code Treatment Minutes: 35'   Total Treatment Minutes:  35'   [address] [facility] [address]:  TE TIME:  NMR TIME:  MANUAL TIME:  TA  UNTIMED MINUTES:  Medicare Total:         [] EVAL (LOW) 57042 (typically 20 minutes face-to-face)  [] EVAL (MOD) 36224 (typically 30 minutes face-to-face)  [] EVAL (HIGH) 52547 (typically 45 minutes face-to-face)  [] RE-EVAL     [x] EE(71885) x 1  [x] IONTO  [] NMR (88259)    [] VASO   [x] Manual (29598) x 1  [] Other:  ionto (4.0mA - bicep tendon)  [] TA x      [] Mech Traction (99063)  [] ES(attended) (70606)      [] ES (un) (81377):  DN x 1         GOALS:     Patient stated goal: Improve motion     Therapist goals for Patient:  Short Term Goals: To be achieved in: 2 weeks  1. Independent in HEP and progression per patient tolerance, in order to prevent re-injury. [x] Progressing: [] Met: [] Not Met: [] Adjusted     2. Patient will have a decrease in pain to facilitate improvement in movement, function, and ADLs as indicated by Functional Deficits. [x] Progressing: [] Met: [] Not Met: [] Adjusted     Long Term Goals: To be achieved in: 12 weeks  1. Pt will improve FOTO to 53 or more, indicating improved functional capacity . [x] Progressing: [] Met: [] Not Met: [] Adjusted     2. Patient will demonstrate increased AROM flex/abd/ER/IR to 150/150/40/L1 to allow for proper joint functioning as indicated by patients Functional Deficits. [x] Progressing: [] Met: [] Not Met: [] Adjusted     3.  Patient will demonstrate an increase in Strength of shoulder  to 5/5 to allow for proper functional mobility as indicated by patients Functional Deficits. [x] Progressing: [] Met: [] Not Met: [] Adjusted     4. Patient will return to functional activities without increased symptoms or restriction. [x] Progressing: [] Met: [] Not Met: [] Adjusted     5. Pt will report ability to sleep 5 hours without sleep disturbance from shoulder. (patient specific functional goal)    [x] Progressing: [] Met: [] Not Met: [] Adjusted             ASSESSMENT:  See eval    Patient received education on their current pathology and how their condition effects them with their functional activities. Patient understood discussion and questions were answered. Patient understands their activity limitations and understands rational for treatment progression. Pt educated on plan of care and HEP, if worsening symptoms to d/c that exercise. PLAN: See eval  [x] Continue per plan of care [] Alter current plan (see comments above)  [] Plan of care initiated [] Hold pending MD visit [] Discharge      Electronically signed by:  Max Torres, PT, MPT     Note: If patient does not return for scheduled/ recommended follow up visits, this note will serve as a discharge from care along with most recent update on progress.

## 2023-01-15 NOTE — FLOWSHEET NOTE
The [facility], [place] and [address],  [facility], Formerly [facility] [address] 12[address]  793 [facility],5Th Floor   [facility], [address]  Phone: [phone]  Fax: [phone]       Physical Therapy Treatment Note/ Progress Report:           Date:  2023  Patient Name:  Sherin Bryson    :  1960  MRN: [id number]  Restrictions/Precautions:    Medical/Treatment Diagnosis Information:  Diagnosis: Z98.890 (ICD-10-CM) - S/P left rotator cuff repair  Treatment Diagnosis: Left shoulder pain, decreased ROM and strength  Insurance/Certification information: [employer], [facility]  Physician Information:  Viki Morillo MD  Has the plan of care been signed (Y/N):        []  Yes  [x]  No     Date of Patient follow up with Physician:     Assessment Summary: Date Range for reporting period:  Beginnin22  Endin      Recertification will be due (POC Duration  / 90 days whichever is less): 22          Visit # Insurance Allowable Auth Required   In Person 3  []  Yes     []  No    Tele Health   []  Yes     []  No    Total          Functional Scale: FOTO 16   Date assessed:     Latex Allergy:  [x]NO      []YES  Preferred Language for Healthcare:   [x]English       []other:      Pain level:  5/10         SUBJECTIVE:       \"Doing ok. ...i'm using it a lot. \"    OBJECTIVE:  full prom          RESTRICTIONS/PRECAUTIONS: Large RCR w/ patch and biceps tenodesis restrictions.     Exercises/Interventions: HEP code: [id number]  Therapeutic Ex (58389) HEP 22     Warm-up   LATEX ALLERGY    Pulleys  3'      UBE            TABLE      Cane flexion  2x10     Supine er @ 0  30x     Standing ball roll   30x mr     Table slide       Seated cane er @ 0       Supine press (2 hands)      Reacher   20x (2#)     Prone: rows, ext's     Supine reaches     Standing reaches     Quadruped rocks   25x    30x     30 x    15x     T band pinches   25x green     Wall walk   5 x 10\" Wall weight shifts   10x      Bicep curls   25x - 2#     Supine tricep 'crushers'  25x                                                                 Manual: PROM into shoulder flexion, abduction, ER and IR to 30° - 15'   Therapeutic Exercise and NMR EXR  [x] (56083) Provided verbal/tactile cueing for activities related to strengthening, flexibility, endurance, ROM  for improvements in scapular, scapulothoracic and UE control with self care, reaching, carrying, lifting, house/yardwork, driving/computer work.    [] (12172) Provided verbal/tactile cueing for activities related to improving balance, coordination, kinesthetic sense, posture, motor skill, proprioception  to assist with  scapular, scapulothoracic and UE control with self care, reaching, carrying, lifting, house/yardwork, driving/computer work. Therapeutic Activities:    [x] (61103 or 59358) Provided verbal/tactile cueing for activities related to improving balance, coordination, kinesthetic sense, posture, motor skill, proprioception and motor activation to allow for proper function of scapular, scapulothoracic and UE control with self care, carrying, lifting, driving/computer work.      Home Exercise Program:    [x] (05163) Reviewed/Progressed HEP activities related to strengthening, flexibility, endurance, ROM of scapular, scapulothoracic and UE control with self care, reaching, carrying, lifting, house/yardwork, driving/computer work  [] (46771) Reviewed/Progressed HEP activities related to improving balance, coordination, kinesthetic sense, posture, motor skill, proprioception of scapular, scapulothoracic and UE control with self care, reaching, carrying, lifting, house/yardwork, driving/computer work      Manual Treatments:  PROM / STM / Oscillations-Mobs:  G-I, II, III, IV (PA's, Inf., Post.)  [x] (24309) Provided manual therapy to mobilize soft tissue/joints of cervical/CT, scapular GHJ and UE for the purpose of modulating pain, promoting relaxation,  increasing ROM, reducing/eliminating soft tissue swelling/inflammation/restriction, improving soft tissue extensibility and allowing for proper ROM for normal function with self care, reaching, carrying, lifting, house/yardwork, driving/computer work    Modalities:     [x] GAME READY (VASO)- for significant edema, swelling, pain control. Charges:  Timed Code Treatment Minutes: 35'   Total Treatment Minutes:  35'   [facility]:  TE TIME:  NMR TIME:  MANUAL TIME:  TA  UNTIMED MINUTES:  Medicare Total:         [] EVAL (LOW) 70855 (typically 20 minutes face-to-face)  [] EVAL (MOD) 34584 (typically 30 minutes face-to-face)  [] EVAL (HIGH) 92851 (typically 45 minutes face-to-face)  [] RE-EVAL     [x] FG(92278) x 1  [x] IONTO  [] NMR (08322)    [] VASO   [x] Manual (99045) x 1  [] Other:  ionto (4.0mA - bicep tendon)  [] TA x      [] Mech Traction (05795)  [] ES(attended) (74513)      [] ES (un) (64979):  DN x 1         GOALS:     Patient stated goal: Improve motion     Therapist goals for Patient:  Short Term Goals: To be achieved in: 2 weeks  1. Independent in HEP and progression per patient tolerance, in order to prevent re-injury. [x] Progressing: [] Met: [] Not Met: [] Adjusted     2. Patient will have a decrease in pain to facilitate improvement in movement, function, and ADLs as indicated by Functional Deficits. [x] Progressing: [] Met: [] Not Met: [] Adjusted     Long Term Goals: To be achieved in: 12 weeks  1. Pt will improve FOTO to 53 or more, indicating improved functional capacity . [x] Progressing: [] Met: [] Not Met: [] Adjusted     2. Patient will demonstrate increased AROM flex/abd/ER/IR to 150/150/40/L1 to allow for proper joint functioning as indicated by patients Functional Deficits. [x] Progressing: [] Met: [] Not Met: [] Adjusted     3.  Patient will demonstrate an increase in Strength of shoulder  to 5/5 to allow for proper functional mobility as indicated by patients Functional Deficits. [x] Progressing: [] Met: [] Not Met: [] Adjusted     4. Patient will return to functional activities without increased symptoms or restriction. [x] Progressing: [] Met: [] Not Met: [] Adjusted     5. Pt will report ability to sleep 5 hours without sleep disturbance from shoulder. (patient specific functional goal)    [x] Progressing: [] Met: [] Not Met: [] Adjusted             ASSESSMENT:  See eval    Patient received education on their current pathology and how their condition effects them with their functional activities. Patient understood discussion and questions were answered. Patient understands their activity limitations and understands rational for treatment progression. Pt educated on plan of care and HEP, if worsening symptoms to d/c that exercise. PLAN: See eval  [x] Continue per plan of care [] Alter current plan (see comments above)  [] Plan of care initiated [] Hold pending MD visit [] Discharge      Electronically signed by:  Leslee Simons, PT, MPT     Note: If patient does not return for scheduled/ recommended follow up visits, this note will serve as a discharge from care along with most recent update on progress.

## 2023-01-16 ENCOUNTER — HOSPITAL ENCOUNTER (OUTPATIENT)
Dept: PHYSICAL THERAPY | Age: 63
Setting detail: THERAPIES SERIES
Discharge: HOME | End: 2023-01-16
Payer: COMMERCIAL

## 2023-01-16 ENCOUNTER — TELEPHONE (OUTPATIENT)
Dept: ORTHOPEDIC SURGERY | Age: 63
End: 2023-01-16

## 2023-01-16 PROCEDURE — 97140 MANUAL THERAPY 1/> REGIONS: CPT | Performed by: PHYSICAL THERAPIST

## 2023-01-16 PROCEDURE — 97110 THERAPEUTIC EXERCISES: CPT | Performed by: PHYSICAL THERAPIST

## 2023-01-16 NOTE — TELEPHONE ENCOUNTER
General Question     Subject: MRI APPROVAL   Patient and /or Facility Request: Emilygenny Gallagher \"Ritu\"  Contact Number: [phone]      PATIENT CALLED IN TO SEE IF HER MRI FOR HER L SHOULDER WAS APPROVE. .. PATIENT HAS AN APPT TO GET HER MRI DONE. .. PLEASE CALL PATIENT BACK AT THE ABOVE NUMBER. ..

## 2023-01-17 NOTE — FLOWSHEET NOTE
The [facility] and [address],  [facility], Formerly [facility] [address]199 [address]  793 [facility],5Th Floor   [place] Suarez  Phone: [phone]  Fax: [phone]       Physical Therapy Treatment Note/ Progress Report:           Date:  2023  Patient Name:  Miriam Esteves    :  1960  MRN: [id number]  Restrictions/Precautions:    Medical/Treatment Diagnosis Information:  Diagnosis: Z98.890 (ICD-10-CM) - S/P left rotator cuff repair  Treatment Diagnosis: Left shoulder pain, decreased ROM and strength  Insurance/Certification information: [address], [facility]  Physician Information:  Jyoti Lopez MD  Has the plan of care been signed (Y/N):        []  Yes  [x]  No     Date of Patient follow up with Physician:     Assessment Summary: Date Range for reporting period:  Beginnin22  Endin      Recertification will be due (POC Duration  / 90 days whichever is less): 22          Visit # Insurance Allowable Auth Required   In Person 5 in   []  Yes     []  No    Tele Health   []  Yes     []  No    Total          Functional Scale: FOTO 16   Date assessed:     Latex Allergy:  [x]NO      []YES  Preferred Language for Healthcare:   [x]English       []other:      Pain level:  5/10         SUBJECTIVE:       \"A bit sore yet\"    OBJECTIVE:  4/5 safe zone           RESTRICTIONS/PRECAUTIONS: Large RCR w/ patch and biceps tenodesis restrictions.     Exercises/Interventions: HEP code: [id number]  Therapeutic Ex (25478) HEP 22     Warm-up   LATEX ALLERGY    Pulleys  3'      UBE            TABLE      Cane flexion  2x10     Supine er @ 0  30x     Standing ball roll   30x mr     Table slide       Seated cane er @ 0       Supine press (2 hands)      Reacher   20x (2#)     Prone: rows, ext's     Supine reaches     Standing reaches     Quadruped rocks   25x    30x     30 x    15x     T band pinches   25x green     Wall walk   5 x 10\"   Wall weight shifts   10x      Bicep curls   25x - 2#     Supine tricep 'crushers'  25x                                                                 Manual: PROM into shoulder flexion, abduction, ER and IR to 30° - 15'   Therapeutic Exercise and NMR EXR  [x] (40293) Provided verbal/tactile cueing for activities related to strengthening, flexibility, endurance, ROM  for improvements in scapular, scapulothoracic and UE control with self care, reaching, carrying, lifting, house/yardwork, driving/computer work.    [] (12058) Provided verbal/tactile cueing for activities related to improving balance, coordination, kinesthetic sense, posture, motor skill, proprioception  to assist with  scapular, scapulothoracic and UE control with self care, reaching, carrying, lifting, house/yardwork, driving/computer work.    Therapeutic Activities:    [x] (65901 or 35803) Provided verbal/tactile cueing for activities related to improving balance, coordination, kinesthetic sense, posture, motor skill, proprioception and motor activation to allow for proper function of scapular, scapulothoracic and UE control with self care, carrying, lifting, driving/computer work.     Home Exercise Program:    [x] (60324) Reviewed/Progressed HEP activities related to strengthening, flexibility, endurance, ROM of scapular, scapulothoracic and UE control with self care, reaching, carrying, lifting, house/yardwork, driving/computer work  [] (83728) Reviewed/Progressed HEP activities related to improving balance, coordination, kinesthetic sense, posture, motor skill, proprioception of scapular, scapulothoracic and UE control with self care, reaching, carrying, lifting, house/yardwork, driving/computer work      Manual Treatments:  PROM / STM / Oscillations-Mobs:  G-I, II, III, IV (PA's, Inf., Post.)  [x] (86024) Provided manual therapy to mobilize soft tissue/joints of cervical/CT, scapular GHJ and UE for the purpose of modulating pain, promoting  relaxation,  increasing ROM, reducing/eliminating soft tissue swelling/inflammation/restriction, improving soft tissue extensibility and allowing for proper ROM for normal function with self care, reaching, carrying, lifting, house/yardwork, driving/computer work    Modalities:     [x] GAME READY (VASO)- for significant edema, swelling, pain control. Charges:  Timed Code Treatment Minutes: 35'   Total Treatment Minutes:  35'   [address] [facility]:  TE TIME:  NMR TIME:  MANUAL TIME:  TA  UNTIMED MINUTES:  Medicare Total:         [] EVAL (LOW) 49581 (typically 20 minutes face-to-face)  [] EVAL (MOD) 62192 (typically 30 minutes face-to-face)  [] EVAL (HIGH) 32126 (typically 45 minutes face-to-face)  [] RE-EVAL     [x] XO(30800) x 1  [] IONTO  [] NMR (49132)    [] VASO   [x] Manual (75063) x 1  [] Other:  ionto (4.0mA - bicep tendon)  [] TA x      [] Mech Traction (28934)  [] ES(attended) (51506)      [] ES (un) (88435):  DN x 1         GOALS:     Patient stated goal: Improve motion     Therapist goals for Patient:  Short Term Goals: To be achieved in: 2 weeks  1. Independent in HEP and progression per patient tolerance, in order to prevent re-injury. [x] Progressing: [] Met: [] Not Met: [] Adjusted     2. Patient will have a decrease in pain to facilitate improvement in movement, function, and ADLs as indicated by Functional Deficits. [x] Progressing: [] Met: [] Not Met: [] Adjusted     Long Term Goals: To be achieved in: 12 weeks  1. Pt will improve FOTO to 53 or more, indicating improved functional capacity . [x] Progressing: [] Met: [] Not Met: [] Adjusted     2. Patient will demonstrate increased AROM flex/abd/ER/IR to 150/150/40/L1 to allow for proper joint functioning as indicated by patients Functional Deficits. [x] Progressing: [] Met: [] Not Met: [] Adjusted     3.  Patient will demonstrate an increase in Strength of shoulder  to 5/5 to allow for proper functional mobility as indicated by patients Functional Deficits. [x] Progressing: [] Met: [] Not Met: [] Adjusted     4. Patient will return to functional activities without increased symptoms or restriction. [x] Progressing: [] Met: [] Not Met: [] Adjusted     5. Pt will report ability to sleep 5 hours without sleep disturbance from shoulder. (patient specific functional goal)    [x] Progressing: [] Met: [] Not Met: [] Adjusted             ASSESSMENT:  See eval    Patient received education on their current pathology and how their condition effects them with their functional activities. Patient understood discussion and questions were answered. Patient understands their activity limitations and understands rational for treatment progression. Pt educated on plan of care and HEP, if worsening symptoms to d/c that exercise. PLAN: See eval  [x] Continue per plan of care [] Alter current plan (see comments above)  [] Plan of care initiated [] Hold pending MD visit [] Discharge      Electronically signed by:  Tej Rod, PT, MPT     Note: If patient does not return for scheduled/ recommended follow up visits, this note will serve as a discharge from care along with most recent update on progress.

## 2023-01-18 ENCOUNTER — HOSPITAL ENCOUNTER (OUTPATIENT)
Dept: PHYSICAL THERAPY | Age: 63
Setting detail: THERAPIES SERIES
Discharge: HOME | End: 2023-01-18
Payer: COMMERCIAL

## 2023-01-18 PROCEDURE — 20560 NDL INSJ W/O NJX 1 OR 2 MUSC: CPT | Performed by: PHYSICAL THERAPIST

## 2023-01-18 PROCEDURE — 97140 MANUAL THERAPY 1/> REGIONS: CPT | Performed by: PHYSICAL THERAPIST

## 2023-01-18 PROCEDURE — 97110 THERAPEUTIC EXERCISES: CPT | Performed by: PHYSICAL THERAPIST

## 2023-01-19 NOTE — FLOWSHEET NOTE
The [facility] and [address],  [facility], Formerly [facility] [address]  793 [facility],5Th Floor   [place] Suarez  Phone: [phone]  Fax: [phone]       Physical Therapy Treatment Note/ Progress Report:           Date:  2023  Patient Name:  Carin Rodríguez    :  1960  MRN: [id number]  Restrictions/Precautions:    Medical/Treatment Diagnosis Information:  Diagnosis: Z98.890 (ICD-10-CM) - S/P left rotator cuff repair  Treatment Diagnosis: Left shoulder pain, decreased ROM and strength  Ins[facility]/Certification information: [facility], [facility]  Physician Information:  Giancarlo Jeong MD  Has the plan of care been signed (Y/N):        []  Yes  [x]  No     Date of Patient follow up with Physician:     Assessment Summary: Date Range for reporting period:  Beginnin22  Endin      Recertification will be due (POC Duration  / 90 days whichever is less): 22          Visit # Insurance Allowable Auth Required   In Person 5 in   []  Yes     []  No    Tele Health   []  Yes     []  No    Total          Functional Scale: FOTO 16   Date assessed:     Latex Allergy:  [x]NO      []YES  Preferred Language for Healthcare:   [x]English       []other:      Pain level:  5/10         SUBJECTIVE:       \"A bit sore yet\"    OBJECTIVE:  4/5 safe zone           RESTRICTIONS/PRECAUTIONS: Large RCR w/ patch and biceps tenodesis restrictions.     Exercises/Interventions: HEP code: [id number]  Therapeutic Ex (87065) HEP 22     Warm-up   LATEX ALLERGY    Pulleys  3'      UBE            TABLE      Cane flexion  2x10     Supine er @ 0  30x     Standing ball roll   30x mr     Table slide       Seated cane er @ 0       Supine press (2 hands)      Reacher   20x (2#)     Prone: rows, ext's     Supine reaches     Standing reaches     Quadruped rocks   25x    30x     30 x    15x     T band pinches   25x green     Wall walk   5 x 10\" Wall weight shifts   10x      Bicep curls   25x - 2#     Supine tricep 'crushers'  25x                                                                 Manual: PROM into shoulder flexion, abduction, ER and IR to 30° - 15'   Dry needling manual therapy: consisted on the placement of microfilament needles in the following muscles:  upper trap, biceps. A 30 mm needle was inserted, piston, rotated, and coned to produce intramuscular mobilization. These techniques were used to restore functional range of motion, reduce muscle spasm and induce healing in the corresponding musculature. (63659)  Clean Technique was utilized today while applying Dry needling treatment. The treatment sites where cleaned with 70% solution of  isopropyl alcohol . The PT washed their hands and utilized treatment gloves along with hand  prior to inserting the needles. All needles where removed and discarded in the appropriate sharps container. Therapeutic Exercise and NMR EXR  [x] (55788) Provided verbal/tactile cueing for activities related to strengthening, flexibility, endurance, ROM  for improvements in scapular, scapulothoracic and UE control with self care, reaching, carrying, lifting, house/yardwork, driving/computer work.    [] (16125) Provided verbal/tactile cueing for activities related to improving balance, coordination, kinesthetic sense, posture, motor skill, proprioception  to assist with  scapular, scapulothoracic and UE control with self care, reaching, carrying, lifting, house/yardwork, driving/computer work. Therapeutic Activities:    [x] (84082 or 03853) Provided verbal/tactile cueing for activities related to improving balance, coordination, kinesthetic sense, posture, motor skill, proprioception and motor activation to allow for proper function of scapular, scapulothoracic and UE control with self care, carrying, lifting, driving/computer work.      Home Exercise Program:    [x] (04260) Reviewed/Progressed HEP activities related to strengthening, flexibility, endurance, ROM of scapular, scapulothoracic and UE control with self care, reaching, carrying, lifting, house/yardwork, driving/computer work  [] (81695) Reviewed/Progressed HEP activities related to improving balance, coordination, kinesthetic sense, posture, motor skill, proprioception of scapular, scapulothoracic and UE control with self care, reaching, carrying, lifting, house/yardwork, driving/computer work      Manual Treatments:  PROM / STM / Oscillations-Mobs:  G-I, II, III, IV (PA's, Inf., Post.)  [x] (59246) Provided manual therapy to mobilize soft tissue/joints of cervical/CT, scapular GHJ and UE for the purpose of modulating pain, promoting relaxation,  increasing ROM, reducing/eliminating soft tissue swelling/inflammation/restriction, improving soft tissue extensibility and allowing for proper ROM for normal function with self care, reaching, carrying, lifting, house/yardwork, driving/computer work    Modalities:     [x] GAME READY (VASO)- for significant edema, swelling, pain control. Charges:  Timed Code Treatment Minutes: 43'    Total Treatment Minutes:  42'    [facility]:  TE TIME:  NMR TIME:  MANUAL TIME:  TA  UNTIMED MINUTES:  Medicare Total:         [] EVAL (LOW) 15419 (typically 20 minutes face-to-face)  [] EVAL (MOD) 05745 (typically 30 minutes face-to-face)  [] EVAL (HIGH) 73359 (typically 45 minutes face-to-face)  [] RE-EVAL     [x] WA(05562) x 1  [] IONTO  [] NMR (28478)    [] VASO   [x] Manual (34991) x 1  [x] Other:  ionto (4.0mA - bicep tendon)  [] TA x      [] [facility] Traction (74824)  [] ES(attended) (10947)      [] ES (un) (33149):  DN x 1         GOALS:     Patient stated goal: Improve motion     Therapist goals for Patient:  Short Term Goals: To be achieved in: 2 weeks  1. Independent in HEP and progression per patient tolerance, in order to prevent re-injury. [x] Progressing: [] Met: [] Not Met: [] Adjusted     2. Patient will have a decrease in pain to facilitate improvement in movement, function, and ADLs as indicated by Functional Deficits. [x] Progressing: [] Met: [] Not Met: [] Adjusted     Long Term Goals: To be achieved in: 12 weeks  1. Pt will improve FOTO to 53 or more, indicating improved functional capacity . [x] Progressing: [] Met: [] Not Met: [] Adjusted     2. Patient will demonstrate increased AROM flex/abd/ER/IR to 150/150/40/L1 to allow for proper joint functioning as indicated by patients Functional Deficits. [x] Progressing: [] Met: [] Not Met: [] Adjusted     3. Patient will demonstrate an increase in Strength of shoulder  to 5/5 to allow for proper functional mobility as indicated by patients Functional Deficits. [x] Progressing: [] Met: [] Not Met: [] Adjusted     4. Patient will return to functional activities without increased symptoms or restriction. [x] Progressing: [] Met: [] Not Met: [] Adjusted     5. Pt will report ability to sleep 5 hours without sleep disturbance from shoulder. (patient specific functional goal)    [x] Progressing: [] Met: [] Not Met: [] Adjusted             ASSESSMENT:  See eval    Patient received education on their current pathology and how their condition effects them with their functional activities. Patient understood discussion and questions were answered. Patient understands their activity limitations and understands rational for treatment progression. Pt educated on plan of care and HEP, if worsening symptoms to d/c that exercise. PLAN: See eval  [x] Continue per plan of care [] Alter current plan (see comments above)  [] Plan of care initiated [] Hold pending MD visit [] Discharge      Electronically signed by:  Fadia Estrada, PT, MPT     Note: If patient does not return for scheduled/ recommended follow up visits, this note will serve as a discharge from care along with most recent update on progress.

## 2023-01-24 ENCOUNTER — OFFICE VISIT (OUTPATIENT)
Dept: ORTHOPEDIC SURGERY | Age: 63
End: 2023-01-24

## 2023-01-24 ENCOUNTER — HOSPITAL ENCOUNTER (OUTPATIENT)
Dept: PHYSICAL THERAPY | Age: 63
Setting detail: THERAPIES SERIES
Discharge: HOME | End: 2023-01-24
Payer: COMMERCIAL

## 2023-01-24 VITALS — WEIGHT: 194 LBS | HEIGHT: 64 IN | BODY MASS INDEX: 33.12 KG/M2

## 2023-01-24 DIAGNOSIS — Z98.890 S/P ROTATOR CUFF REPAIR: Primary | ICD-10-CM

## 2023-01-24 DIAGNOSIS — M25.612 SHOULDER STIFFNESS, LEFT: ICD-10-CM

## 2023-01-24 PROCEDURE — 97110 THERAPEUTIC EXERCISES: CPT | Performed by: PHYSICAL THERAPIST

## 2023-01-24 PROCEDURE — 97140 MANUAL THERAPY 1/> REGIONS: CPT | Performed by: PHYSICAL THERAPIST

## 2023-01-24 RX ORDER — NORTRIPTYLINE HYDROCHLORIDE 10 MG/1
CAPSULE ORAL
COMMUNITY
Start: 2023-01-10

## 2023-01-24 RX ORDER — SUMATRIPTAN 100 MG/1
TABLET, FILM COATED ORAL
COMMUNITY
Start: 2022-12-29

## 2023-01-24 RX ORDER — LEVOTHYROXINE SODIUM 0.05 MG/1
TABLET ORAL
COMMUNITY
Start: 2023-01-11 | End: 2023-01-24

## 2023-01-24 RX ORDER — LEVOTHYROXINE, LIOTHYRONINE 57; 13.5 UG/1; UG/1
TABLET ORAL
COMMUNITY
Start: 2023-01-12

## 2023-01-24 NOTE — LETTER
Physical Therapy Rehabilitation Referral     Patient Name:  Joey Duran      YOB: 1960     Diagnosis:    1. S/P rotator cuff repair          Precautions:      [x]? Evaluate and Treat     Post Op Instructions:  []? Continuous passive motion (CPM)            []? Elbow ROM  [x]? Exercise in plane of scapula                      []?  Strengthening                []? Pulley and instruction                                 [x]? Home exercise program (copy to patient)   []? Sling when arm at risk  []? Sling or brace at all times                          []? AAROM: Forward elevation to  140                                                                            []? AAROM: External rotation  To  40    []? Isometric external rotator strengthening    []? AAROM: internal rotation: up the back  [x]? Isometric abductor strengthening              []? AAROM: Internal abduction             []? Isometric internal rotator strengthening     []? AAROM: cross-body adduction                                                                     Stretching:                                                     Strengthening:  [x]? Four quadrant (FE, ER, IR, CBA)              [x]? Rotator cuff (ER, IR, Abd)  [x]? Forward Elevation                                      [x]? External Rotators                  [x]? External Rotation                                       []? Internal Rotators  [x]? Internal Rotation: up/back                          []? Abductors                  [x]? Internal Rotation: supine in abduction  [x]? Sleeper Stretch                                          []? Flexors  [x]? Cross-body abduction                                []? Extensors  [x]? Pendulum (FE, Abd/Add, cw/ccw)             [x]? Scapular Stabilizers   [x]? Wall-walking (FE, Abd)                                   [x]? Shoulder shrugs                           [x]? Table slides (FE)                                             [x]? Rhomboid pinch  []? Elbow (flex, ext, pron, sup)                                         []? Lat. Pull downs                 []? Medial epicondylitis program                          []? Forward punch   []? Lateral epicondylitis program                          []? Internal rotators                []? Progressive resistive exercises                 []? Bench Press                                                                          []? Bench press plus  Activities:                                                       []? Lateral pull-downs  []? Rowing                                                       [x]? Progressive two-hand supine press  []? Stepper/Exercise bike                                [x]? Biceps: curls/supination  []? Swimming  []? Water exercises     Modalities:                                                     Return to Sport:  [x]? Of Choice                                                   [x]? Plyometrics  []? Ultrasound                                                  [x]? Rhythmic stabilization  []? Iontophoresis                                             []? Core strengthening              []? Moist heat                                                   []? Sports specific program:   [x]? Massage                                                         [x]? Cryotherapy                                                 []? Electrical stimulation                                    []? Paraffin  []? Whirlpool  []? TENS     [x]? Home exercise program (copy to patient). Perform exercises for:   15     minutes    3      times/day  [x]? Supervised physical therapy  Frequency:      []? 1x week  [x]? 2x week  []? 3x week  []? Other:   Duration:         []? 2 weeks   []? 4 weeks  [x]? 6 weeks  []? Other:      Additional Instructions: Evaluate patient for dry needling. Dash Pineda MD, PhD

## 2023-01-24 NOTE — PROGRESS NOTES
Chief Complaint    Shoulder Pain (F/U LEFT SHOULDER MRI)      History of Present Illness:  Karen Betts is a pleasant, 60 y.o., female, here today for follow up of her left shoulder and review an MRI that we have ordered recently. The patient underwent a left arthroscopic revision rotator cuff repair with collagen patch augmentation and biceps tenodesis on 8/5/2022. This patient is now 6 months postop. She continues to struggle with pain and limited motion. We did give her an intra-articular corticosteroid injection to combat the chronic inflammation, however she says this did not help her pain. She is having pain at night which disrupts her sleep. Her pain at rest is minimal. She also complains of subjective popping in the shoulder. She has continued in physical therapy at the [facility] office working with Ree Arroyo. Her therapist has been doing dry needling, iontophoresis and hands on modalities. She feels she has not made recent progress despite these modalities. She reports no new injuries or setbacks. We did order the MRI at the last visit to rule out a recurrent rotator cuff tear. Medical History:  Patient's medications, allergies, past medical, surgical, social and family histories were reviewed and updated as appropriate. Review of Systems  A 14 point review of systems was completed by the patient and is available in the media section of the scanned medical record and was reviewed on 1/24/2023. The review is negative with the exception of those things mentioned in the HPI and Past Medical History    Vital Signs: There were no vitals filed for this visit. General/Appearance: Alert and oriented and in no apparent distress. Skin:  There are no skin lesions, cellulitis, or extreme edema. The patient has warm and well-perfused Bilateral upper extremities with brisk capillary refill. Left Shoulder Exam:  Inspection: Incision portals are healing well.  No gross deformities, no signs of infection. Palpation: Glenohumeral crepitus, Tenderness over the rotator cuff, biceps is non-tender    Active Range of Motion: Forward Elevation 120, Abduction 120, External Rotation 45, Internal Rotation L1    Passive Range of Motion: Forward Elevation 140    Strength:  External Rotation 4/5, Internal Rotation 4/5, Champagne Toast 4/5    Special Tests: No Pierre muscle deformity. Neurovascular: Sensation to light touch is intact, no motor deficits, palpable radial pulses 2+      Radiology:     No new XR obtained at this time. CONCLUSION:   1. The patient is status post rotator cuff repair surgery, acromioplasty and distal clavicular    resection surgery. 2. Moderate-severity tendinosis of the supraspinatus tendon is present with a tiny low-grade    (less than one-quarter thickness) partial-thickness interstitial tear of the posterior aspect    of the distal supraspinatus tendon which would not be visible from either the articular or    bursal surfaces. 3. The intraarticular segment of the long head of the biceps tendon which was clearly    delineated/defined at the time of the prior MRI examination on July 19, 2022 appears amorphous    and ill defined on the current examination and is either torn or tendinopathic (unless interval    biceps tenotomy or tenodesis surgery has been performed). 4. 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 Assessment :  Ms. Marimar Beverly is a pleasant, 60 y.o. patient who underwent a left arthroscopic revision rotator cuff repair with collagen patch augmentation and biceps tenodesis on 8/5/2022. This patient is now 6 months postoperative.  She continues to struggle with pain and lack of overall function. The MRI does not demonstrated a recurrent rotator cuff tear. 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 Impression:  Encounter Diagnoses   Name Primary? S/P rotator cuff repair Yes    Shoulder stiffness, left          Office Procedures:  Orders Placed This Encounter   Procedures    [facility]     Referral Priority:   Routine     Referral Type:   Eval and Treat     Referral Reason:   Specialty Services Required     Requested Specialty:   Physical Therapist     Number of Visits Requested:   1           Treatment Plan:    The MRI was reviewed with Myna End today. She has postoperative stiffness slowly progressing glenohumeral arthritis but no recurrent rotator cuff tear. We believe patient is a candidate for US guided left shoulder GH intra-articular steroid injection. This would be followed by structured formal PT, gentle strengthening and stretching exercises. 1/24/2023  1:51 PM    This patient exhibits moderate complexity for obtaining an independent history, reviewing past medical records, independent interpretation of diagnostic imaging, and further coordinating care. The patient exhibits moderate risk continuing knee pain. Approximately 35 minutes were spent reviewing past medical records, imaging, educating the patient, and coordinating care. Romaine Rainey MD  [facility] Clinical fellow      During this examination, Austin Ramos MD, functioned under the supervision and in a teaching capacity with Dr. Geoff Julian. This dictation was performed with a verbal recognition program (DRAGON) and it was checked for errors. It is possible that there are still dictated errors within this office note. If so, please bring any errors to my attention for an addendum. All efforts were made to ensure that this office note is accurate. This dictation was performed with a verbal recognition program [facility]) and it was checked for errors. It is possible that there are still dictated errors within this office note. If so, please bring any errors to my attention for an addendum. All efforts were made to ensure that this office note is accurate.  ______________________  I was physically present and personally supervised the Orthopaedic Sports Medicine Fellow in the evaluation and development of a treatment plan for this patient. I personally interviewed the patient and performed a physical examination. In addition, I discussed the patient's condition and treatment options with them. I have also reviewed and agree with the past medical, family and social history unless otherwise noted. All of the patient's questions were answered. Dash Romero MD, PhD  1/24/2023

## 2023-01-27 ENCOUNTER — APPOINTMENT (OUTPATIENT)
Dept: PHYSICAL THERAPY | Age: 63
End: 2023-01-27
Payer: COMMERCIAL

## 2023-01-29 NOTE — FLOWSHEET NOTE
[facility] and [address],  Sports Performance and Rehabilitation, [facility] [address]  [address] [facility],[address]   [place] Suarez  Phone: [phone]  Fax: [phone]       Physical Therapy Treatment Note/ Progress Report:           Date:  2023  Patient Name:  Beau Sicard    :  1960  MRN: [id number]  Restrictions/Precautions:    Medical/Treatment Diagnosis Information:  Diagnosis: Z98.890 (ICD-10-CM) - S/P left rotator cuff repair  Treatment Diagnosis: Left shoulder pain, decreased ROM and strength  Insurance/Certification information: [facility], [facility]  Physician Information:  Charlotte Sánchez MD  Has the plan of care been signed (Y/N):        []  Yes  [x]  No     Date of Patient follow up with Physician:     Assessment Summary: Date Range for reporting period:  Beginnin22  Endin      Recertification will be due (POC Duration  / 90 days whichever is less): 22          Visit # Insurance Allowable Auth Required   In Person 6 in   []  Yes     []  No    Tele Health   []  Yes     []  No    Total          Functional Scale: FOTO 16   Date assessed:     Latex Allergy:  [x]NO      []YES  Preferred Language for Healthcare:   [x]English       []other:      Pain level:  5/10         SUBJECTIVE:       \"I just saw the dr\"    OBJECTIVE:  4/5 safe zone           RESTRICTIONS/PRECAUTIONS: Large RCR w/ patch and biceps tenodesis restrictions.     Exercises/Interventions: HEP code: [id number]  Therapeutic Ex (59246) HEP 22     Warm-up   LATEX ALLERGY    Pulleys  3'      UBE            TABLE      Cane flexion  2x10     Supine er @ 0  30x     Standing ball roll   30x mr     Table slide       Seated cane er @ 0       Supine press (2 hands)      Reacher   20x (2#)     Prone: rows, ext's     Supine reaches     Standing reaches     Quadruped rocks   25x    30x     30 x    15x     T band pinches   25x green     Wall walk   5 x 10\" Wall weight shifts   10x      Bicep curls   25x - 2#     Supine tricep 'crushers'  25x                                                                 Manual: PROM into shoulder flexion, abduction, ER and IR to 30° - 15'   Dry needling manual therapy: consisted on the placement of microfilament needles in the following muscles:  upper trap, biceps. A 30 mm needle was inserted, piston, rotated, and coned to produce intramuscular mobilization. These techniques were used to restore functional range of motion, reduce muscle spasm and induce healing in the corresponding musculature. (09459)  Clean Technique was utilized today while applying Dry needling treatment. The treatment sites where cleaned with 70% solution of  isopropyl alcohol . The PT washed their hands and utilized treatment gloves along with hand  prior to inserting the needles. All needles where removed and discarded in the appropriate sharps container. Therapeutic Exercise and NMR EXR  [x] (24278) Provided verbal/tactile cueing for activities related to strengthening, flexibility, endurance, ROM  for improvements in scapular, scapulothoracic and UE control with self care, reaching, carrying, lifting, house/yardwork, driving/computer work.    [] (00741) Provided verbal/tactile cueing for activities related to improving balance, coordination, kinesthetic sense, posture, motor skill, proprioception  to assist with  scapular, scapulothoracic and UE control with self care, reaching, carrying, lifting, house/yardwork, driving/computer work. Therapeutic Activities:    [x] (14524 or 05751) Provided verbal/tactile cueing for activities related to improving balance, coordination, kinesthetic sense, posture, motor skill, proprioception and motor activation to allow for proper function of scapular, scapulothoracic and UE control with self care, carrying, lifting, driving/computer work.      Home Exercise Program:    [x] (16042) Reviewed/Progressed HEP activities related to strengthening, flexibility, endurance, ROM of scapular, scapulothoracic and UE control with self care, reaching, carrying, lifting, house/yardwork, driving/computer work  [] (55966) Reviewed/Progressed HEP activities related to improving balance, coordination, kinesthetic sense, posture, motor skill, proprioception of scapular, scapulothoracic and UE control with self care, reaching, carrying, lifting, house/yardwork, driving/computer work      Manual Treatments:  PROM / STM / Oscillations-Mobs:  G-I, II, III, IV (PA's, Inf., Post.)  [x] (02416) Provided manual therapy to mobilize soft tissue/joints of cervical/CT, scapular GHJ and UE for the purpose of modulating pain, promoting relaxation,  increasing ROM, reducing/eliminating soft tissue swelling/inflammation/restriction, improving soft tissue extensibility and allowing for proper ROM for normal function with self care, reaching, carrying, lifting, house/yardwork, driving/computer work    Modalities:     [x] GAME READY (VASO)- for significant edema, swelling, pain control. Charges:  Timed Code Treatment Minutes: 39'    Total Treatment Minutes:  36'    [facility]:  TE TIME:  NMR TIME:  MANUAL TIME:  TA  UNTIMED MINUTES:  Medicare Total:         [] EVAL (LOW) 25773 (typically 20 minutes face-to-face)  [] EVAL (MOD) 58641 (typically 30 minutes face-to-face)  [] EVAL (HIGH) 03591 (typically 45 minutes face-to-face)  [] RE-EVAL     [x] SD(90210) x 1  [] IONTO  [] NMR (07266)    [] VASO   [x] Manual (98306) x 1  [] Other:  ionto (4.0mA - bicep tendon)  [] TA x      [] Mech Traction (15149)  [] ES(attended) (52983)      [] ES (un) (53546):  DN x 1         GOALS:     Patient stated goal: Improve motion     Therapist goals for Patient:  Short Term Goals: To be achieved in: 2 weeks  1. Independent in HEP and progression per patient tolerance, in order to prevent re-injury. [x] Progressing: [] Met: [] Not Met: [] Adjusted     2. Patient will have a decrease in pain to facilitate improvement in movement, function, and ADLs as indicated by Functional Deficits. [x] Progressing: [] Met: [] Not Met: [] Adjusted     Long Term Goals: To be achieved in: 12 weeks  1. Pt will improve FOTO to 53 or more, indicating improved functional capacity . [x] Progressing: [] Met: [] Not Met: [] Adjusted     2. Patient will demonstrate increased AROM flex/abd/ER/IR to 150/150/40/L1 to allow for proper joint functioning as indicated by patients Functional Deficits. [x] Progressing: [] Met: [] Not Met: [] Adjusted     3. Patient will demonstrate an increase in Strength of shoulder  to 5/5 to allow for proper functional mobility as indicated by patients Functional Deficits. [x] Progressing: [] Met: [] Not Met: [] Adjusted     4. Patient will return to functional activities without increased symptoms or restriction. [x] Progressing: [] Met: [] Not Met: [] Adjusted     5. Pt will report ability to sleep 5 hours without sleep disturbance from shoulder. (patient specific functional goal)    [x] Progressing: [] Met: [] Not Met: [] Adjusted             ASSESSMENT:  See eval    Patient received education on their current pathology and how their condition effects them with their functional activities. Patient understood discussion and questions were answered. Patient understands their activity limitations and understands rational for treatment progression. Pt educated on plan of care and HEP, if worsening symptoms to d/c that exercise. PLAN: See eval  [x] Continue per plan of care [] Alter current plan (see comments above)  [] Plan of care initiated [] Hold pending MD visit [] Discharge      Electronically signed by:  Kati Mcfadden, PT, MPT     Note: If patient does not return for scheduled/ recommended follow up visits, this note will serve as a discharge from care along with most recent update on progress.

## 2023-01-31 ENCOUNTER — OFFICE VISIT (OUTPATIENT)
Dept: ORTHOPEDIC SURGERY | Age: 63
End: 2023-01-31
Payer: COMMERCIAL

## 2023-01-31 VITALS — WEIGHT: 194 LBS | BODY MASS INDEX: 33.12 KG/M2 | HEIGHT: 64 IN

## 2023-01-31 DIAGNOSIS — M19.012 INFLAMMATION OF JOINT OF LEFT SHOULDER REGION: Primary | ICD-10-CM

## 2023-01-31 DIAGNOSIS — Z98.890 S/P SHOULDER SURGERY: ICD-10-CM

## 2023-01-31 DIAGNOSIS — Z87.39 HISTORY OF RHEUMATOID ARTHRITIS: ICD-10-CM

## 2023-01-31 PROCEDURE — 99242 OFF/OP CONSLTJ NEW/EST SF 20: CPT | Performed by: INTERNAL MEDICINE

## 2023-01-31 PROCEDURE — 20611 DRAIN/INJ JOINT/BURSA W/US: CPT | Performed by: INTERNAL MEDICINE

## 2023-01-31 RX ORDER — LIDOCAINE HYDROCHLORIDE 10 MG/ML
5 INJECTION, SOLUTION INFILTRATION; PERINEURAL ONCE
Status: COMPLETED | OUTPATIENT
Start: 2023-01-31 | End: 2023-01-31

## 2023-01-31 RX ORDER — BUPIVACAINE HYDROCHLORIDE 2.5 MG/ML
3 INJECTION, SOLUTION INFILTRATION; PERINEURAL ONCE
Status: CANCELLED | OUTPATIENT
Start: 2023-01-31 | End: 2023-01-31

## 2023-01-31 RX ORDER — METHYLPREDNISOLONE ACETATE 40 MG/ML
40 INJECTION, SUSPENSION INTRA-ARTICULAR; INTRALESIONAL; INTRAMUSCULAR; SOFT TISSUE ONCE
Status: COMPLETED | OUTPATIENT
Start: 2023-01-31 | End: 2023-01-31

## 2023-01-31 RX ORDER — ROPIVACAINE HYDROCHLORIDE 5 MG/ML
3 INJECTION, SOLUTION EPIDURAL; INFILTRATION; PERINEURAL ONCE
Status: COMPLETED | OUTPATIENT
Start: 2023-01-31 | End: 2023-01-31

## 2023-01-31 RX ADMIN — LIDOCAINE HYDROCHLORIDE 5 ML: 10 INJECTION, SOLUTION INFILTRATION; PERINEURAL at 12:17

## 2023-01-31 RX ADMIN — ROPIVACAINE HYDROCHLORIDE 3 ML: 5 INJECTION, SOLUTION EPIDURAL; INFILTRATION; PERINEURAL at 12:18

## 2023-01-31 RX ADMIN — METHYLPREDNISOLONE ACETATE 40 MG: 40 INJECTION, SUSPENSION INTRA-ARTICULAR; INTRALESIONAL; INTRAMUSCULAR; SOFT TISSUE at 12:18

## 2023-01-31 NOTE — LETTER
[address]  [address] [facility] [address]  Phone: [phone]  Fax: [phone]           Rancho Overton MD      February 6, 2023     Patient: Sebastien Tracy   MR Number: [id number]   YOB: 1960   Date of Visit: 1/31/2023       Dear Dr. Nicolette Hong ref. provider found: Thank you for referring Sergio Joseph to me for evaluation/treatment. Below are the relevant portions of my assessment and plan of care. Impression: . Encounter Diagnoses   Name Primary?  Inflammation of joint of left shoulder region Yes    S/P shoulder surgery     History of rheumatoid arthritis               Plan:     Postinjection protocol  Clinical follow-up with Dr. Varun Hamm. Dewey Diallo as scheduled    The nature of the finding, probable diagnosis and likely treatment was thoroughly discussed with the patient. The options, risks, complications, alternative treatment as well as some of the differential diagnosis was discussed. The patient was thoroughly informed and all questions were answered. the patient indicated understanding and satisfaction with the discussion. Orders:        Orders Placed This Encounter   Procedures    US GUIDED NEEDLE PLACEMENT     Standing Status:   Future     Number of Occurrences:   1     Standing Expiration Date:   1/31/2024     Order Specific Question:   Reason for exam:     Answer:   CSI    AR ARTHROCENTESIS ASPIR&/INJ MAJOR JT/BURSA W/O US           Disclaimer: \"This note was dictated with voice recognition software. Though review and correction are routine, we apologize for any errors. \"           If you have questions, please do not hesitate to call me. I look forward to following Ghulam Pederson along with you.     Sincerely,        Rancho Overton MD    CC providers:  MD Vito Mata  [facility] [place]

## 2023-02-06 ENCOUNTER — HOSPITAL ENCOUNTER (OUTPATIENT)
Dept: PHYSICAL THERAPY | Age: 63
Setting detail: THERAPIES SERIES
Discharge: HOME | End: 2023-02-06
Payer: COMMERCIAL

## 2023-02-06 PROCEDURE — 97140 MANUAL THERAPY 1/> REGIONS: CPT | Performed by: PHYSICAL THERAPIST

## 2023-02-06 PROCEDURE — 97110 THERAPEUTIC EXERCISES: CPT | Performed by: PHYSICAL THERAPIST

## 2023-02-06 PROCEDURE — 20560 NDL INSJ W/O NJX 1 OR 2 MUSC: CPT | Performed by: PHYSICAL THERAPIST

## 2023-02-06 NOTE — PROGRESS NOTES
Chief Complaint:   Chief Complaint   Patient presents with    Shoulder Pain     L shoulder referred by Dr. Shanice Bowman for [address] [facility], [place] [address] injection. History of Present Illness:       Patient is a 60 y.o. female presents with the above complaint. She is seen in consultation at request of Dr. Johnathan Bowman for ultrasound-guided intra-articular steroid injection of the left shoulder post arthroscopic surgery 8/5/2022. The patient describes a sharp, aching pain that does not radiate. The symptoms are constant  and are show no change since the onset. Date of Procedure: 8/5/2022  Procedure(s):  DIAGNOSTIC LEFT SHOULDER ARTHROSCOPY, EXAMUNDER ANESTHESIA, EXTENSIVE DEBRIDEMENT, LYSIS OF ADHESIONS, REVISION  ROTATOR CUFF REPAIR WITHPATCH AUGMENTATION, SUBACROMIAL DECOMPRESSION     There are not associated mechanical symptoms localizing to the shoulder. The patient denies symptoms of instability about the shoulder. Treatment to date has included  landmark guided injection 12/7/2022-no therapeutic benefit    Pain levels 5. The symptoms do not correlate with head and neck movement. The patient admits to  weakness of the upper extremity.     There is history rheumatoid arthritis     Past Medical History:        Past Medical History:   Diagnosis Date    Arthritis     rheumatoid arthritis    Fibromyalgia     Hypertension     Migraine     MVP (mitral valve prolapse)     Nausea & vomiting     PONV (postoperative nausea and vomiting)     Reflux Doesn't have anymore r/t weight loss    Thyroid disease     goiter treated with radioactive med    Wears glasses     Wears glasses          Past Surgical History:   Procedure Laterality Date    ANKLE ARTHROSCOPY Right 3/12    lateral ligament repair, removal spurs    ANKLE SURGERY      right    BACK SURGERY      cyst removed off lower spine    CERVICAL FUSION N/A 12/11/2019    C4-5, C5-6, C6-7 ANTERIOR CERVICAL DISCECTOMY AND FUSION performed by Catrachita Garcia MD at [address] CHOLECYSTECTOMY      ELBOW SURGERY      left    JOINT REPLACEMENT Left 2019    KNEE ARTHROSCOPY Left     LUMBAR FUSION N/A 1/6/2022    L3-4, L4-5, L5-S1 ANTERIOR LUMBAR INTERBODY FUSION WITH PEDICLE SCREW FIXATION performed by Jaqueline Peters MD at [address] [place] [address] Left 8/28/2020    LEFT TOTAL KNEE REVISION 2 COMPONENT performed by Niles Dias MD at [address] [place] [address] Left 1/26/2021    LEFT KNEE PATELLA REVISON WITH ANTERIOR SYNOVECTOMY [facility]    DIAGNOSTIC LEFT SHOULDER ARTHROSCOPY, EXAM UNDER ANESTHESIA, EXTENSIVE DEBRIDEMENT, LYSIS OF ADHESIONS, REVISION  ROTATOR CUFF REPAIR WITH PATCH AUGMENTATION, SUBACROMIAL DECOMPRESSION performed by Araceli Ferguson MD at [address] [place] [address]      bilateral    TONSILLECTOMY      T & A    WRIST SURGERY Right          Present Medications:         Current Outpatient Medications   Medication Sig Dispense Refill    nortriptyline (PAMELOR) 10 MG capsule       SUMAtriptan (IMITREX) 100 MG tablet       NP THYROID 90 MG tablet       Dexamethasone Sod Phos-Lido 5-10 MG/1.5ML SOLN To be used in physical therapy only.  1 each 1    EPINEPHrine (EPIPEN 2-RODOLFO) 0.3 MG/0.3ML SOAJ injection Inject 0.3 mLs into the muscle once for 1 dose Use as directed for allergic reaction 2 each 1    triamterene-hydroCHLOROthiazide (DYAZIDE) 37.5-25 MG per capsule Take 1 capsule by mouth daily 90 capsule 3    famotidine (PEPCID) 40 MG tablet Take 1 tablet by mouth every evening 30 tablet 3    celecoxib (CELEBREX) 200 MG capsule Take 1 capsule by mouth daily 60 capsule 2    spironolactone (ALDACTONE) 50 MG tablet       progesterone (PROMETRIUM) 200 MG CAPS capsule       ondansetron (ZOFRAN) 4 MG tablet Take 1 tablet by mouth every 8 hours as needed for Nausea or Vomiting 30 tablet 0    senna (SENOKOT) 8.6 MG tablet Take 1 tablet by mouth in the morning and 1 tablet before bedtime. 60 tablet 11    levothyroxine (SYNTHROID) 75 MCG tablet Take 75 mcg by mouth in the morning.      etanercept (ENBREL) 25 MG/0.5ML SOSY       aluminum & magnesium hydroxide-simethicone (MAALOX) 200-200-20 MG/5ML SUSP suspension Take 15 mLs by mouth every 6 hours as needed for Indigestion 1 each 0    polycarbophil (FIBERCON) 625 MG tablet Take 1 tablet by mouth daily 30 tablet 0    sennosides-docusate sodium (SENOKOT-S) 8.6-50 MG tablet Take 1 tablet by mouth 2 times daily      folic acid (FOLVITE) 1 MG tablet Take 1 mg by mouth in the morning. progesterone (PROMETRIUM) 200 MG capsule Take 600 mg by mouth nightly      triamcinolone (KENALOG) 0.025 % cream Apply topically every 4 hours as needed Apply Topically itchy skin      amLODIPine (NORVASC) 10 MG tablet Take 10 mg by mouth daily        No current facility-administered medications for this visit. Allergies: Allergies   Allergen Reactions    Latex Dermatitis    Adhesive Tape      blisters    Benzoin      Other reaction(s):  Other (See Comments), Other (See Comments)  Blistering of her skin  Blistering of her skin      Gluten Nausea Only    Plaquenil [Hydroxychloroquine Sulfate]      Rash      Vicodin [Hydrocodone-Acetaminophen]      Says she gets a rash after taking it for 3 straight days      Benzamide Derivatives Rash     Benzoin   topical    Codeine Nausea And Vomiting    Methotrexate Nausea And Vomiting and Nausea Only     Other reaction(s): Vomiting        Social History:         Social History     Socioeconomic History    Marital status:      Spouse name: Not on file    Number of children: Not on file    Years of education: Not on file    Highest education level: Not on file   Occupational History    Not on file   Tobacco Use    Smoking status: Never    Smokeless tobacco: Never   Vaping Use    Vaping Use: Never used   Substance and Sexual Activity    Alcohol use: Not Currently    Drug use: No    Sexual activity: Not on file Other Topics Concern    Not on file   Social History Narrative    Not on file     Social Determinants of Health     Financial Resource Strain: Not on file   Food Insecurity: Not on file   Transportation Needs: Not on file   Physical Activity: Not on file   Stress: Not on file   Social Connections: Not on file   Intimate Partner Violence: Not on file   Housing Stability: Not on file        Review of Symptoms:    Pertinent items are noted in HPI   10 point review of systems negative except as mentioned in HPI      Vital Signs: There were no vitals filed for this visit. General Exam:     Constitutional: Patient is adequately groomed with no evidence of malnutrition    Lymphatics: no lymphadenopathy of the cervical or axillary regions or upper extremity      Physical Exam: left shoulder      Primary Exam:    Inspection: No deformity atrophy appreciable effusion      Palpation: No focal tenderness      Range of Motion: Forward elevation 155 degrees with pain      Special Tests: Negative drop arm      Skin: There are no rashes, ulcerations or lesions. Gait: Nonantalgic    Neurovascular - non focal and intact       Additional Comments:        Additional Examinations:                   Office Imaging Results/Procedures PerformedToday:           Office Procedures:     Orders Placed This Encounter   Procedures    US GUIDED NEEDLE PLACEMENT     Standing Status:   Future     Number of Occurrences:   1     Standing Expiration Date:   1/31/2024     Order Specific Question:   Reason for exam:     Answer:   CSI    WI ARTHROCENTESIS ASPIR&/INJ MAJOR JT/BURSA W/O US     Patient placed in   RT  Lateral decubitus position. Ultrasound placed on Shoulder preset function image optimization obtained. The linear transducer was placed transversely over the posterior glenohumeral joint.   After sterile preparation and use of topical anesthetic, a 25-gauge needle was advanced intramuscularly from posterior to anterior using longitudinal technique. A total of 5 cc of Lidocaine was injected. A  22 GA spinal needle was then advanced in the same needle track and under direct guidance the needle was advanced into the posterior joint recess just distal to the labrum. 1 cc of Depo-Medrol and 3 cc of 0.25% Marcaine was injected and the injectate was visualized to flow within the joint recess. Image stored. Positive anesthetic response    Technically successful injection       Other Outside Imaging and Testing Personally Reviewed:    US GUIDED NEEDLE PLACEMENT    Result Date: 1/31/2023  Radiology result is complete; follow up with provider / physician office for radiology results      MRI shoulder 1/17/2023  CONCLUSION:   1. The patient is status post rotator cuff repair surgery, acromioplasty and distal clavicular    resection surgery. 2. Moderate-severity tendinosis of the supraspinatus tendon is present with a tiny low-grade    (less than one-quarter thickness) partial-thickness interstitial tear of the posterior aspect    of the distal supraspinatus tendon which would not be visible from either the articular or    bursal surfaces. 3. The intraarticular segment of the long head of the biceps tendon which was clearly    delineated/defined at the time of the prior MRI examination on July 19, 2022 appears amorphous    and ill defined on the current examination and is either torn or tendinopathic (unless interval    biceps tenotomy or tenodesis surgery has been performed). 4. 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 Thank you for the opportunity to provide your interpretation.                Nico Goode MD       A: ORVILLE/kathy 01/17/2023 10:35 AM   T: GARRICK 01/17/2023 8:35 AM           Assessment   Impression: . Encounter Diagnoses   Name Primary? Inflammation of joint of left shoulder region Yes    S/P shoulder surgery     History of rheumatoid arthritis               Plan:     Postinjection protocol  Clinical follow-up with Dr. Varun Hamm. Dewey Diallo as scheduled    The nature of the finding, probable diagnosis and likely treatment was thoroughly discussed with the patient. The options, risks, complications, alternative treatment as well as some of the differential diagnosis was discussed. The patient was thoroughly informed and all questions were answered. the patient indicated understanding and satisfaction with the discussion. Orders:        Orders Placed This Encounter   Procedures    US GUIDED NEEDLE PLACEMENT     Standing Status:   Future     Number of Occurrences:   1     Standing Expiration Date:   1/31/2024     Order Specific Question:   Reason for exam:     Answer:   CSI    MA ARTHROCENTESIS ASPIR&/INJ MAJOR JT/BURSA W/O US           Disclaimer: \"This note was dictated with voice recognition software. Though review and correction are routine, we apologize for any errors. \"

## 2023-02-08 ENCOUNTER — TELEPHONE (OUTPATIENT)
Dept: ENT CLINIC | Age: 63
End: 2023-02-08

## 2023-02-08 DIAGNOSIS — J01.90 ACUTE NON-RECURRENT SINUSITIS, UNSPECIFIED LOCATION: Primary | ICD-10-CM

## 2023-02-08 RX ORDER — AMOXICILLIN AND CLAVULANATE POTASSIUM 875; 125 MG/1; MG/1
1 TABLET, FILM COATED ORAL 2 TIMES DAILY
Qty: 20 TABLET | Refills: 0 | Status: SHIPPED | OUTPATIENT
Start: 2023-02-08 | End: 2023-02-18

## 2023-02-08 NOTE — TELEPHONE ENCOUNTER
Patient called stating that she has a sinus infection, her jaw hurts and she has pain and pressure around her eyes as well, she is running a fever of 101 she was wondering if you could call something in for her, she is also afraid that this might trigger her vertigo    [facility] in [place]

## 2023-02-11 NOTE — FLOWSHEET NOTE
[facility] and [address],  Sports Performance and Rehabilitation, [facility] [address]  [address] [facility]   [place]  Phone: [phone]  Fax: [phone]       Physical Therapy Treatment Note/ Progress Report:           Date:  2023  Patient Name:  Douglas Manrique    :  1960  MRN: [id number]  Restrictions/Precautions:    Medical/Treatment Diagnosis Information:  Diagnosis: Z98.890 (ICD-10-CM) - S/P left rotator cuff repair  Treatment Diagnosis: Left shoulder pain, decreased ROM and strength  Insurance/Certification information: [address], [facility]  Physician Information:  Yasmeen Hilliard MD  Has the plan of care been signed (Y/N):        []  Yes  [x]  No     Date of Patient follow up with Physician:     Assessment Summary: Date Range for reporting period:  Beginnin22  Endin      Recertification will be due (POC Duration  / 90 days whichever is less): 22          Visit # Insurance Allowable Auth Required   In Person 7 in   []  Yes     []  No    Tele Health   []  Yes     []  No    Total          Functional Scale: FOTO 16   Date assessed:     Latex Allergy:  [x]NO      []YES  Preferred Language for Healthcare:   [x]English       []other:      Pain level:  5/10         SUBJECTIVE:       \"I just saw the dr\"    OBJECTIVE:   safe zone           RESTRICTIONS/PRECAUTIONS: Large RCR w/ patch and biceps tenodesis restrictions.     Exercises/Interventions: HEP code: [id number]  Therapeutic Ex (79876) HEP 22     Warm-up   LATEX ALLERGY    Pulleys  3'      UBE            TABLE      Cane flexion  2x10     Supine er @ 0  30x     Standing ball roll   30x mr     Table slide       Seated cane er @ 0       Supine press (2 hands)      Reacher   20x (2#)     Prone: rows, ext's     Supine reaches     Standing reaches     Quadruped rocks   25x    30x     30 x    15x     T band pinches   25x green     Wall walk   5 x 10\" Wall weight shifts   10x      Bicep curls   25x - 2#     Supine tricep 'crushers'  25x                                                                 Manual: PROM into shoulder flexion, abduction, ER and IR to 30° - 15'   Dry needling manual therapy: consisted on the placement of microfilament needles in the following muscles:  upper trap, biceps. A 30 mm needle was inserted, piston, rotated, and coned to produce intramuscular mobilization. These techniques were used to restore functional range of motion, reduce muscle spasm and induce healing in the corresponding musculature. (64574)  Clean Technique was utilized today while applying Dry needling treatment. The treatment sites where cleaned with 70% solution of  isopropyl alcohol . The PT washed their hands and utilized treatment gloves along with hand  prior to inserting the needles. All needles where removed and discarded in the appropriate sharps container. Therapeutic Exercise and NMR EXR  [x] (70476) Provided verbal/tactile cueing for activities related to strengthening, flexibility, endurance, ROM  for improvements in scapular, scapulothoracic and UE control with self care, reaching, carrying, lifting, house/yardwork, driving/computer work.    [] (50307) Provided verbal/tactile cueing for activities related to improving balance, coordination, kinesthetic sense, posture, motor skill, proprioception  to assist with  scapular, scapulothoracic and UE control with self care, reaching, carrying, lifting, house/yardwork, driving/computer work. Therapeutic Activities:    [x] (02705 or 94865) Provided verbal/tactile cueing for activities related to improving balance, coordination, kinesthetic sense, posture, motor skill, proprioception and motor activation to allow for proper function of scapular, scapulothoracic and UE control with self care, carrying, lifting, driving/computer work.      Home Exercise Program:    [x] (03433) Reviewed/Progressed HEP activities related to strengthening, flexibility, endurance, ROM of scapular, scapulothoracic and UE control with self care, reaching, carrying, lifting, house/yardwork, driving/computer work  [] (34788) Reviewed/Progressed HEP activities related to improving balance, coordination, kinesthetic sense, posture, motor skill, proprioception of scapular, scapulothoracic and UE control with self care, reaching, carrying, lifting, house/yardwork, driving/computer work      Manual Treatments:  PROM / STM / Oscillations-Mobs:  G-I, II, III, IV (PA's, Inf., Post.)  [x] (44609) Provided manual therapy to mobilize soft tissue/joints of cervical/CT, scapular GHJ and UE for the purpose of modulating pain, promoting relaxation,  increasing ROM, reducing/eliminating soft tissue swelling/inflammation/restriction, improving soft tissue extensibility and allowing for proper ROM for normal function with self care, reaching, carrying, lifting, house/yardwork, driving/computer work    Modalities:     [x] GAME READY (VASO)- for significant edema, swelling, pain control. Charges:  Timed Code Treatment Minutes: 43'   Total Treatment Minutes:  42'   BWC:  TE TIME:  NMR TIME:  MANUAL TIME:  TA  UNTIMED MINUTES:  Medicare Total:         [] EVAL (LOW) 53495 (typically 20 minutes face-to-face)  [] EVAL (MOD) 01735 (typically 30 minutes face-to-face)  [] EVAL (HIGH) 64546 (typically 45 minutes face-to-face)  [] RE-EVAL     [x] QE(04254) x 1  [] IONTO  [] NMR (78591)    [] VASO   [x] Manual (80561) x 1  [] Other:  ionto (4.0mA - bicep tendon)  [] TA x      [] Mech Traction (41845)  [] ES(attended) (90918)      [] ES (un) (50885):  DN x 1         GOALS:     Patient stated goal: Improve motion     Therapist goals for Patient:  Short Term Goals: To be achieved in: 2 weeks  1. Independent in HEP and progression per patient tolerance, in order to prevent re-injury. [x] Progressing: [] Met: [] Not Met: [] Adjusted     2. Patient will have a decrease in pain to facilitate improvement in movement, function, and ADLs as indicated by Functional Deficits. [x] Progressing: [] Met: [] Not Met: [] Adjusted     Long Term Goals: To be achieved in: 12 weeks  1. Pt will improve FOTO to 53 or more, indicating improved functional capacity . [x] Progressing: [] Met: [] Not Met: [] Adjusted     2. Patient will demonstrate increased AROM flex/abd/ER/IR to 150/150/40/L1 to allow for proper joint functioning as indicated by patients Functional Deficits. [x] Progressing: [] Met: [] Not Met: [] Adjusted     3. Patient will demonstrate an increase in Strength of shoulder  to 5/5 to allow for proper functional mobility as indicated by patients Functional Deficits. [x] Progressing: [] Met: [] Not Met: [] Adjusted     4. Patient will return to functional activities without increased symptoms or restriction. [x] Progressing: [] Met: [] Not Met: [] Adjusted     5. Pt will report ability to sleep 5 hours without sleep disturbance from shoulder. (patient specific functional goal)    [x] Progressing: [] Met: [] Not Met: [] Adjusted             ASSESSMENT:  See eval    Patient received education on their current pathology and how their condition effects them with their functional activities. Patient understood discussion and questions were answered. Patient understands their activity limitations and understands rational for treatment progression. Pt educated on plan of care and HEP, if worsening symptoms to d/c that exercise. PLAN: See eval  [x] Continue per plan of care [] Alter current plan (see comments above)  [] Plan of care initiated [] Hold pending MD visit [] Discharge      Electronically signed by:  Katherine Torres, PT, MPT     Note: If patient does not return for scheduled/ recommended follow up visits, this note will serve as a discharge from care along with most recent update on progress.

## 2023-02-13 ENCOUNTER — HOSPITAL ENCOUNTER (OUTPATIENT)
Dept: PHYSICAL THERAPY | Age: 63
Setting detail: THERAPIES SERIES
Discharge: HOME | End: 2023-02-13
Payer: COMMERCIAL

## 2023-02-13 PROCEDURE — 97140 MANUAL THERAPY 1/> REGIONS: CPT | Performed by: PHYSICAL THERAPIST

## 2023-02-13 PROCEDURE — 97110 THERAPEUTIC EXERCISES: CPT | Performed by: PHYSICAL THERAPIST

## 2023-02-16 NOTE — FLOWSHEET NOTE
[facility] and [address],  [facility], [place] [id number] [facility] [address]  [facility],5[address]   [place], [address]  Phone: [phone]  Fax: [phone]       Physical Therapy Treatment Note/ Progress Report:           Date:  2023  Patient Name:  Ailyn Villalobos    :  1960  MRN: [id number]  Restrictions/Precautions:    Medical/Treatment Diagnosis Information:  Diagnosis: Z98.890 (ICD-10-CM) - S/P left rotator cuff repair  Treatment Diagnosis: Left shoulder pain, decreased ROM and strength  Insurance/Certification information: [facility] [employer], [facility]  Physician Information:  Shasta Roe MD  Has the plan of care been signed (Y/N):        []  Yes  [x]  No     Date of Patient follow up with Physician:     Assessment Summary: Date Range for reporting period:  Beginnin22  Endin      Recertification will be due (POC Duration  / 90 days whichever is less): 22          Visit # Insurance Allowable Auth Required   In Person 9 in   []  Yes     []  No    Tele Health   []  Yes     []  No    Total          Functional Scale: FOTO 16   Date assessed:     Latex Allergy:  [x]NO      []YES  Preferred Language for Healthcare:   [x]English       []other:      Pain level:  5/10         SUBJECTIVE:           \"Doing ok. .\"  OBJECTIVE:  4/5 safe zone           RESTRICTIONS/PRECAUTIONS: Large RCR w/ patch and biceps tenodesis restrictions.     Exercises/Interventions: HEP code: [id number]  Therapeutic Ex (50849) HEP 22     Warm-up   LATEX ALLERGY    Pulleys  3'      UBE            TABLE      Cane flexion  2x10     Supine er @ 0  30x     Standing ball roll   30x mr     Table slide       Seated cane er @ 0       Supine press (2 hands)      Reacher   20x (2#)     Prone: rows, ext's     Supine reaches     Standing reaches     Quadruped rocks   25x    30x     30 x    15x     T band pinches   25x green     Wall walk   5 x 10\"     Wall weight shifts   10x      Bicep curls   25x - 2#     Supine tricep 'crushers'  25x                                                                 Manual: PROM into shoulder flexion, abduction, ER and IR to 30° - 15'   Dry needling manual therapy: consisted on the placement of microfilament needles in the following muscles:  upper trap, biceps. A 30 mm needle was inserted, piston, rotated, and coned to produce intramuscular mobilization. These techniques were used to restore functional range of motion, reduce muscle spasm and induce healing in the corresponding musculature. (48105)  Clean Technique was utilized today while applying Dry needling treatment. The treatment sites where cleaned with 70% solution of  isopropyl alcohol . The PT washed their hands and utilized treatment gloves along with hand  prior to inserting the needles. All needles where removed and discarded in the appropriate sharps container. Therapeutic Exercise and NMR EXR  [x] (62960) Provided verbal/tactile cueing for activities related to strengthening, flexibility, endurance, ROM  for improvements in scapular, scapulothoracic and UE control with self care, reaching, carrying, lifting, house/yardwork, driving/computer work.    [] (15886) Provided verbal/tactile cueing for activities related to improving balance, coordination, kinesthetic sense, posture, motor skill, proprioception  to assist with  scapular, scapulothoracic and UE control with self care, reaching, carrying, lifting, house/yardwork, driving/computer work. Therapeutic Activities:    [x] (02902 or 73271) Provided verbal/tactile cueing for activities related to improving balance, coordination, kinesthetic sense, posture, motor skill, proprioception and motor activation to allow for proper function of scapular, scapulothoracic and UE control with self care, carrying, lifting, driving/computer work.      Home Exercise Program:    [x] (30115) Reviewed/Progressed HEP activities related to strengthening, flexibility, endurance, ROM of scapular, scapulothoracic and UE control with self care, reaching, carrying, lifting, house/yardwork, driving/computer work  [] (10590) Reviewed/Progressed HEP activities related to improving balance, coordination, kinesthetic sense, posture, motor skill, proprioception of scapular, scapulothoracic and UE control with self care, reaching, carrying, lifting, house/yardwork, driving/computer work      Manual Treatments:  PROM / STM / Oscillations-Mobs:  G-I, II, III, IV (PA's, Inf., Post.)  [x] (88030) Provided manual therapy to mobilize soft tissue/joints of cervical/CT, scapular GHJ and UE for the purpose of modulating pain, promoting relaxation,  increasing ROM, reducing/eliminating soft tissue swelling/inflammation/restriction, improving soft tissue extensibility and allowing for proper ROM for normal function with self care, reaching, carrying, lifting, house/yardwork, driving/computer work    Modalities:     [x] GAME READY (VASO)- for significant edema, swelling, pain control. Charges:  Timed Code Treatment Minutes: 43'   Total Treatment Minutes:  42'   BWC:  TE TIME:  NMR TIME:  MANUAL TIME:  TA  UNTIMED MINUTES:  Medicare Total:         [] EVAL (LOW) 46022 (typically 20 minutes face-to-face)  [] EVAL (MOD) 59536 (typically 30 minutes face-to-face)  [] EVAL (HIGH) 23509 (typically 45 minutes face-to-face)  [] RE-EVAL     [x] ZW(64312) x 1  [] IONTO  [] NMR (18403)    [] VASO   [x] Manual (56760) x 1  [] Other:  ionto (4.0mA - bicep tendon)  [] TA x      [] Mech Traction (65072)  [] ES(attended) (96125)      [] ES (un) (35597):  DN x 1         GOALS:     Patient stated goal: Improve motion     Therapist goals for Patient:  Short Term Goals: To be achieved in: 2 weeks  1. Independent in HEP and progression per patient tolerance, in order to prevent re-injury. [x] Progressing: [] Met: [] Not Met: [] Adjusted     2.  Patient will have a decrease in pain to facilitate improvement in movement, function, and ADLs as indicated by Functional Deficits. [x] Progressing: [] Met: [] Not Met: [] Adjusted     Long Term Goals: To be achieved in: 12 weeks  1. Pt will improve FOTO to 53 or more, indicating improved functional capacity . [x] Progressing: [] Met: [] Not Met: [] Adjusted     2. Patient will demonstrate increased AROM flex/abd/ER/IR to 150/150/40/L1 to allow for proper joint functioning as indicated by patients Functional Deficits. [x] Progressing: [] Met: [] Not Met: [] Adjusted     3. Patient will demonstrate an increase in Strength of shoulder  to 5/5 to allow for proper functional mobility as indicated by patients Functional Deficits. [x] Progressing: [] Met: [] Not Met: [] Adjusted     4. Patient will return to functional activities without increased symptoms or restriction. [x] Progressing: [] Met: [] Not Met: [] Adjusted     5. Pt will report ability to sleep 5 hours without sleep disturbance from shoulder. (patient specific functional goal)    [x] Progressing: [] Met: [] Not Met: [] Adjusted             ASSESSMENT:  See eval    Patient received education on their current pathology and how their condition effects them with their functional activities. Patient understood discussion and questions were answered. Patient understands their activity limitations and understands rational for treatment progression. Pt educated on plan of care and HEP, if worsening symptoms to d/c that exercise. PLAN: See eval  [x] Continue per plan of care [] Alter current plan (see comments above)  [] Plan of care initiated [] Hold pending MD visit [] Discharge      Electronically signed by:  Nan Jensen, PT, MPT     Note: If patient does not return for scheduled/ recommended follow up visits, this note will serve as a discharge from care along with most recent update on progress.

## 2023-02-17 ENCOUNTER — OFFICE VISIT (OUTPATIENT)
Dept: ENT CLINIC | Age: 63
End: 2023-02-17

## 2023-02-17 VITALS
HEIGHT: 64 IN | DIASTOLIC BLOOD PRESSURE: 88 MMHG | TEMPERATURE: 97.7 F | OXYGEN SATURATION: 96 % | BODY MASS INDEX: 33.12 KG/M2 | SYSTOLIC BLOOD PRESSURE: 149 MMHG | HEART RATE: 94 BPM | WEIGHT: 194 LBS

## 2023-02-17 DIAGNOSIS — J34.2 DNS (DEVIATED NASAL SEPTUM): ICD-10-CM

## 2023-02-17 DIAGNOSIS — J32.9 RECURRENT SINUSITIS: ICD-10-CM

## 2023-02-17 DIAGNOSIS — J01.90 ACUTE NON-RECURRENT SINUSITIS, UNSPECIFIED LOCATION: ICD-10-CM

## 2023-02-17 DIAGNOSIS — J01.90 ACUTE BACTERIAL SINUSITIS: Primary | ICD-10-CM

## 2023-02-17 DIAGNOSIS — R49.0 DYSPHONIA: ICD-10-CM

## 2023-02-17 DIAGNOSIS — B96.89 ACUTE BACTERIAL SINUSITIS: Primary | ICD-10-CM

## 2023-02-17 DIAGNOSIS — H92.02 OTALGIA, LEFT: ICD-10-CM

## 2023-02-17 DIAGNOSIS — J30.81 ALLERGIC RHINITIS DUE TO ANIMAL HAIR AND DANDER: ICD-10-CM

## 2023-02-17 RX ORDER — PREDNISONE 20 MG/1
20 TABLET ORAL DAILY
Qty: 5 TABLET | Refills: 0 | Status: SHIPPED | OUTPATIENT
Start: 2023-02-17 | End: 2023-02-22

## 2023-02-17 RX ORDER — AMOXICILLIN AND CLAVULANATE POTASSIUM 875; 125 MG/1; MG/1
1 TABLET, FILM COATED ORAL 2 TIMES DAILY
Qty: 20 TABLET | Refills: 0 | Status: SHIPPED | OUTPATIENT
Start: 2023-02-17 | End: 2023-02-27

## 2023-02-17 ASSESSMENT — ENCOUNTER SYMPTOMS
EYE REDNESS: 0
SORE THROAT: 0
COUGH: 0
EYE PAIN: 0
RHINORRHEA: 1
PHOTOPHOBIA: 0
COLOR CHANGE: 0
NAUSEA: 0
TROUBLE SWALLOWING: 0
CHOKING: 0
FACIAL SWELLING: 0
EYE ITCHING: 0
VOICE CHANGE: 1
STRIDOR: 0
SHORTNESS OF BREATH: 0
DIARRHEA: 0
SINUS PRESSURE: 1
SINUS PAIN: 1

## 2023-02-17 NOTE — PROGRESS NOTES
[facility]  [address] [address] [facility], [address] [place] [address], [address] [place] [address] [place]  P: [phone]  F: [phone]       Patient     Vicky Pearl  1960    ChiefComplaint     Chief Complaint   Patient presents with    Sinus Problem     Patient is here today because she has pain around her eyes, she has puss in her left eye and there is pain in her left ear, voice is hoarse, this is the second time she has had this       History of Present Illness     Vicky Pearl is a pleasant 60 y.o. female here for follow-up for sinusitis. Patient called on February 8 with symptoms of sinus pain, pressure, eye pain and pressure, fever 101. Prescription for Augmentin sent for 10 days. She has 1 pill left. Symptoms are persisting. Continues to complain of pain around her eyes, purulent drainage in the left eye, pain in her left ear, hoarse voice sinus pressure and thick drainage. This is her second episode. She had one previously around [facility]. No prior history of recurrent sinus infections. She saw eye doctor this week and was placed on Floxin drops for the eye. She has been using fluticasone, oxymetazoline at home. She has been using Coricidin as well. Continues to experience symptoms.     Past Medical History     Past Medical History:   Diagnosis Date    Arthritis     rheumatoid arthritis    Fibromyalgia     Hypertension     Migraine     MVP (mitral valve prolapse)     Nausea & vomiting     PONV (postoperative nausea and vomiting)     Reflux Doesn't have anymore r/t weight loss    Thyroid disease     goiter treated with radioactive med    Wears glasses     Wears glasses        Past Surgical History     Past Surgical History:   Procedure Laterality Date    ANKLE ARTHROSCOPY Right 3/12    lateral ligament repair, removal spurs    ANKLE SURGERY      right    BACK SURGERY      cyst removed off lower spine    CERVICAL FUSION N/A 12/11/2019    C4-5, C5-6, C6-7 ANTERIOR CERVICAL DISCECTOMY AND FUSION performed by Cory Quach MD at [address] [place] [address]      left    JOINT REPLACEMENT Left 2019    KNEE ARTHROSCOPY Left     LUMBAR FUSION N/A 1/6/2022    L3-4, L4-5, L5-S1 ANTERIOR LUMBAR INTERBODY FUSION WITH PEDICLE SCREW FIXATION performed by Cory Quach MD at [address] [place] [address] Left 8/28/2020    LEFT TOTAL KNEE REVISION 2 COMPONENT performed by Kamlesh Chahal MD at [address] [place] [address] Left 1/26/2021    LEFT KNEE PATELLA REVISON WITH ANTERIOR SYNOVECTOMY [facility]    DIAGNOSTIC LEFT SHOULDER ARTHROSCOPY, EXAM UNDER ANESTHESIA, EXTENSIVE DEBRIDEMENT, LYSIS OF ADHESIONS, REVISION  ROTATOR CUFF REPAIR WITH PATCH AUGMENTATION, SUBACROMIAL DECOMPRESSION performed by Hina Dumont MD at [address] [place] [address]      bilateral    TONSILLECTOMY      T & A    WRIST SURGERY Right        Family History     Family History   Problem Relation Age of Onset    Diabetes Mother     Heart Disease Father         enlarged heart    Cancer Father     Cancer Brother     Other Sister        Social History     Social History     Socioeconomic History    Marital status:      Spouse name: Not on file    Number of children: Not on file    Years of education: Not on file    Highest education level: Not on file   Occupational History    Not on file   Tobacco Use    Smoking status: Never    Smokeless tobacco: Never   Vaping Use    Vaping Use: Never used   Substance and Sexual Activity    Alcohol use: Not Currently    Drug use: No    Sexual activity: Not on file   Other Topics Concern    Not on file   Social History Narrative    Not on file     Social Determinants of Health     Financial Resource Strain: Not on file   Food Insecurity: Not on file   Transportation Needs: Not on file   Physical Activity: Not on file   Stress: Not on file   Social Connections: Not on file   Intimate Partner Violence: Not on file   Housing Stability: Not on file       Allergies     Allergies   Allergen Reactions    Latex Dermatitis    Adhesive Tape      blisters    Benzoin      Other reaction(s): Other (See Comments), Other (See Comments)  Blistering of her skin  Blistering of her skin      Gluten Nausea Only    Plaquenil [Hydroxychloroquine Sulfate]      Rash      Vicodin [Hydrocodone-Acetaminophen]      Says she gets a rash after taking it for 3 straight days      Benzamide Derivatives Rash     Benzoin   topical    Codeine Nausea And Vomiting    Methotrexate Nausea And Vomiting and Nausea Only     Other reaction(s): Vomiting       Medications     Current Outpatient Medications   Medication Sig Dispense Refill    amoxicillin-clavulanate (AUGMENTIN) 875-125 MG per tablet Take 1 tablet by mouth 2 times daily for 10 days 20 tablet 0    predniSONE (DELTASONE) 20 MG tablet Take 1 tablet by mouth daily for 5 days 5 tablet 0    nortriptyline (PAMELOR) 10 MG capsule       SUMAtriptan (IMITREX) 100 MG tablet       NP THYROID 90 MG tablet       Dexamethasone Sod Phos-Lido 5-10 MG/1.5ML SOLN To be used in physical therapy only. 1 each 1    triamterene-hydroCHLOROthiazide (DYAZIDE) 37.5-25 MG per capsule Take 1 capsule by mouth daily 90 capsule 3    famotidine (PEPCID) 40 MG tablet Take 1 tablet by mouth every evening 30 tablet 3    celecoxib (CELEBREX) 200 MG capsule Take 1 capsule by mouth daily 60 capsule 2    spironolactone (ALDACTONE) 50 MG tablet       progesterone (PROMETRIUM) 200 MG CAPS capsule       ondansetron (ZOFRAN) 4 MG tablet Take 1 tablet by mouth every 8 hours as needed for Nausea or Vomiting 30 tablet 0    senna (SENOKOT) 8.6 MG tablet Take 1 tablet by mouth in the morning and 1 tablet before bedtime.  60 tablet 11    levothyroxine (SYNTHROID) 75 MCG tablet Take 75 mcg by mouth in the morning.      etanercept (ENBREL) 25 MG/0.5ML SOSY       aluminum & magnesium hydroxide-simethicone (MAALOX) 200-200-20 MG/5ML SUSP suspension Take 15 mLs by mouth every 6 hours as needed for Indigestion 1 each 0    polycarbophil (FIBERCON) 625 MG tablet Take 1 tablet by mouth daily 30 tablet 0    sennosides-docusate sodium (SENOKOT-S) 8.6-50 MG tablet Take 1 tablet by mouth 2 times daily      folic acid (FOLVITE) 1 MG tablet Take 1 mg by mouth in the morning. progesterone (PROMETRIUM) 200 MG capsule Take 600 mg by mouth nightly      triamcinolone (KENALOG) 0.025 % cream Apply topically every 4 hours as needed Apply Topically itchy skin      amLODIPine (NORVASC) 10 MG tablet Take 10 mg by mouth daily       EPINEPHrine (EPIPEN 2-RODOLFO) 0.3 MG/0.3ML SOAJ injection Inject 0.3 mLs into the muscle once for 1 dose Use as directed for allergic reaction 2 each 1     No current facility-administered medications for this visit. Review of Systems     Review of Systems   Constitutional:  Negative for chills, fatigue and fever. HENT:  Positive for congestion, ear pain, postnasal drip, rhinorrhea, sinus pressure, sinus pain and voice change. Negative for ear discharge, facial swelling, hearing loss, nosebleeds, sneezing, sore throat, tinnitus and trouble swallowing. Eyes:  Negative for photophobia, pain, redness, itching and visual disturbance. Respiratory:  Negative for cough, choking, shortness of breath and stridor. Gastrointestinal:  Negative for diarrhea and nausea. Musculoskeletal:  Negative for neck pain and neck stiffness. Skin:  Negative for color change and rash. Neurological:  Negative for dizziness, facial asymmetry and light-headedness. Hematological:  Negative for adenopathy. Psychiatric/Behavioral:  Negative for agitation and confusion. PhysicalExam     Vitals:    02/17/23 1112   BP: (!) 149/88   Pulse:    Temp:    SpO2:        Physical Exam  Constitutional:       Appearance: She is well-developed. HENT:      Head: Normocephalic and atraumatic. Jaw: No trismus.       Right Ear: Tympanic membrane, ear canal and external ear normal. No drainage. No middle ear effusion. Tympanic membrane is not perforated. Left Ear: Tympanic membrane, ear canal and external ear normal. No drainage. No middle ear effusion. Tympanic membrane is not perforated. Nose: Septal deviation (3+ left), mucosal edema and congestion present. No rhinorrhea. Right Turbinates: Enlarged. Left Turbinates: Enlarged. Mouth/Throat:      Dentition: Normal dentition. Pharynx: Uvula midline. No oropharyngeal exudate. Eyes:      General: No scleral icterus. Right eye: No discharge. Left eye: No discharge. Pupils: Pupils are equal, round, and reactive to light. Neck:      Thyroid: No thyromegaly. Trachea: Phonation normal. No tracheal deviation. Pulmonary:      Effort: Pulmonary effort is normal. No respiratory distress. Breath sounds: No stridor. Musculoskeletal:      Cervical back: Neck supple. Lymphadenopathy:      Cervical: No cervical adenopathy. Skin:     General: Skin is warm and dry. Neurological:      Mental Status: She is alert and oriented to person, place, and time. Cranial Nerves: No cranial nerve deficit. Psychiatric:         Behavior: Behavior normal.         Procedure     Rigid Nasal Endoscopy    Preop: Acute sinusitis  Anes: topical lidocaine with afrin  Consent: verbal  Description:  After obtaining verbal consent from the patient 4% lidocaine with afrin was sprayed into the nasal cavities. After allowing a time for anesthesia, a nasal endoscope was placed into the naris. The septum, inferior, and middle turbinates were examined. The middle meatus, and sphenoethmoid recess was examined bilaterally. Thick mucopus from bilateral middle meatus. Significant septal deviation to the left. Moderate to severe mucosal edema throughout. Thick mucus surrounding the eustachian tube openings bilaterally into the nasopharynx.     Tolerated well without complication. Assessment and Plan     1. Acute bacterial sinusitis  The patient has persistent acute bacterial sinusitis. This is her second episode in the last 4 months. I recommend extending the Augmentin for another 10 days, we will add a prednisone burst.  Proper administration and risks of medication discussed. Continue fluticasone, nasal saline, antihistamine, antibiotic eyedrop. Follow-up in 2 weeks. If symptoms persist will obtain a sinus CT.  - amoxicillin-clavulanate (AUGMENTIN) 875-125 MG per tablet; Take 1 tablet by mouth 2 times daily for 10 days  Dispense: 20 tablet; Refill: 0  - predniSONE (DELTASONE) 20 MG tablet; Take 1 tablet by mouth daily for 5 days  Dispense: 5 tablet; Refill: 0    2. Otalgia, left      3. Recurrent sinusitis    - amoxicillin-clavulanate (AUGMENTIN) 875-125 MG per tablet; Take 1 tablet by mouth 2 times daily for 10 days  Dispense: 20 tablet; Refill: 0  - predniSONE (DELTASONE) 20 MG tablet; Take 1 tablet by mouth daily for 5 days  Dispense: 5 tablet; Refill: 0    4. Allergic rhinitis due to animal hair and dander      5. Dysphonia      6. Acute non-recurrent sinusitis, unspecified location      Return in about 2 weeks (around 3/3/2023). [ ] Review/order radiology tests   [ ] Independent interpretation of diagnostic test by another provider  [ ] Discussed case with another provider  [ ] High risk of loss of major body function  [ ] Elective major surgery with risk factors    Portions of this note were dictated using Dragon.  There may be linguistic errors secondary to the use of this program.

## 2023-02-21 ENCOUNTER — HOSPITAL ENCOUNTER (OUTPATIENT)
Dept: PHYSICAL THERAPY | Age: 63
Setting detail: THERAPIES SERIES
Discharge: HOME | End: 2023-02-21
Payer: COMMERCIAL

## 2023-02-21 PROCEDURE — 97140 MANUAL THERAPY 1/> REGIONS: CPT | Performed by: PHYSICAL THERAPIST

## 2023-02-21 PROCEDURE — 97110 THERAPEUTIC EXERCISES: CPT | Performed by: PHYSICAL THERAPIST

## 2023-02-24 ENCOUNTER — HOSPITAL ENCOUNTER (OUTPATIENT)
Dept: PHYSICAL THERAPY | Age: 63
Setting detail: THERAPIES SERIES
Discharge: HOME | End: 2023-02-24
Payer: COMMERCIAL

## 2023-02-24 PROCEDURE — 20560 NDL INSJ W/O NJX 1 OR 2 MUSC: CPT | Performed by: PHYSICAL THERAPIST

## 2023-02-24 PROCEDURE — 97110 THERAPEUTIC EXERCISES: CPT | Performed by: PHYSICAL THERAPIST

## 2023-02-24 PROCEDURE — 97140 MANUAL THERAPY 1/> REGIONS: CPT | Performed by: PHYSICAL THERAPIST

## 2023-02-26 NOTE — FLOWSHEET NOTE
The [facility] and [address],  Sports Performance and Rehabilitation, [facility] [address]  [address] [facility],5Th Floor   [place] Suarez  Phone: [phone]  Fax: [phone]       Physical Therapy Treatment Note/ Progress Report:           Date:  2023  Patient Name:  Cesar Castro    :  1960  MRN: [id number]  Restrictions/Precautions:    Medical/Treatment Diagnosis Information:  Diagnosis: Z98.890 (ICD-10-CM) - S/P left rotator cuff repair  Treatment Diagnosis: Left shoulder pain, decreased ROM and strength  Insurance/Certification information: [employer], [facility]  Physician Information:  Fina Willis MD  Has the plan of care been signed (Y/N):        []  Yes  [x]  No     Date of Patient follow up with Physician:     Assessment Summary: Date Range for reporting period:  Beginnin22  Endin36      Recertification will be due (POC Duration  / 90 days whichever is less): 22          Visit # Insurance Allowable Auth Required   In Person 10 in   []  Yes     []  No    Tele Health   []  Yes     []  No    Total          Functional Scale: FOTO 16   Date assessed:     Latex Allergy:  [x]NO      []YES  Preferred Language for Healthcare:   [x]English       []other:      Pain level:  10         SUBJECTIVE:     progress note       \"Doing ok. .\"  OBJECTIVE:  progress note         RESTRICTIONS/PRECAUTIONS: Large RCR w/ patch and biceps tenodesis restrictions.     Exercises/Interventions: HEP code: [id number]  Therapeutic Ex (55978) HEP 22     Warm-up   LATEX ALLERGY    Pulleys  3'      UBE            TABLE      Cane flexion  2x10     Supine er @ 0  30x     Standing ball roll   30x mr     Table slide       Seated cane er @ 0       Supine press (2 hands)      Reacher   20x (2#)     Prone: rows, ext's     Supine reaches     Standing reaches     Quadruped rocks   25x    30x     30 x    15x     T band pinches   25x green     Wall walk   5 x 10\"     Wall weight shifts   10x      Bicep curls   25x - 2#     Supine tricep 'crushers'  25x                                                                 Manual: PROM into shoulder flexion, abduction, ER and IR to 30° - 15'   Dry needling manual therapy: consisted on the placement of microfilament needles in the following muscles:  upper trap, biceps. A 30 mm needle was inserted, piston, rotated, and coned to produce intramuscular mobilization. These techniques were used to restore functional range of motion, reduce muscle spasm and induce healing in the corresponding musculature. (43119)  Clean Technique was utilized today while applying Dry needling treatment. The treatment sites where cleaned with 70% solution of  isopropyl alcohol . The PT washed their hands and utilized treatment gloves along with hand  prior to inserting the needles. All needles where removed and discarded in the appropriate sharps container. Therapeutic Exercise and NMR EXR  [x] (85771) Provided verbal/tactile cueing for activities related to strengthening, flexibility, endurance, ROM  for improvements in scapular, scapulothoracic and UE control with self care, reaching, carrying, lifting, house/yardwork, driving/computer work.    [] (80348) Provided verbal/tactile cueing for activities related to improving balance, coordination, kinesthetic sense, posture, motor skill, proprioception  to assist with  scapular, scapulothoracic and UE control with self care, reaching, carrying, lifting, house/yardwork, driving/computer work. Therapeutic Activities:    [x] (70786 or 17343) Provided verbal/tactile cueing for activities related to improving balance, coordination, kinesthetic sense, posture, motor skill, proprioception and motor activation to allow for proper function of scapular, scapulothoracic and UE control with self care, carrying, lifting, driving/computer work.      Home Exercise Program:    [x] (18625) Reviewed/Progressed HEP activities related to strengthening, flexibility, endurance, ROM of scapular, scapulothoracic and UE control with self care, reaching, carrying, lifting, house/yardwork, driving/computer work  [] (24178) Reviewed/Progressed HEP activities related to improving balance, coordination, kinesthetic sense, posture, motor skill, proprioception of scapular, scapulothoracic and UE control with self care, reaching, carrying, lifting, house/yardwork, driving/computer work      Manual Treatments:  PROM / STM / Oscillations-Mobs:  G-I, II, III, IV (PA's, Inf., Post.)  [x] (81736) Provided manual therapy to mobilize soft tissue/joints of cervical/CT, scapular GHJ and UE for the purpose of modulating pain, promoting relaxation,  increasing ROM, reducing/eliminating soft tissue swelling/inflammation/restriction, improving soft tissue extensibility and allowing for proper ROM for normal function with self care, reaching, carrying, lifting, house/yardwork, driving/computer work    Modalities:     [x] GAME READY (VASO)- for significant edema, swelling, pain control. Charges:  Timed Code Treatment Minutes: 43'   Total Treatment Minutes:  42'   BWC:  TE TIME:  NMR TIME:  MANUAL TIME:  TA  UNTIMED MINUTES:  Medicare Total:         [] EVAL (LOW) 58213 (typically 20 minutes face-to-face)  [] EVAL (MOD) 06837 (typically 30 minutes face-to-face)  [] EVAL (HIGH) 35778 (typically 45 minutes face-to-face)  [] RE-EVAL     [x] CT(26274) x 1  [] IONTO  [] NMR (51406)    [] VASO   [x] Manual (89063) x 1  [] Other:  ionto (4.0mA - bicep tendon)  [] TA x      [] [facility] T[facility] (31471)  [] ES(attended) (78109)      [] ES (un) (17426):  DN x 1         GOALS:     Patient stated goal: Improve motion     Therapist goals for Patient:  Short Term Goals: To be achieved in: 2 weeks  1. Independent in HEP and progression per patient tolerance, in order to prevent re-injury. [x] Progressing: [] Met: [] Not Met: [] Adjusted     2. Patient will have a decrease in pain to facilitate improvement in movement, function, and ADLs as indicated by Functional Deficits. [x] Progressing: [] Met: [] Not Met: [] Adjusted     Long Term Goals: To be achieved in: - updated on 2/23   I with hep  Arom wnl   4+/5 global shoulder strength  Avg pain no greater than 1-2/10 per day        ASSESSMENT:  See eval    Patient received education on their current pathology and how their condition effects them with their functional activities. Patient understood discussion and questions were answered. Patient understands their activity limitations and understands rational for treatment progression. Pt educated on plan of care and HEP, if worsening symptoms to d/c that exercise. PLAN: See eval  [x] Continue per plan of care [] Alter current plan (see comments above)  [] Plan of care initiated [] Hold pending MD visit [] Discharge      Electronically signed by:  Jefferson Painter, PT, MPT     Note: If patient does not return for scheduled/ recommended follow up visits, this note will serve as a discharge from care along with most recent update on progress.

## 2023-02-26 NOTE — PROGRESS NOTES
The [facility] and [address],  Sports Performance and Rehabilitation, [facility] [address]  793 [facility],5Th Floor   [place] Suarez  Phone: [phone]  Fax: [phone]                                                       Physical Therapy Re-Certification Plan of Care    Dear Dr Artemio Garibay ,    We had the pleasure of treating the following patient for physical therapy services at [address] [place] [address]. A summary of our findings can be found in the updated assessment below. This includes our plan of care. If you have any questions or concerns regarding these findings, please do not hesitate to contact me at the office phone number checked above. Thank you for the referral.       Physician Signature:_______________________________Date:__________________  By signing above (or electronic signature), therapists plan is approved by physician      Patient: Arron Ashby   : 1960   MRN: [id number]  Referring Physician:        Evaluation Date: 2023      Medical Diagnosis Information:   Diagnosis: Z98.890 (ICD-10-CM) - S/P left rotator cuff repair  Treatment Diagnosis: Left shoulder pain, decreased ROM and strength                                             Insurance information:       Date Range:  -    Total visits: 26        G-Codes: (if applicable)       SUBJECTIVE: doing better overall. Still a bit of achiness in the bicep region. Pain Scale: 7/10  :     OBJECTIVE:   Impairments:    ROM:  decreased arom by 5%      Strength/Neuromuscular control:  4/5 global       MEDICARE CAP EXCEPTION DOCUMENTATION  I certify that this patient meets one of the below criteria necessary for becoming an exception to the Medicare cap on therapy services:  []  The patient has a condition that has a direct and significant impact on the need for therapy.  (Significantly impacts the rate of recovery)  [x]  The patient has a complexity that has a direct and significant impact on the need for therapy. (Significantly impacts the rate of recovery and is associated with a primary condition.)       []  The patient has associated variables that influence the amount of treatment to include:  Social support, self-efficacy/motivation, prognosis, time since onset/acuity. []  The patient has generalized musculoskeletal conditions or a condition affecting multiple sites that will have a direct impact on the rate of recovery. []  The patient had a prior episode of outpatient therapy during this calendar year for a different condition. []  The patient has a mental or cognitive disorder in addition to the condition being treated that will have a direct and significant impact on the rate of recovery. ASSESSMENT:    Response to Treatment:   - Patient is responding well to treatment and improvement is noted with regards  to goals    Updated Functional deficiencies/Impairments:     The patient demonstrated at least 25% but less than 27% impairment, limitation or restriction in:   - carrying, moving and handling objects.  - Decreased upper extremity functional strength and neuromuscular control - Reduced overall functional level with carrying /lifting  - Noted GHJ joint hypomobility- Reduced overall functional level with carrying /lifting   - Noted cervicothoracic joint hypomobility- Reduced overall functional level with carrying /lifting   - Pain/difficulty with driving and/or computer work- Reduced overall functional level   - Pain/difficulty associated with self care tasks- Reduced overall functional level and ADL status  - Pain/difficulty with lifting/reaching/carrying - Reduced overall functional level with carrying and lifting  - Pain/difficulty with household work- Reduced ADL status  - Unable to perform sport/recreational activity due to pain and dysfunction    Updated Classification:  - signs/symptoms consistent with post-surgical status including decreased ROM, strength and function. - impaired joint mobility, motor function, muscle performance and range of motion associated with:    - bony or soft tissue surgical procedure. (Pattern 4I)  - ligament or other connective tissue dysfunction. (Pattern 4D)  - localized inflammation. (Pattern 4E)    Prognosis/Rehab Potential:       - Good       Factors affecting rehab potential:  - associated co-morbidities    Tolerance of evaluation/treatment:     - Good     New/Updated Goals (if applicable):  [] No change to goals established upon initial eval/last progress note:  New Goals:   I with hep  Arom wnl   4+/5 global shoulder strength  Avg pain no greater than 1-2/10 per day    GOALS:     Plan of Care:  [x] Continue Current Therapy Intervention    Frequency/Duration:  2 days per week for 10 Weeks:  Interventions:  1. Therapeutic exercise including: strength training, ROM, NMR and proprioception for the scapula, core and Upper extremity  2. Manual therapy as indicated including Dry Needling/IASTM, STM, PROM, Gr I-IV mobilizations, spinal mobilization/manipulation. 3. Modalities as needed including: thermal agents, E-stim, US, iontophoresis as indicated. 4. Patient education on joint protection, activity modification, progression of HEP.         Electronically signed by:  Saniya Washington PT, MPT

## 2023-02-27 NOTE — FLOWSHEET NOTE
The [facility] and [address],  Sports Performance and Rehabilitation, [facility] [address]  793 [facility],5Th Floor   [place]  Phone: [phone]  Fax: [phone]       Physical Therapy Treatment Note/ Progress Report:           Date:  2023  Patient Name:  Leo Lopez    :  1960  MRN: [id number]  Restrictions/Precautions:    Medical/Treatment Diagnosis Information:  Diagnosis: Z98.890 (ICD-10-CM) - S/P left rotator cuff repair  Treatment Diagnosis: Left shoulder pain, decreased ROM and strength  Insurance/Certification information: [address], [facility]  Physician Information:  Alea Hutchinson MD  Has the plan of care been signed (Y/N):        []  Yes  [x]  No     Date of Patient follow up with Physician:     Assessment Summary: Date Range for reporting period:  Beginnin22  Endin      Recertification will be due (POC Duration  / 90 days whichever is less): 22          Visit # Insurance Allowable Auth Required   In Person 11 in   []  Yes     []  No    Tele Health   []  Yes     []  No    Total          Functional Scale: FOTO 16   Date assessed:     Latex Allergy:  [x]NO      []YES  Preferred Language for Healthcare:   [x]English       []other:      Pain level:  5/10         SUBJECTIVE:     \"little better\"      \"Doing ok. .\"  OBJECTIVE:  progress note         RESTRICTIONS/PRECAUTIONS: Large RCR w/ patch and biceps tenodesis restrictions.     Exercises/Interventions: HEP code: [id number]  Therapeutic Ex (89592) HEP 22     Warm-up   LATEX ALLERGY    Pulleys  3'      UBE            TABLE      Cane flexion  2x10     Supine er @ 0  30x     Standing ball roll   30x mr     Table slide       Seated cane er @ 0       Supine press (2 hands)      Reacher   20x (2#)     Prone: rows, ext's     Supine reaches     Standing reaches     Quadruped rocks   25x    30x     30 x    15x     T band pinches   25x green     Wall walk   5 x 10\"     Wall weight shifts   10x      Bicep curls   25x - 2#     Supine tricep 'crushers'  25x                                                                 Manual: PROM into shoulder flexion, abduction, ER and IR to 30° - 15'   Dry needling manual therapy: consisted on the placement of microfilament needles in the following muscles:  upper trap, biceps. A 30 mm needle was inserted, piston, rotated, and coned to produce intramuscular mobilization. These techniques were used to restore functional range of motion, reduce muscle spasm and induce healing in the corresponding musculature. (49552)  Clean Technique was utilized today while applying Dry needling treatment. The treatment sites where cleaned with 70% solution of  isopropyl alcohol . The PT washed their hands and utilized treatment gloves along with hand  prior to inserting the needles. All needles where removed and discarded in the appropriate sharps container. Therapeutic Exercise and NMR EXR  [x] (21347) Provided verbal/tactile cueing for activities related to strengthening, flexibility, endurance, ROM  for improvements in scapular, scapulothoracic and UE control with self care, reaching, carrying, lifting, house/yardwork, driving/computer work.    [] (10284) Provided verbal/tactile cueing for activities related to improving balance, coordination, kinesthetic sense, posture, motor skill, proprioception  to assist with  scapular, scapulothoracic and UE control with self care, reaching, carrying, lifting, house/yardwork, driving/computer work. Therapeutic Activities:    [x] (97451 or 27261) Provided verbal/tactile cueing for activities related to improving balance, coordination, kinesthetic sense, posture, motor skill, proprioception and motor activation to allow for proper function of scapular, scapulothoracic and UE control with self care, carrying, lifting, driving/computer work.      Home Exercise Program:    [x] (38812) Reviewed/Progressed HEP activities related to strengthening, flexibility, endurance, ROM of scapular, scapulothoracic and UE control with self care, reaching, carrying, lifting, house/yardwork, driving/computer work  [] (52806) Reviewed/Progressed HEP activities related to improving balance, coordination, kinesthetic sense, posture, motor skill, proprioception of scapular, scapulothoracic and UE control with self care, reaching, carrying, lifting, house/yardwork, driving/computer work      Manual Treatments:  PROM / STM / Oscillations-Mobs:  G-I, II, III, IV (PA's, Inf., Post.)  [x] (21005) Provided manual therapy to mobilize soft tissue/joints of cervical/CT, scapular GHJ and UE for the purpose of modulating pain, promoting relaxation,  increasing ROM, reducing/eliminating soft tissue swelling/inflammation/restriction, improving soft tissue extensibility and allowing for proper ROM for normal function with self care, reaching, carrying, lifting, house/yardwork, driving/computer work    Modalities:     [x] GAME READY (VASO)- for significant edema, swelling, pain control. Charges:  Timed Code Treatment Minutes: 43'   Total Treatment Minutes:  42'   BWC:  TE TIME:  NMR TIME:  MANUAL TIME:  TA  UNTIMED MINUTES:  Medicare Total:         [] EVAL (LOW) 69720 (typically 20 minutes face-to-face)  [] EVAL (MOD) 96826 (typically 30 minutes face-to-face)  [] EVAL (HIGH) 78134 (typically 45 minutes face-to-face)  [] RE-EVAL     [x] MX(45718) x 1  [] IONTO  [] NMR (11602)    [] VASO   [x] Manual (49301) x 1  [x] Other:  ionto (4.0mA - bicep tendon)  [] TA x      [] Mech Traction (07147)  [] ES(attended) (60858)      [] ES (un) (57071):  DN x 1         GOALS:     Patient stated goal: Improve motion     Therapist goals for Patient:  Short Term Goals: To be achieved in: 2 weeks  1. Independent in HEP and progression per patient tolerance, in order to prevent re-injury. [x] Progressing: [] Met: [] Not Met: [] Adjusted     2. Patient will have a decrease in pain to facilitate improvement in movement, function, and ADLs as indicated by Functional Deficits. [x] Progressing: [] Met: [] Not Met: [] Adjusted     Long Term Goals: To be achieved in: - updated on 2/23   I with hep  Arom wnl   4+/5 global shoulder strength  Avg pain no greater than 1-2/10 per day        ASSESSMENT:  See eval    Patient received education on their current pathology and how their condition effects them with their functional activities. Patient understood discussion and questions were answered. Patient understands their activity limitations and understands rational for treatment progression. Pt educated on plan of care and HEP, if worsening symptoms to d/c that exercise. PLAN: See eval  [x] Continue per plan of care [] Alter current plan (see comments above)  [] Plan of care initiated [] Hold pending MD visit [] Discharge      Electronically signed by:  Trinity Morales, PT, MPT     Note: If patient does not return for scheduled/ recommended follow up visits, this note will serve as a discharge from care along with most recent update on progress.

## 2023-03-03 ENCOUNTER — OFFICE VISIT (OUTPATIENT)
Dept: ENT CLINIC | Age: 63
End: 2023-03-03
Payer: COMMERCIAL

## 2023-03-03 VITALS
SYSTOLIC BLOOD PRESSURE: 130 MMHG | DIASTOLIC BLOOD PRESSURE: 88 MMHG | TEMPERATURE: 98.2 F | BODY MASS INDEX: 35.51 KG/M2 | HEIGHT: 64 IN | OXYGEN SATURATION: 95 % | WEIGHT: 208 LBS | HEART RATE: 99 BPM

## 2023-03-03 DIAGNOSIS — J34.3 NASAL TURBINATE HYPERTROPHY: ICD-10-CM

## 2023-03-03 DIAGNOSIS — H90.12 CONDUCTIVE HEARING LOSS OF LEFT EAR, UNSPECIFIED HEARING STATUS ON CONTRALATERAL SIDE: ICD-10-CM

## 2023-03-03 DIAGNOSIS — H92.02 LEFT EAR PAIN: ICD-10-CM

## 2023-03-03 DIAGNOSIS — J34.2 DNS (DEVIATED NASAL SEPTUM): ICD-10-CM

## 2023-03-03 DIAGNOSIS — J32.9 CHRONIC SINUSITIS, UNSPECIFIED LOCATION: Primary | ICD-10-CM

## 2023-03-03 DIAGNOSIS — H65.92 FLUID LEVEL BEHIND TYMPANIC MEMBRANE OF LEFT EAR: ICD-10-CM

## 2023-03-03 PROCEDURE — 99213 OFFICE O/P EST LOW 20 MIN: CPT | Performed by: OTOLARYNGOLOGY

## 2023-03-03 ASSESSMENT — ENCOUNTER SYMPTOMS
SHORTNESS OF BREATH: 0
STRIDOR: 0
NAUSEA: 0
VOICE CHANGE: 0
EYE REDNESS: 0
EYE PAIN: 0
COLOR CHANGE: 0
EYE ITCHING: 0
SINUS PAIN: 0
SORE THROAT: 0
FACIAL SWELLING: 0
CHOKING: 0
COUGH: 0
DIARRHEA: 0
PHOTOPHOBIA: 0
RHINORRHEA: 0
TROUBLE SWALLOWING: 0
SINUS PRESSURE: 1

## 2023-03-03 NOTE — PROGRESS NOTES
[facility]  [address], [address] [place] [address]  P: [phone]  F: [phone]       Patient     Zara Yao  1960    ChiefComplaint     Chief Complaint   Patient presents with    Follow-up     Patient is here today for her 2 week follow up, she states that her sinus are better but her left ear still has pain and she still has glue like mucus coming out of her nose       History of Present Illness     Zara Yao is a pleasant 60 y.o. female here for follow-up for bacterial sinusitis, ear pain, allergic rhinitis and dysphonia. Was placed on a extended course of Augmentin and prednisone at previous visit. She feels like the sinuses are better, but the left ear still having pain and she has thick mucus coming out of the nose. There is still some sinus pressure. Additionally, her left ear hearing feels muffled. She has not had a sinus CT.     Past Medical History     Past Medical History:   Diagnosis Date    Arthritis     rheumatoid arthritis    Fibromyalgia     Hypertension     Migraine     MVP (mitral valve prolapse)     Nausea & vomiting     PONV (postoperative nausea and vomiting)     Reflux Doesn't have anymore r/t weight loss    Thyroid disease     goiter treated with radioactive med    Wears glasses     Wears glasses        Past Surgical History     Past Surgical History:   Procedure Laterality Date    ANKLE ARTHROSCOPY Right 3/12    lateral ligament repair, removal spurs    ANKLE SURGERY      right    BACK SURGERY      cyst removed off lower spine    CERVICAL FUSION N/A 12/11/2019    C4-5, C5-6, C6-7 ANTERIOR CERVICAL DISCECTOMY AND FUSION performed by Catrachita Garcia MD at [address]       left    JOINT REPLACEMENT Left 2019    KNEE ARTHROSCOPY Left     LUMBAR FUSION N/A 1/6/2022    L3-4, L4-5, L5-S1 ANTERIOR LUMBAR INTERBODY FUSION WITH PEDICLE SCREW FIXATION performed by Catrachita Garcia MD at [place] [address] ARTHROPLASTY Left 8/28/2020    LEFT TOTAL KNEE REVISION 2 COMPONENT performed by Curtis Galvan MD at [address] [place] [address] Left 1/26/2021    LEFT KNEE PATELLA REVISON WITH ANTERIOR SYNOVECTOMY performed by Curtis Galvan MD at [facility] ARTHROSCOPY Left 8/5/2022    DIAGNOSTIC LEFT SHOULDER ARTHROSCOPY, EXAM UNDER ANESTHESIA, EXTENSIVE DEBRIDEMENT, LYSIS OF ADHESIONS, REVISION  ROTATOR CUFF REPAIR WITH PATCH AUGMENTATION, SUBACROMIAL DECOMPRESSION performed by Lala Norman MD at [address] [place] [address]      bilateral    TONSILLECTOMY      T & A    WRIST SURGERY Right        Family History     Family History   Problem Relation Age of Onset    Diabetes Mother     Heart Disease Father         enlarged heart    Cancer Father     Cancer Brother     Other Sister        Social History     Social History     Socioeconomic History    Marital status:      Spouse name: Not on file    Number of children: Not on file    Years of education: Not on file    Highest education level: Not on file   Occupational History    Not on file   Tobacco Use    Smoking status: Never    Smokeless tobacco: Never   Vaping Use    Vaping Use: Never used   Substance and Sexual Activity    Alcohol use: Not Currently    Drug use: No    Sexual activity: Not on file   Other Topics Concern    Not on file   Social History Narrative    Not on file     Social Determinants of Health     Financial Resource Strain: Not on file   Food Insecurity: Not on file   Transportation Needs: Not on file   Physical Activity: Not on file   Stress: Not on file   Social Connections: Not on file   Intimate Partner Violence: Not on file   Housing Stability: Not on file       Allergies     Allergies   Allergen Reactions    Latex Dermatitis    Adhesive Tape      blisters    Benzoin      Other reaction(s):  Other (See Comments), Other (See Comments)  Blistering of her skin  Blistering of her skin      Gluten Nausea Only    Plaquenil [Hydroxychloroquine Sulfate]      Rash      Vicodin [Hydrocodone-Acetaminophen]      Says she gets a rash after taking it for 3 straight days      Benzamide Derivatives Rash     Benzoin   topical    Codeine Nausea And Vomiting    Methotrexate Nausea And Vomiting and Nausea Only     Other reaction(s): Vomiting       Medications     Current Outpatient Medications   Medication Sig Dispense Refill    nortriptyline (PAMELOR) 10 MG capsule       SUMAtriptan (IMITREX) 100 MG tablet       NP THYROID 90 MG tablet       Dexamethasone Sod Phos-Lido 5-10 MG/1.5ML SOLN To be used in physical therapy only. 1 each 1    triamterene-hydroCHLOROthiazide (DYAZIDE) 37.5-25 MG per capsule Take 1 capsule by mouth daily 90 capsule 3    famotidine (PEPCID) 40 MG tablet Take 1 tablet by mouth every evening 30 tablet 3    celecoxib (CELEBREX) 200 MG capsule Take 1 capsule by mouth daily 60 capsule 2    spironolactone (ALDACTONE) 50 MG tablet       progesterone (PROMETRIUM) 200 MG CAPS capsule       ondansetron (ZOFRAN) 4 MG tablet Take 1 tablet by mouth every 8 hours as needed for Nausea or Vomiting 30 tablet 0    senna (SENOKOT) 8.6 MG tablet Take 1 tablet by mouth in the morning and 1 tablet before bedtime. 60 tablet 11    levothyroxine (SYNTHROID) 75 MCG tablet Take 75 mcg by mouth in the morning.      etanercept (ENBREL) 25 MG/0.5ML SOSY       aluminum & magnesium hydroxide-simethicone (MAALOX) 200-200-20 MG/5ML SUSP suspension Take 15 mLs by mouth every 6 hours as needed for Indigestion 1 each 0    polycarbophil (FIBERCON) 625 MG tablet Take 1 tablet by mouth daily 30 tablet 0    sennosides-docusate sodium (SENOKOT-S) 8.6-50 MG tablet Take 1 tablet by mouth 2 times daily      folic acid (FOLVITE) 1 MG tablet Take 1 mg by mouth in the morning.       progesterone (PROMETRIUM) 200 MG capsule Take 600 mg by mouth nightly      triamcinolone (KENALOG) 0.025 % cream Apply topically every 4 hours as needed Apply Topically itchy skin amLODIPine (NORVASC) 10 MG tablet Take 10 mg by mouth daily       EPINEPHrine (EPIPEN 2-RODOLFO) 0.3 MG/0.3ML SOAJ injection Inject 0.3 mLs into the muscle once for 1 dose Use as directed for allergic reaction 2 each 1     No current facility-administered medications for this visit. Review of Systems     Review of Systems   Constitutional:  Negative for chills, fatigue and fever. HENT:  Positive for congestion, ear pain, hearing loss and sinus pressure. Negative for ear discharge, facial swelling, nosebleeds, postnasal drip, rhinorrhea, sinus pain, sneezing, sore throat, tinnitus, trouble swallowing and voice change. Eyes:  Negative for photophobia, pain, redness, itching and visual disturbance. Respiratory:  Negative for cough, choking, shortness of breath and stridor. Gastrointestinal:  Negative for diarrhea and nausea. Musculoskeletal:  Negative for neck pain and neck stiffness. Skin:  Negative for color change and rash. Neurological:  Negative for dizziness, facial asymmetry and light-headedness. Hematological:  Negative for adenopathy. Psychiatric/Behavioral:  Negative for agitation and confusion. PhysicalExam     Vitals:    03/03/23 1007   BP: 130/88   Pulse: 99   Temp: 98.2 °F (36.8 °C)   SpO2: 95%       Physical Exam  Constitutional:       Appearance: She is well-developed. HENT:      Head: Normocephalic and atraumatic. Jaw: No trismus. Right Ear: Tympanic membrane, ear canal and external ear normal. No drainage. No middle ear effusion. Tympanic membrane is not perforated. Left Ear: Ear canal and external ear normal. No drainage. A middle ear effusion is present. Tympanic membrane is retracted. Tympanic membrane is not perforated. Ears:      Bauer exam findings: Lateralizes left. Nose: Septal deviation, mucosal edema, congestion and rhinorrhea present. Rhinorrhea is purulent. Right Turbinates: Enlarged. Left Turbinates: Enlarged. Mouth/Throat:      Dentition: Normal dentition. Pharynx: Uvula midline. No oropharyngeal exudate. Eyes:      General: No scleral icterus. Right eye: No discharge. Left eye: No discharge. Pupils: Pupils are equal, round, and reactive to light. Neck:      Thyroid: No thyromegaly. Trachea: Phonation normal. No tracheal deviation. Pulmonary:      Effort: Pulmonary effort is normal. No respiratory distress. Breath sounds: No stridor. Musculoskeletal:      Cervical back: Neck supple. Lymphadenopathy:      Cervical: No cervical adenopathy. Skin:     General: Skin is warm and dry. Neurological:      Mental Status: She is alert and oriented to person, place, and time. Cranial Nerves: No cranial nerve deficit. Psychiatric:         Behavior: Behavior normal.         Procedure           Assessment and Plan     1. Chronic sinusitis, unspecified location  Patient presents today after completing 3 weeks of antibiotics, 5-day course of prednisone for sinusitis, left-sided otitis media. Patient has noticed some improvement of symptoms, but continues to experience thick nasal drainage, some sinus pressure, left-sided ear pain and muffled hearing. Bauer test lateralizes to the left with evidence of persistent effusion on the left. I recommend a sinus CT given she has undergone maximal medical therapy for infection. Follow-up after CT. Add nasal saline and nasal steroid spray.  - CT SINUS WO CONTRAST; Future    2. Fluid level behind tympanic membrane of left ear      3. Left ear pain      4. Conductive hearing loss of left ear, unspecified hearing status on contralateral side      5. DNS (deviated nasal septum)      6. Nasal turbinate hypertrophy      Return for after ct.       [ ] Review/order radiology tests   [ ] Independent interpretation of diagnostic test by another provider  [ ] Discussed case with another provider  [ ] High risk of loss of major body function  [ ] Elective major surgery with risk factors    Portions of this note were dictated using Dragon.  There may be linguistic errors secondary to the use of this program.

## 2023-03-07 ENCOUNTER — APPOINTMENT (OUTPATIENT)
Dept: PHYSICAL THERAPY | Age: 63
End: 2023-03-07
Payer: COMMERCIAL

## 2023-03-09 ENCOUNTER — HOSPITAL ENCOUNTER (OUTPATIENT)
Dept: CT IMAGING | Age: 63
Discharge: HOME | End: 2023-03-09
Payer: COMMERCIAL

## 2023-03-09 DIAGNOSIS — J32.9 CHRONIC SINUSITIS, UNSPECIFIED LOCATION: ICD-10-CM

## 2023-03-09 PROCEDURE — 70486 CT MAXILLOFACIAL W/O DYE: CPT

## 2023-03-10 ENCOUNTER — HOSPITAL ENCOUNTER (OUTPATIENT)
Dept: PHYSICAL THERAPY | Age: 63
Setting detail: THERAPIES SERIES
Discharge: HOME | End: 2023-03-10
Payer: COMMERCIAL

## 2023-03-10 ENCOUNTER — OFFICE VISIT (OUTPATIENT)
Dept: ENT CLINIC | Age: 63
End: 2023-03-10
Payer: COMMERCIAL

## 2023-03-10 VITALS
BODY MASS INDEX: 35.34 KG/M2 | DIASTOLIC BLOOD PRESSURE: 76 MMHG | TEMPERATURE: 98.1 F | SYSTOLIC BLOOD PRESSURE: 148 MMHG | HEIGHT: 64 IN | OXYGEN SATURATION: 98 % | WEIGHT: 207 LBS | HEART RATE: 86 BPM

## 2023-03-10 DIAGNOSIS — J34.3 NASAL TURBINATE HYPERTROPHY: ICD-10-CM

## 2023-03-10 DIAGNOSIS — H69.82 ETD (EUSTACHIAN TUBE DYSFUNCTION), LEFT: ICD-10-CM

## 2023-03-10 DIAGNOSIS — J32.0 CHRONIC MAXILLARY SINUSITIS: Primary | ICD-10-CM

## 2023-03-10 DIAGNOSIS — J34.2 DNS (DEVIATED NASAL SEPTUM): ICD-10-CM

## 2023-03-10 PROCEDURE — 97110 THERAPEUTIC EXERCISES: CPT | Performed by: PHYSICAL THERAPIST

## 2023-03-10 PROCEDURE — 99214 OFFICE O/P EST MOD 30 MIN: CPT | Performed by: OTOLARYNGOLOGY

## 2023-03-10 PROCEDURE — 97140 MANUAL THERAPY 1/> REGIONS: CPT | Performed by: PHYSICAL THERAPIST

## 2023-03-10 ASSESSMENT — ENCOUNTER SYMPTOMS
FACIAL SWELLING: 0
EYE ITCHING: 0
SHORTNESS OF BREATH: 0
CHOKING: 0
COUGH: 0
VOICE CHANGE: 0
COLOR CHANGE: 0
RHINORRHEA: 0
SORE THROAT: 0
EYE REDNESS: 0
PHOTOPHOBIA: 0
NAUSEA: 0
SINUS PAIN: 0
TROUBLE SWALLOWING: 0
EYE PAIN: 0
DIARRHEA: 0
SINUS PRESSURE: 1
STRIDOR: 0

## 2023-03-10 NOTE — LETTER
[facility]    Surgery Schedule Request Form: 03/10/23  [address] [place], [address]                                                             1 wk post op TBS    DATE OF SURGERY: 6/5/23    TIME OF SURGERY:  7:30            CONF #: ____________________       Patient Information:    Patient name: Marimar Beverly    YOB: 1960 Age/Sex:62 y.o./female    SS #:[id number]    Wt Readings from Last 1 Encounters:   03/10/23 207 lb (93.9 kg)       Telephone Information:   Mobile [phone]     Home [phone]     Surgeon & Procedure Information:     Lead surgeon: Mary Ma Co-Surgeon: edson  Phone: [phone] Fax: [phone]  PCP: Marielena Diez DO    Diagnosis: 1. Chronic maxillary sinusitis J32.0   2. Deviated nasal septum J34.2   3. Nasal turbinate hypertrophy J34.3   4. Left eustachian tube dysfunction  H69.82      Procedure name/CPT: Left Anterior Ethmoidectomy (54188), Bilateral Inferior Turbinate Reduction (62606-46), Left Maxillary Antrostomy (20078), Left Myringotomy with Pressure Equalizing Tube (78908) and Septoplasty (57808)    Postop CPT: Nasal endoscopy with debridement x2 (30746 x2)    Procedure length: 1 hour Anesthesia: General    Special Equipment: no    Patient Status: SDS (OP)    Primary Payor Plan: [employer]  Member ID: [id number]   Subscriber name: Newton Hdzer    KAYE Vaccinated? Yes    [] Implement attached clinical orders for patient.       Electronically signed by Aldair Keith DO on 3/10/2023 at 11:55 AM

## 2023-03-10 NOTE — PROGRESS NOTES
[facility]  [address] [facility], [address] [place] [address]  P: [phone]  F: [phone]       Patient     Shea Ribeiro  1960    ChiefComplaint     Chief Complaint   Patient presents with    Follow-up     Patient is here today to follow up on her CT scan       History of Present Illness     Shea Ribeiro is a pleasant 60 y.o. female here for follow-up to go over the results of her CT scan. Sinus CT reveals obstruction of the left maxillary os with polyp or mucous retention cyst within the left maxillary sinus. Significant septal deviation to the left with a large septal spur impacting the left inferior turbinate. Bilateral inferior turbinate hypertrophy. There is no evidence of fluid in the middle ears. She continues to experience left-sided sinus pressure symptoms, obstruction. Pressure of the left ear and muffled hearing in the left ear.     Past Medical History     Past Medical History:   Diagnosis Date    Arthritis     rheumatoid arthritis    Fibromyalgia     Hypertension     Migraine     MVP (mitral valve prolapse)     Nausea & vomiting     PONV (postoperative nausea and vomiting)     Reflux Doesn't have anymore r/t weight loss    Thyroid disease     goiter treated with radioactive med    Wears glasses     Wears glasses        Past Surgical History     Past Surgical History:   Procedure Laterality Date    ANKLE ARTHROSCOPY Right 3/12    lateral ligament repair, removal spurs    ANKLE SURGERY      right    BACK SURGERY      cyst removed off lower spine    CERVICAL FUSION N/A 12/11/2019    C4-5, C5-6, C6-7 ANTERIOR CERVICAL DISCECTOMY AND FUSION performed by Víctor Faith MD at [address] [facility]      left    JOINT REPLACEMENT Left 2019    KNEE ARTHROSCOPY Left     LUMBAR FUSION N/A 1/6/2022    L3-4, L4-5, L5-S1 ANTERIOR LUMBAR INTERBODY FUSION WITH PEDICLE SCREW FIXATION performed by Víctor Faith MD at [place] [address] ARTHROPLASTY Left 8/28/2020    LEFT TOTAL KNEE REVISION 2 COMPONENT performed by Darren Ramachandran MD at [address] [place] [address] Left 1/26/2021    LEFT KNEE PATELLA REVISON WITH ANTERIOR SYNOVECTOMY performed by Darren Ramachandran MD at [facility] ARTHROSCOPY Left 8/5/2022    DIAGNOSTIC LEFT SHOULDER ARTHROSCOPY, EXAM UNDER ANESTHESIA, EXTENSIVE DEBRIDEMENT, LYSIS OF ADHESIONS, REVISION  ROTATOR CUFF REPAIR WITH PATCH AUGMENTATION, SUBACROMIAL DECOMPRESSION performed by Matt Fuller MD at [address] [place] [address]      bilateral    TONSILLECTOMY      T & A    WRIST SURGERY Right        Family History     Family History   Problem Relation Age of Onset    Diabetes Mother     Heart Disease Father         enlarged heart    Cancer Father     Cancer Brother     Other Sister        Social History     Social History     Socioeconomic History    Marital status:      Spouse name: Not on file    Number of children: Not on file    Years of education: Not on file    Highest education level: Not on file   Occupational History    Not on file   Tobacco Use    Smoking status: Never    Smokeless tobacco: Never   Vaping Use    Vaping Use: Never used   Substance and Sexual Activity    Alcohol use: Not Currently    Drug use: No    Sexual activity: Not on file   Other Topics Concern    Not on file   Social History Narrative    Not on file     Social Determinants of Health     Financial Resource Strain: Not on file   Food Insecurity: Not on file   Transportation Needs: Not on file   Physical Activity: Not on file   Stress: Not on file   Social Connections: Not on file   Intimate Partner Violence: Not on file   Housing Stability: Not on file       Allergies     Allergies   Allergen Reactions    Latex Dermatitis    Adhesive Tape      blisters    Benzoin      Other reaction(s):  Other (See Comments), Other (See Comments)  Blistering of her skin  Blistering of her skin      Gluten Nausea Only    Plaquenil [Hydroxychloroquine Sulfate]      Rash      Vicodin [Hydrocodone-Acetaminophen]      Says she gets a rash after taking it for 3 straight days      Benzamide Derivatives Rash     Benzoin   topical    Codeine Nausea And Vomiting    Methotrexate Nausea And Vomiting and Nausea Only     Other reaction(s): Vomiting       Medications     Current Outpatient Medications   Medication Sig Dispense Refill    nortriptyline (PAMELOR) 10 MG capsule       SUMAtriptan (IMITREX) 100 MG tablet       NP THYROID 90 MG tablet       Dexamethasone Sod Phos-Lido 5-10 MG/1.5ML SOLN To be used in physical therapy only. 1 each 1    triamterene-hydroCHLOROthiazide (DYAZIDE) 37.5-25 MG per capsule Take 1 capsule by mouth daily 90 capsule 3    famotidine (PEPCID) 40 MG tablet Take 1 tablet by mouth every evening 30 tablet 3    celecoxib (CELEBREX) 200 MG capsule Take 1 capsule by mouth daily 60 capsule 2    spironolactone (ALDACTONE) 50 MG tablet       progesterone (PROMETRIUM) 200 MG CAPS capsule       ondansetron (ZOFRAN) 4 MG tablet Take 1 tablet by mouth every 8 hours as needed for Nausea or Vomiting 30 tablet 0    senna (SENOKOT) 8.6 MG tablet Take 1 tablet by mouth in the morning and 1 tablet before bedtime. 60 tablet 11    levothyroxine (SYNTHROID) 75 MCG tablet Take 75 mcg by mouth in the morning.      etanercept (ENBREL) 25 MG/0.5ML SOSY       aluminum & magnesium hydroxide-simethicone (MAALOX) 200-200-20 MG/5ML SUSP suspension Take 15 mLs by mouth every 6 hours as needed for Indigestion 1 each 0    polycarbophil (FIBERCON) 625 MG tablet Take 1 tablet by mouth daily 30 tablet 0    sennosides-docusate sodium (SENOKOT-S) 8.6-50 MG tablet Take 1 tablet by mouth 2 times daily      folic acid (FOLVITE) 1 MG tablet Take 1 mg by mouth in the morning.       progesterone (PROMETRIUM) 200 MG capsule Take 600 mg by mouth nightly      triamcinolone (KENALOG) 0.025 % cream Apply topically every 4 hours as needed Apply Topically itchy skin amLODIPine (NORVASC) 10 MG tablet Take 10 mg by mouth daily       EPINEPHrine (EPIPEN 2-RODOLFO) 0.3 MG/0.3ML SOAJ injection Inject 0.3 mLs into the muscle once for 1 dose Use as directed for allergic reaction 2 each 1     No current facility-administered medications for this visit. Review of Systems     Review of Systems   Constitutional:  Negative for chills, fatigue and fever. HENT:  Positive for congestion, ear pain, hearing loss and sinus pressure. Negative for ear discharge, facial swelling, nosebleeds, postnasal drip, rhinorrhea, sinus pain, sneezing, sore throat, tinnitus, trouble swallowing and voice change. Eyes:  Negative for photophobia, pain, redness, itching and visual disturbance. Respiratory:  Negative for cough, choking, shortness of breath and stridor. Gastrointestinal:  Negative for diarrhea and nausea. Musculoskeletal:  Negative for neck pain and neck stiffness. Skin:  Negative for color change and rash. Neurological:  Negative for dizziness, facial asymmetry and light-headedness. Hematological:  Negative for adenopathy. Psychiatric/Behavioral:  Negative for agitation and confusion. PhysicalExam     Vitals:    03/10/23 1141   BP: (!) 148/76   Pulse:    Temp:    SpO2:        Physical Exam  Constitutional:       Appearance: She is well-developed. HENT:      Head: Normocephalic and atraumatic. Jaw: No trismus. Right Ear: Tympanic membrane, ear canal and external ear normal. No drainage. No middle ear effusion. Tympanic membrane is not perforated. Left Ear: Ear canal and external ear normal. No drainage. No middle ear effusion. Tympanic membrane is retracted. Tympanic membrane is not perforated. Ears:      Bauer exam findings: Lateralizes left. Nose: Septal deviation, mucosal edema, congestion and rhinorrhea present. Rhinorrhea is purulent. Right Turbinates: Enlarged. Left Turbinates: Enlarged.       Mouth/Throat:      Dentition: Normal dentition. Pharynx: Uvula midline. No oropharyngeal exudate. Eyes:      General: No scleral icterus. Right eye: No discharge. Left eye: No discharge. Pupils: Pupils are equal, round, and reactive to light. Neck:      Thyroid: No thyromegaly. Trachea: Phonation normal. No tracheal deviation. Pulmonary:      Effort: Pulmonary effort is normal. No respiratory distress. Breath sounds: No stridor. Musculoskeletal:      Cervical back: Neck supple. Lymphadenopathy:      Cervical: No cervical adenopathy. Skin:     General: Skin is warm and dry. Neurological:      Mental Status: She is alert and oriented to person, place, and time. Cranial Nerves: No cranial nerve deficit. Psychiatric:         Behavior: Behavior normal.         Procedure           Assessment and Plan     1. Chronic maxillary sinusitis  CT reveals chronic maxillary sinusitis and an obstructed left maxillary sinus outflow tract by ethmoid air cells. Additionally, CT reveals significant septal deviation, turbinate hypertrophy. Given symptoms and exam findings and CT findings, I recommend septoplasty, turbinate reduction, maxillary antrostomy, anterior ethmoidectomy on the left and left-sided PE tube placement. Risks, benefits and alternatives of the procedure discussed with the patient. Risk include, but are not limited to bleeding, infection, pain, septal perforation, septal hematoma, poor cosmetic outcome, damage to the orbit and blindness, damage to the skull base and CSF leak. She understands and would like to proceed. 2. DNS (deviated nasal septum)      3. ETD (Eustachian tube dysfunction), left      4. Nasal turbinate hypertrophy      Return for 1 week post op.       [ ] Review/order radiology tests   [ ] Independent interpretation of diagnostic test by another provider  [ ] Discussed case with another provider  [ ] High risk of loss of major body function  [ ] Elective major surgery with risk factors    Portions of this note were dictated using Dragon.  There may be linguistic errors secondary to the use of this program. There are no Wet Read(s) to document.

## 2023-03-12 NOTE — FLOWSHEET NOTE
The [facility] and [address],  Sports Performance and Rehabilitation, [facility] 6199 1246 [address]  [address] [facility]   [place]rid[place] Suarez  Phone: [phone]  Fax: [phone]       Physical Therapy Treatment Note/ Progress Report:           Date:  3/10/2023  Patient Name:  Vicky Pearl    :  1960  MRN: [id number]  Restrictions/Precautions:    Medical/Treatment Diagnosis Information:  Diagnosis: Z98.890 (ICD-10-CM) - S/P left rotator cuff repair  Treatment Diagnosis: Left shoulder pain, decreased ROM and strength  Insurance/Certification information: [address], [facility]  Physician Information:  Glenn Mackey MD  Has the plan of care been signed (Y/N):        []  Yes  [x]  No     Date of Patient follow up with Physician:     Assessment Summary: Date Range for reporting period:  Beginnin22  Endin99      Recertification will be due (POC Duration  / 90 days whichever is less): 22          Visit # Insurance Allowable Auth Required   In Person 12 in   []  Yes     []  No    Tele Health   []  Yes     []  No    Total          Functional Scale: FOTO 16   Date assessed:     Latex Allergy:  [x]NO      []YES  Preferred Language for Healthcare:   [x]English       []other:      Pain level:  5/10         SUBJECTIVE:     \"I am feeling about the same\"      OBJECTIVE:  4/5 global strength         RESTRICTIONS/PRECAUTIONS: Large RCR w/ patch and biceps tenodesis restrictions.     Exercises/Interventions: HEP code: [id number]  Therapeutic Ex (62431) HEP 22     Warm-up   LATEX ALLERGY    Pulleys  3'      UBE            TABLE      Cane flexion  2x10     Supine er @ 0  30x     Standing ball roll   30x mr     Table slide       Seated cane er @ 0       Supine press (2 hands)      Reacher   20x (2#)     Prone: rows, ext's     Supine reaches     Standing reaches     Quadruped rocks   25x    30x     30 x    15x     T band pinches   25x green     Wall walk 5 x 10\"     Wall weight shifts   10x      Bicep curls   25x - 2#     Supine tricep 'crushers'  25x                                                                 Manual: PROM into shoulder flexion, abduction, ER and IR to 30° - 15'   Dry needling manual therapy: consisted on the placement of microfilament needles in the following muscles:  upper trap, biceps. A 30 mm needle was inserted, piston, rotated, and coned to produce intramuscular mobilization. These techniques were used to restore functional range of motion, reduce muscle spasm and induce healing in the corresponding musculature. (75028)  Clean Technique was utilized today while applying Dry needling treatment. The treatment sites where cleaned with 70% solution of  isopropyl alcohol . The PT washed their hands and utilized treatment gloves along with hand  prior to inserting the needles. All needles where removed and discarded in the appropriate sharps container. Therapeutic Exercise and NMR EXR  [x] (21060) Provided verbal/tactile cueing for activities related to strengthening, flexibility, endurance, ROM  for improvements in scapular, scapulothoracic and UE control with self care, reaching, carrying, lifting, house/yardwork, driving/computer work.    [] (42464) Provided verbal/tactile cueing for activities related to improving balance, coordination, kinesthetic sense, posture, motor skill, proprioception  to assist with  scapular, scapulothoracic and UE control with self care, reaching, carrying, lifting, house/yardwork, driving/computer work. Therapeutic Activities:    [x] (43619 or 82807) Provided verbal/tactile cueing for activities related to improving balance, coordination, kinesthetic sense, posture, motor skill, proprioception and motor activation to allow for proper function of scapular, scapulothoracic and UE control with self care, carrying, lifting, driving/computer work.      Home Exercise Program:    [x] (16183) Reviewed/Progressed HEP activities related to strengthening, flexibility, endurance, ROM of scapular, scapulothoracic and UE control with self care, reaching, carrying, lifting, house/yardwork, driving/computer work  [] (08426) Reviewed/Progressed HEP activities related to improving balance, coordination, kinesthetic sense, posture, motor skill, proprioception of scapular, scapulothoracic and UE control with self care, reaching, carrying, lifting, house/yardwork, driving/computer work      Manual Treatments:  PROM / STM / Oscillations-Mobs:  G-I, II, III, IV (PA's, Inf., Post.)  [x] (57717) Provided manual therapy to mobilize soft tissue/joints of cervical/CT, scapular GHJ and UE for the purpose of modulating pain, promoting relaxation,  increasing ROM, reducing/eliminating soft tissue swelling/inflammation/restriction, improving soft tissue extensibility and allowing for proper ROM for normal function with self care, reaching, carrying, lifting, house/yardwork, driving/computer work    Modalities:     [x] GAME READY (VASO)- for significant edema, swelling, pain control. Charges:  Timed Code Treatment Minutes: 43'   Total Treatment Minutes:  42'   BWC:  TE TIME:  NMR TIME:  MANUAL TIME:  TA  UNTIMED MINUTES:  Medicare Total:         [] EVAL (LOW) 33408 (typically 20 minutes face-to-face)  [] EVAL (MOD) 70070 (typically 30 minutes face-to-face)  [] EVAL (HIGH) 82163 (typically 45 minutes face-to-face)  [] RE-EVAL     [x] QI(14204) x 1  [] IONTO  [] NMR (19697)    [] VASO   [x] Manual (74879) x 1  [] Other:  ionto (4.0mA - bicep tendon)  [] TA x      [] Mech Traction (86079)  [] ES(attended) (15751)      [] ES (un) (65582):  DN x 1         GOALS:     Patient stated goal: Improve motion     Therapist goals for Patient:  Short Term Goals: To be achieved in: 2 weeks  1. Independent in HEP and progression per patient tolerance, in order to prevent re-injury. [x] Progressing: [] Met: [] Not Met: [] Adjusted     2. Patient will have a decrease in pain to facilitate improvement in movement, function, and ADLs as indicated by Functional Deficits. [x] Progressing: [] Met: [] Not Met: [] Adjusted     Long Term Goals: To be achieved in: - updated on 2/23   I with hep  Arom wnl   4+/5 global shoulder strength  Avg pain no greater than 1-2/10 per day        ASSESSMENT:  See eval    Patient received education on their current pathology and how their condition effects them with their functional activities. Patient understood discussion and questions were answered. Patient understands their activity limitations and understands rational for treatment progression. Pt educated on plan of care and HEP, if worsening symptoms to d/c that exercise. PLAN: See eval  [x] Continue per plan of care [] Alter current plan (see comments above)  [] Plan of care initiated [] Hold pending MD visit [] Discharge      Electronically signed by:  Leana Wu, PT, MPT     Note: If patient does not return for scheduled/ recommended follow up visits, this note will serve as a discharge from care along with most recent update on progress.

## 2023-03-14 ENCOUNTER — OFFICE VISIT (OUTPATIENT)
Dept: ORTHOPEDIC SURGERY | Age: 63
End: 2023-03-14

## 2023-03-14 VITALS — BODY MASS INDEX: 35.34 KG/M2 | WEIGHT: 207 LBS | HEIGHT: 64 IN

## 2023-03-14 DIAGNOSIS — Z98.890 S/P ROTATOR CUFF REPAIR: Primary | ICD-10-CM

## 2023-03-14 NOTE — PROGRESS NOTES
12 [facility]  History and Physical  Shoulder Pain    Date:  3/14/2023    Name:  Twila Hughes  Address:  [address] [place] [address]  [address] [place], [place] [id number]    :  1960      Age:   60 y.o.    SSN:  [id number]      Medical Record Number:  [id number]    Reason for Visit:    Follow-up (Left Shoulder)      HPI:   Twila Hughes is a 60 y.o. female who presents to our office today for follow up of the left shoulder pain. She has history of left shoulder arthroscopic revision rotator cuff repair with collagen patch augmentation and biceps tenodesis on 2022. She has had improvement in motion however she has had considerable pain over the lateral aspect of the shoulder since surgery. She underwent ultrasound-guided glenohumeral joint injection with Dr. Rubia Tilley 2 months ago. She states she had immediate relief with the local anesthetic however several hours later her pain had returned. No notes on file    Review of Systems:  A 14 point review of systems available in the scanned medical record as documented by the patient and reviewed on 3/14/2023. The review is negative with the exception of those things mentioned in the History of Present Illness and Past Medical History. Past Medical History:  Patient's medications, allergies, past medical, surgical, social and family histories were reviewed and updated as appropriate. Allergies: Allergies   Allergen Reactions    Latex Dermatitis    Adhesive Tape      blisters    Benzoin      Other reaction(s):  Other (See Comments), Other (See Comments)  Blistering of her skin  Blistering of her skin      Gluten Nausea Only    Plaquenil [Hydroxychloroquine Sulfate]      Rash      Vicodin [Hydrocodone-Acetaminophen]      Says she gets a rash after taking it for 3 straight days      Benzamide Derivatives Rash     Benzoin   topical    Codeine Nausea And Vomiting    Methotrexate Nausea And Vomiting and Nausea Only     Other reaction(s): Vomiting       Physical Exam:  There were no vitals filed for this visit. General: Reagan Ocasio is a healthy and well appearing 60 y.o. female who is sitting comfortably in our office in acute distress. Skin:  There are no skin lesions, cellulitis, or extreme edema. The patient has warm and well-perfused Bilateral upper extremities with brisk capillary refill. Eyes: Extra-ocular muscles intact  Mouth: Oral mucosa moist. No perioral lesions  Pulm: Respirations unlabored and regular. Neuro: Alert and oriented x3    Left shoulder Exam:  Inspection:  No gross deformities, no signs of infection. Palpation:  There is mild crepitus. Non-tender to palpation. Active Range of Motion: Forward elevation of 160, abduction of 130, external rotation with elbow at the side 50, internal rotation to the back is T12    Passive Range of Motion: Passively forward elevation can be further increased to same. Strength: External rotation with resistance with elbow at the side 4/5, internal rotation with resistance with elbow at the side 4/5, Champagne toast testing 4/5, Jobes test 4/5    Special Tests:  No Pierre muscle deformity. Neurovascular: Sensation to light touch is intact, no motor deficits, palpable radial pulses 2+    Additional Examinations:    Examination of the contralateral extremity does not show any tenderness, deformity or injury. Range of motion is unremarkable. There is no gross instability. There are no rashes, ulcerations or lesions. Strength and tone are normal.      Radiographic:  No new imaging. Assessment:  Reagan Ocasio is a 60 y.o. female 7 months status post left shoulder revision rotator cuff repair with patch augmentation and biceps tenodesis with continued pain related to glenohumeral joint arthritis. Impression:  Encounter Diagnosis   Name Primary?     S/P rotator cuff repair Yes       Office Procedures:  No orders of the defined types were placed in this encounter. Plan:   Flakita Giron comes in with continued left shoulder pain located to the lateral aspect. Since surgery her motion has improved considerably however she continues to have this pain. She was worked up further with a repeat MRI which demonstrated intact rotator cuff however she does have mild to moderate glenohumeral joint osteoarthritis which is present on MRI as well as intraoperative arthroscopic images. We do believe that surgery did improve her range of motion related to her rotator cuff tear. The mild arthritic changes on the humeral head were observed at the time of surgery which is commonly found in many patients with rotator cuff tears and which is typically asymptomatic. Ritu however continues to have pain likely related to these findings. She has had no relief from cortisone injections. We did discuss the further treatment options which include continued conservative management with pain control from oral medications versus viscosupplementation injection. We also did offer her surgical options including arthroscopic debridement which we stated may or may not have pain relief from the surgery which may be limited in time should she experience relief. Finally we discussed a total shoulder arthroplasty as a definitive option for her glenohumeral joint arthritis shoulder pain. She will consider these options and return for further discussion and treatment. Ayoub Oh will follow up as needed. The patient was in agreement with this plan and all questions were answered to the patient's satisfaction, and were encouraged to call with any questions. Greater than 20 minutes were expended on all aspects of today's visit. 3/14/2023  11:57 AM      Elaina Canchola MD  [facility] Sports Medicine Fellow    The encounter with the patient was supervised by Dr. John Reynolds examined the patient and reviewed the plan.      This dictation was performed with a verbal recognition program (DRAGON) and it was checked for errors. It is possible that there are still dictated errors within this office note. If so, please bring any errors to my attention for an addendum. All efforts were made to ensure that this office note is accurate.  _____________  I was physically present and personally supervised the Orthopaedic Sports Medicine Fellow in the evaluation and development of a treatment plan for this patient. I personally interviewed the patient and performed a physical examination. In addition, I discussed the patient's condition and treatment options with them. I have also reviewed and agree with the past medical, family and social history unless otherwise noted. All of the patient's questions were answered. Samer S. Lorrin Bamberger, MD, PhD  3/14/2023

## 2023-03-17 ENCOUNTER — APPOINTMENT (OUTPATIENT)
Dept: PHYSICAL THERAPY | Age: 63
End: 2023-03-17
Payer: COMMERCIAL

## 2023-05-30 NOTE — TELEPHONE ENCOUNTER
Pt informed and she wants to know if she puts off sx for awhile should she go back in the boot to make sure she doesn't do further damage? Aklief counseling:  Patient advised to apply a pea-sized amount only at bedtime and wait 30 minutes after washing their face before applying.  If too drying, patient may add a non-comedogenic moisturizer.  The most commonly reported side effects including irritation, redness, scaling, dryness, stinging, burning, itching, and increased risk of sunburn.  The patient verbalized understanding of the proper use and possible adverse effects of retinoids.  All of the patient's questions and concerns were addressed.

## 2023-06-23 ENCOUNTER — TELEPHONE (OUTPATIENT)
Dept: ENT CLINIC | Age: 63
End: 2023-06-23

## 2023-06-27 ENCOUNTER — OFFICE VISIT (OUTPATIENT)
Dept: ENT CLINIC | Age: 63
End: 2023-06-27
Payer: COMMERCIAL

## 2023-06-27 ENCOUNTER — OFFICE VISIT (OUTPATIENT)
Dept: ORTHOPEDIC SURGERY | Age: 63
End: 2023-06-27

## 2023-06-27 VITALS
WEIGHT: 206.2 LBS | DIASTOLIC BLOOD PRESSURE: 79 MMHG | HEART RATE: 90 BPM | TEMPERATURE: 97.2 F | SYSTOLIC BLOOD PRESSURE: 126 MMHG | HEIGHT: 64 IN | BODY MASS INDEX: 35.2 KG/M2

## 2023-06-27 VITALS — WEIGHT: 206 LBS | BODY MASS INDEX: 35.17 KG/M2 | HEIGHT: 64 IN

## 2023-06-27 DIAGNOSIS — H81.02 MENIERE'S DISEASE OF LEFT EAR: ICD-10-CM

## 2023-06-27 DIAGNOSIS — H61.21 IMPACTED CERUMEN OF RIGHT EAR: ICD-10-CM

## 2023-06-27 DIAGNOSIS — Z98.890 S/P ROTATOR CUFF REPAIR: Primary | ICD-10-CM

## 2023-06-27 DIAGNOSIS — H92.11 OTORRHEA, RIGHT: ICD-10-CM

## 2023-06-27 DIAGNOSIS — H93.11 TINNITUS OF RIGHT EAR: Primary | ICD-10-CM

## 2023-06-27 PROCEDURE — 99213 OFFICE O/P EST LOW 20 MIN: CPT | Performed by: OTOLARYNGOLOGY

## 2023-06-27 PROCEDURE — 69210 REMOVE IMPACTED EAR WAX UNI: CPT | Performed by: OTOLARYNGOLOGY

## 2023-06-27 RX ORDER — EPINEPHRINE 0.3 MG/.3ML
0.3 INJECTION SUBCUTANEOUS PRN
COMMUNITY

## 2023-06-27 ASSESSMENT — ENCOUNTER SYMPTOMS
PHOTOPHOBIA: 0
STRIDOR: 0
COUGH: 0
NAUSEA: 0
FACIAL SWELLING: 0
DIARRHEA: 0
TROUBLE SWALLOWING: 0
SHORTNESS OF BREATH: 0
COLOR CHANGE: 0
SINUS PRESSURE: 0
RHINORRHEA: 0
EYE REDNESS: 0
VOICE CHANGE: 0
SINUS PAIN: 0
EYE PAIN: 0
CHOKING: 0
SORE THROAT: 0
EYE ITCHING: 0

## 2023-07-05 RX ORDER — OXYCODONE HYDROCHLORIDE AND ACETAMINOPHEN 5; 325 MG/1; MG/1
1 TABLET ORAL PRN
COMMUNITY
Start: 2023-05-03 | End: 2023-07-05

## 2023-07-05 RX ORDER — NAPROXEN 500 MG/1
500 TABLET ORAL PRN
COMMUNITY
Start: 2023-05-09 | End: 2023-07-05

## 2023-07-05 RX ORDER — CYCLOBENZAPRINE HCL 10 MG
10 TABLET ORAL DAILY
COMMUNITY
Start: 2023-04-14 | End: 2023-07-05

## 2023-07-05 RX ORDER — METHOCARBAMOL 750 MG/1
750 TABLET, FILM COATED ORAL DAILY
COMMUNITY
Start: 2023-05-09 | End: 2023-07-05

## 2023-07-05 RX ORDER — LEFLUNOMIDE 20 MG/1
20 TABLET ORAL DAILY
COMMUNITY
Start: 2023-05-25 | End: 2023-07-05

## 2023-07-07 ENCOUNTER — ANESTHESIA EVENT (OUTPATIENT)
Dept: OPERATING ROOM | Age: 63
End: 2023-07-07
Payer: COMMERCIAL

## 2023-07-07 ENCOUNTER — TELEPHONE (OUTPATIENT)
Dept: ENT CLINIC | Age: 63
End: 2023-07-07

## 2023-07-10 ENCOUNTER — ANESTHESIA (OUTPATIENT)
Dept: OPERATING ROOM | Age: 63
End: 2023-07-10
Payer: COMMERCIAL

## 2023-07-10 ENCOUNTER — HOSPITAL ENCOUNTER (OUTPATIENT)
Age: 63
Setting detail: OUTPATIENT SURGERY
Discharge: HOME | End: 2023-07-10
Attending: OTOLARYNGOLOGY | Admitting: OTOLARYNGOLOGY
Payer: COMMERCIAL

## 2023-07-10 VITALS
HEART RATE: 97 BPM | TEMPERATURE: 97.8 F | SYSTOLIC BLOOD PRESSURE: 127 MMHG | RESPIRATION RATE: 11 BRPM | BODY MASS INDEX: 35.1 KG/M2 | DIASTOLIC BLOOD PRESSURE: 71 MMHG | HEIGHT: 64 IN | WEIGHT: 205.6 LBS | OXYGEN SATURATION: 96 %

## 2023-07-10 DIAGNOSIS — G89.18 ACUTE POST-OPERATIVE PAIN: Primary | ICD-10-CM

## 2023-07-10 DIAGNOSIS — J32.0 CHRONIC MAXILLARY SINUSITIS: ICD-10-CM

## 2023-07-10 DIAGNOSIS — H69.82 ACUTE DYSFUNCTION OF LEFT EUSTACHIAN TUBE: ICD-10-CM

## 2023-07-10 DIAGNOSIS — J34.3 NASAL TURBINATE HYPERTROPHY: ICD-10-CM

## 2023-07-10 DIAGNOSIS — J34.2 DEVIATED NASAL SEPTUM: ICD-10-CM

## 2023-07-10 PROBLEM — H69.92 ACUTE DYSFUNCTION OF LEFT EUSTACHIAN TUBE: Status: ACTIVE | Noted: 2023-07-10

## 2023-07-10 PROBLEM — H69.92 ETD (EUSTACHIAN TUBE DYSFUNCTION), LEFT: Status: ACTIVE | Noted: 2023-07-10

## 2023-07-10 LAB
GLUCOSE BLD-MCNC: 157 MG/DL (ref 70–99)
PERFORMED ON: ABNORMAL

## 2023-07-10 PROCEDURE — 6360000002 HC RX W HCPCS: Performed by: NURSE ANESTHETIST, CERTIFIED REGISTERED

## 2023-07-10 PROCEDURE — 2500000003 HC RX 250 WO HCPCS: Performed by: OTOLARYNGOLOGY

## 2023-07-10 PROCEDURE — 7100000001 HC PACU RECOVERY - ADDTL 15 MIN: Performed by: OTOLARYNGOLOGY

## 2023-07-10 PROCEDURE — 2500000003 HC RX 250 WO HCPCS: Performed by: NURSE ANESTHETIST, CERTIFIED REGISTERED

## 2023-07-10 PROCEDURE — A4217 STERILE WATER/SALINE, 500 ML: HCPCS | Performed by: OTOLARYNGOLOGY

## 2023-07-10 PROCEDURE — 3600000004 HC SURGERY LEVEL 4 BASE: Performed by: OTOLARYNGOLOGY

## 2023-07-10 PROCEDURE — 88305 TISSUE EXAM BY PATHOLOGIST: CPT

## 2023-07-10 PROCEDURE — 2580000003 HC RX 258: Performed by: ANESTHESIOLOGY

## 2023-07-10 PROCEDURE — 6370000000 HC RX 637 (ALT 250 FOR IP): Performed by: ANESTHESIOLOGY

## 2023-07-10 PROCEDURE — 3600000014 HC SURGERY LEVEL 4 ADDTL 15MIN: Performed by: OTOLARYNGOLOGY

## 2023-07-10 PROCEDURE — 3700000000 HC ANESTHESIA ATTENDED CARE: Performed by: OTOLARYNGOLOGY

## 2023-07-10 PROCEDURE — 6360000002 HC RX W HCPCS: Performed by: ANESTHESIOLOGY

## 2023-07-10 PROCEDURE — 6370000000 HC RX 637 (ALT 250 FOR IP): Performed by: OTOLARYNGOLOGY

## 2023-07-10 PROCEDURE — 3700000001 HC ADD 15 MINUTES (ANESTHESIA): Performed by: OTOLARYNGOLOGY

## 2023-07-10 PROCEDURE — 7100000011 HC PHASE II RECOVERY - ADDTL 15 MIN: Performed by: OTOLARYNGOLOGY

## 2023-07-10 PROCEDURE — 7100000000 HC PACU RECOVERY - FIRST 15 MIN: Performed by: OTOLARYNGOLOGY

## 2023-07-10 PROCEDURE — 2720000010 HC SURG SUPPLY STERILE: Performed by: OTOLARYNGOLOGY

## 2023-07-10 PROCEDURE — 6360000002 HC RX W HCPCS

## 2023-07-10 PROCEDURE — L8699 PROSTHETIC IMPLANT NOS: HCPCS | Performed by: OTOLARYNGOLOGY

## 2023-07-10 PROCEDURE — 7100000010 HC PHASE II RECOVERY - FIRST 15 MIN: Performed by: OTOLARYNGOLOGY

## 2023-07-10 PROCEDURE — 2580000003 HC RX 258: Performed by: NURSE ANESTHETIST, CERTIFIED REGISTERED

## 2023-07-10 PROCEDURE — 2709999900 HC NON-CHARGEABLE SUPPLY: Performed by: OTOLARYNGOLOGY

## 2023-07-10 PROCEDURE — 2580000003 HC RX 258: Performed by: OTOLARYNGOLOGY

## 2023-07-10 DEVICE — VENT TUBE 1010201 5PK BOBBIN 1.14 FLPL
Type: IMPLANTABLE DEVICE | Site: EAR | Status: FUNCTIONAL
Brand: REUTER

## 2023-07-10 RX ORDER — SODIUM CHLORIDE, SODIUM LACTATE, POTASSIUM CHLORIDE, CALCIUM CHLORIDE 600; 310; 30; 20 MG/100ML; MG/100ML; MG/100ML; MG/100ML
INJECTION, SOLUTION INTRAVENOUS CONTINUOUS
Status: DISCONTINUED | OUTPATIENT
Start: 2023-07-10 | End: 2023-07-10 | Stop reason: HOSPADM

## 2023-07-10 RX ORDER — HYDROMORPHONE HYDROCHLORIDE 1 MG/ML
0.5 INJECTION, SOLUTION INTRAMUSCULAR; INTRAVENOUS; SUBCUTANEOUS EVERY 5 MIN PRN
Status: DISCONTINUED | OUTPATIENT
Start: 2023-07-10 | End: 2023-07-10 | Stop reason: HOSPADM

## 2023-07-10 RX ORDER — MIDAZOLAM HYDROCHLORIDE 1 MG/ML
INJECTION INTRAMUSCULAR; INTRAVENOUS PRN
Status: DISCONTINUED | OUTPATIENT
Start: 2023-07-10 | End: 2023-07-10 | Stop reason: SDUPTHER

## 2023-07-10 RX ORDER — SODIUM CHLORIDE 9 MG/ML
INJECTION, SOLUTION INTRAVENOUS PRN
Status: DISCONTINUED | OUTPATIENT
Start: 2023-07-10 | End: 2023-07-10 | Stop reason: HOSPADM

## 2023-07-10 RX ORDER — ONDANSETRON 2 MG/ML
4 INJECTION INTRAMUSCULAR; INTRAVENOUS
Status: DISCONTINUED | OUTPATIENT
Start: 2023-07-10 | End: 2023-07-10 | Stop reason: HOSPADM

## 2023-07-10 RX ORDER — ACETAMINOPHEN 325 MG/1
650 TABLET ORAL
Status: DISCONTINUED | OUTPATIENT
Start: 2023-07-10 | End: 2023-07-10 | Stop reason: HOSPADM

## 2023-07-10 RX ORDER — DEXAMETHASONE SODIUM PHOSPHATE 10 MG/ML
INJECTION, SOLUTION INTRAMUSCULAR; INTRAVENOUS
Status: DISCONTINUED
Start: 2023-07-10 | End: 2023-07-10 | Stop reason: HOSPADM

## 2023-07-10 RX ORDER — MEPERIDINE HYDROCHLORIDE 25 MG/ML
12.5 INJECTION INTRAMUSCULAR; INTRAVENOUS; SUBCUTANEOUS EVERY 5 MIN PRN
Status: DISCONTINUED | OUTPATIENT
Start: 2023-07-10 | End: 2023-07-10 | Stop reason: HOSPADM

## 2023-07-10 RX ORDER — LABETALOL HYDROCHLORIDE 5 MG/ML
10 INJECTION, SOLUTION INTRAVENOUS
Status: DISCONTINUED | OUTPATIENT
Start: 2023-07-10 | End: 2023-07-10 | Stop reason: HOSPADM

## 2023-07-10 RX ORDER — FENTANYL CITRATE 50 UG/ML
25 INJECTION, SOLUTION INTRAMUSCULAR; INTRAVENOUS EVERY 5 MIN PRN
Status: COMPLETED | OUTPATIENT
Start: 2023-07-10 | End: 2023-07-10

## 2023-07-10 RX ORDER — SCOLOPAMINE TRANSDERMAL SYSTEM 1 MG/1
PATCH, EXTENDED RELEASE TRANSDERMAL
Status: COMPLETED
Start: 2023-07-10 | End: 2023-07-10

## 2023-07-10 RX ORDER — EPINEPHRINE NASAL SOLUTION 1 MG/ML
SOLUTION NASAL PRN
Status: DISCONTINUED | OUTPATIENT
Start: 2023-07-10 | End: 2023-07-10 | Stop reason: HOSPADM

## 2023-07-10 RX ORDER — APREPITANT 40 MG/1
CAPSULE ORAL
Status: COMPLETED
Start: 2023-07-10 | End: 2023-07-10

## 2023-07-10 RX ORDER — FENTANYL CITRATE 50 UG/ML
INJECTION, SOLUTION INTRAMUSCULAR; INTRAVENOUS PRN
Status: DISCONTINUED | OUTPATIENT
Start: 2023-07-10 | End: 2023-07-10 | Stop reason: SDUPTHER

## 2023-07-10 RX ORDER — AMOXICILLIN 500 MG/1
500 CAPSULE ORAL 2 TIMES DAILY
Qty: 14 CAPSULE | Refills: 0 | Status: SHIPPED | OUTPATIENT
Start: 2023-07-10 | End: 2023-07-17

## 2023-07-10 RX ORDER — DEXAMETHASONE SODIUM PHOSPHATE 4 MG/ML
INJECTION, SOLUTION INTRA-ARTICULAR; INTRALESIONAL; INTRAMUSCULAR; INTRAVENOUS; SOFT TISSUE PRN
Status: DISCONTINUED | OUTPATIENT
Start: 2023-07-10 | End: 2023-07-10 | Stop reason: SDUPTHER

## 2023-07-10 RX ORDER — OXYCODONE HYDROCHLORIDE 5 MG/1
5 TABLET ORAL PRN
Status: COMPLETED | OUTPATIENT
Start: 2023-07-10 | End: 2023-07-10

## 2023-07-10 RX ORDER — OXYCODONE HYDROCHLORIDE 5 MG/1
10 TABLET ORAL PRN
Status: COMPLETED | OUTPATIENT
Start: 2023-07-10 | End: 2023-07-10

## 2023-07-10 RX ORDER — ONDANSETRON 2 MG/ML
INJECTION INTRAMUSCULAR; INTRAVENOUS PRN
Status: DISCONTINUED | OUTPATIENT
Start: 2023-07-10 | End: 2023-07-10 | Stop reason: SDUPTHER

## 2023-07-10 RX ORDER — APREPITANT 40 MG/1
40 CAPSULE ORAL
Status: DISCONTINUED | OUTPATIENT
Start: 2023-07-10 | End: 2023-07-10 | Stop reason: HOSPADM

## 2023-07-10 RX ORDER — SODIUM CHLORIDE 0.9 % (FLUSH) 0.9 %
5-40 SYRINGE (ML) INJECTION EVERY 12 HOURS SCHEDULED
Status: DISCONTINUED | OUTPATIENT
Start: 2023-07-10 | End: 2023-07-10 | Stop reason: HOSPADM

## 2023-07-10 RX ORDER — ROPIVACAINE HYDROCHLORIDE 5 MG/ML
INJECTION, SOLUTION EPIDURAL; INFILTRATION; PERINEURAL
Status: DISCONTINUED
Start: 2023-07-10 | End: 2023-07-10 | Stop reason: HOSPADM

## 2023-07-10 RX ORDER — IPRATROPIUM BROMIDE AND ALBUTEROL SULFATE 2.5; .5 MG/3ML; MG/3ML
1 SOLUTION RESPIRATORY (INHALATION)
Status: DISCONTINUED | OUTPATIENT
Start: 2023-07-10 | End: 2023-07-10 | Stop reason: HOSPADM

## 2023-07-10 RX ORDER — OXYMETAZOLINE HYDROCHLORIDE 0.05 G/100ML
SPRAY NASAL PRN
Status: DISCONTINUED | OUTPATIENT
Start: 2023-07-10 | End: 2023-07-10 | Stop reason: HOSPADM

## 2023-07-10 RX ORDER — LIDOCAINE HYDROCHLORIDE AND EPINEPHRINE 10; 10 MG/ML; UG/ML
INJECTION, SOLUTION INFILTRATION; PERINEURAL PRN
Status: DISCONTINUED | OUTPATIENT
Start: 2023-07-10 | End: 2023-07-10 | Stop reason: HOSPADM

## 2023-07-10 RX ORDER — HYDRALAZINE HYDROCHLORIDE 20 MG/ML
10 INJECTION INTRAMUSCULAR; INTRAVENOUS
Status: DISCONTINUED | OUTPATIENT
Start: 2023-07-10 | End: 2023-07-10 | Stop reason: HOSPADM

## 2023-07-10 RX ORDER — MAGNESIUM HYDROXIDE 1200 MG/15ML
LIQUID ORAL CONTINUOUS PRN
Status: DISCONTINUED | OUTPATIENT
Start: 2023-07-10 | End: 2023-07-10 | Stop reason: HOSPADM

## 2023-07-10 RX ORDER — LIDOCAINE HYDROCHLORIDE 20 MG/ML
INJECTION, SOLUTION INFILTRATION; PERINEURAL PRN
Status: DISCONTINUED | OUTPATIENT
Start: 2023-07-10 | End: 2023-07-10 | Stop reason: SDUPTHER

## 2023-07-10 RX ORDER — OXYCODONE HYDROCHLORIDE AND ACETAMINOPHEN 5; 325 MG/1; MG/1
1 TABLET ORAL EVERY 6 HOURS PRN
Qty: 28 TABLET | Refills: 0 | Status: SHIPPED | OUTPATIENT
Start: 2023-07-10 | End: 2023-07-17

## 2023-07-10 RX ORDER — SODIUM CHLORIDE, SODIUM LACTATE, POTASSIUM CHLORIDE, CALCIUM CHLORIDE 600; 310; 30; 20 MG/100ML; MG/100ML; MG/100ML; MG/100ML
INJECTION, SOLUTION INTRAVENOUS CONTINUOUS PRN
Status: DISCONTINUED | OUTPATIENT
Start: 2023-07-10 | End: 2023-07-10 | Stop reason: SDUPTHER

## 2023-07-10 RX ORDER — SODIUM CHLORIDE 0.9 % (FLUSH) 0.9 %
5-40 SYRINGE (ML) INJECTION PRN
Status: DISCONTINUED | OUTPATIENT
Start: 2023-07-10 | End: 2023-07-10 | Stop reason: HOSPADM

## 2023-07-10 RX ORDER — NORTRIPTYLINE HYDROCHLORIDE 10 MG/1
CAPSULE ORAL
COMMUNITY
Start: 2023-07-09

## 2023-07-10 RX ORDER — PROPOFOL 10 MG/ML
INJECTION, EMULSION INTRAVENOUS PRN
Status: DISCONTINUED | OUTPATIENT
Start: 2023-07-10 | End: 2023-07-10 | Stop reason: SDUPTHER

## 2023-07-10 RX ORDER — ROCURONIUM BROMIDE 10 MG/ML
INJECTION, SOLUTION INTRAVENOUS PRN
Status: DISCONTINUED | OUTPATIENT
Start: 2023-07-10 | End: 2023-07-10 | Stop reason: SDUPTHER

## 2023-07-10 RX ORDER — SCOLOPAMINE TRANSDERMAL SYSTEM 1 MG/1
1 PATCH, EXTENDED RELEASE TRANSDERMAL
Status: DISCONTINUED | OUTPATIENT
Start: 2023-07-10 | End: 2023-07-10 | Stop reason: HOSPADM

## 2023-07-10 RX ORDER — PROCHLORPERAZINE EDISYLATE 5 MG/ML
5 INJECTION INTRAMUSCULAR; INTRAVENOUS
Status: DISCONTINUED | OUTPATIENT
Start: 2023-07-10 | End: 2023-07-10 | Stop reason: HOSPADM

## 2023-07-10 RX ADMIN — FENTANYL CITRATE 50 MCG: 50 INJECTION, SOLUTION INTRAMUSCULAR; INTRAVENOUS at 08:08

## 2023-07-10 RX ADMIN — SUGAMMADEX 200 MG: 100 INJECTION, SOLUTION INTRAVENOUS at 08:34

## 2023-07-10 RX ADMIN — DEXMEDETOMIDINE HYDROCHLORIDE 4 MCG: 100 INJECTION, SOLUTION INTRAVENOUS at 07:38

## 2023-07-10 RX ADMIN — PROPOFOL 150 MG: 10 INJECTION, EMULSION INTRAVENOUS at 07:36

## 2023-07-10 RX ADMIN — HYDROMORPHONE HYDROCHLORIDE 0.5 MG: 1 INJECTION, SOLUTION INTRAMUSCULAR; INTRAVENOUS; SUBCUTANEOUS at 09:17

## 2023-07-10 RX ADMIN — FENTANYL CITRATE 50 MCG: 50 INJECTION, SOLUTION INTRAMUSCULAR; INTRAVENOUS at 07:32

## 2023-07-10 RX ADMIN — SCOPOLAMINE 1 PATCH: 1.5 PATCH, EXTENDED RELEASE TRANSDERMAL at 07:24

## 2023-07-10 RX ADMIN — MIDAZOLAM HYDROCHLORIDE 2 MG: 2 INJECTION, SOLUTION INTRAMUSCULAR; INTRAVENOUS at 07:27

## 2023-07-10 RX ADMIN — ROCURONIUM BROMIDE 50 MG: 10 INJECTION INTRAVENOUS at 07:36

## 2023-07-10 RX ADMIN — LIDOCAINE HYDROCHLORIDE 100 MG: 20 INJECTION, SOLUTION INFILTRATION; PERINEURAL at 07:36

## 2023-07-10 RX ADMIN — DEXAMETHASONE SODIUM PHOSPHATE 8 MG: 4 INJECTION, SOLUTION INTRAMUSCULAR; INTRAVENOUS at 07:45

## 2023-07-10 RX ADMIN — PROPOFOL 40 MG: 10 INJECTION, EMULSION INTRAVENOUS at 08:26

## 2023-07-10 RX ADMIN — APREPITANT 40 MG: 40 CAPSULE ORAL at 07:24

## 2023-07-10 RX ADMIN — OXYCODONE HYDROCHLORIDE 10 MG: 5 TABLET ORAL at 10:08

## 2023-07-10 RX ADMIN — SODIUM CHLORIDE, SODIUM LACTATE, POTASSIUM CHLORIDE, AND CALCIUM CHLORIDE: .6; .31; .03; .02 INJECTION, SOLUTION INTRAVENOUS at 07:27

## 2023-07-10 RX ADMIN — ONDANSETRON 4 MG: 2 INJECTION INTRAMUSCULAR; INTRAVENOUS at 07:45

## 2023-07-10 RX ADMIN — DEXMEDETOMIDINE HYDROCHLORIDE 6 MCG: 100 INJECTION, SOLUTION INTRAVENOUS at 07:27

## 2023-07-10 RX ADMIN — MEPERIDINE HYDROCHLORIDE 12.5 MG: 25 INJECTION INTRAMUSCULAR; INTRAVENOUS; SUBCUTANEOUS at 08:55

## 2023-07-10 RX ADMIN — SODIUM CHLORIDE, POTASSIUM CHLORIDE, SODIUM LACTATE AND CALCIUM CHLORIDE: 600; 310; 30; 20 INJECTION, SOLUTION INTRAVENOUS at 06:28

## 2023-07-10 RX ADMIN — HYDROMORPHONE HYDROCHLORIDE 0.5 MG: 1 INJECTION, SOLUTION INTRAMUSCULAR; INTRAVENOUS; SUBCUTANEOUS at 09:24

## 2023-07-10 RX ADMIN — FENTANYL CITRATE 50 MCG: 50 INJECTION, SOLUTION INTRAMUSCULAR; INTRAVENOUS at 07:27

## 2023-07-10 RX ADMIN — SODIUM CHLORIDE, SODIUM LACTATE, POTASSIUM CHLORIDE, AND CALCIUM CHLORIDE: .6; .31; .03; .02 INJECTION, SOLUTION INTRAVENOUS at 07:52

## 2023-07-10 RX ADMIN — FENTANYL CITRATE 25 MCG: 50 INJECTION, SOLUTION INTRAMUSCULAR; INTRAVENOUS at 09:02

## 2023-07-10 RX ADMIN — FENTANYL CITRATE 25 MCG: 50 INJECTION, SOLUTION INTRAMUSCULAR; INTRAVENOUS at 09:07

## 2023-07-10 RX ADMIN — FENTANYL CITRATE 50 MCG: 50 INJECTION, SOLUTION INTRAMUSCULAR; INTRAVENOUS at 08:24

## 2023-07-10 ASSESSMENT — PAIN SCALES - GENERAL
PAINLEVEL_OUTOF10: 8
PAINLEVEL_OUTOF10: 0
PAINLEVEL_OUTOF10: 6
PAINLEVEL_OUTOF10: 7
PAINLEVEL_OUTOF10: 6
PAINLEVEL_OUTOF10: 6
PAINLEVEL_OUTOF10: 8

## 2023-07-10 ASSESSMENT — PAIN DESCRIPTION - LOCATION
LOCATION: NOSE
LOCATION: FACE;NOSE
LOCATION: NOSE
LOCATION: NOSE

## 2023-07-10 ASSESSMENT — PAIN DESCRIPTION - ORIENTATION
ORIENTATION: LEFT
ORIENTATION: LEFT
ORIENTATION: INNER

## 2023-07-10 ASSESSMENT — PAIN DESCRIPTION - DESCRIPTORS
DESCRIPTORS: ACHING;BURNING
DESCRIPTORS: STABBING
DESCRIPTORS: ACHING
DESCRIPTORS: ACHING
DESCRIPTORS: ACHING;BURNING
DESCRIPTORS: ACHING

## 2023-07-10 ASSESSMENT — PAIN DESCRIPTION - FREQUENCY: FREQUENCY: CONTINUOUS

## 2023-07-10 ASSESSMENT — PAIN DESCRIPTION - PAIN TYPE: TYPE: SURGICAL PAIN

## 2023-07-10 ASSESSMENT — PAIN DESCRIPTION - ONSET: ONSET: ON-GOING

## 2023-07-10 NOTE — PROGRESS NOTES
Admitted to pacu at this time. HOB at 30 degrees. VSS on monitor. Report given by MERCED.  Ksenia moraes used to suck out bloody drg in back of throat

## 2023-07-10 NOTE — BRIEF OP NOTE
Brief Postoperative Note      Patient: Alban Lopes  YOB: 1960  MRN: [id number]    Date of Procedure: 7/10/2023    Pre-Op Diagnosis Codes:     * Chronic maxillary sinusitis [J32.0]     * Deviated nasal septum [J34.2]     * Nasal turbinate hypertrophy [J34.3]     * Acute dysfunction of left eustachian tube [H69.82]    Post-Op Diagnosis: Same       Procedure(s):  LEFT ANTERIOR ETHMOIDECTOMY, BILATERAL INFERIOR TURBINATE REDUCTION, LEFT MAXILLARY ANTROSTOMY AND SEPTOPLASTY  LEFT MYRINGOTOMY WITH PRESSURE EQUALIZING TUBE    Surgeon(s):  Lynda Heart DO    Assistant:  * No surgical staff found *    Anesthesia: General    Estimated Blood Loss (mL): 63AT    Complications: None    Specimens:   ID Type Source Tests Collected by Time Destination   A : A.  LEFT SINUS CONTENTS Tissue Tissue SURGICAL PATHOLOGY Lynda Heart DO 7/10/2023 2243        Implants:  * No implants in log *      Drains: * No LDAs found *        Electronically signed by Lynda Heart DO on 7/10/2023 at 9:02 AM

## 2023-07-10 NOTE — ANESTHESIA PRE PROCEDURE
Department of Anesthesiology  Preprocedure Note       Name:  Kati Coon   Age:  58 y.o.  :  1960                                          MRN:  [id number]         Date:  7/10/2023      Surgeon: Mateo Lambert):  Kulwant Thomason DO    Procedure: Procedure(s):  LEFT ANTERIOR ETHMOIDECTOMY, BILATERAL INFERIOR TURBINATE REDUCTION, LEFT MAXILLARY ANTROSTOMY AND SEPTOPLASTY  LEFT MYRINGOTOMY WITH PRESSURE EQUALIZING TUBE    Medications prior to admission:   Prior to Admission medications    Medication Sig Start Date End Date Taking? Authorizing Provider   nortriptyline (PAMELOR) 10 MG capsule  23   Historical Provider, MD   SUMAtriptan (IMITREX) 100 MG tablet PRN 22   Historical Provider, MD   NP THYROID 90 MG tablet daily 23   Historical Provider, MD   triamterene-hydroCHLOROthiazide (DYAZIDE) 37.5-25 MG per capsule Take 1 capsule by mouth daily  Patient taking differently: Take 1 capsule by mouth daily PRN 22   Kulwant Thomason DO   celecoxib (CELEBREX) 200 MG capsule Take 1 capsule by mouth daily 10/13/22   Rhonda Suazo MD   progesterone ([address] [place]) 200 MG CAPS capsule  22   Historical Provider, MD   levothyroxine (SYNTHROID) 75 MCG tablet Take 1 tablet by mouth daily 21   Historical Provider, MD   etanercept (ENBREL) 25 MG/0.5ML SOSY INJECTION WEEKLY 2/10/22   Historical Provider, MD   diclofenac sodium (VOLTAREN) 1 % GEL Apply 2 g topically 4 times daily As needed for right wrist swelling/pain 22  Marissa Abo Do DO   folic acid (FOLVITE) 1 MG tablet Take 1 tablet by mouth daily    Historical Provider, MD   triamcinolone (KENALOG) 0.025 % cream Apply topically every 4 hours as needed Apply Topically itchy skin  PRN    Historical Provider, MD   amLODIPine (NORVASC) 10 MG tablet Take 1 tablet by mouth daily 18   Historical Provider, MD       Current medications:    No current facility-administered medications for this visit.      No current outpatient medications on

## 2023-07-10 NOTE — DISCHARGE INSTRUCTIONS
[address] [facility]  [facility]  [address] [facility]. [address]  [phone]      POST-OP NASAL SURGERY INSTRUCTIONS    Diet  Resume regular diet. Avoid hot and spicy foods, as this may increase nasal blood flow and oozing  Drink plenty of liquids    Activity  DO NOT blow your nose. Cough and sneeze with you mouth open if you need to do so. Avoid Straining, bending or lifting or vigorous exercise for 2 weeks  Keep the head of your bed elevated to reduce swelling for the first 72 hours  May shower the day after surgery    General Instructions  Change the drip pad (gauze) under your nose as needed. In the first 24-48 hours, you may need to change the drip pad every 15-20 minutes. This is normal. Do not be alarmed. If you experience excessive bleeding or bleeding that does not subside after 15 minutes, you should call the number above. You may place ice packs on your nose to alleviate swelling and discomfort. DO NOT place ice packs directly on nose, wrap the pack in a cloth before application. Splints will be placed in your nose, this will cause stuffiness and mild discomfort. They will be removed at your first post-op visit. Medications  Resume your normal medications  Take antibiotics prescribed  Take pain medications as needed for pain  DO NOT use any herbal medicines/diet pills for two weeks after surgery. Saline nasal spray can be purchased over the counter, and should be used 4 times daily to keep the nasal passageways moist.      Call the Office  Fever greater than 100.4  Excessive nasal bleeding. Sudden increase in pain      [address] [facility] D.O.  [address] [facility]. [address]  [phone]    Post-Op Ear Tube Surgery Instructions    General  Ear tubes have been placed in your ears to treat Eustachian tube dysfunction and its complications. The Ear tubes remain in place on average 6 months to 2 years.   Please see

## 2023-07-10 NOTE — ANESTHESIA POSTPROCEDURE EVALUATION
Department of Anesthesiology  Postprocedure Note    Patient: Tawana Vital  MRN: [id number]  YOB: 1960  Date of evaluation: 7/10/2023      Procedure Summary     Date: 07/10/23 Room / Location: [address] [address] [facility]    Anesthesia Start: 0730 Anesthesia Stop: 328    Procedures:       LEFT ANTERIOR ETHMOIDECTOMY, BILATERAL INFERIOR TURBINATE REDUCTION, LEFT MAXILLARY ANTROSTOMY AND SEPTOPLASTY (Bilateral)      LEFT MYRINGOTOMY WITH PRESSURE EQUALIZING TUBE (Left) Diagnosis:       Chronic maxillary sinusitis      Deviated nasal septum      Nasal turbinate hypertrophy      Acute dysfunction of left eustachian tube      (Chronic maxillary sinusitis [J32.0])      (Deviated nasal septum [J34.2])      (Nasal turbinate hypertrophy [J34.3])      (Acute dysfunction of left eustachian tube [H69.82])    Surgeons: Randi Murray DO Responsible Provider: Pratibah Carvalho DO    Anesthesia Type: general, TIVA ASA Status: 3          Anesthesia Type: No value filed.     Ant Phase I: Ant Score: 5    Ant Phase II:        Anesthesia Post Evaluation    Patient location during evaluation: PACU  Patient participation: complete - patient participated  Level of consciousness: responsive to verbal stimuli  Pain score: 4  Airway patency: patent  Nausea & Vomiting: no nausea and no vomiting  Complications: no  Cardiovascular status: hemodynamically stable  Respiratory status: acceptable  Hydration status: stable  Multimodal analgesia pain management approach

## 2023-07-10 NOTE — H&P
Interval H&P  See preop H&P from Araceli Escalera dated 7/5/23  No interval changes  63F with DNS, sinusitis, etd, turbinate hypertrophy  Proceed with surgery

## 2023-07-10 NOTE — OP NOTE
Operative Note      Patient: Lexus Angeles  YOB: 1960  MRN: [id number]    Date of Procedure: 7/10/2023    Pre-Op Diagnosis Codes:     * Chronic maxillary sinusitis [J32.0]     * Deviated nasal septum [J34.2]     * Nasal turbinate hypertrophy [J34.3]     * Acute dysfunction of left eustachian tube [H69.82]    Post-Op Diagnosis: Same       Procedure(s):  LEFT ANTERIOR ETHMOIDECTOMY, BILATERAL INFERIOR TURBINATE REDUCTION, LEFT MAXILLARY ANTROSTOMY AND SEPTOPLASTY  LEFT MYRINGOTOMY WITH PRESSURE EQUALIZING TUBE    Surgeon(s):  Shanita Monte DO    Assistant:   * No surgical staff found *    Anesthesia: General    Estimated Blood Loss (mL): 40LY    Complications: None    Specimens:   ID Type Source Tests Collected by Time Destination   A : A. LEFT SINUS CONTENTS Tissue Tissue SURGICAL PATHOLOGY Shanita Monte DO 7/10/2023 0894        Implants:  * No implants in log *      Drains: * No LDAs found *    Findings: 4+ septal deviation to the left. Bilateral turbinate hypertrophy. Adequate PE tube placement. Chronic sinusitis and sinus contents. Detailed Description of Procedure: The patient was identified in the preoperative holding area. Verified informed consent was obtained. The left ear was marked. The patient was taken to the operating suite, transferred to the operating table, sedated with general anesthesia and intubated. The nose was infiltrated with 1% lidocaine with 1: 100,000 epinephrine. The nasal passages were packed with Afrin-soaked cottonoids. A proper timeout was performed. Surgery began with the ear tube on the left. The operating microscope was brought to the field. Under microscopic visualization, the left ear was examined using a 5 mm speculum. A myringotomy knife was used to make a radial incision in the anterior-inferior quadrant of the tympanic membrane. An alligator forcep was used to place a router bobbin PE tube into the myringotomy.     The head of

## 2023-07-18 ENCOUNTER — OFFICE VISIT (OUTPATIENT)
Dept: ENT CLINIC | Age: 63
End: 2023-07-18
Payer: COMMERCIAL

## 2023-07-18 VITALS — WEIGHT: 203 LBS | HEIGHT: 64 IN | BODY MASS INDEX: 34.66 KG/M2

## 2023-07-18 DIAGNOSIS — H69.82 ETD (EUSTACHIAN TUBE DYSFUNCTION), LEFT: ICD-10-CM

## 2023-07-18 DIAGNOSIS — J32.0 CHRONIC MAXILLARY SINUSITIS: ICD-10-CM

## 2023-07-18 DIAGNOSIS — Z98.890 HISTORY OF NASAL SEPTOPLASTY: Primary | ICD-10-CM

## 2023-07-18 DIAGNOSIS — Z98.890 HISTORY OF ENDOSCOPIC SINUS SURGERY: ICD-10-CM

## 2023-07-18 PROCEDURE — 31237 NSL/SINS NDSC SURG BX POLYPC: CPT | Performed by: OTOLARYNGOLOGY

## 2023-07-18 PROCEDURE — 99024 POSTOP FOLLOW-UP VISIT: CPT | Performed by: OTOLARYNGOLOGY

## 2023-07-18 NOTE — PROGRESS NOTES
[facility]  [address], [address] [facility], [address] [place] [address]  P: [phone]  F: [phone]       Patient     Dm Harry  1960    ChiefComplaint     Chief Complaint   Patient presents with    Post-Op Check     No concerns- sneezing- one week       History of Present Illness     Dm Harry is a pleasant 58 y.o. female here for 1 week postop visit for endoscopic sinus surgery, septoplasty, left PE tube placement. Overall doing relatively well. She is having some sneezing and congestion. No significant bleeding or pain.     Past Medical History     Past Medical History:   Diagnosis Date    Allergic rhinitis     Arthritis     rheumatoid arthritis    Diabetes mellitus (HCC)     PREDIABETIC D/T STEROID USE    Dizziness     Fibromyalgia     Hearing loss     Hypertension     Migraine     MVP (mitral valve prolapse)     Nausea & vomiting     PONV (postoperative nausea and vomiting)     NAUSEA    Rash     Reflux Doesn't have anymore r/t weight loss    Sinusitis     Thyroid disease     goiter treated with radioactive med    Tinnitus     TMJ dysfunction     Wears glasses        Past Surgical History     Past Surgical History:   Procedure Laterality Date    ANKLE ARTHROSCOPY Right 03/2012    lateral ligament repair, removal spurs    ANKLE SURGERY      right    APPENDECTOMY      BACK SURGERY      cyst removed off lower spine    CERVICAL FUSION N/A 12/11/2019    C4-5, C5-6, C6-7 ANTERIOR CERVICAL DISCECTOMY AND FUSION performed by Raegan Bosch MD at [address] [facility]      left    JOINT REPLACEMENT Left 2019    KNEE ARTHROSCOPY Left     LUMBAR FUSION N/A 01/06/2022    L3-4, L4-5, L5-S1 ANTERIOR LUMBAR INTERBODY FUSION WITH PEDICLE SCREW FIXATION performed by Raegan Bosch MD at [address] [facility] [address] Left 7/10/2023    LEFT MYRINGOTOMY WITH PRESSURE EQUALIZING TUBE performed by Arthur Rivas DO at [address] Left 08/28/2020

## 2023-08-01 ENCOUNTER — OFFICE VISIT (OUTPATIENT)
Dept: ENT CLINIC | Age: 63
End: 2023-08-01
Payer: COMMERCIAL

## 2023-08-01 VITALS — BODY MASS INDEX: 34.66 KG/M2 | HEIGHT: 64 IN | WEIGHT: 203 LBS

## 2023-08-01 DIAGNOSIS — H69.82 ETD (EUSTACHIAN TUBE DYSFUNCTION), LEFT: ICD-10-CM

## 2023-08-01 DIAGNOSIS — Z98.890 HISTORY OF NASAL SEPTOPLASTY: Primary | ICD-10-CM

## 2023-08-01 DIAGNOSIS — Z96.22 PATENT TYMPANOSTOMY TUBE: ICD-10-CM

## 2023-08-01 DIAGNOSIS — Z98.890 HISTORY OF ENDOSCOPIC SINUS SURGERY: ICD-10-CM

## 2023-08-01 PROCEDURE — 99024 POSTOP FOLLOW-UP VISIT: CPT | Performed by: OTOLARYNGOLOGY

## 2023-08-01 PROCEDURE — 31237 NSL/SINS NDSC SURG BX POLYPC: CPT | Performed by: OTOLARYNGOLOGY

## 2023-08-01 NOTE — PROGRESS NOTES
[facility]  [address], [address] [facility], [address]  P: [phone]  F: [phone]       Patient     Diana Chen  1960    ChiefComplaint     Chief Complaint   Patient presents with    Post-Op Check       History of Present Illness     Diana Chen is a pleasant 58 y.o. female here for 3-week postop visit for endoscopic sinus surgery, septoplasty, left-sided ear tube placement. Overall doing well.     Past Medical History     Past Medical History:   Diagnosis Date    Allergic rhinitis     Arthritis     rheumatoid arthritis    Diabetes mellitus (HCC)     PREDIABETIC D/T STEROID USE    Dizziness     Fibromyalgia     Hearing loss     Hypertension     Migraine     MVP (mitral valve prolapse)     Nausea & vomiting     PONV (postoperative nausea and vomiting)     NAUSEA    Rash     Reflux Doesn't have anymore r/t weight loss    Sinusitis     Thyroid disease     goiter treated with radioactive med    Tinnitus     TMJ dysfunction     Wears glasses        Past Surgical History     Past Surgical History:   Procedure Laterality Date    ANKLE ARTHROSCOPY Right 03/2012    lateral ligament repair, removal spurs    ANKLE SURGERY      right    APPENDECTOMY      BACK SURGERY      cyst removed off lower spine    CERVICAL FUSION N/A 12/11/2019    C4-5, C5-6, C6-7 ANTERIOR CERVICAL DISCECTOMY AND FUSION performed by Kirsten Hammans, MD at [address] [facility]      left    JOINT REPLACEMENT Left 2019    KNEE ARTHROSCOPY Left     LUMBAR FUSION N/A 01/06/2022    L3-4, L4-5, L5-S1 ANTERIOR LUMBAR INTERBODY FUSION WITH PEDICLE SCREW FIXATION [facility] Hammans[facility] [address] [facility] 7/10/2023    LEFT MYRINGOTOMY WITH PRESSURE EQUALIZING TUBE performed by Janell Walker DO at [address] [facility] Left 08/28/2020    LEFT TOTAL KNEE REVISION 2 COMPONENT performed by Francisco Montoya MD at [address]

## 2023-09-19 ENCOUNTER — OFFICE VISIT (OUTPATIENT)
Dept: ENT CLINIC | Age: 63
End: 2023-09-19
Payer: COMMERCIAL

## 2023-09-19 VITALS
WEIGHT: 209 LBS | HEIGHT: 64 IN | SYSTOLIC BLOOD PRESSURE: 134 MMHG | TEMPERATURE: 98.3 F | BODY MASS INDEX: 35.68 KG/M2 | OXYGEN SATURATION: 98 % | DIASTOLIC BLOOD PRESSURE: 82 MMHG | HEART RATE: 81 BPM | RESPIRATION RATE: 16 BRPM

## 2023-09-19 DIAGNOSIS — Z98.890 HISTORY OF ENDOSCOPIC SINUS SURGERY: ICD-10-CM

## 2023-09-19 DIAGNOSIS — R09.89 CHRONIC THROAT CLEARING: ICD-10-CM

## 2023-09-19 DIAGNOSIS — Z98.890 HISTORY OF NASAL SEPTOPLASTY: ICD-10-CM

## 2023-09-19 DIAGNOSIS — R13.13 PHARYNGEAL DYSPHAGIA: ICD-10-CM

## 2023-09-19 DIAGNOSIS — Z96.22 PATENT TYMPANOSTOMY TUBE: ICD-10-CM

## 2023-09-19 DIAGNOSIS — J32.0 CHRONIC MAXILLARY SINUSITIS: Primary | ICD-10-CM

## 2023-09-19 PROCEDURE — 99214 OFFICE O/P EST MOD 30 MIN: CPT | Performed by: OTOLARYNGOLOGY

## 2023-09-19 PROCEDURE — 31575 DIAGNOSTIC LARYNGOSCOPY: CPT | Performed by: OTOLARYNGOLOGY

## 2023-09-19 ASSESSMENT — ENCOUNTER SYMPTOMS
SHORTNESS OF BREATH: 0
COLOR CHANGE: 0
COUGH: 0
RHINORRHEA: 0
SORE THROAT: 0
STRIDOR: 0
EYE ITCHING: 0
EYE PAIN: 0
EYE REDNESS: 0
VOICE CHANGE: 0
DIARRHEA: 0
PHOTOPHOBIA: 0
CHOKING: 0
TROUBLE SWALLOWING: 0
FACIAL SWELLING: 0
SINUS PAIN: 0
NAUSEA: 0
SINUS PRESSURE: 1

## 2023-09-19 NOTE — PROGRESS NOTES
[facility]  [address] [facility], [address] [place] [address], [address] [place] [address]  P: [phone]  F: [phone]       Patient     Mikie Lagos  1960    ChiefComplaint     Chief Complaint   Patient presents with    Follow-up     Follow up for her Nasal Septoplasty , and pt stated that both ears feel like there is water in her ears. Pt also stated that she is having issues swallowing and is always clearing her throat because she feels like there is something there but nothing comes up       History of Present Illness     Mikie Lagos is a pleasant 58 y.o. female here for 2-month follow-up visit for endoscopic sinus surgery, septoplasty, turbinate reduction and left PE tube. She has been feeling some left-sided facial and sinus pressure, postnasal drainage, dysphagia, throat clearing. She has been using nasal saline spray.     Past Medical History     Past Medical History:   Diagnosis Date    Allergic rhinitis     Arthritis     rheumatoid arthritis    Diabetes mellitus (HCC)     PREDIABETIC D/T STEROID USE    Dizziness     Fibromyalgia     Hearing loss     Hypertension     Migraine     MVP (mitral valve prolapse)     Nausea & vomiting     PONV (postoperative nausea and vomiting)     NAUSEA    Rash     Reflux Doesn't have anymore r/t weight loss    Sinusitis     Thyroid disease     goiter treated with radioactive med    Tinnitus     TMJ dysfunction     Wears glasses        Past Surgical History     Past Surgical History:   Procedure Laterality Date    ANKLE ARTHROSCOPY Right 03/2012    lateral ligament repair, removal spurs    ANKLE SURGERY      right    APPENDECTOMY      BACK SURGERY      cyst removed off lower spine    CERVICAL FUSION N/A 12/11/2019    C4-5, C5-6, C6-7 ANTERIOR CERVICAL DISCECTOMY AND FUSION performed by Priyank Murphy MD at [address] [facility]       left    JOINT REPLACEMENT Left 2019    KNEE ARTHROSCOPY Left     LUMBAR FUSION N/A 01/06/2022    L3-4, L4-5,

## 2023-09-20 ENCOUNTER — TELEPHONE (OUTPATIENT)
Dept: ENT CLINIC | Age: 63
End: 2023-09-20

## 2023-09-20 LAB
REASON FOR REJECTION: NORMAL
REJECTED TEST: NORMAL

## 2023-09-20 NOTE — TELEPHONE ENCOUNTER
Shirlene from [facility] is calling in regard to a rejected lab sample. Please give her a call back at [phone]. Thanks!

## 2023-09-21 NOTE — TELEPHONE ENCOUNTER
Spoke to patient informed her the lab error , and she wants to do the culture first but you have not availability on your schedule .  Please advise

## 2023-09-22 LAB
BACTERIA SPEC AEROBE CULT: ABNORMAL
BACTERIA SPEC AEROBE CULT: ABNORMAL
BACTERIA SPEC ANAEROBE CULT: ABNORMAL
GRAM STN SPEC: ABNORMAL
ORGANISM: ABNORMAL

## 2023-09-22 NOTE — RESULT ENCOUNTER NOTE
Spoke to patient and informed her of findings and that your planning to send a different med to pharmacy

## 2023-09-25 ENCOUNTER — TELEPHONE (OUTPATIENT)
Dept: ENT CLINIC | Age: 63
End: 2023-09-25

## 2023-09-25 NOTE — TELEPHONE ENCOUNTER
Patient feels her 9/19 ov was coded incorrectly as insurance is not covering appointment. (It does look like this should still be a post op appointment)  Patient was also told that visit was coded with a nurse practitioner and not Dr Chet Ledesma. Patient was advised that there are no nurse practitioners in this office. Patient was told by billing office to call our office to get this cleared up.

## 2023-09-25 NOTE — TELEPHONE ENCOUNTER
Patient called says she needs her antibiotic sent in. She is going out of town tomorrow morning and needs to start her medicine ASAP. Please give her a call back today, thanks!

## 2023-10-17 ENCOUNTER — OFFICE VISIT (OUTPATIENT)
Dept: ENT CLINIC | Age: 63
End: 2023-10-17
Payer: COMMERCIAL

## 2023-10-17 VITALS
BODY MASS INDEX: 33.63 KG/M2 | HEART RATE: 86 BPM | TEMPERATURE: 97.8 F | OXYGEN SATURATION: 90 % | DIASTOLIC BLOOD PRESSURE: 78 MMHG | WEIGHT: 197 LBS | SYSTOLIC BLOOD PRESSURE: 134 MMHG | HEIGHT: 64 IN | RESPIRATION RATE: 16 BRPM

## 2023-10-17 DIAGNOSIS — Z96.22 PATENT TYMPANOSTOMY TUBE: ICD-10-CM

## 2023-10-17 DIAGNOSIS — Z98.890 HISTORY OF ENDOSCOPIC SINUS SURGERY: ICD-10-CM

## 2023-10-17 DIAGNOSIS — Z98.890 HISTORY OF NASAL SEPTOPLASTY: Primary | ICD-10-CM

## 2023-10-17 DIAGNOSIS — J32.0 CHRONIC MAXILLARY SINUSITIS: ICD-10-CM

## 2023-10-17 PROCEDURE — 99213 OFFICE O/P EST LOW 20 MIN: CPT | Performed by: OTOLARYNGOLOGY

## 2023-10-17 ASSESSMENT — ENCOUNTER SYMPTOMS
SORE THROAT: 0
STRIDOR: 0
FACIAL SWELLING: 0
COLOR CHANGE: 0
COUGH: 0
EYE REDNESS: 0
NAUSEA: 0
SINUS PAIN: 1
EYE PAIN: 0
DIARRHEA: 0
EYE ITCHING: 0
PHOTOPHOBIA: 0
RHINORRHEA: 0
VOICE CHANGE: 0
SHORTNESS OF BREATH: 0
SINUS PRESSURE: 0
CHOKING: 0
TROUBLE SWALLOWING: 0

## 2023-10-17 NOTE — PROGRESS NOTES
[facility]  [address], [address] [facility], [address] [place] [address]  P: [phone]  F: [phone]       Patient     Nila Hay  1960    ChiefComplaint     Chief Complaint   Patient presents with    Post-Op Check     Deviated Septum and tube in the ear. Also here to over biopsy results        History of Present Illness     Nila Hay is a pleasant 58 y.o. female here for 1 month follow-up for chronic sinusitis. Surgery was performed in July of this year. At previous visit, head persistent issues both nasal crusting, dysphagia and throat clearing. At previous visit she had some yellow-green mucus crusting with mucopus. Culture grew Pseudomonas. We sent a prescription for nasal antibiotic rinse. She began the rinse a week ago. She feels some pressure and aching in the ethmoid and frontal region. She is still getting some clots out occasionally.     Past Medical History     Past Medical History:   Diagnosis Date    Allergic rhinitis     Arthritis     rheumatoid arthritis    Diabetes mellitus (HCC)     PREDIABETIC D/T STEROID USE    Dizziness     Fibromyalgia     Hearing loss     Hypertension     Migraine     MVP (mitral valve prolapse)     Nausea & vomiting     PONV (postoperative nausea and vomiting)     NAUSEA    Rash     Reflux Doesn't have anymore r/t weight loss    Sinusitis     Thyroid disease     goiter treated with radioactive med    Tinnitus     TMJ dysfunction     Wears glasses        Past Surgical History     Past Surgical History:   Procedure Laterality Date    ANKLE ARTHROSCOPY Right 03/2012    lateral ligament repair, removal spurs    ANKLE SURGERY      right    APPENDECTOMY      BACK SURGERY      cyst removed off lower spine    CERVICAL FUSION N/A 12/11/2019    C4-5, C5-6, C6-7 ANTERIOR CERVICAL DISCECTOMY AND FUSION performed by Evon Marie MD at [address] [facility]       left    JOINT REPLACEMENT Left 2019    KNEE ARTHROSCOPY Left

## 2023-10-18 ENCOUNTER — OFFICE VISIT (OUTPATIENT)
Dept: ORTHOPEDIC SURGERY | Age: 63
End: 2023-10-18
Payer: COMMERCIAL

## 2023-10-18 VITALS — WEIGHT: 197 LBS | HEIGHT: 64 IN | BODY MASS INDEX: 33.63 KG/M2

## 2023-10-18 DIAGNOSIS — M54.2 CERVICAL PAIN: Primary | ICD-10-CM

## 2023-10-18 PROCEDURE — 99213 OFFICE O/P EST LOW 20 MIN: CPT | Performed by: ORTHOPAEDIC SURGERY

## 2023-10-18 RX ORDER — GABAPENTIN 300 MG/1
CAPSULE ORAL
COMMUNITY
Start: 2023-10-10

## 2023-10-18 NOTE — PROGRESS NOTES
normal.      Radiographic:  Left shoulder MRI 1/16/2023  CONCLUSION:   1. The patient is status post rotator cuff repair surgery, acromioplasty and distal clavicular   resection surgery. 2. Moderate-severity tendinosis of the supraspinatus tendon is present with a tiny low-grade   (less than one-quarter thickness) partial-thickness interstitial tear of the posterior aspect   of the distal supraspinatus tendon which would not be visible from either the articular or   bursal surfaces. 3. The intraarticular segment of the long head of the biceps tendon which was clearly   delineated/defined at the time of the prior MRI examination on July 19, 2022 appears amorphous   and ill defined on the current examination and is either torn or tendinopathic (unless interval    biceps tenotomy or tenodesis surgery has been performed). 4. A moderate-sized bursal fluid collection is present in the region of the   subacromial-subdeltoid bursa, and a small to moderate-sized joint effusion is present with   synovitis, numerous tiny foci of synovial osteochondromatosis or rice bodies within the   subacromial-subdeltoid bursa and within the axillary recess of the glenohumeral joint. Assessment:  Kati Coon is a 58 y.o. female with a history of a left revision rotator cuff repair more than a year ago currently with persistent pain despite extensive conservative treatment related to persistent synovitis. Impression:  Encounter Diagnosis   Name Primary? Cervical pain Yes       Office Procedures:  Orders Placed This Encounter   Procedures    JOSÉ LUIS - Joaquin Harris MD, Physical Medicine and Rehabilitation (EMG), [facility]     Referral Priority:   Routine     Referral Type:   Eval and Treat     Referral Reason:   Specialty Services Required     Referred to Provider:   Dae Dumas MD     Requested Specialty:   Physical Medicine and Rehab     Number of Visits Requested:   1       Plan:    We were able to review

## 2023-11-07 ENCOUNTER — OFFICE VISIT (OUTPATIENT)
Dept: ENT CLINIC | Age: 63
End: 2023-11-07
Payer: COMMERCIAL

## 2023-11-07 VITALS
OXYGEN SATURATION: 97 % | HEART RATE: 89 BPM | DIASTOLIC BLOOD PRESSURE: 83 MMHG | SYSTOLIC BLOOD PRESSURE: 137 MMHG | TEMPERATURE: 97.5 F | BODY MASS INDEX: 37.56 KG/M2 | RESPIRATION RATE: 16 BRPM | WEIGHT: 220 LBS | HEIGHT: 64 IN

## 2023-11-07 DIAGNOSIS — Z98.890 HISTORY OF ENDOSCOPIC SINUS SURGERY: Primary | ICD-10-CM

## 2023-11-07 DIAGNOSIS — Z96.22 PATENT TYMPANOSTOMY TUBE: ICD-10-CM

## 2023-11-07 DIAGNOSIS — J32.0 CHRONIC MAXILLARY SINUSITIS: ICD-10-CM

## 2023-11-07 DIAGNOSIS — H81.02 MENIERE'S DISEASE OF LEFT EAR: ICD-10-CM

## 2023-11-07 DIAGNOSIS — H69.93 DYSFUNCTION OF BOTH EUSTACHIAN TUBES: ICD-10-CM

## 2023-11-07 DIAGNOSIS — H92.01 OTALGIA, RIGHT: ICD-10-CM

## 2023-11-07 PROCEDURE — 99213 OFFICE O/P EST LOW 20 MIN: CPT | Performed by: OTOLARYNGOLOGY

## 2023-11-07 PROCEDURE — 31231 NASAL ENDOSCOPY DX: CPT | Performed by: OTOLARYNGOLOGY

## 2023-11-07 RX ORDER — TRIAMTERENE AND HYDROCHLOROTHIAZIDE 37.5; 25 MG/1; MG/1
1 CAPSULE ORAL DAILY
Qty: 90 CAPSULE | Refills: 3 | Status: SHIPPED | OUTPATIENT
Start: 2023-11-07

## 2023-11-07 ASSESSMENT — ENCOUNTER SYMPTOMS
EYE ITCHING: 0
STRIDOR: 0
SINUS PRESSURE: 0
COLOR CHANGE: 0
RHINORRHEA: 1
DIARRHEA: 0
PHOTOPHOBIA: 0
VOICE CHANGE: 0
COUGH: 0
SORE THROAT: 0
TROUBLE SWALLOWING: 0
NAUSEA: 0
SHORTNESS OF BREATH: 0
CHOKING: 0
EYE REDNESS: 0
SINUS PAIN: 0
EYE PAIN: 0
FACIAL SWELLING: 0

## 2023-11-15 ENCOUNTER — PROCEDURE VISIT (OUTPATIENT)
Dept: ENT CLINIC | Age: 63
End: 2023-11-15
Payer: COMMERCIAL

## 2023-11-15 VITALS
BODY MASS INDEX: 36.5 KG/M2 | WEIGHT: 213.8 LBS | DIASTOLIC BLOOD PRESSURE: 83 MMHG | SYSTOLIC BLOOD PRESSURE: 138 MMHG | HEART RATE: 87 BPM | TEMPERATURE: 97.8 F | HEIGHT: 64 IN

## 2023-11-15 DIAGNOSIS — H69.91 ETD (EUSTACHIAN TUBE DYSFUNCTION), RIGHT: Primary | ICD-10-CM

## 2023-11-15 PROCEDURE — 69433 CREATE EARDRUM OPENING: CPT | Performed by: OTOLARYNGOLOGY

## 2023-11-15 NOTE — PROGRESS NOTES
PE Tube    Pre op Dx: right-sided eustachian tube dysfunction  Post Op: Same  Procedure: right-sided Myringotomy with tympanostomy tube placement  Consent: written obtained  Surgeon: Hakan Lagunas DO  Description:  With the use of an operating microscopy and a 4mm speculum, the external auditory canals were examined bilaterally. Any cerumen was removed using instrumentation. The tympanic membranes were visualize bilaterally. Topical phenol was applied to the anterior-inferior quadrant of right-sided tympanic membrane(s). A myringotomy knife was used to make a radial incision in the tympanic membrane(s). A 3-suction was used to suction out fluid from the middle ear space. An alligator forceps was used to place a paparella PE tube in the myringotomy. The patient tolerated the procedure well. There were no complications with the procedure.       Post procedure instructions discussed  Follow up 3 weeks

## 2023-11-16 ENCOUNTER — TELEPHONE (OUTPATIENT)
Dept: ENT CLINIC | Age: 63
End: 2023-11-16

## 2023-11-16 NOTE — TELEPHONE ENCOUNTER
Patient called says her ear hurts and it feels like she has a marble stuffed in her ear. Is that normal?    Please give her a call, thanks!

## 2023-11-17 NOTE — TELEPHONE ENCOUNTER
Patient informed to take Tylenol or Ibuprofen for her pain per Dr Reggie Hatfield instructions.   sean

## 2023-11-20 ENCOUNTER — TELEPHONE (OUTPATIENT)
Dept: ENT CLINIC | Age: 63
End: 2023-11-20

## 2023-11-20 NOTE — TELEPHONE ENCOUNTER
Patient had a tube placed in her right ear last week and now she can't hear out of her ear.  She feels like she has cotton in her ear.  No pain.  She has had problems with her hearing since the tube was placed.  She is not currently taking anything for her ears.  She wants to know what she should do?

## 2023-11-21 NOTE — TELEPHONE ENCOUNTER
Patient will schedule a follow up with Dr Nevarez and have a hearing test before the appointment. sean

## 2023-12-06 ENCOUNTER — OFFICE VISIT (OUTPATIENT)
Dept: ENT CLINIC | Age: 63
End: 2023-12-06
Payer: COMMERCIAL

## 2023-12-06 ENCOUNTER — PROCEDURE VISIT (OUTPATIENT)
Dept: AUDIOLOGY | Age: 63
End: 2023-12-06
Payer: COMMERCIAL

## 2023-12-06 VITALS
WEIGHT: 212 LBS | SYSTOLIC BLOOD PRESSURE: 131 MMHG | RESPIRATION RATE: 16 BRPM | TEMPERATURE: 97.8 F | HEART RATE: 77 BPM | BODY MASS INDEX: 36.19 KG/M2 | HEIGHT: 64 IN | DIASTOLIC BLOOD PRESSURE: 72 MMHG

## 2023-12-06 DIAGNOSIS — G43.809 VESTIBULAR MIGRAINE: ICD-10-CM

## 2023-12-06 DIAGNOSIS — J30.81 ALLERGIC RHINITIS DUE TO ANIMAL HAIR AND DANDER: ICD-10-CM

## 2023-12-06 DIAGNOSIS — H69.93 DYSFUNCTION OF BOTH EUSTACHIAN TUBES: Primary | ICD-10-CM

## 2023-12-06 DIAGNOSIS — H90.3 SENSORINEURAL HEARING LOSS (SNHL) OF BOTH EARS: ICD-10-CM

## 2023-12-06 DIAGNOSIS — H81.02 MENIERE'S DISEASE OF LEFT EAR: ICD-10-CM

## 2023-12-06 DIAGNOSIS — H93.13 TINNITUS OF BOTH EARS: ICD-10-CM

## 2023-12-06 DIAGNOSIS — H90.3 SENSORINEURAL HEARING LOSS (SNHL) OF BOTH EARS: Primary | ICD-10-CM

## 2023-12-06 DIAGNOSIS — Z96.22 PATENT TYMPANOSTOMY TUBE: ICD-10-CM

## 2023-12-06 PROCEDURE — 92557 COMPREHENSIVE HEARING TEST: CPT

## 2023-12-06 PROCEDURE — 99214 OFFICE O/P EST MOD 30 MIN: CPT | Performed by: OTOLARYNGOLOGY

## 2023-12-06 PROCEDURE — 92567 TYMPANOMETRY: CPT

## 2023-12-06 RX ORDER — OLOPATADINE HYDROCHLORIDE AND MOMETASONE FUROATE 25; 665 UG/1; UG/1
1 SPRAY, METERED NASAL 2 TIMES DAILY
Qty: 29 G | Refills: 5 | Status: SHIPPED | OUTPATIENT
Start: 2023-12-06

## 2023-12-06 ASSESSMENT — ENCOUNTER SYMPTOMS
TROUBLE SWALLOWING: 0
FACIAL SWELLING: 0
SHORTNESS OF BREATH: 0
PHOTOPHOBIA: 0
RHINORRHEA: 0
SINUS PAIN: 0
NAUSEA: 0
COUGH: 0
VOICE CHANGE: 0
EYE REDNESS: 0
DIARRHEA: 0
SORE THROAT: 0
CHOKING: 0
COLOR CHANGE: 0
EYE PAIN: 0
STRIDOR: 0
EYE ITCHING: 0
SINUS PRESSURE: 0

## 2023-12-06 NOTE — PROGRESS NOTES
DO        Degree of   Hearing Sensitivity dB Range   Within Normal Limits (WNL) 0 - 20   Mild 20 - 40   Moderate 40 - 55   Moderately-Severe 55 - 70   Severe 70 - 90   Profound 90 +

## 2023-12-06 NOTE — PROGRESS NOTES
case with another provider  [ ] High risk of loss of major body function  [ ] Elective major surgery with risk factors    Portions of this note were dictated using Dragon.  There may be linguistic errors secondary to the use of this program.

## 2023-12-06 NOTE — PATIENT INSTRUCTIONS
Your tinnitus or hearing loss does not get better within 1 week after an ear injury. Your tinnitus bothers you enough that you want to take medicines to help you cope with it. If you notice changes in your tinnitus and/or your hearing, it is recommended that you have your hearing tested by your audiologist and to follow-up with your physician that manages your hearing loss (such as your ENT or Primary Care doctor).

## 2023-12-10 DIAGNOSIS — H81.02 MENIERE'S DISEASE OF LEFT EAR: ICD-10-CM

## 2023-12-11 ENCOUNTER — TELEPHONE (OUTPATIENT)
Dept: ENT CLINIC | Age: 63
End: 2023-12-11

## 2023-12-11 RX ORDER — TRIAMTERENE AND HYDROCHLOROTHIAZIDE 37.5; 25 MG/1; MG/1
1 CAPSULE ORAL DAILY
Qty: 90 CAPSULE | Refills: 3 | Status: SHIPPED | OUTPATIENT
Start: 2023-12-11

## 2023-12-11 NOTE — TELEPHONE ENCOUNTER
Asked by Dr. Nevarez to discuss allergy testing with this patient.  Allergy Welcome Packet given to patient in the office and contents reviewed over the phone.  Patient will be getting new health insurance in January and will need to check that before scheduling for testing. Patient will call to check insurance coverage.     Pt verb understanding to stop taking all antihistamines 7 days prior to testing.She is currently on a [employer] generic antihistamine for allergies, but doesn't remember which one it is. She is to also stop Ryaltris 2 days before testing. She is prescribed Nortriptyline for migraines PRN, but has not taken in over 2 weeks and does not take on a regular basis. Patient states that it will not be a problem to not take it for 2 weeks before allergy testing.    Patient will call our office back to schedule allergy testing after she gets her new insurance after the first of the year.

## 2024-02-09 ENCOUNTER — TELEPHONE (OUTPATIENT)
Dept: ENT CLINIC | Age: 64
End: 2024-02-09

## 2024-02-09 NOTE — TELEPHONE ENCOUNTER
Patient is not ready to schedule for allergy testing at this time and asked for a call back in April.

## 2024-05-03 ENCOUNTER — TELEPHONE (OUTPATIENT)
Dept: ENT CLINIC | Age: 64
End: 2024-05-03

## 2024-05-20 NOTE — TELEPHONE ENCOUNTER
Spoke to patient over the phone regarding allergy testing. She will be changing insurance. She does not want to schedule at this time, but will contact our office if she decided to schedule allergy testing.

## 2024-10-28 ENCOUNTER — HOSPITAL ENCOUNTER (EMERGENCY)
Age: 64
Discharge: HOME | End: 2024-10-28
Attending: EMERGENCY MEDICINE
Payer: MEDICARE

## 2024-10-28 ENCOUNTER — APPOINTMENT (OUTPATIENT)
Dept: GENERAL RADIOLOGY | Age: 64
End: 2024-10-28
Payer: MEDICARE

## 2024-10-28 VITALS
OXYGEN SATURATION: 99 % | DIASTOLIC BLOOD PRESSURE: 86 MMHG | BODY MASS INDEX: 34.51 KG/M2 | HEART RATE: 74 BPM | RESPIRATION RATE: 22 BRPM | SYSTOLIC BLOOD PRESSURE: 135 MMHG | WEIGHT: 201.06 LBS | TEMPERATURE: 98.7 F

## 2024-10-28 DIAGNOSIS — T78.40XA ALLERGIC REACTION, INITIAL ENCOUNTER: Primary | ICD-10-CM

## 2024-10-28 LAB
ANION GAP SERPL CALCULATED.3IONS-SCNC: 12 MMOL/L (ref 3–16)
BASOPHILS # BLD: 0 K/UL (ref 0–0.2)
BASOPHILS NFR BLD: 0.3 %
BUN SERPL-MCNC: 14 MG/DL (ref 7–20)
CALCIUM SERPL-MCNC: 8.9 MG/DL (ref 8.3–10.6)
CHLORIDE SERPL-SCNC: 100 MMOL/L (ref 99–110)
CO2 SERPL-SCNC: 25 MMOL/L (ref 21–32)
CREAT SERPL-MCNC: 0.7 MG/DL (ref 0.6–1.2)
DEPRECATED RDW RBC AUTO: 13.5 % (ref 12.4–15.4)
EKG ATRIAL RATE: 86 BPM
EKG DIAGNOSIS: NORMAL
EKG P AXIS: 69 DEGREES
EKG P-R INTERVAL: 176 MS
EKG Q-T INTERVAL: 382 MS
EKG QRS DURATION: 94 MS
EKG QTC CALCULATION (BAZETT): 457 MS
EKG R AXIS: -20 DEGREES
EKG T AXIS: 49 DEGREES
EKG VENTRICULAR RATE: 86 BPM
EOSINOPHIL # BLD: 0.2 K/UL (ref 0–0.6)
EOSINOPHIL NFR BLD: 2.9 %
GFR SERPLBLD CREATININE-BSD FMLA CKD-EPI: >90 ML/MIN/{1.73_M2}
GLUCOSE SERPL-MCNC: 114 MG/DL (ref 70–99)
HCT VFR BLD AUTO: 40.4 % (ref 36–48)
HGB BLD-MCNC: 14 G/DL (ref 12–16)
LYMPHOCYTES # BLD: 1.3 K/UL (ref 1–5.1)
LYMPHOCYTES NFR BLD: 18.5 %
MCH RBC QN AUTO: 31.4 PG (ref 26–34)
MCHC RBC AUTO-ENTMCNC: 34.6 G/DL (ref 31–36)
MCV RBC AUTO: 90.7 FL (ref 80–100)
MONOCYTES # BLD: 1.4 K/UL (ref 0–1.3)
MONOCYTES NFR BLD: 20.9 %
NEUTROPHILS # BLD: 3.9 K/UL (ref 1.7–7.7)
NEUTROPHILS NFR BLD: 57.4 %
NT-PROBNP SERPL-MCNC: 85 PG/ML (ref 0–124)
PLATELET # BLD AUTO: 258 K/UL (ref 135–450)
PMV BLD AUTO: 9.6 FL (ref 5–10.5)
POTASSIUM SERPL-SCNC: 4 MMOL/L (ref 3.5–5.1)
RBC # BLD AUTO: 4.46 M/UL (ref 4–5.2)
SODIUM SERPL-SCNC: 137 MMOL/L (ref 136–145)
TROPONIN, HIGH SENSITIVITY: 8 NG/L (ref 0–14)
TROPONIN, HIGH SENSITIVITY: 8 NG/L (ref 0–14)
WBC # BLD AUTO: 6.8 K/UL (ref 4–11)

## 2024-10-28 PROCEDURE — 71045 X-RAY EXAM CHEST 1 VIEW: CPT

## 2024-10-28 PROCEDURE — 2500000003 HC RX 250 WO HCPCS: Performed by: EMERGENCY MEDICINE

## 2024-10-28 PROCEDURE — 93005 ELECTROCARDIOGRAM TRACING: CPT | Performed by: EMERGENCY MEDICINE

## 2024-10-28 PROCEDURE — 96374 THER/PROPH/DIAG INJ IV PUSH: CPT

## 2024-10-28 PROCEDURE — 99285 EMERGENCY DEPT VISIT HI MDM: CPT

## 2024-10-28 PROCEDURE — 6370000000 HC RX 637 (ALT 250 FOR IP): Performed by: EMERGENCY MEDICINE

## 2024-10-28 PROCEDURE — 83880 ASSAY OF NATRIURETIC PEPTIDE: CPT

## 2024-10-28 PROCEDURE — 85025 COMPLETE CBC W/AUTO DIFF WBC: CPT

## 2024-10-28 PROCEDURE — 6360000002 HC RX W HCPCS: Performed by: EMERGENCY MEDICINE

## 2024-10-28 PROCEDURE — 80048 BASIC METABOLIC PNL TOTAL CA: CPT

## 2024-10-28 PROCEDURE — 84484 ASSAY OF TROPONIN QUANT: CPT

## 2024-10-28 PROCEDURE — 2580000003 HC RX 258: Performed by: EMERGENCY MEDICINE

## 2024-10-28 RX ORDER — PREDNISONE 20 MG/1
40 TABLET ORAL DAILY
Qty: 10 TABLET | Refills: 0 | Status: SHIPPED | OUTPATIENT
Start: 2024-10-28 | End: 2024-11-02

## 2024-10-28 RX ORDER — CETIRIZINE HYDROCHLORIDE 10 MG/1
10 TABLET ORAL DAILY
Status: DISCONTINUED | OUTPATIENT
Start: 2024-10-28 | End: 2024-10-28 | Stop reason: ALTCHOICE

## 2024-10-28 RX ADMIN — METHYLPREDNISOLONE SODIUM SUCCINATE 125 MG: 125 INJECTION INTRAMUSCULAR; INTRAVENOUS at 16:14

## 2024-10-28 RX ADMIN — FAMOTIDINE 20 MG: 10 INJECTION, SOLUTION INTRAVENOUS at 16:26

## 2024-10-28 RX ADMIN — CETIRIZINE HYDROCHLORIDE 10 MG: 10 TABLET, FILM COATED ORAL at 16:14

## 2024-10-28 ASSESSMENT — PAIN DESCRIPTION - LOCATION: LOCATION: CHEST

## 2024-10-28 ASSESSMENT — PAIN - FUNCTIONAL ASSESSMENT: PAIN_FUNCTIONAL_ASSESSMENT: 0-10

## 2024-10-28 ASSESSMENT — PAIN DESCRIPTION - PAIN TYPE: TYPE: ACUTE PAIN

## 2024-10-28 ASSESSMENT — ENCOUNTER SYMPTOMS
SHORTNESS OF BREATH: 1
CHEST TIGHTNESS: 1
WHEEZING: 0

## 2024-10-28 ASSESSMENT — PAIN DESCRIPTION - ORIENTATION: ORIENTATION: MID

## 2024-10-28 ASSESSMENT — PAIN DESCRIPTION - DESCRIPTORS: DESCRIPTORS: ACHING

## 2024-10-28 ASSESSMENT — PAIN DESCRIPTION - FREQUENCY: FREQUENCY: INTERMITTENT

## 2024-10-28 ASSESSMENT — PAIN SCALES - GENERAL: PAINLEVEL_OUTOF10: 4

## 2024-10-28 NOTE — ED NOTES
Patient prepared for and ready to be discharged. Patient discharged at this time in no acute distress after verbalizing understanding of discharge instructions. Patient left after receiving After Visit Summary instructions.        Marvin Martell RN  10/28/24 9138

## 2024-10-28 NOTE — ED NOTES
Patient ambulated to and from bathroom with steady gate, no signs of distress or SOB noted     Marvin Martell, RN  10/28/24 6562

## 2024-10-28 NOTE — ED TRIAGE NOTES
Pt co SOB that began around noon today. Worse with exertion. Pt reports receiving first dose of an infusion today at 10:30am for RA. Pt denies oral swelling.

## 2024-10-28 NOTE — ED NOTES
IV placed   Blood sent to lab at this time   Meds given   Patient on continuous cardiac and SpO2 monitoring, equal rise in fall in chest, no signs of distress noted. No requests from patient at the moment. Bed in lowest position and call light within reach.        Marvin Martell, RN  10/28/24 4855

## 2024-10-28 NOTE — ED PROVIDER NOTES
found.    MOST RECENT VITALS:  BP: 135/86,Temp: 98.7 °F (37.1 °C), Pulse: 74, Respirations: 22, SpO2: 99 %     Procedures         ED Course     Nursing Notes, Past Medical Hx, Past Surgical Hx, Social Hx,Allergies, and Family Hx were reviewed.         The patient was given the following medications:  Orders Placed This Encounter   Medications    methylPREDNISolone sodium succ (SOLU-MEDROL) 125 mg in sterile water 2 mL injection    famotidine (PEPCID) 20 mg in sodium chloride (PF) 0.9 % 10 mL injection    DISCONTD: cetirizine (ZYRTEC) tablet 10 mg    predniSONE (DELTASONE) 20 MG tablet     Sig: Take 2 tablets by mouth daily for 5 days     Dispense:  10 tablet     Refill:  0       CONSULTS:  None    Review of Systems     Review of Systems   Constitutional:  Negative for fever.   HENT:          She states her face felt flushed and red after this   Respiratory:  Positive for chest tightness and shortness of breath. Negative for wheezing.    Cardiovascular:  Negative for chest pain, palpitations and leg swelling.   Genitourinary: Negative.    Neurological: Negative.  Negative for syncope and light-headedness.   All other systems reviewed and are negative.      Past Medical, Surgical, Family, and Social History     She has a past medical history of Allergic rhinitis, Arthritis, Diabetes mellitus (HCC), Dizziness, Fibromyalgia, Hearing loss, Hypertension, Migraine, MVP (mitral valve prolapse), Nausea & vomiting, PONV (postoperative nausea and vomiting), Rash, Reflux, Sinusitis, Thyroid disease, Tinnitus, TMJ dysfunction, and Wears glasses.  She has a past surgical history that includes shoulder surgery; Ankle surgery; Cholecystectomy; Tonsillectomy; back surgery; Elbow surgery; Ankle arthroscopy (Right, 03/2012); Knee arthroscopy (Left); Wrist surgery (Right); cervical fusion (N/A, 12/11/2019); joint replacement (Left, 2019); Revision total knee arthroplasty (Left, 08/28/2020); Revision total knee arthroplasty (Left,

## 2024-11-05 ENCOUNTER — OFFICE VISIT (OUTPATIENT)
Dept: ENT CLINIC | Age: 64
End: 2024-11-05
Payer: MEDICARE

## 2024-11-05 VITALS
SYSTOLIC BLOOD PRESSURE: 126 MMHG | TEMPERATURE: 97.3 F | BODY MASS INDEX: 34.66 KG/M2 | WEIGHT: 203 LBS | HEIGHT: 64 IN | HEART RATE: 92 BPM | RESPIRATION RATE: 16 BRPM | DIASTOLIC BLOOD PRESSURE: 71 MMHG

## 2024-11-05 DIAGNOSIS — H69.93 DYSFUNCTION OF BOTH EUSTACHIAN TUBES: Primary | ICD-10-CM

## 2024-11-05 DIAGNOSIS — H91.92 HEARING LOSS OF LEFT EAR, UNSPECIFIED HEARING LOSS TYPE: ICD-10-CM

## 2024-11-05 DIAGNOSIS — T85.618A NON-FUNCTIONING TYMPANOSTOMY TUBE, INITIAL ENCOUNTER: ICD-10-CM

## 2024-11-05 DIAGNOSIS — J32.9 CHRONIC SINUSITIS, UNSPECIFIED LOCATION: ICD-10-CM

## 2024-11-05 PROCEDURE — 99214 OFFICE O/P EST MOD 30 MIN: CPT | Performed by: OTOLARYNGOLOGY

## 2024-11-05 PROCEDURE — 3017F COLORECTAL CA SCREEN DOC REV: CPT | Performed by: OTOLARYNGOLOGY

## 2024-11-05 PROCEDURE — G8484 FLU IMMUNIZE NO ADMIN: HCPCS | Performed by: OTOLARYNGOLOGY

## 2024-11-05 PROCEDURE — 92504 EAR MICROSCOPY EXAMINATION: CPT | Performed by: OTOLARYNGOLOGY

## 2024-11-05 PROCEDURE — 1124F ACP DISCUSS-NO DSCNMKR DOCD: CPT | Performed by: OTOLARYNGOLOGY

## 2024-11-05 PROCEDURE — G8427 DOCREV CUR MEDS BY ELIG CLIN: HCPCS | Performed by: OTOLARYNGOLOGY

## 2024-11-05 PROCEDURE — 1090F PRES/ABSN URINE INCON ASSESS: CPT | Performed by: OTOLARYNGOLOGY

## 2024-11-05 PROCEDURE — G8417 CALC BMI ABV UP PARAM F/U: HCPCS | Performed by: OTOLARYNGOLOGY

## 2024-11-05 PROCEDURE — G8399 PT W/DXA RESULTS DOCUMENT: HCPCS | Performed by: OTOLARYNGOLOGY

## 2024-11-05 PROCEDURE — 1036F TOBACCO NON-USER: CPT | Performed by: OTOLARYNGOLOGY

## 2024-11-05 ASSESSMENT — ENCOUNTER SYMPTOMS
COUGH: 0
EYE ITCHING: 0
NAUSEA: 0
SORE THROAT: 0
DIARRHEA: 0
SINUS PAIN: 0
FACIAL SWELLING: 0
SINUS PRESSURE: 1
SHORTNESS OF BREATH: 0
EYE PAIN: 0
EYE REDNESS: 0
COLOR CHANGE: 0
CHOKING: 0
VOICE CHANGE: 0
PHOTOPHOBIA: 0
RHINORRHEA: 0
STRIDOR: 0
TROUBLE SWALLOWING: 0

## 2024-11-05 NOTE — PROGRESS NOTES
[facility]  [address] , [address]  [place] [address]  P: [phone]  F: [phone]       Patient     Marly Tejeda  1960    ChiefComplaint     Chief Complaint   Patient presents with    Other     Have tubes but having trouble hearing and with sinus       History of Present Illness     Marly Tejeda is a pleasant 64 y.o. female with history of bilateral eustachian tube dysfunction, allergic rhinitis.  Presents today for worsening hearing of the left ear, pressure in the left ear and sinusitis symptoms and postnasal drainage and sinus congestion.  Left-sided hearing has been gradually declining over the past couple months.  She contracted this respiratory infection 2 to 3 months ago.  She was placed on a Z-Nando.  She has been having persistent postnasal drainage since.  She continues to use Ryaltris.  The right-sided ear has some mild decrease in hearing but not significant like the left.    Past Medical History     Past Medical History:   Diagnosis Date    Allergic rhinitis     Arthritis     rheumatoid arthritis    Diabetes mellitus (HCC)     PREDIABETIC D/T STEROID USE    Dizziness     Fibromyalgia     Hearing loss     Hypertension     Migraine     MVP (mitral valve prolapse)     Nausea & vomiting     PONV (postoperative nausea and vomiting)     NAUSEA    Rash     Reflux Doesn't have anymore r/t weight loss    Sinusitis     Thyroid disease     goiter treated with radioactive med    Tinnitus     TMJ dysfunction     Wears glasses        Past Surgical History     Past Surgical History:   Procedure Laterality Date    ANKLE ARTHROSCOPY Right 03/2012    lateral ligament repair, removal spurs    ANKLE SURGERY      right    APPENDECTOMY      BACK SURGERY      cyst removed off lower spine    CERVICAL FUSION N/A 12/11/2019    C4-5, C5-6, C6-7 ANTERIOR CERVICAL DISCECTOMY AND FUSION performed by Tyson Clement MD at [facility] OR    CHOLECYSTECTOMY      ELBOW SURGERY      left    JOINT

## 2024-11-05 NOTE — PATIENT INSTRUCTIONS
Mix 2-4 sprays of afrin with neti pot. Rinse twice daily for 3 days.  Then continue neti pot without afrin.

## 2024-11-06 ENCOUNTER — PREP FOR PROCEDURE (OUTPATIENT)
Dept: ENT CLINIC | Age: 64
End: 2024-11-06

## 2024-11-06 DIAGNOSIS — H69.93 DYSFUNCTION OF BOTH EUSTACHIAN TUBES: ICD-10-CM

## 2024-11-06 PROBLEM — T85.618A NON-FUNCTIONING TYMPANOSTOMY TUBE: Status: ACTIVE | Noted: 2024-11-06

## 2024-11-13 RX ORDER — EPINEPHRINE 0.3 MG/.3ML
0.3 INJECTION SUBCUTANEOUS ONCE
COMMUNITY

## 2024-11-13 RX ORDER — METHOCARBAMOL 500 MG/1
500 TABLET, FILM COATED ORAL 3 TIMES DAILY PRN
COMMUNITY
Start: 2024-09-16

## 2024-11-13 NOTE — H&P (VIEW-ONLY)
[facility] [place]  [address], [address]  [facility] [address]     Name:  Marly Tejeda  YOB: 1960 (64 y.o.)     MRN: [id number]     Date of Service:  11/13/2024       Subjective:     Chief Complaint   Patient presents with   • Pre-op Exam     Myringotomy, 11/18/2024, [facility], Eren Nevarez DO, Fax [phone]        HPI      Surgery - Myringotomy, bilaterally.  Reason for surgery - Bilateral ETD   Date of surgery - 11/18/2024  Referred by - Dr Eren Nevarez    Metabolic equivalents - 4 METS  Prior anesthesia - No complications    History of MI - No  Current chest pain -  No  Use of nitrates - No    History of CHF - No  SOB - No  PND - No  Orthopnea -  No  LE edema - No    History of TIA/CVA: No    Treated with insulin - No    sCr > 2.0 - No    Lab Results   Component Value Date    CREATININE 0.7 10/28/2024     She takes amlodipine 10 mg at night. She stopped triamterene HCTZ 37.5/25 mg because of muscle cramps and increased eye dryness. She drinks tonic water when she remembers.    She is prediabetic. A1c 6.0 on 9/16/2024. She does water aerobics 3 times/week and walks daily.     She has RA, followed by rheumatology. She had an allergic reaction to Golimumab infusion - had SOB. She was started on Sulfasalazine 500 mg twice a day on 11/6/2024 but hasn't picked it up yet.    Vertigo hasn't bothered her a lot. She hasn't needed to take Diazepam.    She takes Levothyroxine 50 mcg (not 75 mcg) and thyroid pork 90 mg daily in AM.   Normal echo with EF 59-63% on 11/6/2024 and ECG on 10/28/2024.     She has no bleeding disorders or previous history of difficulty with anesthesia.    Past Medical History:   Diagnosis Date   • Acute medial meniscus tear of left knee    • BPPV (benign paroxysmal positional vertigo) 06/20/2021   • Candidiasis, intertrigo    • Cervical spondylosis    • COVID-19 virus infection 12/02/2021   • Environmental allergies    • Essential hypertension    •

## 2024-11-18 ENCOUNTER — ANESTHESIA (OUTPATIENT)
Dept: OPERATING ROOM | Age: 64
End: 2024-11-18
Payer: MEDICARE

## 2024-11-18 ENCOUNTER — ANESTHESIA EVENT (OUTPATIENT)
Dept: OPERATING ROOM | Age: 64
End: 2024-11-18
Payer: MEDICARE

## 2024-11-18 ENCOUNTER — HOSPITAL ENCOUNTER (OUTPATIENT)
Age: 64
Setting detail: OUTPATIENT SURGERY
Discharge: HOME | End: 2024-11-18
Attending: OTOLARYNGOLOGY | Admitting: OTOLARYNGOLOGY
Payer: MEDICARE

## 2024-11-18 VITALS
HEIGHT: 64 IN | DIASTOLIC BLOOD PRESSURE: 71 MMHG | WEIGHT: 201 LBS | OXYGEN SATURATION: 97 % | BODY MASS INDEX: 34.31 KG/M2 | TEMPERATURE: 98.2 F | SYSTOLIC BLOOD PRESSURE: 123 MMHG | HEART RATE: 82 BPM | RESPIRATION RATE: 16 BRPM

## 2024-11-18 LAB
GLUCOSE BLD-MCNC: 124 MG/DL (ref 70–99)
PERFORMED ON: ABNORMAL

## 2024-11-18 PROCEDURE — 3600000002 HC SURGERY LEVEL 2 BASE: Performed by: OTOLARYNGOLOGY

## 2024-11-18 PROCEDURE — 3700000000 HC ANESTHESIA ATTENDED CARE: Performed by: OTOLARYNGOLOGY

## 2024-11-18 PROCEDURE — 2580000003 HC RX 258

## 2024-11-18 PROCEDURE — 6360000002 HC RX W HCPCS: Performed by: ANESTHESIOLOGY

## 2024-11-18 PROCEDURE — 6360000002 HC RX W HCPCS

## 2024-11-18 PROCEDURE — 7100000010 HC PHASE II RECOVERY - FIRST 15 MIN: Performed by: OTOLARYNGOLOGY

## 2024-11-18 PROCEDURE — 69436 CREATE EARDRUM OPENING: CPT | Performed by: OTOLARYNGOLOGY

## 2024-11-18 PROCEDURE — 2709999900 HC NON-CHARGEABLE SUPPLY: Performed by: OTOLARYNGOLOGY

## 2024-11-18 PROCEDURE — 3700000001 HC ADD 15 MINUTES (ANESTHESIA): Performed by: OTOLARYNGOLOGY

## 2024-11-18 PROCEDURE — 7100000000 HC PACU RECOVERY - FIRST 15 MIN: Performed by: OTOLARYNGOLOGY

## 2024-11-18 PROCEDURE — 7100000001 HC PACU RECOVERY - ADDTL 15 MIN: Performed by: OTOLARYNGOLOGY

## 2024-11-18 PROCEDURE — 3600000012 HC SURGERY LEVEL 2 ADDTL 15MIN: Performed by: OTOLARYNGOLOGY

## 2024-11-18 PROCEDURE — 7100000011 HC PHASE II RECOVERY - ADDTL 15 MIN: Performed by: OTOLARYNGOLOGY

## 2024-11-18 PROCEDURE — 6370000000 HC RX 637 (ALT 250 FOR IP): Performed by: ANESTHESIOLOGY

## 2024-11-18 PROCEDURE — L8699 PROSTHETIC IMPLANT NOS: HCPCS | Performed by: OTOLARYNGOLOGY

## 2024-11-18 DEVICE — VENT TUBE 1016010 5PK GOODE T 12MM SILIC
Type: IMPLANTABLE DEVICE | Site: EAR | Status: FUNCTIONAL
Brand: GOODE T-TUBE®

## 2024-11-18 RX ORDER — HYDRALAZINE HYDROCHLORIDE 20 MG/ML
10 INJECTION INTRAMUSCULAR; INTRAVENOUS
Status: DISCONTINUED | OUTPATIENT
Start: 2024-11-18 | End: 2024-11-18 | Stop reason: HOSPADM

## 2024-11-18 RX ORDER — FENTANYL CITRATE 50 UG/ML
INJECTION, SOLUTION INTRAMUSCULAR; INTRAVENOUS
Status: DISCONTINUED | OUTPATIENT
Start: 2024-11-18 | End: 2024-11-18 | Stop reason: SDUPTHER

## 2024-11-18 RX ORDER — SODIUM CHLORIDE 9 MG/ML
INJECTION, SOLUTION INTRAVENOUS PRN
Status: DISCONTINUED | OUTPATIENT
Start: 2024-11-18 | End: 2024-11-18 | Stop reason: HOSPADM

## 2024-11-18 RX ORDER — METOCLOPRAMIDE HYDROCHLORIDE 5 MG/ML
10 INJECTION INTRAMUSCULAR; INTRAVENOUS
Status: DISCONTINUED | OUTPATIENT
Start: 2024-11-18 | End: 2024-11-18 | Stop reason: HOSPADM

## 2024-11-18 RX ORDER — LIDOCAINE HYDROCHLORIDE 20 MG/ML
INJECTION, SOLUTION INTRAVENOUS
Status: DISCONTINUED | OUTPATIENT
Start: 2024-11-18 | End: 2024-11-18 | Stop reason: SDUPTHER

## 2024-11-18 RX ORDER — SODIUM CHLORIDE 9 MG/ML
INJECTION, SOLUTION INTRAVENOUS CONTINUOUS
Status: DISCONTINUED | OUTPATIENT
Start: 2024-11-18 | End: 2024-11-18 | Stop reason: HOSPADM

## 2024-11-18 RX ORDER — SODIUM CHLORIDE 0.9 % (FLUSH) 0.9 %
5-40 SYRINGE (ML) INJECTION EVERY 12 HOURS SCHEDULED
Status: DISCONTINUED | OUTPATIENT
Start: 2024-11-18 | End: 2024-11-18 | Stop reason: HOSPADM

## 2024-11-18 RX ORDER — MEPERIDINE HYDROCHLORIDE 25 MG/ML
12.5 INJECTION INTRAMUSCULAR; INTRAVENOUS; SUBCUTANEOUS EVERY 5 MIN PRN
Status: DISCONTINUED | OUTPATIENT
Start: 2024-11-18 | End: 2024-11-18 | Stop reason: HOSPADM

## 2024-11-18 RX ORDER — SCOLOPAMINE TRANSDERMAL SYSTEM 1 MG/1
1 PATCH, EXTENDED RELEASE TRANSDERMAL ONCE
Status: DISCONTINUED | OUTPATIENT
Start: 2024-11-18 | End: 2024-11-18 | Stop reason: HOSPADM

## 2024-11-18 RX ORDER — DIPHENHYDRAMINE HYDROCHLORIDE 50 MG/ML
12.5 INJECTION INTRAMUSCULAR; INTRAVENOUS
Status: DISCONTINUED | OUTPATIENT
Start: 2024-11-18 | End: 2024-11-18 | Stop reason: HOSPADM

## 2024-11-18 RX ORDER — HYDROMORPHONE HYDROCHLORIDE 1 MG/ML
0.5 INJECTION, SOLUTION INTRAMUSCULAR; INTRAVENOUS; SUBCUTANEOUS EVERY 5 MIN PRN
Status: DISCONTINUED | OUTPATIENT
Start: 2024-11-18 | End: 2024-11-18 | Stop reason: HOSPADM

## 2024-11-18 RX ORDER — DEXAMETHASONE SODIUM PHOSPHATE 4 MG/ML
INJECTION, SOLUTION INTRA-ARTICULAR; INTRALESIONAL; INTRAMUSCULAR; INTRAVENOUS; SOFT TISSUE
Status: DISCONTINUED | OUTPATIENT
Start: 2024-11-18 | End: 2024-11-18 | Stop reason: SDUPTHER

## 2024-11-18 RX ORDER — APREPITANT 40 MG/1
40 CAPSULE ORAL ONCE
Status: COMPLETED | OUTPATIENT
Start: 2024-11-18 | End: 2024-11-18

## 2024-11-18 RX ORDER — PROPOFOL 10 MG/ML
INJECTION, EMULSION INTRAVENOUS
Status: DISCONTINUED | OUTPATIENT
Start: 2024-11-18 | End: 2024-11-18 | Stop reason: SDUPTHER

## 2024-11-18 RX ORDER — LABETALOL HYDROCHLORIDE 5 MG/ML
10 INJECTION, SOLUTION INTRAVENOUS
Status: DISCONTINUED | OUTPATIENT
Start: 2024-11-18 | End: 2024-11-18 | Stop reason: HOSPADM

## 2024-11-18 RX ORDER — SODIUM CHLORIDE 0.9 % (FLUSH) 0.9 %
5-40 SYRINGE (ML) INJECTION PRN
Status: DISCONTINUED | OUTPATIENT
Start: 2024-11-18 | End: 2024-11-18 | Stop reason: HOSPADM

## 2024-11-18 RX ORDER — SODIUM CHLORIDE, SODIUM LACTATE, POTASSIUM CHLORIDE, CALCIUM CHLORIDE 600; 310; 30; 20 MG/100ML; MG/100ML; MG/100ML; MG/100ML
INJECTION, SOLUTION INTRAVENOUS
Status: DISCONTINUED | OUTPATIENT
Start: 2024-11-18 | End: 2024-11-18 | Stop reason: SDUPTHER

## 2024-11-18 RX ORDER — NALOXONE HYDROCHLORIDE 0.4 MG/ML
INJECTION, SOLUTION INTRAMUSCULAR; INTRAVENOUS; SUBCUTANEOUS PRN
Status: DISCONTINUED | OUTPATIENT
Start: 2024-11-18 | End: 2024-11-18 | Stop reason: HOSPADM

## 2024-11-18 RX ORDER — HYDROMORPHONE HYDROCHLORIDE 1 MG/ML
0.5 INJECTION, SOLUTION INTRAMUSCULAR; INTRAVENOUS; SUBCUTANEOUS EVERY 10 MIN PRN
Status: DISCONTINUED | OUTPATIENT
Start: 2024-11-18 | End: 2024-11-18 | Stop reason: HOSPADM

## 2024-11-18 RX ORDER — SODIUM CHLORIDE, SODIUM LACTATE, POTASSIUM CHLORIDE, CALCIUM CHLORIDE 600; 310; 30; 20 MG/100ML; MG/100ML; MG/100ML; MG/100ML
INJECTION, SOLUTION INTRAVENOUS CONTINUOUS
Status: CANCELLED | OUTPATIENT
Start: 2024-11-18

## 2024-11-18 RX ORDER — ONDANSETRON 2 MG/ML
INJECTION INTRAMUSCULAR; INTRAVENOUS
Status: DISCONTINUED | OUTPATIENT
Start: 2024-11-18 | End: 2024-11-18 | Stop reason: SDUPTHER

## 2024-11-18 RX ORDER — MIDAZOLAM HYDROCHLORIDE 1 MG/ML
INJECTION, SOLUTION INTRAMUSCULAR; INTRAVENOUS
Status: DISCONTINUED | OUTPATIENT
Start: 2024-11-18 | End: 2024-11-18 | Stop reason: SDUPTHER

## 2024-11-18 RX ADMIN — APREPITANT 40 MG: 40 CAPSULE ORAL at 07:02

## 2024-11-18 RX ADMIN — HYDROMORPHONE HYDROCHLORIDE 0.5 MG: 1 INJECTION, SOLUTION INTRAMUSCULAR; INTRAVENOUS; SUBCUTANEOUS at 08:30

## 2024-11-18 RX ADMIN — PROPOFOL 200 MG: 10 INJECTION, EMULSION INTRAVENOUS at 07:24

## 2024-11-18 RX ADMIN — PROPOFOL 50 MG: 10 INJECTION, EMULSION INTRAVENOUS at 07:38

## 2024-11-18 RX ADMIN — DEXAMETHASONE SODIUM PHOSPHATE 10 MG: 4 INJECTION INTRA-ARTICULAR; INTRALESIONAL; INTRAMUSCULAR; INTRAVENOUS; SOFT TISSUE at 07:28

## 2024-11-18 RX ADMIN — SODIUM CHLORIDE, SODIUM LACTATE, POTASSIUM CHLORIDE, AND CALCIUM CHLORIDE: .6; .31; .03; .02 INJECTION, SOLUTION INTRAVENOUS at 07:19

## 2024-11-18 RX ADMIN — LIDOCAINE HYDROCHLORIDE 100 MG: 20 INJECTION, SOLUTION INTRAVENOUS at 07:24

## 2024-11-18 RX ADMIN — ONDANSETRON 4 MG: 2 INJECTION INTRAMUSCULAR; INTRAVENOUS at 07:19

## 2024-11-18 RX ADMIN — MIDAZOLAM HYDROCHLORIDE 2 MG: 2 INJECTION, SOLUTION INTRAMUSCULAR; INTRAVENOUS at 07:15

## 2024-11-18 RX ADMIN — FENTANYL CITRATE 100 MCG: 50 INJECTION, SOLUTION INTRAMUSCULAR; INTRAVENOUS at 07:19

## 2024-11-18 ASSESSMENT — PAIN DESCRIPTION - LOCATION: LOCATION: EAR

## 2024-11-18 ASSESSMENT — PAIN - FUNCTIONAL ASSESSMENT
PAIN_FUNCTIONAL_ASSESSMENT: 0-10
PAIN_FUNCTIONAL_ASSESSMENT: 0-10
PAIN_FUNCTIONAL_ASSESSMENT: PREVENTS OR INTERFERES SOME ACTIVE ACTIVITIES AND ADLS

## 2024-11-18 ASSESSMENT — PAIN DESCRIPTION - ORIENTATION: ORIENTATION: LEFT

## 2024-11-18 ASSESSMENT — PAIN SCALES - GENERAL
PAINLEVEL_OUTOF10: 3
PAINLEVEL_OUTOF10: 0
PAINLEVEL_OUTOF10: 0
PAINLEVEL_OUTOF10: 6

## 2024-11-18 ASSESSMENT — PAIN DESCRIPTION - DESCRIPTORS
DESCRIPTORS: ACHING
DESCRIPTORS: SHARP

## 2024-11-18 NOTE — BRIEF OP NOTE
Brief Postoperative Note      Patient: Marly Tejeda  YOB: 1960  MRN: [id number]    Date of Procedure: 11/18/2024    Pre-Op Diagnosis Codes:      * Dysfunction of both eustachian tubes [H69.93]     * Non-functioning tympanostomy tube, initial encounter [T85.618A]    Post-Op Diagnosis: Same       Procedure(s):  MYRINGOTOMY WITH PRESSURE EQUALIZING TUBE INSERTION, BILATERAL    Surgeon(s):  Eren Nevarez DO    Assistant:  * No surgical staff found *    Anesthesia: General    Estimated Blood Loss (mL): Minimal    Complications: None    Specimens:   * No specimens in log *    Implants:  Implant Name Type Inv. Item Serial No.  Lot No. LRB No. Used Action   TUBE VENT L12MM ID1.14MM BEBO ROSARIO T - [id number]  TUBE VENT L12MM ID1.14MM BEBO ROSARIO T  [employer] [id number] Right 1 Implanted   TUBE VENT L12MM ID1.14MM BEBO ROSARIO T - [id number]  TUBE VENT L12MM ID1.14MM BEBO ROSARIO T  [employer] [id number] Left 1 Implanted         Drains: * No LDAs found *    Findings:  Infection Present At Time Of Surgery (PATOS) (choose all levels that have infection present):  No infection present  Other Findings: retained PE tubes    Electronically signed by Eren Nevarez DO on 11/18/2024 at 7:51 AM

## 2024-11-18 NOTE — OP NOTE
Operative Note      Patient: Marly Tejeda  YOB: 1960  MRN: [id number]    Date of Procedure: 11/18/2024    Pre-Op Diagnosis Codes:      * Dysfunction of both eustachian tubes [H69.93]     * Non-functioning tympanostomy tube, initial encounter [T85.618A]    Post-Op Diagnosis: Same       Procedure(s):  MYRINGOTOMY WITH PRESSURE EQUALIZING TUBE INSERTION, BILATERAL    Surgeon(s):  Eren Nevarez DO    Assistant:   * No surgical staff found *    Anesthesia: General    Estimated Blood Loss (mL): Minimal    Complications: None    Specimens:   * No specimens in log *    Implants:  Implant Name Type Inv. Item Serial No.  Lot No. LRB No. Used Action   TUBE VENT L12MM ID1.14MM BEBO ROSARIO T - [id number]  TUBE VENT L12MM ID1.14MM BEBO ROSARIO T  [employer] [id number] Right 1 Implanted   TUBE VENT L12MM ID1.14MM BEBO ROSARIO T - [id number]  TUBE VENT L12MM ID1.14MM BEBO ROSARIO T  [employer] [id number] Left 1 Implanted         Drains: * No LDAs found *    Findings:  Infection Present At Time Of Surgery (PATOS) (choose all levels that have infection present):  No infection present  Other Findings: retained PE tubes    Detailed Description of Procedure:   The patient was identified in the preoperative holding area.  Verified informed consent was obtained.  The patient was taken to the operating suite, transferred to the operating table, sedated with anesthesia and an LMA was placed.  Patient was prepped and draped in a standard fashion.  A proper timeout was performed.    The operating microscope was brought into the field.  The left ear was examined using a 6 mm speculum.  A cerumen curette was used to remove cerumen from the ear canal.  The retained PE tube was visualized in the posterior aspect of the tympanic membrane.  A Young needle was used to free this up from the tympanic membrane.  It was removed with an alligator forcep.  The underlying tympanic membrane had healed

## 2024-11-18 NOTE — DISCHARGE INSTRUCTIONS
STOP. We care about your health![facility]  Eren Nevarez D.O.  [address]  [place] [address]  [phone]    Post-Op Ear Tube Surgery Instructions    General  Ear tubes have been placed in your ears to treat Eustachian tube dysfunction and its complications.  The Ear tubes remain in place on average of 2 years.    Diet:  A short general anesthetic has been given, which may cause some nausea for the next 24 hours.  Advance diet as tolerated.  After the affects of anesthesia have worn off in 24 hours there are no dietary restrictions    Activity  Resume normal activity    Medications  Ear drops may be prescribed.  Use as directed on prescription. Save the remaining drops as you may need them from time to time if there is drainage from the ears.  Discomfort can usually be relieved with Tylenol.  Avoid aspirin containing medications    Work/School    You may return to work or school the day after the procedure    Follow-up    Please schedule a follow up for 3 weeks after surgery.  While the ear tubes are in place you will need to be seen every 6 months.    Additional Information    Bloody drainage from the ears during the first 3 days following surgery is to be expected.  Excessive blood or green drainage should be addressed by calling the office.    Swimming or bathing while the ear tubes are in, are permitted without ear plugs.  However, if swimming in rivers, lakes, going underwater in pools or very soapy baths ear protection is recommended.  Ear plugs can be purchased at your local pharmacy.  If you have any questions or concerns about your post-operative course please feel free to call the office

## 2024-11-18 NOTE — ANESTHESIA POSTPROCEDURE EVALUATION
Department of Anesthesiology  Postprocedure Note    Patient: Marly Tejeda  MRN: [id number]  YOB: 1960  Date of evaluation: 11/18/2024    Procedure Summary       Date: 11/18/24 Room / Location: [address]    Anesthesia Start: 0719 Anesthesia Stop: 0751    Procedure: MYRINGOTOMY WITH PRESSURE EQUALIZING TUBE INSERTION, BILATERAL (Bilateral) Diagnosis:       Dysfunction of both eustachian tubes      Non-functioning tympanostomy tube, initial encounter      (Dysfunction of both eustachian tubes [H69.93])      (Non-functioning tympanostomy tube, initial encounter [T85.618A])    Surgeons: Eren Nevarez DO Responsible Provider: Nav Sykes MD    Anesthesia Type: General ASA Status: 2            Anesthesia Type: General    Ant Phase I: Ant Score: 8    Ant Phase II: Ant Score: 10    Anesthesia Post Evaluation    Patient location during evaluation: PACU  Patient participation: complete - patient participated  Level of consciousness: awake and alert  Pain score: 0  Airway patency: patent  Nausea & Vomiting: no nausea and no vomiting  Cardiovascular status: hemodynamically stable  Respiratory status: acceptable  Hydration status: euvolemic  Pain management: adequate    There were no known notable events for this encounter.

## 2024-11-18 NOTE — INTERVAL H&P NOTE
Update History & Physical    The patient's History and Physical of November 13, 2024 was reviewed with the patient and I examined the patient. There was no change. The surgical site was confirmed by the patient and me.     Plan: The risks, benefits, expected outcome, and alternative to the recommended procedure have been discussed with the patient. Patient understands and wants to proceed with the procedure.     Electronically signed by Eren Nevarez DO on 11/18/2024 at 7:14 AM

## 2024-11-18 NOTE — PROGRESS NOTES
11/13 @ 1050 Pt confirms going to PCP Dr. Nuñez [phone] today 11/13/24 for PreOp. MD  
11/15/24 @ 0953 Anita at office aware pt needs pre op orders and consent  /NR  
Ambulatory Surgery/Procedure Discharge Note    Vitals:    11/18/24 0848   BP: 123/71   Pulse: 82   Resp: 16   Temp: 98.2 °F (36.8 °C)   SpO2: 97%     BP meets juan standard for discharge    In: 650 [P.O.:150; I.V.:500]  Out: -     Restroom use offered before discharge.  Yes    Pain assessment:  none  Pain Level: 0    Pt and S.O./family states \"ready to go home\". Pt alert and oriented x4. IV removed. Denies N/V or pain.  Voided prior to discharge. Pt tolerating po intake. Discharge instructions given to pt and  with pt permission. Pt and  verbalized understanding of all instructions. Left with all belongings and discharge instructions.       Patient discharged to home/self care. Patient discharged via wheel chair by transporter to waiting family/S.O.       11/18/2024 0934  
Called and updated patients  Flash on phone in waiting room.     Patient awake- vital signs stable.   No pain, no nausea, no needs.   
PACU Transfer Note    Vitals:    11/18/24 0837   BP: 123/67   Pulse: 83   Resp: 16   Temp: 97.4 °F (36.3 °C)   SpO2: 98%       No intake/output data recorded.    Pain assessment:  none VS stable. Patient to [facility] bed#4 for discharge.   Pain Level: 3    Report given to Receiving unit RN.    11/18/2024 8:41 AM       
Patient admitted to PACU # 10 from OR at 0749 post MYRINGOTOMY WITH PRESSURE EQUALIZING TUBE INSERTION, BILATERAL - Bilateral  per Dr. Nevarez.  Attached to PACU monitoring system and report received from anesthesia provider.  Patient was reported to be hemodynamically stable during procedure.  Patient drowsy on admission and denied pain.Pt on 3 L NC. Pt now RA. Pt NSR on monitor. Bilateral ears c/d/I. Blood glucose 124. Will continue to monitor.   
please bring it with you on the day of your procedure.    10. If you use oxygen at home, please bring your oxygen tank with you to hospital..     11. We recommend that valuable personal belongings such as cash, cell phones, e-tablets, or jewelry, be left at home during your stay. The hospital will not be responsible for valuables that are not secured in the hospital safe. However, if your insurance requires a co-pay, you may want to bring a method of payment, i.e., Check or credit card, if you wish to pay your co-pay the day of surgery.      12. If you are to stay overnight, you may bring a bag with personal items. Please have any large items you may need brought in by your family after your arrival to your hospital room.    13. If you have a Living Will or Durable Power of , please bring a copy on the day of your procedure.     How we keep you safe and work to prevent surgical site infections:   1. Health care workers should always check your ID bracelet to verify your name and birth date. You will be asked many times to state your name, date of birth, and allergies.    2. Health care workers should always clean their hands with soap or alcohol gel before providing care to you. It is okay to ask anyone if they cleaned their hands before they touch you.    3. You will be actively involved in verifying the type of procedure you are having and ensuring the correct surgical site. This will be confirmed multiple times prior to your procedure. Do NOT duke your surgery site UNLESS instructed to by your surgeon.     4. When you are in the operating room, your surgical site will be cleansed with a special soap, and in most cases, you will be given an antibiotic before the surgery begins.      What to expect AFTER your procedure?  1. Immediately following your procedure, your will be taken to the PACU for the first phase of your recovery.  Your nurse will help you recover from any potential side effects of

## 2024-11-18 NOTE — ANESTHESIA PRE PROCEDURE
Department of Anesthesiology  Preprocedure Note       Name:  Marly Tejeda   Age:  64 y.o.  :  1960                                          MRN:  [id number]         Date:  2024      Surgeon: Surgeon(s):  Eren Nevarez DO    Procedure: Procedure(s):  MYRINGOTOMY WITH PRESSURE EQUALIZING TUBE INSERTION, BILATERAL    Medications prior to admission:   Prior to Admission medications    Medication Sig Start Date End Date Taking? Authorizing Provider   methocarbamol (ROBAXIN) 500 MG tablet Take 1 tablet by mouth 3 times daily as needed (Vertigo) 24  Yes ProviderJose MD   Olopatadine-Mometasone (RYALTRIS) 665-25 MCG/ACT SUSP 1 spray by Nasal route in the morning and at bedtime  Patient taking differently: 1 spray by Nasal route 2 times daily as needed (congestion) 23  Yes Eren Nevarez DO   SUMAtriptan (IMITREX) 100 MG tablet PRN 22  Yes ProviderJose MD   NP THYROID 90 MG tablet daily 23  Yes Provider, MD Jose   celecoxib (CELEBREX) 200 MG capsule Take 1 capsule by mouth daily 10/13/22  Yes David Macario MD   progesterone (PROMETRIUM) 200 MG CAPS capsule Take 1 capsule by mouth daily 22  Yes ProviderJose MD   levothyroxine (SYNTHROID) 75 MCG tablet Take 1 tablet by mouth daily 21  Yes ProviderJose MD   etanercept (ENBREL) 25 MG/0.5ML SOSY INJECTION WEEKLY 2/10/22  Yes ProviderJose MD   triamcinolone (KENALOG) 0.025 % cream Apply topically every 4 hours as needed Apply Topically itchy skin  PRN   Yes ProviderJose MD   amLODIPine (NORVASC) 10 MG tablet Take 1 tablet by mouth nightly 18  Yes Jose Nino MD   EPINEPHrine (EPIPEN) 0.3 MG/0.3ML SOAJ injection Inject 0.3 mLs into the skin once    Jose Nino MD   triamterene-hydroCHLOROthiazide (DYAZIDE) 37.5-25 MG per capsule TAKE ONE CAPSULE BY MOUTH DAILY 23   Eren Nevarez DO   diclofenac sodium (VOLTAREN) 1 % GEL Apply 2 g

## 2024-11-25 DIAGNOSIS — H92.09 OTALGIA, UNSPECIFIED LATERALITY: Primary | ICD-10-CM

## 2024-11-25 RX ORDER — CIPROFLOXACIN AND DEXAMETHASONE 3; 1 MG/ML; MG/ML
4 SUSPENSION/ DROPS AURICULAR (OTIC) 2 TIMES DAILY
Qty: 1 EACH | Refills: 0 | Status: SHIPPED | OUTPATIENT
Start: 2024-11-25 | End: 2024-12-02

## 2024-12-03 DIAGNOSIS — H92.09 OTALGIA, UNSPECIFIED LATERALITY: Primary | ICD-10-CM

## 2024-12-03 DIAGNOSIS — H81.02 MENIERE'S DISEASE OF LEFT EAR: ICD-10-CM

## 2024-12-03 RX ORDER — TRIAMTERENE AND HYDROCHLOROTHIAZIDE 37.5; 25 MG/1; MG/1
CAPSULE ORAL DAILY
Qty: 90 CAPSULE | Refills: 3 | Status: SHIPPED | OUTPATIENT
Start: 2024-12-03

## 2024-12-03 RX ORDER — CIPROFLOXACIN AND DEXAMETHASONE 3; 1 MG/ML; MG/ML
4 SUSPENSION/ DROPS AURICULAR (OTIC) 2 TIMES DAILY
Qty: 1 EACH | Refills: 0 | Status: SHIPPED | OUTPATIENT
Start: 2024-12-03 | End: 2024-12-10

## 2024-12-06 ENCOUNTER — OFFICE VISIT (OUTPATIENT)
Dept: ENT CLINIC | Age: 64
End: 2024-12-06
Payer: MEDICARE

## 2024-12-06 VITALS
HEIGHT: 64 IN | WEIGHT: 195 LBS | RESPIRATION RATE: 16 BRPM | SYSTOLIC BLOOD PRESSURE: 123 MMHG | BODY MASS INDEX: 33.29 KG/M2 | DIASTOLIC BLOOD PRESSURE: 74 MMHG | HEART RATE: 99 BPM | TEMPERATURE: 98 F

## 2024-12-06 DIAGNOSIS — H69.93 DYSFUNCTION OF BOTH EUSTACHIAN TUBES: Primary | ICD-10-CM

## 2024-12-06 DIAGNOSIS — H92.03 OTALGIA OF BOTH EARS: ICD-10-CM

## 2024-12-06 DIAGNOSIS — R05.1 ACUTE COUGH: ICD-10-CM

## 2024-12-06 DIAGNOSIS — Z96.22 PATENT TYMPANOSTOMY TUBE: ICD-10-CM

## 2024-12-06 PROCEDURE — G8399 PT W/DXA RESULTS DOCUMENT: HCPCS | Performed by: OTOLARYNGOLOGY

## 2024-12-06 PROCEDURE — 1124F ACP DISCUSS-NO DSCNMKR DOCD: CPT | Performed by: OTOLARYNGOLOGY

## 2024-12-06 PROCEDURE — G8484 FLU IMMUNIZE NO ADMIN: HCPCS | Performed by: OTOLARYNGOLOGY

## 2024-12-06 PROCEDURE — 1090F PRES/ABSN URINE INCON ASSESS: CPT | Performed by: OTOLARYNGOLOGY

## 2024-12-06 PROCEDURE — G8417 CALC BMI ABV UP PARAM F/U: HCPCS | Performed by: OTOLARYNGOLOGY

## 2024-12-06 PROCEDURE — 99213 OFFICE O/P EST LOW 20 MIN: CPT | Performed by: OTOLARYNGOLOGY

## 2024-12-06 PROCEDURE — G8427 DOCREV CUR MEDS BY ELIG CLIN: HCPCS | Performed by: OTOLARYNGOLOGY

## 2024-12-06 PROCEDURE — 1036F TOBACCO NON-USER: CPT | Performed by: OTOLARYNGOLOGY

## 2024-12-06 PROCEDURE — 3017F COLORECTAL CA SCREEN DOC REV: CPT | Performed by: OTOLARYNGOLOGY

## 2024-12-06 ASSESSMENT — ENCOUNTER SYMPTOMS
EYE REDNESS: 0
PHOTOPHOBIA: 0
NAUSEA: 0
CHOKING: 0
COUGH: 0
EYE PAIN: 0
STRIDOR: 0
EYE ITCHING: 0
FACIAL SWELLING: 0
SINUS PAIN: 0
TROUBLE SWALLOWING: 0
SHORTNESS OF BREATH: 0
COLOR CHANGE: 0
SINUS PRESSURE: 0
DIARRHEA: 0
VOICE CHANGE: 0
SORE THROAT: 0
RHINORRHEA: 0

## 2024-12-06 NOTE — PROGRESS NOTES
Patent tympanostomy tube      3. Otalgia of both ears      4. Acute cough  Suspect cough may be related to Arnold reflux due to recent manipulation of the ears and the ear tubes.  I would expect this to improve.  If it does not she should follow-up.      Return in about 4 months (around 4/6/2025).      [ ] Review/order radiology tests   [ ] Independent interpretation of diagnostic test by another provider  [ ] Discussed case with another provider  [ ] High risk of loss of major body function  [ ] Elective major surgery with risk factors    Portions of this note were dictated using Dragon. There may be linguistic errors secondary to the use of this program.

## 2025-01-24 DIAGNOSIS — H66.92 LEFT OTITIS MEDIA, UNSPECIFIED OTITIS MEDIA TYPE: Primary | ICD-10-CM

## 2025-01-24 RX ORDER — PREDNISONE 10 MG/1
TABLET ORAL
Qty: 34 TABLET | Refills: 0 | Status: SHIPPED | OUTPATIENT
Start: 2025-01-24

## 2025-02-12 ENCOUNTER — APPOINTMENT (OUTPATIENT)
Dept: URBAN - METROPOLITAN AREA CLINIC 204 | Age: 65
Setting detail: DERMATOLOGY
End: 2025-02-12

## 2025-02-12 DIAGNOSIS — Z41.9 ENCOUNTER FOR PROCEDURE FOR PURPOSES OTHER THAN REMEDYING HEALTH STATE, UNSPECIFIED: ICD-10-CM

## 2025-02-12 PROCEDURE — OTHER COSMETIC CONSULTATION: BOTOX: OTHER

## 2025-02-12 PROCEDURE — OTHER ADDITIONAL NOTES: OTHER

## 2025-02-12 PROCEDURE — OTHER BOTOX (U OR CC): OTHER

## 2025-02-12 PROCEDURE — OTHER INVENTORY: OTHER

## 2025-02-12 PROCEDURE — OTHER COSMETIC CONSULTATION: FILLERS: OTHER

## 2025-02-12 PROCEDURE — OTHER FILLERS: OTHER

## 2025-02-12 ASSESSMENT — LOCATION DETAILED DESCRIPTION DERM
LOCATION DETAILED: RIGHT CENTRAL FRONTAL SCALP
LOCATION DETAILED: RIGHT INFERIOR TEMPLE
LOCATION DETAILED: LEFT MEDIAL EYEBROW
LOCATION DETAILED: RIGHT LATERAL EYEBROW
LOCATION DETAILED: LEFT LATERAL CANTHUS
LOCATION DETAILED: GLABELLA
LOCATION DETAILED: LEFT LATERAL EYEBROW
LOCATION DETAILED: RIGHT SUPERIOR CENTRAL MALAR CHEEK
LOCATION DETAILED: LEFT LATERAL FOREHEAD
LOCATION DETAILED: LEFT INFERIOR FOREHEAD
LOCATION DETAILED: RIGHT MEDIAL EYEBROW
LOCATION DETAILED: RIGHT SUPERIOR MEDIAL FOREHEAD
LOCATION DETAILED: RIGHT INFERIOR LATERAL FOREHEAD
LOCATION DETAILED: LEFT CENTRAL FRONTAL SCALP
LOCATION DETAILED: LEFT SUPERIOR CENTRAL MALAR CHEEK
LOCATION DETAILED: RIGHT CENTRAL EYEBROW

## 2025-02-12 ASSESSMENT — LOCATION SIMPLE DESCRIPTION DERM
LOCATION SIMPLE: RIGHT CHEEK
LOCATION SIMPLE: RIGHT EYEBROW
LOCATION SIMPLE: LEFT EYEBROW
LOCATION SIMPLE: RIGHT SCALP
LOCATION SIMPLE: GLABELLA
LOCATION SIMPLE: RIGHT TEMPLE
LOCATION SIMPLE: LEFT EYELID
LOCATION SIMPLE: LEFT SCALP
LOCATION SIMPLE: LEFT CHEEK
LOCATION SIMPLE: RIGHT FOREHEAD
LOCATION SIMPLE: LEFT FOREHEAD

## 2025-02-12 ASSESSMENT — LOCATION ZONE DERM
LOCATION ZONE: SCALP
LOCATION ZONE: EYELID
LOCATION ZONE: FACE

## 2025-02-12 NOTE — PROCEDURE: FILLERS
Jawline Filler Volume In Cc: 0
Map Statment: See Attach Map for Complete Details
Include Documentation That Aspiration Was Performed Prior To Injecting Filler:: No
Aspiration Statement: Aspiration was performed prior to injecting site with filler.
Mid Face Filler Volume In Cc: 0.5
Consent: Written consent obtained. Risks include but not limited to bruising, beading, irregular texture, ulceration, infection, allergic reaction, scar formation, incomplete augmentation, temporary nature, and procedural pain.
Post-Care Instructions: After the procedure, patient instructed to apply ice to reduce swelling.
Topical Anesthesia?: 23% lidocaine, 7% tetracaine
Detail Level: Detailed
Filler: Restylane Eyelight

## 2025-02-12 NOTE — PROCEDURE: BOTOX (U OR CC)
Additional Area 3 Units: 0
Consent: Written consent obtained. Risks include but not limited to lid/brow ptosis, bruising, swelling, diplopia, temporary effect, incomplete chemical denervation.
Including Pricing Information In The Note: No
Detail Level: Detailed
Additional Area 1 Location: Upper lip
Additional Area 2 Location: Chin
Periorbital Skin Units/Cc: 24
Post-Care Instructions: Patient instructed to not lie down for 4 hours and limit physical activity for 24 hours.
Document As Units Or Cc?: units
Dilution (U/0.1 Cc): 1.1
Forehead Units/Cc: 10
Glabellar Complex Units: 20
Quantity Per Injection Site (Units Or Cc): 4 U
Quantity Per Injection Site (Units Or Cc): 2 U

## 2025-02-12 NOTE — PROCEDURE: ADDITIONAL NOTES
Detail Level: Simple
Additional Notes: Patient was given $200 in professional courtesy, redeemed $50 in Víctor, and redeemed $20 in Aspire
Render Risk Assessment In Note?: no

## 2025-02-19 ENCOUNTER — OFFICE VISIT (OUTPATIENT)
Dept: ENT CLINIC | Age: 65
End: 2025-02-19
Payer: MEDICARE

## 2025-02-19 VITALS
WEIGHT: 195 LBS | HEART RATE: 84 BPM | BODY MASS INDEX: 33.47 KG/M2 | TEMPERATURE: 98 F | DIASTOLIC BLOOD PRESSURE: 68 MMHG | SYSTOLIC BLOOD PRESSURE: 127 MMHG

## 2025-02-19 DIAGNOSIS — E50.7 XEROPHTHALMIA: ICD-10-CM

## 2025-02-19 DIAGNOSIS — K11.7 XEROSTOMIA: Primary | ICD-10-CM

## 2025-02-19 DIAGNOSIS — K11.7 XEROSTOMIA: ICD-10-CM

## 2025-02-19 DIAGNOSIS — J31.1 CHRONIC NASOPHARYNGITIS: ICD-10-CM

## 2025-02-19 DIAGNOSIS — R13.14 PHARYNGOESOPHAGEAL DYSPHAGIA: ICD-10-CM

## 2025-02-19 PROCEDURE — 3017F COLORECTAL CA SCREEN DOC REV: CPT | Performed by: OTOLARYNGOLOGY

## 2025-02-19 PROCEDURE — 1124F ACP DISCUSS-NO DSCNMKR DOCD: CPT | Performed by: OTOLARYNGOLOGY

## 2025-02-19 PROCEDURE — 31575 DIAGNOSTIC LARYNGOSCOPY: CPT | Performed by: OTOLARYNGOLOGY

## 2025-02-19 PROCEDURE — 1036F TOBACCO NON-USER: CPT | Performed by: OTOLARYNGOLOGY

## 2025-02-19 PROCEDURE — G8399 PT W/DXA RESULTS DOCUMENT: HCPCS | Performed by: OTOLARYNGOLOGY

## 2025-02-19 PROCEDURE — 99214 OFFICE O/P EST MOD 30 MIN: CPT | Performed by: OTOLARYNGOLOGY

## 2025-02-19 PROCEDURE — G8417 CALC BMI ABV UP PARAM F/U: HCPCS | Performed by: OTOLARYNGOLOGY

## 2025-02-19 PROCEDURE — G8427 DOCREV CUR MEDS BY ELIG CLIN: HCPCS | Performed by: OTOLARYNGOLOGY

## 2025-02-19 PROCEDURE — 1090F PRES/ABSN URINE INCON ASSESS: CPT | Performed by: OTOLARYNGOLOGY

## 2025-02-19 RX ORDER — MUPIROCIN 20 MG/G
OINTMENT TOPICAL
Qty: 22 G | Refills: 3 | Status: SHIPPED | OUTPATIENT
Start: 2025-02-19

## 2025-02-19 ASSESSMENT — ENCOUNTER SYMPTOMS
SINUS PAIN: 0
SINUS PRESSURE: 0
FACIAL SWELLING: 0
PHOTOPHOBIA: 0
TROUBLE SWALLOWING: 1
RHINORRHEA: 0
VOICE CHANGE: 0
COLOR CHANGE: 0
EYE PAIN: 0
DIARRHEA: 0
SORE THROAT: 0
COUGH: 0
SHORTNESS OF BREATH: 0
STRIDOR: 0
EYE ITCHING: 0
NAUSEA: 0
EYE REDNESS: 0
CHOKING: 0

## 2025-02-19 NOTE — PROGRESS NOTES
[facility]  [address]  [place] [address]  P: [phone]  F: [phone]       Patient     Marly Tejeda  1960    ChiefComplaint     Chief Complaint   Patient presents with    trouble swallowing-globus sensation     Tried humidifier, mouth dry, trouble swallowing without water to drink with it.       History of Present Illness     Marly Tejeda is a pleasant 64 y.o. female known to me presents for new issue of dysphagia.  Symptoms began after her recent ear tube placement.  She has been noticing dry mouth recently as well.  She does have a history of dry eyes.  She has a history of rheumatoid arthritis.  Never been worked up for Sjogren's syndrome.  She is a humidifier at night.  She is using toothpaste recommended by her dentist to help with dry mouth.  Symptoms are persisting.  Additionally she is noticing the thick mucus in the mornings again.  This was previously treated with a Bactroban nasal rinse.    Past Medical History     Past Medical History:   Diagnosis Date    Allergic rhinitis     Arthritis     rheumatoid arthritis    Diabetes mellitus (HCC)     PREDIABETIC D/T STEROID USE    Dizziness     Fibromyalgia     Hearing loss     Hypertension     Migraine     MVP (mitral valve prolapse)     Nausea & vomiting     PONV (postoperative nausea and vomiting)     NAUSEA    Rash     Reflux Doesn't have anymore r/t weight loss    Sinusitis     Thyroid disease     goiter treated with radioactive med    Tinnitus     TMJ dysfunction     Wears glasses        Past Surgical History     Past Surgical History:   Procedure Laterality Date    ANKLE ARTHROSCOPY Right 03/2012    lateral ligament repair, removal spurs    ANKLE SURGERY      right    APPENDECTOMY      BACK SURGERY      cyst removed off lower spine    CERVICAL FUSION N/A 12/11/2019    C4-5, C5-6, C6-7 ANTERIOR CERVICAL DISCECTOMY AND FUSION performed by Tyson Clement MD at [facility] OR    CHOLECYSTECTOMY      ELBOW

## 2025-02-20 LAB
ENA SS-A AB SER IA-ACNC: <0.2 AI (ref 0–0.9)
ENA SS-B AB SER IA-ACNC: <0.2 AI (ref 0–0.9)

## 2025-03-10 ENCOUNTER — PROCEDURE VISIT (OUTPATIENT)
Dept: AUDIOLOGY | Age: 65
End: 2025-03-10
Payer: MEDICARE

## 2025-03-10 DIAGNOSIS — H92.03 OTALGIA OF BOTH EARS: ICD-10-CM

## 2025-03-10 DIAGNOSIS — H90.A32 MIXED CONDUCTIVE AND SENSORINEURAL HEARING LOSS OF LEFT EAR WITH RESTRICTED HEARING OF RIGHT EAR: Primary | ICD-10-CM

## 2025-03-10 DIAGNOSIS — H93.8X2 SENSATION OF FULLNESS IN LEFT EAR: ICD-10-CM

## 2025-03-10 DIAGNOSIS — H92.13 OTORRHEA OF BOTH EARS: ICD-10-CM

## 2025-03-10 DIAGNOSIS — H90.A11 CONDUCTIVE HEARING LOSS OF RIGHT EAR WITH RESTRICTED HEARING OF LEFT EAR: ICD-10-CM

## 2025-03-10 PROCEDURE — 92567 TYMPANOMETRY: CPT

## 2025-03-10 PROCEDURE — 92557 COMPREHENSIVE HEARING TEST: CPT

## 2025-03-10 NOTE — PATIENT INSTRUCTIONS
noise to help you sleep.  Background noise may cover up the noise that you hear in your ears.  You can buy a tabletop machine or a device that sits under your pillow to play soothing sounds, like ocean waves.  Smart phones have free apps, such as Whist, Relax Melodies, ReSound Relief, and White Noise Lite.  These apps have different types of sounds/noise, some of which you can blend together to find sounds that are most soothing to you.  Hearing aid technology, especially when there is some hearing loss, may help reduce tinnitus symptoms by giving your brain better access to the sounds it is missing.  There are some hearing aids with built-in noise generator programs, which may help when amplification alone is not enough.        Additional resources may be found through the [employer] at www.elvin.org    When should you call for help?  Call 911 anytime you think you may need emergency care. For example, call if:    You have symptoms of a stroke. These may include:  Sudden numbness, tingling, weakness, or loss of movement in your face, arm, or leg, especially on only one side of your body.  Sudden vision changes.  Sudden trouble speaking.  Sudden confusion or trouble understanding simple statements.  Sudden problems with walking or balance.  A sudden, severe headache that is different from past headaches.    Call your doctor now or seek immediate medical care if:    You develop other symptoms. These may include hearing loss (or worse hearing loss), balance problems, dizziness, nausea, or vomiting.    Watch closely for changes in your health, and be sure to contact your doctor if:    Your tinnitus moves from both ears to one ear.     Your hearing loss gets worse within 1 day after an ear injury.     Your tinnitus or hearing loss does not get better within 1 week after an ear injury.     Your tinnitus bothers you enough that you want to take medicines to help you cope with it.      If you notice

## 2025-03-10 NOTE — PROGRESS NOTES
RECOMMENDATIONS:                                                                                                                                                                                                                                                            The following items are recommended based on patient report and results from today's appointment:   - Continue medical follow-up with Eren Nevarez DO.   - Retest hearing as medically indicated and/or sooner if a change in hearing is noted.  - If desired, schedule a Hearing Aid Evaluation (HAE) appointment to discuss hearing aid options.  - Utilize \"Good Communication Strategies\" as discussed to assist in speech understanding with communication partners.  - Maintain a sound enriched environment to assist in the management of tinnitus symptoms.    Zeferino Perry  Audiologist    Chart CC'd to: Eren Nevarez DO      Degree of   Hearing Sensitivity dB Range   Within Normal Limits (WNL) 0 - 20   Mild 20 - 40   Moderate 40 - 55   Moderately-Severe 55 - 70   Severe 70 - 90   Profound 90 +     Portions of this note were dictated using Dragon. There may be linguistic errors secondary to the use of this program.

## 2025-03-13 ENCOUNTER — TELEPHONE (OUTPATIENT)
Dept: ENT CLINIC | Age: 65
End: 2025-03-13

## 2025-03-13 DIAGNOSIS — H91.8X3 ASYMMETRICAL HEARING LOSS: Primary | ICD-10-CM

## 2025-03-13 NOTE — TELEPHONE ENCOUNTER
Patient is scheduled for a MRI and she is claustrophobic.  She needs some medicine to take before the procedure

## 2025-03-14 RX ORDER — DIAZEPAM 2 MG/1
TABLET ORAL
Qty: 2 TABLET | Refills: 0 | Status: SHIPPED | OUTPATIENT
Start: 2025-03-14 | End: 2025-03-21

## 2025-03-17 ENCOUNTER — HOSPITAL ENCOUNTER (OUTPATIENT)
Dept: MRI IMAGING | Age: 65
Discharge: HOME | End: 2025-03-17
Attending: OTOLARYNGOLOGY
Payer: MEDICARE

## 2025-03-17 DIAGNOSIS — H91.8X3 ASYMMETRICAL HEARING LOSS: ICD-10-CM

## 2025-03-17 PROCEDURE — 70553 MRI BRAIN STEM W/O & W/DYE: CPT

## 2025-03-17 PROCEDURE — A9576 INJ PROHANCE MULTIPACK: HCPCS | Performed by: OTOLARYNGOLOGY

## 2025-03-17 PROCEDURE — 6360000004 HC RX CONTRAST MEDICATION: Performed by: OTOLARYNGOLOGY

## 2025-03-17 RX ADMIN — GADOTERIDOL 19 ML: 279.3 INJECTION, SOLUTION INTRAVENOUS at 18:59

## 2025-03-18 ENCOUNTER — RESULTS FOLLOW-UP (OUTPATIENT)
Dept: MRI IMAGING | Age: 65
End: 2025-03-18

## 2025-03-19 DIAGNOSIS — H91.8X3 ASYMMETRICAL HEARING LOSS: Primary | ICD-10-CM

## 2025-03-19 DIAGNOSIS — H81.02 MENIERE'S DISEASE OF LEFT EAR: ICD-10-CM

## 2025-03-19 NOTE — RESULT ENCOUNTER NOTE
3-  Patient informed that referral was placed and to hold off on hearing aids until after she sees Dr Edwards.  sean

## 2025-03-28 ENCOUNTER — OFFICE VISIT (OUTPATIENT)
Dept: ENT CLINIC | Age: 65
End: 2025-03-28

## 2025-03-28 VITALS
HEIGHT: 64 IN | BODY MASS INDEX: 33.63 KG/M2 | WEIGHT: 197 LBS | DIASTOLIC BLOOD PRESSURE: 78 MMHG | HEART RATE: 78 BPM | TEMPERATURE: 97.4 F | RESPIRATION RATE: 16 BRPM | SYSTOLIC BLOOD PRESSURE: 128 MMHG

## 2025-03-28 DIAGNOSIS — E50.7 XEROPHTHALMIA: ICD-10-CM

## 2025-03-28 DIAGNOSIS — R13.14 PHARYNGOESOPHAGEAL DYSPHAGIA: ICD-10-CM

## 2025-03-28 DIAGNOSIS — Z96.22 PATENT TYMPANOSTOMY TUBE: ICD-10-CM

## 2025-03-28 DIAGNOSIS — H91.8X3 ASYMMETRICAL HEARING LOSS: Primary | ICD-10-CM

## 2025-03-28 DIAGNOSIS — K11.7 XEROSTOMIA: ICD-10-CM

## 2025-03-28 ASSESSMENT — ENCOUNTER SYMPTOMS
EYE REDNESS: 0
VOICE CHANGE: 0
STRIDOR: 0
COLOR CHANGE: 0
SORE THROAT: 0
EYE ITCHING: 0
FACIAL SWELLING: 0
COUGH: 0
NAUSEA: 0
SINUS PRESSURE: 0
SHORTNESS OF BREATH: 0
EYE PAIN: 0
RHINORRHEA: 0
SINUS PAIN: 0
DIARRHEA: 0
CHOKING: 0
PHOTOPHOBIA: 0
TROUBLE SWALLOWING: 1

## 2025-03-28 NOTE — PATIENT INSTRUCTIONS
Histamine Digest - take one before each meal  [email or web address]  Probiota Histamine X - take as directed    Low histamine diet

## 2025-03-28 NOTE — PROGRESS NOTES
[facility]  [address]  [place] [address]  P: [phone]  F: [phone]       Patient     Marly Tejeda  1960    ChiefComplaint     Chief Complaint   Patient presents with    Follow-up     Follow up go over test results       History of Present Illness     Marly Tejeda is a pleasant 64 y.o. female here for follow-up for audiogram, MRI and Sjogren labs results.  Audiogram shows a new left sided asymmetry in the low frequencies.  Subsequent MRI shows no retrocochlear tumor.  Continues to have issues with hearing loss in the left ear.  Continues to have dry mouth, trouble swallowing, dry eyes.  Symptoms all seem to be in the left side.  He is seeing neurotology in June.      Past Medical History     Past Medical History:   Diagnosis Date    Allergic rhinitis     Arthritis     rheumatoid arthritis    Diabetes mellitus (HCC)     PREDIABETIC D/T STEROID USE    Dizziness     Fibromyalgia     Hearing loss     Hypertension     Migraine     MVP (mitral valve prolapse)     Nausea & vomiting     PONV (postoperative nausea and vomiting)     NAUSEA    Rash     Reflux Doesn't have anymore r/t weight loss    Sinusitis     Thyroid disease     goiter treated with radioactive med    Tinnitus     TMJ dysfunction     Wears glasses        Past Surgical History     Past Surgical History:   Procedure Laterality Date    ANKLE ARTHROSCOPY Right 03/2012    lateral ligament repair, removal spurs    ANKLE SURGERY      right    APPENDECTOMY      BACK SURGERY      cyst removed off lower spine    CERVICAL FUSION N/A 12/11/2019    C4-5, C5-6, C6-7 ANTERIOR CERVICAL DISCECTOMY AND FUSION performed by Tyson Clement MD at [facility] OR    CHOLECYSTECTOMY      ELBOW SURGERY      left    JOINT REPLACEMENT Left 2019    KNEE ARTHROSCOPY Left     LUMBAR FUSION N/A 01/06/2022    L3-4, L4-5, L5-S1 ANTERIOR LUMBAR INTERBODY FUSION WITH PEDICLE SCREW FIXATION performed by Tyson Clement MD at [facility] OR

## 2025-04-30 ENCOUNTER — TELEPHONE (OUTPATIENT)
Dept: ENT CLINIC | Age: 65
End: 2025-04-30

## 2025-04-30 NOTE — TELEPHONE ENCOUNTER
Patient calling about the MRI that she had in March 2025.  She was seen at  Neurology and was told she has had a stroke in the pass that she never knew about.  She wants to know who read the report for the MRI because it shows she had a stroke but not when.  She is losing her hearing in her left ear and the stroke was on the right.  She would like a call back.  Patient informed that Dr Nevarez is out of the office until Monday and she is ok waiting till then to talk to Dr Nevarez.    She has a biopsy scheduled of her salivary gland on May 5th and she wants to cancel it at this time.

## 2025-07-01 ENCOUNTER — PROCEDURE VISIT (OUTPATIENT)
Dept: AUDIOLOGY | Age: 65
End: 2025-07-01

## 2025-07-01 DIAGNOSIS — H90.A32 MIXED CONDUCTIVE AND SENSORINEURAL HEARING LOSS OF LEFT EAR WITH RESTRICTED HEARING OF RIGHT EAR: Primary | ICD-10-CM

## 2025-07-01 NOTE — PROGRESS NOTES
Marly Tejeda  1960.65 y.o. female   [id number]      HEARING AID EVALUATION    Marly Tejeda was seen today, 7/1/2025 , for a Hearing Aid Evaluation following audiologic evaluation on 3/10/25.   Patient has no prior history of hearing aid use. Patient does not have an insurance benefit for hearing aids. Patient is responsible for any cost over the previously listed benefit (if any).  Hearing aid benefit worksheet scanned into media tab.      DATE: 7/1/2025     PROCEDURES:     REVIEWED AUDIOGRAM AND HEARING EVALUATION RESULTS:         LIFESTYLE EVALUATION:      Patient notes the following hearing difficulty:  - Hearing across distance is an issue  - Sitting at a restaurant, conversation is not clear    Patient was able to demo a pair of Phonak B10-[id number] devices while in office. Patient noted significant difference in sound quality while listening to hearing aids.     After a discussion of evaluation results, discussion of levels of technology and prices, and lifestyle assessment, Marly Tejeda has decided to pursue hearing aids.     Technology to be ordered: Phonak [id number].  Picked color P1,  power 2M, medium vent dome, AU. Signed medical evaluation waiver and purchase agreement.    Patient cost due at time of fitting: $4900    Patient left [employer] message on 7/7 saying she would like to hold off right now. Patient's order to be cancelled and returned for credit.    PATIENT EDUCATION:      - Verbally reviewed benefits and limitations of devices and available features in hearing aids.    - Verbally discussed realistic expectations of hearing aid use     Information was shared verbally with patient during appointment.     RECOMMENDATIONS:     - Return if patient would like to pursue hearing aids.    No charge for today's appointment.    Zeferino Perry  Audiologist    Portions of this note were dictated using Dragon. There may be linguistic errors secondary to the use of this program.

## 2025-08-12 RX ORDER — DORZOLAMIDE HYDROCHLORIDE AND TIMOLOL MALEATE 20; 5 MG/ML; MG/ML
1 SOLUTION/ DROPS OPHTHALMIC 2 TIMES DAILY
COMMUNITY
Start: 2025-04-24

## 2025-08-12 RX ORDER — SARILUMAB 200 MG/1.14ML
200 INJECTION, SOLUTION SUBCUTANEOUS
COMMUNITY
Start: 2025-06-24

## 2025-08-15 ENCOUNTER — HOSPITAL ENCOUNTER (OUTPATIENT)
Age: 65
Setting detail: OUTPATIENT SURGERY
Discharge: HOME | End: 2025-08-15
Attending: ORTHOPAEDIC SURGERY | Admitting: ORTHOPAEDIC SURGERY
Payer: MEDICARE

## 2025-08-15 ENCOUNTER — ANESTHESIA EVENT (OUTPATIENT)
Dept: OPERATING ROOM | Age: 65
End: 2025-08-15
Payer: MEDICARE

## 2025-08-15 ENCOUNTER — ANESTHESIA (OUTPATIENT)
Dept: OPERATING ROOM | Age: 65
End: 2025-08-15
Payer: MEDICARE

## 2025-08-15 VITALS
WEIGHT: 194.6 LBS | HEIGHT: 64 IN | RESPIRATION RATE: 14 BRPM | SYSTOLIC BLOOD PRESSURE: 133 MMHG | TEMPERATURE: 97.1 F | HEART RATE: 67 BPM | BODY MASS INDEX: 33.22 KG/M2 | OXYGEN SATURATION: 94 % | DIASTOLIC BLOOD PRESSURE: 59 MMHG

## 2025-08-15 DIAGNOSIS — M25.741 OSTEOPHYTE OF RIGHT HAND: ICD-10-CM

## 2025-08-15 DIAGNOSIS — M79.89 SOFT TISSUE MASS: ICD-10-CM

## 2025-08-15 LAB
GLUCOSE BLD-MCNC: 144 MG/DL (ref 70–99)
PERFORMED ON: ABNORMAL

## 2025-08-15 PROCEDURE — 3600000014 HC SURGERY LEVEL 4 ADDTL 15MIN: Performed by: ORTHOPAEDIC SURGERY

## 2025-08-15 PROCEDURE — 3700000001 HC ADD 15 MINUTES (ANESTHESIA): Performed by: ORTHOPAEDIC SURGERY

## 2025-08-15 PROCEDURE — 6360000002 HC RX W HCPCS: Performed by: ORTHOPAEDIC SURGERY

## 2025-08-15 PROCEDURE — 6360000002 HC RX W HCPCS: Performed by: ANESTHESIOLOGY

## 2025-08-15 PROCEDURE — 88305 TISSUE EXAM BY PATHOLOGIST: CPT

## 2025-08-15 PROCEDURE — 6370000000 HC RX 637 (ALT 250 FOR IP): Performed by: ORTHOPAEDIC SURGERY

## 2025-08-15 PROCEDURE — 2500000003 HC RX 250 WO HCPCS: Performed by: ORTHOPAEDIC SURGERY

## 2025-08-15 PROCEDURE — 7100000010 HC PHASE II RECOVERY - FIRST 15 MIN: Performed by: ORTHOPAEDIC SURGERY

## 2025-08-15 PROCEDURE — 3700000000 HC ANESTHESIA ATTENDED CARE: Performed by: ORTHOPAEDIC SURGERY

## 2025-08-15 PROCEDURE — 7100000011 HC PHASE II RECOVERY - ADDTL 15 MIN: Performed by: ORTHOPAEDIC SURGERY

## 2025-08-15 PROCEDURE — 7100000001 HC PACU RECOVERY - ADDTL 15 MIN: Performed by: ORTHOPAEDIC SURGERY

## 2025-08-15 PROCEDURE — 2580000003 HC RX 258: Performed by: ANESTHESIOLOGY

## 2025-08-15 PROCEDURE — 2709999900 HC NON-CHARGEABLE SUPPLY: Performed by: ORTHOPAEDIC SURGERY

## 2025-08-15 PROCEDURE — 6370000000 HC RX 637 (ALT 250 FOR IP): Performed by: ANESTHESIOLOGY

## 2025-08-15 PROCEDURE — 7100000000 HC PACU RECOVERY - FIRST 15 MIN: Performed by: ORTHOPAEDIC SURGERY

## 2025-08-15 PROCEDURE — 3600000004 HC SURGERY LEVEL 4 BASE: Performed by: ORTHOPAEDIC SURGERY

## 2025-08-15 PROCEDURE — 6360000002 HC RX W HCPCS: Performed by: NURSE ANESTHETIST, CERTIFIED REGISTERED

## 2025-08-15 RX ORDER — APREPITANT 40 MG/1
40 CAPSULE ORAL ONCE
Status: COMPLETED | OUTPATIENT
Start: 2025-08-15 | End: 2025-08-15

## 2025-08-15 RX ORDER — SODIUM CHLORIDE 0.9 % (FLUSH) 0.9 %
5-40 SYRINGE (ML) INJECTION EVERY 12 HOURS SCHEDULED
Status: DISCONTINUED | OUTPATIENT
Start: 2025-08-15 | End: 2025-08-15 | Stop reason: HOSPADM

## 2025-08-15 RX ORDER — SODIUM CHLORIDE, SODIUM LACTATE, POTASSIUM CHLORIDE, CALCIUM CHLORIDE 600; 310; 30; 20 MG/100ML; MG/100ML; MG/100ML; MG/100ML
INJECTION, SOLUTION INTRAVENOUS CONTINUOUS
Status: DISCONTINUED | OUTPATIENT
Start: 2025-08-15 | End: 2025-08-15 | Stop reason: HOSPADM

## 2025-08-15 RX ORDER — IPRATROPIUM BROMIDE AND ALBUTEROL SULFATE 2.5; .5 MG/3ML; MG/3ML
1 SOLUTION RESPIRATORY (INHALATION)
Status: DISCONTINUED | OUTPATIENT
Start: 2025-08-15 | End: 2025-08-15 | Stop reason: HOSPADM

## 2025-08-15 RX ORDER — DIPHENHYDRAMINE HYDROCHLORIDE 50 MG/ML
12.5 INJECTION, SOLUTION INTRAMUSCULAR; INTRAVENOUS
Status: DISCONTINUED | OUTPATIENT
Start: 2025-08-15 | End: 2025-08-15 | Stop reason: HOSPADM

## 2025-08-15 RX ORDER — PROPOFOL 10 MG/ML
INJECTION, EMULSION INTRAVENOUS
Status: DISCONTINUED | OUTPATIENT
Start: 2025-08-15 | End: 2025-08-15 | Stop reason: SDUPTHER

## 2025-08-15 RX ORDER — FENTANYL CITRATE 50 UG/ML
25 INJECTION, SOLUTION INTRAMUSCULAR; INTRAVENOUS EVERY 5 MIN PRN
Status: DISCONTINUED | OUTPATIENT
Start: 2025-08-15 | End: 2025-08-15 | Stop reason: HOSPADM

## 2025-08-15 RX ORDER — LABETALOL HYDROCHLORIDE 5 MG/ML
10 INJECTION, SOLUTION INTRAVENOUS
Status: DISCONTINUED | OUTPATIENT
Start: 2025-08-15 | End: 2025-08-15 | Stop reason: HOSPADM

## 2025-08-15 RX ORDER — GINSENG 100 MG
CAPSULE ORAL PRN
Status: DISCONTINUED | OUTPATIENT
Start: 2025-08-15 | End: 2025-08-15 | Stop reason: ALTCHOICE

## 2025-08-15 RX ORDER — SODIUM CHLORIDE 0.9 % (FLUSH) 0.9 %
5-40 SYRINGE (ML) INJECTION PRN
Status: DISCONTINUED | OUTPATIENT
Start: 2025-08-15 | End: 2025-08-15 | Stop reason: HOSPADM

## 2025-08-15 RX ORDER — PROCHLORPERAZINE EDISYLATE 5 MG/ML
5 INJECTION INTRAMUSCULAR; INTRAVENOUS
Status: DISCONTINUED | OUTPATIENT
Start: 2025-08-15 | End: 2025-08-15 | Stop reason: HOSPADM

## 2025-08-15 RX ORDER — HYDROCODONE BITARTRATE AND ACETAMINOPHEN 5; 325 MG/1; MG/1
1 TABLET ORAL EVERY 6 HOURS PRN
Qty: 12 TABLET | Refills: 0 | Status: SHIPPED | OUTPATIENT
Start: 2025-08-15 | End: 2025-08-18

## 2025-08-15 RX ORDER — LIDOCAINE HYDROCHLORIDE 20 MG/ML
INJECTION, SOLUTION INTRAVENOUS
Status: DISCONTINUED | OUTPATIENT
Start: 2025-08-15 | End: 2025-08-15 | Stop reason: SDUPTHER

## 2025-08-15 RX ORDER — OXYCODONE HYDROCHLORIDE 5 MG/1
5 TABLET ORAL
Status: COMPLETED | OUTPATIENT
Start: 2025-08-15 | End: 2025-08-15

## 2025-08-15 RX ORDER — MAGNESIUM HYDROXIDE 1200 MG/15ML
LIQUID ORAL CONTINUOUS PRN
Status: DISCONTINUED | OUTPATIENT
Start: 2025-08-15 | End: 2025-08-15 | Stop reason: HOSPADM

## 2025-08-15 RX ORDER — MEPERIDINE HYDROCHLORIDE 25 MG/ML
12.5 INJECTION INTRAMUSCULAR; INTRAVENOUS; SUBCUTANEOUS EVERY 5 MIN PRN
Status: DISCONTINUED | OUTPATIENT
Start: 2025-08-15 | End: 2025-08-15 | Stop reason: HOSPADM

## 2025-08-15 RX ORDER — SODIUM CHLORIDE 9 MG/ML
INJECTION, SOLUTION INTRAVENOUS PRN
Status: DISCONTINUED | OUTPATIENT
Start: 2025-08-15 | End: 2025-08-15 | Stop reason: HOSPADM

## 2025-08-15 RX ORDER — ONDANSETRON 2 MG/ML
4 INJECTION INTRAMUSCULAR; INTRAVENOUS
Status: DISCONTINUED | OUTPATIENT
Start: 2025-08-15 | End: 2025-08-15 | Stop reason: HOSPADM

## 2025-08-15 RX ADMIN — PROPOFOL 30 MG: 10 INJECTION, EMULSION INTRAVENOUS at 07:49

## 2025-08-15 RX ADMIN — FENTANYL CITRATE 25 MCG: 50 INJECTION INTRAMUSCULAR; INTRAVENOUS at 09:21

## 2025-08-15 RX ADMIN — APREPITANT 40 MG: 40 CAPSULE ORAL at 07:19

## 2025-08-15 RX ADMIN — FENTANYL CITRATE 25 MCG: 50 INJECTION INTRAMUSCULAR; INTRAVENOUS at 09:07

## 2025-08-15 RX ADMIN — OXYCODONE HYDROCHLORIDE 5 MG: 5 TABLET ORAL at 09:42

## 2025-08-15 RX ADMIN — CEFAZOLIN SODIUM 2000 MG: 1 POWDER, FOR SOLUTION INTRAMUSCULAR; INTRAVENOUS at 07:41

## 2025-08-15 RX ADMIN — PROPOFOL 50 MG: 10 INJECTION, EMULSION INTRAVENOUS at 07:39

## 2025-08-15 RX ADMIN — PROPOFOL 20 MG: 10 INJECTION, EMULSION INTRAVENOUS at 07:41

## 2025-08-15 RX ADMIN — LIDOCAINE HYDROCHLORIDE 50 MG: 20 INJECTION, SOLUTION INTRAVENOUS at 07:39

## 2025-08-15 RX ADMIN — SODIUM CHLORIDE, SODIUM LACTATE, POTASSIUM CHLORIDE, AND CALCIUM CHLORIDE: .6; .31; .03; .02 INJECTION, SOLUTION INTRAVENOUS at 06:47

## 2025-08-15 RX ADMIN — PROPOFOL 125 MCG/KG/MIN: 10 INJECTION, EMULSION INTRAVENOUS at 07:36

## 2025-08-15 RX ADMIN — PROPOFOL 20 MG: 10 INJECTION, EMULSION INTRAVENOUS at 07:40

## 2025-08-15 ASSESSMENT — PAIN DESCRIPTION - ONSET: ONSET: ON-GOING

## 2025-08-15 ASSESSMENT — PAIN DESCRIPTION - LOCATION
LOCATION: FINGER (COMMENT WHICH ONE)
LOCATION: OTHER (COMMENT)
LOCATION: FINGER (COMMENT WHICH ONE)

## 2025-08-15 ASSESSMENT — PAIN DESCRIPTION - PAIN TYPE: TYPE: CHRONIC PAIN

## 2025-08-15 ASSESSMENT — PAIN SCALES - GENERAL
PAINLEVEL_OUTOF10: 5
PAINLEVEL_OUTOF10: 5
PAINLEVEL_OUTOF10: 7
PAINLEVEL_OUTOF10: 7

## 2025-08-15 ASSESSMENT — PAIN DESCRIPTION - ORIENTATION
ORIENTATION: RIGHT
ORIENTATION: RIGHT

## 2025-08-15 ASSESSMENT — PAIN - FUNCTIONAL ASSESSMENT
PAIN_FUNCTIONAL_ASSESSMENT: 0-10
PAIN_FUNCTIONAL_ASSESSMENT: PREVENTS OR INTERFERES SOME ACTIVE ACTIVITIES AND ADLS
PAIN_FUNCTIONAL_ASSESSMENT: 0-10

## 2025-08-15 ASSESSMENT — PAIN DESCRIPTION - FREQUENCY: FREQUENCY: CONTINUOUS

## 2025-08-15 ASSESSMENT — PAIN DESCRIPTION - DESCRIPTORS
DESCRIPTORS: THROBBING
DESCRIPTORS: THROBBING
DESCRIPTORS: ACHING;DISCOMFORT;NAGGING

## 2025-08-31 ENCOUNTER — APPOINTMENT (OUTPATIENT)
Dept: CT IMAGING | Age: 65
End: 2025-08-31
Payer: MEDICARE

## 2025-08-31 ENCOUNTER — HOSPITAL ENCOUNTER (OUTPATIENT)
Age: 65
Setting detail: OBSERVATION
Discharge: HOME | End: 2025-09-03
Admitting: HOSPITALIST
Payer: MEDICARE

## 2025-08-31 DIAGNOSIS — R42 DIZZINESS: Primary | ICD-10-CM

## 2025-08-31 DIAGNOSIS — R53.1 LEFT-SIDED WEAKNESS: ICD-10-CM

## 2025-08-31 DIAGNOSIS — R11.0 NAUSEA: ICD-10-CM

## 2025-08-31 DIAGNOSIS — I63.9 CEREBROVASCULAR ACCIDENT (CVA), UNSPECIFIED MECHANISM (HCC): ICD-10-CM

## 2025-08-31 DIAGNOSIS — R20.0 LEFT SIDED NUMBNESS: ICD-10-CM

## 2025-08-31 LAB
ALBUMIN SERPL-MCNC: 4.4 G/DL (ref 3.4–5)
ALBUMIN/GLOB SERPL: 1.6 {RATIO} (ref 1.1–2.2)
ALP SERPL-CCNC: 90 U/L (ref 40–129)
ALT SERPL-CCNC: 22 U/L (ref 10–40)
ANION GAP SERPL CALCULATED.3IONS-SCNC: 19 MMOL/L (ref 3–16)
AST SERPL-CCNC: 31 U/L (ref 15–37)
BASOPHILS # BLD: 0 K/UL (ref 0–0.2)
BASOPHILS NFR BLD: 0.7 %
BILIRUB SERPL-MCNC: 0.4 MG/DL (ref 0–1)
BUN SERPL-MCNC: 18 MG/DL (ref 7–20)
CALCIUM SERPL-MCNC: 9.7 MG/DL (ref 8.3–10.6)
CHLORIDE SERPL-SCNC: 101 MMOL/L (ref 99–110)
CHP ED QC CHECK: YES
CO2 SERPL-SCNC: 18 MMOL/L (ref 21–32)
CREAT SERPL-MCNC: 0.9 MG/DL (ref 0.6–1.2)
DEPRECATED RDW RBC AUTO: 12.7 % (ref 12.4–15.4)
EOSINOPHIL # BLD: 0.1 K/UL (ref 0–0.6)
EOSINOPHIL NFR BLD: 2.4 %
GFR SERPLBLD CREATININE-BSD FMLA CKD-EPI: 70 ML/MIN/{1.73_M2}
GLUCOSE BLD-MCNC: 152 MG/DL
GLUCOSE BLD-MCNC: 152 MG/DL (ref 70–99)
GLUCOSE SERPL-MCNC: 160 MG/DL (ref 70–99)
HCT VFR BLD AUTO: 44.1 % (ref 36–48)
HGB BLD-MCNC: 15.6 G/DL (ref 12–16)
INR PPP: 0.87 (ref 0.86–1.14)
LYMPHOCYTES # BLD: 1.3 K/UL (ref 1–5.1)
LYMPHOCYTES NFR BLD: 22.7 %
MAGNESIUM SERPL-MCNC: 2 MG/DL (ref 1.8–2.4)
MCH RBC QN AUTO: 31.6 PG (ref 26–34)
MCHC RBC AUTO-ENTMCNC: 35.3 G/DL (ref 31–36)
MCV RBC AUTO: 89.4 FL (ref 80–100)
MONOCYTES # BLD: 1 K/UL (ref 0–1.3)
MONOCYTES NFR BLD: 16.7 %
NEUTROPHILS # BLD: 3.3 K/UL (ref 1.7–7.7)
NEUTROPHILS NFR BLD: 57.5 %
PERFORMED ON: ABNORMAL
PLATELET # BLD AUTO: 226 K/UL (ref 135–450)
PMV BLD AUTO: 9.7 FL (ref 5–10.5)
POTASSIUM SERPL-SCNC: 3.4 MMOL/L (ref 3.5–5.1)
POTASSIUM SERPL-SCNC: 4.4 MMOL/L (ref 3.5–5.1)
PROT SERPL-MCNC: 7.2 G/DL (ref 6.4–8.2)
PROTHROMBIN TIME: 12.1 SEC (ref 12.1–14.9)
RBC # BLD AUTO: 4.93 M/UL (ref 4–5.2)
SODIUM SERPL-SCNC: 138 MMOL/L (ref 136–145)
TROPONIN, HIGH SENSITIVITY: <6 NG/L (ref 0–14)
WBC # BLD AUTO: 5.7 K/UL (ref 4–11)

## 2025-08-31 PROCEDURE — 85025 COMPLETE CBC W/AUTO DIFF WBC: CPT

## 2025-08-31 PROCEDURE — 1200000000 HC SEMI PRIVATE

## 2025-08-31 PROCEDURE — 85610 PROTHROMBIN TIME: CPT

## 2025-08-31 PROCEDURE — 70496 CT ANGIOGRAPHY HEAD: CPT

## 2025-08-31 PROCEDURE — 6370000000 HC RX 637 (ALT 250 FOR IP)

## 2025-08-31 PROCEDURE — 84484 ASSAY OF TROPONIN QUANT: CPT

## 2025-08-31 PROCEDURE — 83735 ASSAY OF MAGNESIUM: CPT

## 2025-08-31 PROCEDURE — 99285 EMERGENCY DEPT VISIT HI MDM: CPT

## 2025-08-31 PROCEDURE — 84132 ASSAY OF SERUM POTASSIUM: CPT

## 2025-08-31 PROCEDURE — 6360000004 HC RX CONTRAST MEDICATION

## 2025-08-31 PROCEDURE — 96374 THER/PROPH/DIAG INJ IV PUSH: CPT

## 2025-08-31 PROCEDURE — 70450 CT HEAD/BRAIN W/O DYE: CPT

## 2025-08-31 PROCEDURE — 2580000003 HC RX 258

## 2025-08-31 PROCEDURE — 6360000002 HC RX W HCPCS

## 2025-08-31 PROCEDURE — 80053 COMPREHEN METABOLIC PANEL: CPT

## 2025-08-31 PROCEDURE — 93005 ELECTROCARDIOGRAM TRACING: CPT

## 2025-08-31 PROCEDURE — 36415 COLL VENOUS BLD VENIPUNCTURE: CPT

## 2025-08-31 RX ORDER — SODIUM CHLORIDE 0.9 % (FLUSH) 0.9 %
5-40 SYRINGE (ML) INJECTION EVERY 12 HOURS SCHEDULED
Status: DISCONTINUED | OUTPATIENT
Start: 2025-08-31 | End: 2025-09-03 | Stop reason: HOSPADM

## 2025-08-31 RX ORDER — SODIUM CHLORIDE 9 MG/ML
INJECTION, SOLUTION INTRAVENOUS CONTINUOUS
Status: DISCONTINUED | OUTPATIENT
Start: 2025-08-31 | End: 2025-08-31

## 2025-08-31 RX ORDER — CLOPIDOGREL 300 MG/1
300 TABLET, FILM COATED ORAL ONCE
Status: COMPLETED | OUTPATIENT
Start: 2025-08-31 | End: 2025-08-31

## 2025-08-31 RX ORDER — ONDANSETRON 2 MG/ML
4 INJECTION INTRAMUSCULAR; INTRAVENOUS ONCE
Status: COMPLETED | OUTPATIENT
Start: 2025-08-31 | End: 2025-08-31

## 2025-08-31 RX ORDER — CLOPIDOGREL BISULFATE 75 MG/1
75 TABLET ORAL DAILY
Status: DISCONTINUED | OUTPATIENT
Start: 2025-09-01 | End: 2025-09-03 | Stop reason: HOSPADM

## 2025-08-31 RX ORDER — CLOPIDOGREL BISULFATE 75 MG/1
75 TABLET ORAL DAILY
Status: CANCELLED | OUTPATIENT
Start: 2025-08-31 | End: 2025-09-21

## 2025-08-31 RX ORDER — SODIUM CHLORIDE 0.9 % (FLUSH) 0.9 %
5-40 SYRINGE (ML) INJECTION PRN
Status: DISCONTINUED | OUTPATIENT
Start: 2025-08-31 | End: 2025-09-03 | Stop reason: HOSPADM

## 2025-08-31 RX ORDER — SODIUM CHLORIDE 9 MG/ML
INJECTION, SOLUTION INTRAVENOUS PRN
Status: DISCONTINUED | OUTPATIENT
Start: 2025-08-31 | End: 2025-09-03 | Stop reason: HOSPADM

## 2025-08-31 RX ORDER — ASPIRIN 81 MG/1
81 TABLET, CHEWABLE ORAL DAILY
Status: DISCONTINUED | OUTPATIENT
Start: 2025-08-31 | End: 2025-09-03 | Stop reason: HOSPADM

## 2025-08-31 RX ORDER — ATORVASTATIN CALCIUM 80 MG/1
80 TABLET, FILM COATED ORAL NIGHTLY
Status: DISCONTINUED | OUTPATIENT
Start: 2025-08-31 | End: 2025-09-03 | Stop reason: HOSPADM

## 2025-08-31 RX ORDER — POLYETHYLENE GLYCOL 3350 17 G/17G
17 POWDER, FOR SOLUTION ORAL DAILY PRN
Status: DISCONTINUED | OUTPATIENT
Start: 2025-08-31 | End: 2025-09-03 | Stop reason: HOSPADM

## 2025-08-31 RX ORDER — ASPIRIN 81 MG/1
81 TABLET ORAL DAILY
COMMUNITY

## 2025-08-31 RX ORDER — IOPAMIDOL 755 MG/ML
75 INJECTION, SOLUTION INTRAVASCULAR
Status: COMPLETED | OUTPATIENT
Start: 2025-08-31 | End: 2025-08-31

## 2025-08-31 RX ADMIN — ASPIRIN 81 MG: 81 TABLET, CHEWABLE ORAL at 21:59

## 2025-08-31 RX ADMIN — SODIUM CHLORIDE: 9 INJECTION, SOLUTION INTRAVENOUS at 21:57

## 2025-08-31 RX ADMIN — ONDANSETRON 4 MG: 2 INJECTION, SOLUTION INTRAMUSCULAR; INTRAVENOUS at 17:03

## 2025-08-31 RX ADMIN — IOPAMIDOL 75 ML: 755 INJECTION, SOLUTION INTRAVENOUS at 16:51

## 2025-08-31 RX ADMIN — CLOPIDOGREL BISULFATE 300 MG: 300 TABLET, FILM COATED ORAL at 17:31

## 2025-08-31 RX ADMIN — ATORVASTATIN CALCIUM 80 MG: 80 TABLET, FILM COATED ORAL at 21:59

## 2025-08-31 ASSESSMENT — PAIN - FUNCTIONAL ASSESSMENT: PAIN_FUNCTIONAL_ASSESSMENT: 0-10

## 2025-08-31 ASSESSMENT — PAIN SCALES - GENERAL
PAINLEVEL_OUTOF10: 0
PAINLEVEL_OUTOF10: 0

## 2025-09-01 PROBLEM — R26.89 IMBALANCE: Status: ACTIVE | Noted: 2025-09-01

## 2025-09-01 PROBLEM — R29.898 LEFT LEG WEAKNESS: Status: ACTIVE | Noted: 2025-09-01

## 2025-09-01 LAB
ALBUMIN SERPL-MCNC: 3.6 G/DL (ref 3.4–5)
ANION GAP SERPL CALCULATED.3IONS-SCNC: 14 MMOL/L (ref 3–16)
APTT BLD: 24.1 SEC (ref 22.8–35.8)
BASOPHILS # BLD: 0 K/UL (ref 0–0.2)
BASOPHILS NFR BLD: 0.7 %
BUN SERPL-MCNC: 13 MG/DL (ref 7–20)
CALCIUM SERPL-MCNC: 8.9 MG/DL (ref 8.3–10.6)
CHLORIDE SERPL-SCNC: 104 MMOL/L (ref 99–110)
CO2 SERPL-SCNC: 23 MMOL/L (ref 21–32)
CREAT SERPL-MCNC: 0.7 MG/DL (ref 0.6–1.2)
DEPRECATED RDW RBC AUTO: 13 % (ref 12.4–15.4)
EKG ATRIAL RATE: 93 BPM
EKG DIAGNOSIS: NORMAL
EKG P AXIS: 78 DEGREES
EKG P-R INTERVAL: 196 MS
EKG Q-T INTERVAL: 402 MS
EKG QRS DURATION: 98 MS
EKG QTC CALCULATION (BAZETT): 499 MS
EKG R AXIS: 56 DEGREES
EKG T AXIS: 59 DEGREES
EKG VENTRICULAR RATE: 93 BPM
EOSINOPHIL # BLD: 0.2 K/UL (ref 0–0.6)
EOSINOPHIL NFR BLD: 3.8 %
GFR SERPLBLD CREATININE-BSD FMLA CKD-EPI: >90 ML/MIN/{1.73_M2}
GLUCOSE SERPL-MCNC: 112 MG/DL (ref 70–99)
HCT VFR BLD AUTO: 39.5 % (ref 36–48)
HGB BLD-MCNC: 14 G/DL (ref 12–16)
INR PPP: 1.03 (ref 0.86–1.14)
LYMPHOCYTES # BLD: 0.9 K/UL (ref 1–5.1)
LYMPHOCYTES NFR BLD: 17.5 %
MAGNESIUM SERPL-MCNC: 2.08 MG/DL (ref 1.8–2.4)
MCH RBC QN AUTO: 31.8 PG (ref 26–34)
MCHC RBC AUTO-ENTMCNC: 35.4 G/DL (ref 31–36)
MCV RBC AUTO: 90 FL (ref 80–100)
MONOCYTES # BLD: 0.9 K/UL (ref 0–1.3)
MONOCYTES NFR BLD: 17.9 %
NEUTROPHILS # BLD: 3 K/UL (ref 1.7–7.7)
NEUTROPHILS NFR BLD: 60.1 %
PHOSPHATE SERPL-MCNC: 3.9 MG/DL (ref 2.5–4.9)
PLATELET # BLD AUTO: 198 K/UL (ref 135–450)
PMV BLD AUTO: 10.2 FL (ref 5–10.5)
POTASSIUM SERPL-SCNC: 3.8 MMOL/L (ref 3.5–5.1)
PROTHROMBIN TIME: 13.8 SEC (ref 12.1–14.9)
RBC # BLD AUTO: 4.39 M/UL (ref 4–5.2)
SODIUM SERPL-SCNC: 141 MMOL/L (ref 136–145)
WBC # BLD AUTO: 5 K/UL (ref 4–11)

## 2025-09-01 PROCEDURE — G0378 HOSPITAL OBSERVATION PER HR: HCPCS

## 2025-09-01 PROCEDURE — 80061 LIPID PANEL: CPT

## 2025-09-01 PROCEDURE — 6370000000 HC RX 637 (ALT 250 FOR IP)

## 2025-09-01 PROCEDURE — 85025 COMPLETE CBC W/AUTO DIFF WBC: CPT

## 2025-09-01 PROCEDURE — 92610 EVALUATE SWALLOWING FUNCTION: CPT

## 2025-09-01 PROCEDURE — 83735 ASSAY OF MAGNESIUM: CPT

## 2025-09-01 PROCEDURE — 83036 HEMOGLOBIN GLYCOSYLATED A1C: CPT

## 2025-09-01 PROCEDURE — 36415 COLL VENOUS BLD VENIPUNCTURE: CPT

## 2025-09-01 PROCEDURE — 85730 THROMBOPLASTIN TIME PARTIAL: CPT

## 2025-09-01 PROCEDURE — 85610 PROTHROMBIN TIME: CPT

## 2025-09-01 PROCEDURE — 2500000003 HC RX 250 WO HCPCS

## 2025-09-01 PROCEDURE — 99223 1ST HOSP IP/OBS HIGH 75: CPT | Performed by: PSYCHIATRY & NEUROLOGY

## 2025-09-01 PROCEDURE — 80069 RENAL FUNCTION PANEL: CPT

## 2025-09-01 RX ORDER — METHOCARBAMOL 500 MG/1
500 TABLET, FILM COATED ORAL 3 TIMES DAILY PRN
Status: DISCONTINUED | OUTPATIENT
Start: 2025-09-01 | End: 2025-09-03 | Stop reason: HOSPADM

## 2025-09-01 RX ORDER — LORAZEPAM 1 MG/1
1 TABLET ORAL
Status: DISCONTINUED | OUTPATIENT
Start: 2025-09-01 | End: 2025-09-02

## 2025-09-01 RX ORDER — AMLODIPINE BESYLATE 10 MG/1
10 TABLET ORAL NIGHTLY
Status: DISCONTINUED | OUTPATIENT
Start: 2025-09-01 | End: 2025-09-03 | Stop reason: HOSPADM

## 2025-09-01 RX ORDER — THYROID 30 MG/1
90 TABLET ORAL DAILY
Status: DISCONTINUED | OUTPATIENT
Start: 2025-09-01 | End: 2025-09-03 | Stop reason: HOSPADM

## 2025-09-01 RX ORDER — PROGESTERONE 100 MG/1
200 CAPSULE ORAL DAILY
Status: DISCONTINUED | OUTPATIENT
Start: 2025-09-01 | End: 2025-09-03 | Stop reason: HOSPADM

## 2025-09-01 RX ORDER — LEVOTHYROXINE SODIUM 75 UG/1
75 TABLET ORAL DAILY
Status: DISCONTINUED | OUTPATIENT
Start: 2025-09-01 | End: 2025-09-03 | Stop reason: HOSPADM

## 2025-09-01 RX ORDER — CELECOXIB 100 MG/1
200 CAPSULE ORAL DAILY
Status: DISCONTINUED | OUTPATIENT
Start: 2025-09-01 | End: 2025-09-01

## 2025-09-01 RX ORDER — CELECOXIB 100 MG/1
100 CAPSULE ORAL 2 TIMES DAILY
Status: DISCONTINUED | OUTPATIENT
Start: 2025-09-01 | End: 2025-09-01

## 2025-09-01 RX ORDER — DORZOLAMIDE HYDROCHLORIDE AND TIMOLOL MALEATE 20; 5 MG/ML; MG/ML
1 SOLUTION/ DROPS OPHTHALMIC 2 TIMES DAILY
Status: DISCONTINUED | OUTPATIENT
Start: 2025-09-01 | End: 2025-09-01

## 2025-09-01 RX ORDER — EPINEPHRINE 0.3 MG/.3ML
0.3 INJECTION SUBCUTANEOUS
Status: DISCONTINUED | OUTPATIENT
Start: 2025-09-01 | End: 2025-09-03 | Stop reason: HOSPADM

## 2025-09-01 RX ORDER — SUMATRIPTAN SUCCINATE 100 MG/1
100 TABLET ORAL DAILY PRN
Status: DISCONTINUED | OUTPATIENT
Start: 2025-09-01 | End: 2025-09-03 | Stop reason: HOSPADM

## 2025-09-01 RX ORDER — CELECOXIB 100 MG/1
100 CAPSULE ORAL DAILY
Status: COMPLETED | OUTPATIENT
Start: 2025-09-01 | End: 2025-09-01

## 2025-09-01 RX ADMIN — CELECOXIB 100 MG: 100 CAPSULE ORAL at 22:24

## 2025-09-01 RX ADMIN — SODIUM CHLORIDE, PRESERVATIVE FREE 10 ML: 5 INJECTION INTRAVENOUS at 22:25

## 2025-09-01 RX ADMIN — AMLODIPINE BESYLATE 10 MG: 10 TABLET ORAL at 22:24

## 2025-09-01 RX ADMIN — CLOPIDOGREL BISULFATE 75 MG: 75 TABLET, FILM COATED ORAL at 10:09

## 2025-09-01 RX ADMIN — ASPIRIN 81 MG: 81 TABLET, CHEWABLE ORAL at 10:09

## 2025-09-01 RX ADMIN — THYROID 90 MG: 30 TABLET ORAL at 11:15

## 2025-09-01 RX ADMIN — SODIUM CHLORIDE, PRESERVATIVE FREE 10 ML: 5 INJECTION INTRAVENOUS at 10:13

## 2025-09-01 RX ADMIN — LEVOTHYROXINE SODIUM 75 MCG: 0.07 TABLET ORAL at 10:09

## 2025-09-01 ASSESSMENT — ENCOUNTER SYMPTOMS
CHEST TIGHTNESS: 0
APNEA: 0
SHORTNESS OF BREATH: 0
COUGH: 0
CHOKING: 0

## 2025-09-01 ASSESSMENT — PAIN SCALES - GENERAL
PAINLEVEL_OUTOF10: 0

## 2025-09-02 ENCOUNTER — APPOINTMENT (OUTPATIENT)
Dept: MRI IMAGING | Age: 65
End: 2025-09-02
Payer: MEDICARE

## 2025-09-02 LAB
ALBUMIN SERPL-MCNC: 3.6 G/DL (ref 3.4–5)
ANION GAP SERPL CALCULATED.3IONS-SCNC: 12 MMOL/L (ref 3–16)
BUN SERPL-MCNC: 13 MG/DL (ref 7–20)
CALCIUM SERPL-MCNC: 9.1 MG/DL (ref 8.3–10.6)
CHLORIDE SERPL-SCNC: 105 MMOL/L (ref 99–110)
CO2 SERPL-SCNC: 23 MMOL/L (ref 21–32)
CREAT SERPL-MCNC: 0.6 MG/DL (ref 0.6–1.2)
EST. AVERAGE GLUCOSE BLD GHB EST-MCNC: 119.8 MG/DL
GFR SERPLBLD CREATININE-BSD FMLA CKD-EPI: >90 ML/MIN/{1.73_M2}
GLUCOSE SERPL-MCNC: 122 MG/DL (ref 70–99)
HBA1C MFR BLD: 5.8 %
MAGNESIUM SERPL-MCNC: 1.99 MG/DL (ref 1.8–2.4)
PHOSPHATE SERPL-MCNC: 3.6 MG/DL (ref 2.5–4.9)
POTASSIUM SERPL-SCNC: 4.1 MMOL/L (ref 3.5–5.1)
SODIUM SERPL-SCNC: 140 MMOL/L (ref 136–145)

## 2025-09-02 PROCEDURE — 6360000004 HC RX CONTRAST MEDICATION: Performed by: HOSPITALIST

## 2025-09-02 PROCEDURE — 97535 SELF CARE MNGMENT TRAINING: CPT

## 2025-09-02 PROCEDURE — G0378 HOSPITAL OBSERVATION PER HR: HCPCS

## 2025-09-02 PROCEDURE — 70553 MRI BRAIN STEM W/O & W/DYE: CPT

## 2025-09-02 PROCEDURE — 2500000003 HC RX 250 WO HCPCS

## 2025-09-02 PROCEDURE — 6360000002 HC RX W HCPCS

## 2025-09-02 PROCEDURE — A9576 INJ PROHANCE MULTIPACK: HCPCS | Performed by: HOSPITALIST

## 2025-09-02 PROCEDURE — 6370000000 HC RX 637 (ALT 250 FOR IP)

## 2025-09-02 PROCEDURE — 80069 RENAL FUNCTION PANEL: CPT

## 2025-09-02 PROCEDURE — 97161 PT EVAL LOW COMPLEX 20 MIN: CPT

## 2025-09-02 PROCEDURE — 36415 COLL VENOUS BLD VENIPUNCTURE: CPT

## 2025-09-02 PROCEDURE — 97530 THERAPEUTIC ACTIVITIES: CPT

## 2025-09-02 PROCEDURE — 83735 ASSAY OF MAGNESIUM: CPT

## 2025-09-02 PROCEDURE — 97165 OT EVAL LOW COMPLEX 30 MIN: CPT

## 2025-09-02 PROCEDURE — 97116 GAIT TRAINING THERAPY: CPT

## 2025-09-02 PROCEDURE — 96375 TX/PRO/DX INJ NEW DRUG ADDON: CPT

## 2025-09-02 RX ORDER — GADOTERIDOL 279.3 MG/ML
18 INJECTION INTRAVENOUS
Status: COMPLETED | OUTPATIENT
Start: 2025-09-02 | End: 2025-09-02

## 2025-09-02 RX ORDER — CELECOXIB 100 MG/1
100 CAPSULE ORAL ONCE
Status: COMPLETED | OUTPATIENT
Start: 2025-09-02 | End: 2025-09-02

## 2025-09-02 RX ORDER — MIDAZOLAM HYDROCHLORIDE 1 MG/ML
0.5 INJECTION, SOLUTION INTRAMUSCULAR; INTRAVENOUS ONCE
Status: COMPLETED | OUTPATIENT
Start: 2025-09-02 | End: 2025-09-02

## 2025-09-02 RX ADMIN — PROGESTERONE 200 MG: 100 CAPSULE ORAL at 08:18

## 2025-09-02 RX ADMIN — AMLODIPINE BESYLATE 10 MG: 10 TABLET ORAL at 21:00

## 2025-09-02 RX ADMIN — ASPIRIN 81 MG: 81 TABLET, CHEWABLE ORAL at 08:18

## 2025-09-02 RX ADMIN — GADOTERIDOL 18 ML: 279.3 INJECTION, SOLUTION INTRAVENOUS at 17:39

## 2025-09-02 RX ADMIN — SODIUM CHLORIDE, PRESERVATIVE FREE 10 ML: 5 INJECTION INTRAVENOUS at 08:18

## 2025-09-02 RX ADMIN — POLYETHYLENE GLYCOL 3350 17 G: 17 POWDER, FOR SOLUTION ORAL at 08:33

## 2025-09-02 RX ADMIN — MIDAZOLAM HYDROCHLORIDE 0.5 MG: 1 INJECTION, SOLUTION INTRAMUSCULAR; INTRAVENOUS at 16:50

## 2025-09-02 RX ADMIN — THYROID 90 MG: 30 TABLET ORAL at 06:51

## 2025-09-02 RX ADMIN — LEVOTHYROXINE SODIUM 75 MCG: 0.07 TABLET ORAL at 06:51

## 2025-09-02 RX ADMIN — CELECOXIB 100 MG: 100 CAPSULE ORAL at 21:35

## 2025-09-02 ASSESSMENT — PAIN SCALES - GENERAL
PAINLEVEL_OUTOF10: 0

## 2025-09-02 ASSESSMENT — ENCOUNTER SYMPTOMS
CHEST TIGHTNESS: 0
SHORTNESS OF BREATH: 0
COUGH: 0
APNEA: 0
CHOKING: 0

## 2025-09-03 VITALS
TEMPERATURE: 98.6 F | HEART RATE: 97 BPM | HEIGHT: 64 IN | DIASTOLIC BLOOD PRESSURE: 75 MMHG | RESPIRATION RATE: 17 BRPM | WEIGHT: 193.4 LBS | BODY MASS INDEX: 33.02 KG/M2 | OXYGEN SATURATION: 96 % | SYSTOLIC BLOOD PRESSURE: 138 MMHG

## 2025-09-03 LAB
ALBUMIN SERPL-MCNC: 3.9 G/DL (ref 3.4–5)
ANION GAP SERPL CALCULATED.3IONS-SCNC: 11 MMOL/L (ref 3–16)
BUN SERPL-MCNC: 13 MG/DL (ref 7–20)
CALCIUM SERPL-MCNC: 9.3 MG/DL (ref 8.3–10.6)
CHLORIDE SERPL-SCNC: 102 MMOL/L (ref 99–110)
CHOLEST SERPL-MCNC: 216 MG/DL (ref 0–199)
CO2 SERPL-SCNC: 28 MMOL/L (ref 21–32)
CREAT SERPL-MCNC: 0.7 MG/DL (ref 0.6–1.2)
GFR SERPLBLD CREATININE-BSD FMLA CKD-EPI: >90 ML/MIN/{1.73_M2}
GLUCOSE SERPL-MCNC: 127 MG/DL (ref 70–99)
HDLC SERPL-MCNC: 41 MG/DL (ref 40–60)
LDLC SERPL CALC-MCNC: 143 MG/DL
MAGNESIUM SERPL-MCNC: 2.15 MG/DL (ref 1.8–2.4)
PHOSPHATE SERPL-MCNC: 3.5 MG/DL (ref 2.5–4.9)
POTASSIUM SERPL-SCNC: 3.8 MMOL/L (ref 3.5–5.1)
SODIUM SERPL-SCNC: 141 MMOL/L (ref 136–145)
TRIGL SERPL-MCNC: 159 MG/DL (ref 0–150)
VLDLC SERPL CALC-MCNC: 32 MG/DL

## 2025-09-03 PROCEDURE — 80069 RENAL FUNCTION PANEL: CPT

## 2025-09-03 PROCEDURE — 6370000000 HC RX 637 (ALT 250 FOR IP)

## 2025-09-03 PROCEDURE — G0378 HOSPITAL OBSERVATION PER HR: HCPCS

## 2025-09-03 PROCEDURE — 36415 COLL VENOUS BLD VENIPUNCTURE: CPT

## 2025-09-03 PROCEDURE — 83735 ASSAY OF MAGNESIUM: CPT

## 2025-09-03 RX ORDER — SCOPOLAMINE 1 MG/3D
1 PATCH, EXTENDED RELEASE TRANSDERMAL
Qty: 10 PATCH | Refills: 0 | Status: SHIPPED | OUTPATIENT
Start: 2025-09-03

## 2025-09-03 RX ORDER — SCOPOLAMINE 1 MG/3D
1 PATCH, EXTENDED RELEASE TRANSDERMAL
Status: DISCONTINUED | OUTPATIENT
Start: 2025-09-03 | End: 2025-09-03 | Stop reason: HOSPADM

## 2025-09-03 RX ORDER — ATORVASTATIN CALCIUM 80 MG/1
80 TABLET, FILM COATED ORAL NIGHTLY
Qty: 30 TABLET | Refills: 3 | Status: SHIPPED | OUTPATIENT
Start: 2025-09-03

## 2025-09-03 RX ADMIN — PROGESTERONE 200 MG: 100 CAPSULE ORAL at 09:10

## 2025-09-03 RX ADMIN — ASPIRIN 81 MG: 81 TABLET, CHEWABLE ORAL at 09:08

## 2025-09-03 RX ADMIN — POLYETHYLENE GLYCOL 3350 17 G: 17 POWDER, FOR SOLUTION ORAL at 09:08

## 2025-09-03 RX ADMIN — THYROID 90 MG: 30 TABLET ORAL at 05:48

## 2025-09-03 RX ADMIN — LEVOTHYROXINE SODIUM 75 MCG: 0.07 TABLET ORAL at 05:47

## 2025-09-03 ASSESSMENT — ENCOUNTER SYMPTOMS
CHOKING: 0
COUGH: 0
SHORTNESS OF BREATH: 0
CHEST TIGHTNESS: 0
APNEA: 0

## 2025-09-03 ASSESSMENT — PAIN SCALES - GENERAL
PAINLEVEL_OUTOF10: 0

## (undated) DEVICE — HIGH FLOW TIP

## (undated) DEVICE — AGENT HEMOSTATIC SURGIFLOW MATRIX KIT W/THROMBIN

## (undated) DEVICE — SUTURE FIBERWIRE SZ 2 W/ TAPERED NEEDLE BLUE L38IN NONABSORB BLU L26.5MM 1/2 CIRCLE AR7200

## (undated) DEVICE — TOOL T14MH25M LEGEND 14CM 2.5MM MH 2FLT: Brand: MIDAS REX™

## (undated) DEVICE — STAPLER SKIN H3.9MM WIRE DIA0.58MM CRWN 6.9MM 35 STPL ROT

## (undated) DEVICE — BIPOLAR SEALER 23-112-1 AQM 6.0: Brand: AQUAMANTYS ®

## (undated) DEVICE — COVER LT HNDL BLU PLAS

## (undated) DEVICE — SUTURE VCRL SZ 3-0 L27IN ABSRB UD L26MM CT-2 1/2 CIR J232H

## (undated) DEVICE — C-ARM: Brand: UNBRANDED

## (undated) DEVICE — UNDERGLOVE SURG SZ 8.5 FNGR THK0.21MIL GRN LTX BEAD CUF

## (undated) DEVICE — 3M™ COBAN™ NL STERILE NON-LATEX SELF-ADHERENT WRAP, 2086S, 6 IN X 5 YD (15 CM X 4,5 M), 12 ROLLS/CASE: Brand: 3M™ COBAN™

## (undated) DEVICE — SUTURE VCRL SZ 1 L18IN ABSRB UD L36MM CT-1 1/2 CIR J841D

## (undated) DEVICE — SUTURE VCRL SZ 3-0 L27IN ABSRB UD L36MM CT-1 1/2 CIR J258H

## (undated) DEVICE — SUTURE VIC COAT BR VIO CP2 4-0 18IN J392H

## (undated) DEVICE — PROBE BALL TIP STIMULATING 25

## (undated) DEVICE — SUTURE MCRYL SZ 4-0 L27IN ABSRB UD L19MM PS-2 1/2 CIR PRIM Y426H

## (undated) DEVICE — NEURO SPONGES: Brand: DEROYAL

## (undated) DEVICE — GOWN SURGICAL XL-XLONG L52IN BLUE POLY REINFORCED BREATHABLE FILM SLEEVE

## (undated) DEVICE — PLATE ES AD W 9FT CRD 2

## (undated) DEVICE — GLOVE SURG SZ 8 L12IN FNGR THK75MIL WHT LTX POLYMER BEAD

## (undated) DEVICE — APPLICATOR PREP 26ML 0.7% IOD POVACRYLEX 74% ISO ALC ST

## (undated) DEVICE — GLOVE SURG SZ 65 CRM LTX FREE POLYISOPRENE POLYMER BEAD ANTI

## (undated) DEVICE — SUTURE STRATAFIX SPRL SZ 1 L14IN ABSRB VLT L48CM CTX 1/2 SXPD2B405

## (undated) DEVICE — PAD,NON-ADHERENT,3X8,STERILE,LF,1/PK: Brand: MEDLINE

## (undated) DEVICE — NEEDLE SUT PASS FOR ARTHSCP SCORPION

## (undated) DEVICE — BLADE MYR OFFSET 45DEG SPEAR TIP NAR SHFT W/ RND KNURLED

## (undated) DEVICE — BLANKET WRM W29.9XL79.1IN UP BODY FORC AIR MISTRAL-AIR

## (undated) DEVICE — 3M™ IOBAN™ 2 ANTIMICROBIAL INCISE DRAPE 6640EZ: Brand: IOBAN™ 2

## (undated) DEVICE — E-Z CLEAN, NON-STICK, PTFE COATED, ELECTROSURGICAL BLADE ELECTRODE, MODIFIED EXTENDED INSULATION, 2.5 INCH (6.35 CM): Brand: MEGADYNE

## (undated) DEVICE — ZIMMER® STERILE DISPOSABLE TOURNIQUET CUFF WITH PLC, DUAL PORT, SINGLE BLADDER, 30 IN. (76 CM)

## (undated) DEVICE — DISSECTOR LAP DIA5MM BLNT TIP ENDOPATH

## (undated) DEVICE — STERILE SYNTHETIC POLYISOPRENE POWDER-FREE SURGICAL GLOVES WITH HYDROGEL COATING, SMOOTH FINISH, STRAIGHT FINGER: Brand: PROTEXIS

## (undated) DEVICE — PUDDLEVAC FLOOR SUCTION DEVICE: Brand: PUDDLEVAC

## (undated) DEVICE — STANDARD HYPODERMIC NEEDLE,POLYPROPYLENE HUB: Brand: MONOJECT

## (undated) DEVICE — TRAY CATHETER 16FR F INCLUDE BARDX IC COMPLT CARE DRNGE BG

## (undated) DEVICE — HANDPIECE SUCTION TUBING INTERPULSE 10FT

## (undated) DEVICE — TOOL 14MH30 LEGEND 14CM 3MM: Brand: MIDAS REX ™

## (undated) DEVICE — PADDING CAST N ADH 12X6 IN CRIMPED FINISH 100% COTTON WBRLII

## (undated) DEVICE — PACK PROCEDURE SURG TOTAL KNEE

## (undated) DEVICE — GOWN,REINFRCE,POLY,ECLIPSE,SLV,3XLG: Brand: MEDLINE

## (undated) DEVICE — SUTURE VCRL SZ 0 L18IN ABSRB UD L36MM CT-1 1/2 CIR J840D

## (undated) DEVICE — JEWISH HOSPITAL TURNOVER KIT: Brand: MEDLINE INDUSTRIES, INC.

## (undated) DEVICE — 2108 SERIES SAGITTAL BLADE (9.1 X 0.64 X 35.2MM)

## (undated) DEVICE — TOWEL,STOP FLAG GOLD N-W: Brand: MEDLINE

## (undated) DEVICE — 2108 SERIES SAGITTAL BLADE FAN, OFFSET  (29.0 X 0.89 X 73.0MM)

## (undated) DEVICE — CATHETER IV 14GA L5.25IN PERIPH ORNG FEP POLYMER 3 BVL

## (undated) DEVICE — STERILE PVP: Brand: MEDLINE INDUSTRIES, INC.

## (undated) DEVICE — SYSTEM SKIN CLSR 22CM DERMBND PRINEO

## (undated) DEVICE — NEURO FUSION ADD-ON PACK: Brand: MEDLINE INDUSTRIES, INC.

## (undated) DEVICE — KIT EVAC 400CC DIA1/8IN H PAT 12.5IN 3 SPR RND SHP PVC DRN

## (undated) DEVICE — PIN FIX THRD TRINICA SYS STRL

## (undated) DEVICE — MARKER SURG SKIN UTIL BLK REG TIP NONSMEARING W/ 6IN RUL

## (undated) DEVICE — SYRINGE MED 30ML STD CLR PLAS LUERLOCK TIP N CTRL DISP

## (undated) DEVICE — PAD DRY FLOOR ABS 32X58IN GRN

## (undated) DEVICE — KNEE HOLDER DISPOSABLE LINER: Brand: ALVARADO®  KNEE SUPPORT

## (undated) DEVICE — 3M™ STERI-DRAPE™ INSTRUMENT POUCH 1018: Brand: STERI-DRAPE™

## (undated) DEVICE — 3M™ TEGADERM™ TRANSPARENT FILM DRESSING FRAME STYLE, 1626W, 4 IN X 4-3/4 IN (10 CM X 12 CM), 50/CT 4CT/CASE: Brand: 3M™ TEGADERM™

## (undated) DEVICE — SUTURE VCRL SZ 2-0 L18IN ABSRB UD CT-1 L36MM 1/2 CIR J839D

## (undated) DEVICE — BLADE SHV L13CM DIA5MM EXCALIBUR AGG COOLCUT

## (undated) DEVICE — 3M™ IOBAN™ 2 ANTIMICROBIAL INCISE DRAPE 6648EZ: Brand: IOBAN™ 2

## (undated) DEVICE — E-Z CLEAN, NON-STICK, PTFE COATED, ELECTROSURGICAL BLADE ELECTRODE, 2.5 INCH (6.35 CM): Brand: EZ CLEAN

## (undated) DEVICE — UNDERGLOVE SURG SZ 8 BLU LTX FREE SYN POLYISOPRENE POLYMER

## (undated) DEVICE — TUBING FLD MGMT Y DBL SPIK DUALWAVE

## (undated) DEVICE — TUBE SUCT LAPSCP

## (undated) DEVICE — COVER,TABLE,HEAVY DUTY,77"X90",STRL: Brand: MEDLINE

## (undated) DEVICE — BIT DRL OD2.3MM REUSE TRINICA

## (undated) DEVICE — SOLUTION IV IRRIG 0.9% NACL 3000ML BAG 2B7477

## (undated) DEVICE — BLADE,CARBON-STEEL,15,STRL,DISPOSABLE,TB: Brand: MEDLINE

## (undated) DEVICE — CANNULA ARTHSCP L4CM DIA8MM PASSPRT BTTN

## (undated) DEVICE — BLADE SAW RECIP HVY DUTY LNG 27796327] STRYKER CORP]

## (undated) DEVICE — DRAPE 33X23IN INCISE ANTIMICROB IOBAN 2

## (undated) DEVICE — BLADE,CARBON-STEEL,11,STRL,DISPOSABLE,TB: Brand: MEDLINE

## (undated) DEVICE — DRAPE,INSTRUMENT,MAGNETIC,10X16: Brand: MEDLINE

## (undated) DEVICE — COTTON BALLS: Brand: DEROYAL

## (undated) DEVICE — DRESSING THERABOND 3D ANTIMIC CNTCT SYS 15INCHX10INCH

## (undated) DEVICE — COVER XR CASS W21XL40IN UNIV ADH MICROSHIELD

## (undated) DEVICE — ADHESIVE SKIN CLSR 0.7ML TOP DERMBND ADV

## (undated) DEVICE — SST TWIST DRILL, STANDARD, 3.2MM DIA. X 127MM: Brand: MICROAIRE®

## (undated) DEVICE — SOLUTION IV IRRIG WATER 1000ML POUR BRL 2F7114

## (undated) DEVICE — KIT POS PT CARE KT W/ GENTLE TCH WILSON +

## (undated) DEVICE — STERILE POLYISOPRENE POWDER-FREE SURGICAL GLOVES WITH EMOLLIENT COATING: Brand: PROTEXIS

## (undated) DEVICE — TUBING PMP L6FT CONT WAVE EXTN

## (undated) DEVICE — BLADE 1882940HR 5PK M4 INF TURB 2.9MM: Brand: STRAIGHTSHOT

## (undated) DEVICE — SPONGE,NEURO,0.5"X3",XR,STRL,LF,10/PK: Brand: MEDLINE

## (undated) DEVICE — BOWL AND CEMENT CARTRIDGE WITH BREAKAWAY FEMORAL NOZZLE AND MEDIUM PRESSURIZER: Brand: ACM

## (undated) DEVICE — SUTURE PERMAHAND SZ 2-0 L12X18IN NONABSORBABLE BLK SILK A185H

## (undated) DEVICE — SOLUTION IV 1000ML 0.9% SOD CHL

## (undated) DEVICE — GOWN,SIRUS,POLYRNF,BRTHSLV,XL,30/CS: Brand: MEDLINE

## (undated) DEVICE — NEEDLE SPNL L3.5IN PNK HUB S STL REG WALL FIT STYL W/ QNCKE

## (undated) DEVICE — SYRINGE IRRIG 60ML SFT PLIABLE BLB EZ TO GRP 1 HND USE W/

## (undated) DEVICE — Device: Brand: JELCO

## (undated) DEVICE — CONTAINER,SPECIMEN,PNEU TUBE,3OZ,OR STRL: Brand: MEDLINE

## (undated) DEVICE — SPONGE,PEANUT,XRAY,ST,SM,3/8",5/CARD: Brand: MEDLINE INDUSTRIES, INC.

## (undated) DEVICE — GLOVE 6 LTX ST BIOGEL M PF BEAD CUFF

## (undated) DEVICE — GLOVE SURG SZ 75 L12IN FNGR THK94MIL TRNSLUC YEL LTX

## (undated) DEVICE — DRAPE MICSCP W54XL150IN W/ 4 BINOC GLS LENS LEICA

## (undated) DEVICE — TOWEL,OR,DSP,ST,BLUE,DLX,8/PK,10PK/CS: Brand: MEDLINE

## (undated) DEVICE — GARMENT,MEDLINE,DVT,INT,CALF,MED, GEN2: Brand: MEDLINE

## (undated) DEVICE — BUR SHAVER 5 MMX13 CM 8 FLUT OVL FOR AGGRESSIVE BNE COOLCUT

## (undated) DEVICE — COVER LT HNDL GRN PLAS FLX SFT

## (undated) DEVICE — SUTURE ETHLN SZ 3-0 L30IN NONABSORBABLE BLK L36MM FSLX 3/8 1673BH

## (undated) DEVICE — CUFF TRNQT W4XL18IN 2 PRT SGL BLDR CYL DISP

## (undated) DEVICE — SUTURE MCRYL + SZ 4-0 L18IN ABSRB UD L19MM PS-2 3/8 CIR MCP496G

## (undated) DEVICE — 3M™ TEGADERM™ TRANSPARENT FILM DRESSING FRAME STYLE, 1627, 4 IN X 10 IN (10 CM X 25 CM), 20/CT 4CT/CASE: Brand: 3M™ TEGADERM™

## (undated) DEVICE — CANNULA ARTHSCP L7CM DIA7MM TRNSLUC THRD FLX W/ NO SQUIRT

## (undated) DEVICE — GLOVE ORANGE PI 7 1/2   MSG9075

## (undated) DEVICE — SUTURE PDS II SZ 0 L60IN ABSRB VLT L48MM CTX 1/2 CIR Z990G

## (undated) DEVICE — E-Z CLEAN, NON-STICK, PTFE COATED, ELECTROSURGICAL BLADE ELECTRODE, 6.5 INCH (16.5 CM): Brand: MEGADYNE

## (undated) DEVICE — SPONGE,NEURO,1"X3",XR,STRL,LF,10/PK: Brand: MEDLINE

## (undated) DEVICE — SHEET,T,THYROID,STERILE: Brand: MEDLINE

## (undated) DEVICE — 3M™ STERI-DRAPE™ U-DRAPE 1015: Brand: STERI-DRAPE™

## (undated) DEVICE — DRAPE MICSCP W132XL406CM LENS DIA68MM W VARI LENS2 FOR LEICA

## (undated) DEVICE — PEEL-AWAY HOOD: Brand: FLYTE, SURGICOOL

## (undated) DEVICE — BANDAGE GZ W3XL75IN RAYON POLY 1 PLY WV FINISHED EDGE CNFRM

## (undated) DEVICE — COVER LT HNDL CAM BLU DISP W/ SURG CTRL

## (undated) DEVICE — SHOULDER ARTHROSCOPY: Brand: MEDLINE INDUSTRIES, INC.

## (undated) DEVICE — BLANKET WRM W40.2XL55.9IN IORT LO BODY + MISTRAL AIR

## (undated) DEVICE — APPLIER LIG CLP M L11IN TI STR RNG HNDL FOR 20 CLP DISP

## (undated) DEVICE — CUFF RESTRN WRST OR ANK 45FT AD FOAM

## (undated) DEVICE — 60 ML SYRINGE,REGULAR TIP: Brand: MONOJECT

## (undated) DEVICE — SPONGE,LAP,18"X18",DLX,XR,ST,5/PK,40/PK: Brand: MEDLINE

## (undated) DEVICE — LAMINECTOMY: Brand: MEDLINE INDUSTRIES, INC.

## (undated) DEVICE — TOWEL MSG G N WVN STP FLAG

## (undated) DEVICE — SURE SET-DOUBLE BASIN-LF: Brand: MEDLINE INDUSTRIES, INC.

## (undated) DEVICE — YANKAUER,OPEN TIP,W/O VENT,STERILE: Brand: MEDLINE INDUSTRIES, INC.

## (undated) DEVICE — NASAL FESS: Brand: MEDLINE INDUSTRIES, INC.

## (undated) DEVICE — CABLE BPLR L12FT FLYING LD DISPOSABLE

## (undated) DEVICE — PACK,UNIVERSAL,NO GOWNS: Brand: MEDLINE

## (undated) DEVICE — CORD,CAUTERY,BIPOLAR,STERILE: Brand: MEDLINE

## (undated) DEVICE — SYRINGE MED 10ML LUERLOCK TIP W/O SFTY DISP

## (undated) DEVICE — GLOVE SURG SZ 75 L12IN FNGR THK87MIL DK GRN LTX FREE ISOLEX

## (undated) DEVICE — PADDING CAST W4INXL4YD HIGHLY ABSRB THAN COT EZ APPL

## (undated) DEVICE — ORTHO PRE OP PACK: Brand: MEDLINE INDUSTRIES, INC.

## (undated) DEVICE — GLOVE ORTHO 7 1/2   MSG9475

## (undated) DEVICE — APPLIER CLP L9.375IN APER 2.1MM CLS L3.8MM 20 SM TI CLP

## (undated) DEVICE — SPONGE GZ W4XL8IN COT WVN 12 PLY

## (undated) DEVICE — SPONGE,LAP,4"X18",XR,ST,5/PK,40PK/CS: Brand: MEDLINE INDUSTRIES, INC.

## (undated) DEVICE — SST BUR, WIRE PASS DRILL, 2 FLUTES, MED., 2MM DIA.: Brand: MICROAIRE®

## (undated) DEVICE — STRAP SFTY W5XL72IN 1 PC

## (undated) DEVICE — TUBING, SUCTION, 1/4" X 12', STRAIGHT: Brand: MEDLINE

## (undated) DEVICE — SUTURE NONABSORBABLE MONOFILAMENT 4-0 PS-2 18 IN BLK ETHILON 1667G

## (undated) DEVICE — BLADE 1884004HR TRICUT 5PK M4 4MM ROTATE: Brand: TRICUT

## (undated) DEVICE — BANDAGE GZ W2XL75IN ST RAYON POLY CNFRM STRTCH LTWT

## (undated) DEVICE — SHEET, T, LAPAROTOMY, STERILE: Brand: MEDLINE

## (undated) DEVICE — BLADE OPHTH 180DEG CUT SURF BLU STR SHRP DBL BVL GRINDLESS

## (undated) DEVICE — CODMAN® SURGICAL PATTIES 3/4" X 3/4" (1.91CM X 1.91CM): Brand: CODMAN®

## (undated) DEVICE — Z DISCOTINUED USE 2306229 KIT PT CARE POS PD SL BAR PD CRADL PLLW FOR 5321 WILSON +

## (undated) DEVICE — DUAL CUT SAGITTAL BLADE

## (undated) DEVICE — SURGICAL SET UP - SURE SET: Brand: MEDLINE INDUSTRIES, INC.

## (undated) DEVICE — PROBE ABLAT XL 90DEG ASPIR BPLR RF 1 PC ELECTRD ERGO HNDL

## (undated) DEVICE — SOLUTION IV IRRIG LACTATED RINGERS 3000ML 2B7487

## (undated) DEVICE — TOURNIQUET PHLEB L EXSANGUINATING FOR AD DGT TOURNI-COT

## (undated) DEVICE — PAD,EYE,1-5/8X2 5/8,STERILE,LF,1/PK: Brand: MEDLINE

## (undated) DEVICE — SUTURE PERMAHAND SZ 2-0 L30IN NONABSORBABLE BLK SILK W/O A305H

## (undated) DEVICE — GARMENT COMPR M FOR 12-18IN CALF INTMIT SGL BLDR HEMO-FORCE

## (undated) DEVICE — PROTECTOR ULN NRV PUR FOAM HK LOOP STRP ANATOMICALLY

## (undated) DEVICE — SUTURE PDS II SZ 1 L27IN ABSRB VLT CT-1 L36MM 1/2 CIR Z341H

## (undated) DEVICE — TUBING PMP L16FT MAIN DISP FOR AR-6400 AR-6475

## (undated) DEVICE — TRAY IRRIG 60ML PVP BLB TIP SYR CSR PVP

## (undated) DEVICE — Device

## (undated) DEVICE — CHLORAPREP 26ML ORANGE

## (undated) DEVICE — LAMINECTOMY PK